# Patient Record
Sex: MALE | Race: BLACK OR AFRICAN AMERICAN | Employment: FULL TIME | URBAN - METROPOLITAN AREA
[De-identification: names, ages, dates, MRNs, and addresses within clinical notes are randomized per-mention and may not be internally consistent; named-entity substitution may affect disease eponyms.]

---

## 2020-04-14 ENCOUNTER — APPOINTMENT (OUTPATIENT)
Dept: GENERAL RADIOLOGY | Age: 65
End: 2020-04-14
Payer: COMMERCIAL

## 2020-04-14 ENCOUNTER — HOSPITAL ENCOUNTER (EMERGENCY)
Age: 65
Discharge: HOME | End: 2020-04-14
Attending: EMERGENCY MEDICINE
Payer: COMMERCIAL

## 2020-04-14 VITALS
SYSTOLIC BLOOD PRESSURE: 165 MMHG | RESPIRATION RATE: 18 BRPM | WEIGHT: 270 LBS | HEART RATE: 80 BPM | HEIGHT: 70 IN | DIASTOLIC BLOOD PRESSURE: 111 MMHG | BODY MASS INDEX: 38.65 KG/M2 | OXYGEN SATURATION: 95 % | TEMPERATURE: 98.3 F

## 2020-04-14 LAB
A/G RATIO: 1.2 (ref 1.1–2.2)
ALBUMIN SERPL-MCNC: 3.8 G/DL (ref 3.4–5)
ALP BLD-CCNC: 72 U/L (ref 40–129)
ALT SERPL-CCNC: 20 U/L (ref 10–40)
ANION GAP SERPL CALCULATED.3IONS-SCNC: 12 MMOL/L (ref 3–16)
AST SERPL-CCNC: 24 U/L (ref 15–37)
BASOPHILS ABSOLUTE: 0.1 K/UL (ref 0–0.2)
BASOPHILS RELATIVE PERCENT: 2 %
BILIRUB SERPL-MCNC: 0.3 MG/DL (ref 0–1)
BUN BLDV-MCNC: 16 MG/DL (ref 7–20)
CALCIUM SERPL-MCNC: 9.5 MG/DL (ref 8.3–10.6)
CHLORIDE BLD-SCNC: 102 MMOL/L (ref 99–110)
CO2: 25 MMOL/L (ref 21–32)
CREAT SERPL-MCNC: 1 MG/DL (ref 0.8–1.3)
EOSINOPHILS ABSOLUTE: 0.3 K/UL (ref 0–0.6)
EOSINOPHILS RELATIVE PERCENT: 4.7 %
GFR AFRICAN AMERICAN: >60
GFR NON-AFRICAN AMERICAN: >60
GLOBULIN: 3.2 G/DL
GLUCOSE BLD-MCNC: 117 MG/DL (ref 70–99)
HCT VFR BLD CALC: 45 % (ref 40.5–52.5)
HEMOGLOBIN: 15 G/DL (ref 13.5–17.5)
INR BLD: 1.04 (ref 0.86–1.14)
LYMPHOCYTES ABSOLUTE: 2.8 K/UL (ref 1–5.1)
LYMPHOCYTES RELATIVE PERCENT: 41.5 %
MCH RBC QN AUTO: 24.8 PG (ref 26–34)
MCHC RBC AUTO-ENTMCNC: 33.3 G/DL (ref 31–36)
MCV RBC AUTO: 74.6 FL (ref 80–100)
MONOCYTES ABSOLUTE: 0.9 K/UL (ref 0–1.3)
MONOCYTES RELATIVE PERCENT: 13 %
NEUTROPHILS ABSOLUTE: 2.6 K/UL (ref 1.7–7.7)
NEUTROPHILS RELATIVE PERCENT: 38.8 %
PDW BLD-RTO: 13.7 % (ref 12.4–15.4)
PLATELET # BLD: 191 K/UL (ref 135–450)
PMV BLD AUTO: 9 FL (ref 5–10.5)
POTASSIUM SERPL-SCNC: 3.9 MMOL/L (ref 3.5–5.1)
PROTHROMBIN TIME: 12.1 SEC (ref 10–13.2)
RBC # BLD: 6.04 M/UL (ref 4.2–5.9)
SODIUM BLD-SCNC: 139 MMOL/L (ref 136–145)
TOTAL PROTEIN: 7 G/DL (ref 6.4–8.2)
WBC # BLD: 6.7 K/UL (ref 4–11)

## 2020-04-14 PROCEDURE — 80053 COMPREHEN METABOLIC PANEL: CPT

## 2020-04-14 PROCEDURE — 6370000000 HC RX 637 (ALT 250 FOR IP): Performed by: EMERGENCY MEDICINE

## 2020-04-14 PROCEDURE — 99283 EMERGENCY DEPT VISIT LOW MDM: CPT

## 2020-04-14 PROCEDURE — 71045 X-RAY EXAM CHEST 1 VIEW: CPT

## 2020-04-14 PROCEDURE — 85610 PROTHROMBIN TIME: CPT

## 2020-04-14 PROCEDURE — 85025 COMPLETE CBC W/AUTO DIFF WBC: CPT

## 2020-04-14 RX ORDER — PREDNISONE 20 MG/1
40 TABLET ORAL ONCE
Status: COMPLETED | OUTPATIENT
Start: 2020-04-14 | End: 2020-04-14

## 2020-04-14 RX ORDER — ALBUTEROL SULFATE 90 UG/1
2 AEROSOL, METERED RESPIRATORY (INHALATION) EVERY 4 HOURS PRN
Qty: 1 INHALER | Refills: 0 | Status: ON HOLD | OUTPATIENT
Start: 2020-04-14 | End: 2022-01-11

## 2020-04-14 RX ORDER — PREDNISONE 10 MG/1
40 TABLET ORAL DAILY
Qty: 16 TABLET | Refills: 0 | Status: SHIPPED | OUTPATIENT
Start: 2020-04-14 | End: 2020-04-18

## 2020-04-14 RX ORDER — AZITHROMYCIN 250 MG/1
500 TABLET, FILM COATED ORAL ONCE
Status: COMPLETED | OUTPATIENT
Start: 2020-04-14 | End: 2020-04-14

## 2020-04-14 RX ORDER — AZITHROMYCIN 250 MG/1
250 TABLET, FILM COATED ORAL DAILY
Qty: 5 TABLET | Refills: 0 | Status: SHIPPED | OUTPATIENT
Start: 2020-04-14 | End: 2020-04-19

## 2020-04-14 RX ADMIN — AZITHROMYCIN MONOHYDRATE 500 MG: 250 TABLET ORAL at 02:02

## 2020-04-14 RX ADMIN — PREDNISONE 40 MG: 20 TABLET ORAL at 02:02

## 2020-04-14 NOTE — ED PROVIDER NOTES
Attends meetings of clubs or organizations: Not on file     Relationship status: Not on file    Intimate partner violence     Fear of current or ex partner: Not on file     Emotionally abused: Not on file     Physically abused: Not on file     Forced sexual activity: Not on file   Other Topics Concern    Not on file   Social History Narrative    Not on file     No current facility-administered medications for this encounter. Current Outpatient Medications   Medication Sig Dispense Refill    predniSONE (DELTASONE) 10 MG tablet Take 4 tablets by mouth daily for 4 days 16 tablet 0    albuterol sulfate HFA (PROVENTIL HFA) 108 (90 Base) MCG/ACT inhaler Inhale 2 puffs into the lungs every 4 hours as needed for Wheezing 1 Inhaler 0    azithromycin (ZITHROMAX) 250 MG tablet Take 1 tablet by mouth daily for 5 days 2 pills on day 1 and 1 pill on days 2-5 5 tablet 0    lisinopril (PRINIVIL;ZESTRIL) 10 MG tablet Take 10 mg by mouth daily      Multiple Vitamins-Minerals (THERAPEUTIC MULTIVITAMIN-MINERALS) tablet Take 1 tablet by mouth daily      Ascorbic Acid (VITAMIN C) 500 MG tablet Take 250 mg by mouth daily      Omega-3 Fatty Acids (FISH OIL) 1000 MG CAPS Take 1,000 mg by mouth daily      Ginkgo Biloba Extract (GNP GINGKO BILOBA EXTRACT) 60 MG CAPS Take by mouth daily      naproxen (NAPROSYN) 500 MG tablet Take 500 mg by mouth 2 times daily as needed for Pain       No Known Allergies    REVIEW OF SYSTEMS  10 systems reviewed, pertinent positives per HPI otherwise noted to be negative. PHYSICAL EXAM  BP (!) 165/111   Pulse 80   Temp 98.3 °F (36.8 °C) (Oral)   Resp 18   Ht 5' 10\" (1.778 m)   Wt 270 lb (122.5 kg)   SpO2 95%   BMI 38.74 kg/m²   GENERAL APPEARANCE: Awake and alert. Cooperative. No acute distress. HEAD: Normocephalic. Atraumatic. EYES: PERRL. EOM's grossly intact. ENT: Mucous membranes are moist.   NECK: Supple. HEART: RRR.    CHEST/LUNGS: Chest atraumatic, nontender, MILD WHEEZE

## 2020-04-15 ENCOUNTER — CARE COORDINATION (OUTPATIENT)
Dept: CARE COORDINATION | Age: 65
End: 2020-04-15

## 2020-04-15 NOTE — CARE COORDINATION
COVID-19 ED/Flu Clinic Initial Outreach Note    Patient contacted regarding recent visit for viral symptoms. This Anthony Turpin contacted the patient by telephone to perform post discharge call. Verified name and  with patient as identifiers. Provided introduction to self, and reason for call due to viral symptoms of infection and/or exposure to COVID-19. Patient presented to emergency department/flu clinic with complaints of viral symptoms/exposure to COVID. Patient reports symptoms are improving. Due to no new or worsening symptoms the RN CTN/ACADRIAN was not notified for escalation. Discussed exposure protocols and quarantine with CDC Guidelines What To Do If You Are Sick    Patient was given an opportunity for questions and concerns. Stay home except to get medical care    Separate yourself from other people and animals in your home    Call ahead before visiting your doctor    Wear a facemask    Cover your coughs and sneezes    Clean your hands often    Avoid sharing personal household items    Clean all high-touch surfaces everyday    Monitor your symptoms  Seek prompt medical attention if your illness is worsening (e.g., difficulty breathing). Before seeking care, call your healthcare provider and tell them that you have, or are being evaluated for, COVID-19. Put on a facemask before you enter the facility. These steps will help the healthcare provider's office to keep other people in the office or waiting room from getting infected or exposed. Ask your healthcare provider to call the local or Critical access hospital health department. Persons who are placed under If you have a medical emergency and need to call 911, notify the dispatch personnel that you have, or are being evaluated for COVID-19. If possible, put on a facemask before emergency medical services arrive.     The patient agrees to contact the [facility] exposure line [phone], local health department [place] [facility]: ([phone]) and PCP office

## 2020-04-17 ENCOUNTER — TELEPHONE (OUTPATIENT)
Dept: PULMONOLOGY | Age: 65
End: 2020-04-17

## 2020-04-17 NOTE — TELEPHONE ENCOUNTER
Within this Telehealth Consent, the terms you and yours refer to the person using the Telehealth Service (Service), or in the case of a use of the Service by or on behalf of a minor, you and yours refer to and include (i) the parent or legal guardian who provides consent to the use of the Service by such minor or uses the Service on behalf of such minor, and (ii) the minor for whom consent is being provided or on whose behalf the Service is being utilized. When using Service, you will be consulting with your health care providers via the use of Telehealth.   Telehealth involves the delivery of healthcare services using electronic communications, information technology or other means between a healthcare provider and a patient who are not in the same physical location. Telehealth may be used for diagnosis, treatment, follow-up and/or patient education, and may include, but is not limited to, one or more of the following:    Electronic transmission of medical records, photo images, personal health information or other data between a patient and a healthcare provider    Interactions between a patient and healthcare provider via audio, video and/or data communications    Use of output data from medical devices, sound and video files    Anticipated Benefits   The use of Telehealth by your Provider(s) through the Service may have the following possible benefits:    Making it easier and more efficient for you to access medical care and treatment for the conditions treated by such Provider(s) utilizing the Service    Allowing you to obtain medical care and treatment by Provider(s) at times that are convenient for you    Enabling you to interact with Provider(s) without the necessity of an in-office appointment     Possible Risks   While the use of Telehealth can provide potential benefits for you, there are also potential risks associated with the use of Telehealth.  These risks include, but may not be

## 2020-04-22 ENCOUNTER — VIRTUAL VISIT (OUTPATIENT)
Dept: PULMONOLOGY | Age: 65
End: 2020-04-22
Payer: COMMERCIAL

## 2020-04-22 ENCOUNTER — TELEPHONE (OUTPATIENT)
Dept: PULMONOLOGY | Age: 65
End: 2020-04-22

## 2020-04-22 PROCEDURE — 99204 OFFICE O/P NEW MOD 45 MIN: CPT | Performed by: INTERNAL MEDICINE

## 2020-04-22 ASSESSMENT — ENCOUNTER SYMPTOMS
STRIDOR: 0
CHOKING: 0
EYE PAIN: 0
VOICE CHANGE: 0
EYE DISCHARGE: 0
CHEST TIGHTNESS: 1
SORE THROAT: 0
EYE ITCHING: 0
CONSTIPATION: 0
DIARRHEA: 0
COUGH: 1
ABDOMINAL PAIN: 0

## 2020-04-22 NOTE — PROGRESS NOTES
Pulmonary Outpatient Note   Erinn Hinson MD       4/22/2020    Chief Complaint:  New Patient (Ref Dr Ramírez Martínez Frequent Bronchitis)     HPI:   76y.o. year old male here for further evaluation of hemoptysis. Has a remote history of sarcoidosis based on chest imaging and what sounds like a nerve biopsy due to sciatica pain he was experiencing over 20 years ago. He had not respiratory symptoms at that time and no further workup or treatment was pursued. A week ago he experienced three days of a daily cough productive of yellow sputum and streaks of blood, mild congestion, substernal chest tightness. No fevers, sick contacts, or shortness of breath. Was evaluated in the ED, had a CXR I Personally reviewed showing no acute process. Labs reviewed, no significant lymphopenia or eosinophilia. Discharged on azithromycin, prednisone, and albuterol MDI. He subsequently improved. Had one episode earlier this week of chest burning after he was done mowing the lawn. Denies any preceding allergic or asthma symptoms. Has not been using his albuterol MDI as he did not feel that he needed to.        Past Medical History:   Diagnosis Date    Hypertension     Kidney stone     Mitral valve prolapse 1980    resolved on own    Sarcoidosis     Spinal stenosis        Past Surgical History:   Procedure Laterality Date    BACK SURGERY  1992    inflamed nerve    COLONOSCOPY  5/5/2015       Social History     Tobacco Use    Smoking status: Never Smoker    Smokeless tobacco: Never Used   Substance Use Topics    Alcohol use: Yes     Comment: 2 x week          Family History   Problem Relation Age of Onset    High Blood Pressure Mother     Stroke Father          Current Outpatient Medications:     albuterol sulfate HFA (PROVENTIL HFA) 108 (90 Base) MCG/ACT inhaler, Inhale 2 puffs into the lungs every 4 hours as needed for Wheezing, Disp: 1 Inhaler, Rfl: 0    lisinopril (PRINIVIL;ZESTRIL) 10 MG tablet, Take 10 mg by again  -Return to clinic after CT chest for further workup    Orders Placed This Encounter   Procedures    CT Chest Martir Varma MD    Evon Bruner is a 76 y.o. male being evaluated by a Virtual Visit (video visit) encounter to address concerns as mentioned above. A caregiver was present when appropriate. Due to this being a TeleHealth encounter ([facility]), evaluation of the following organ systems was limited: Vitals/Constitutional/EENT/Resp/CV/GI//MS/Neuro/Skin/Heme-Lymph-Imm. Pursuant to the emergency declaration under the [address] [place] [address], [address] [place] [address] authority and the [employer] Act, this Virtual Visit was conducted with patient's (and/or legal guardian's) consent, to reduce the patient's risk of exposure to COVID-19 and provide necessary medical care. The patient (and/or legal guardian) has also been advised to contact this office for worsening conditions or problems, and seek emergency medical treatment and/or call 911 if deemed necessary. Services were provided through a video synchronous discussion virtually to substitute for in-person clinic visit. Patient and provider were located at their individual homes. --Emile Pham MD on 4/22/2020 at 12:31 PM    An electronic signature was used to authenticate this note.

## 2020-04-22 NOTE — TELEPHONE ENCOUNTER
----- Message from Derek Almaguer MD sent at 4/22/2020 12:32 PM EDT -----  CT chest in June and follow up visit after   LM on am

## 2020-06-22 ENCOUNTER — HOSPITAL ENCOUNTER (OUTPATIENT)
Dept: CT IMAGING | Age: 65
Discharge: HOME | End: 2020-06-22
Payer: COMMERCIAL

## 2020-06-22 PROCEDURE — 71250 CT THORAX DX C-: CPT

## 2020-06-29 ENCOUNTER — VIRTUAL VISIT (OUTPATIENT)
Dept: PULMONOLOGY | Age: 65
End: 2020-06-29
Payer: COMMERCIAL

## 2020-06-29 PROCEDURE — 99213 OFFICE O/P EST LOW 20 MIN: CPT | Performed by: INTERNAL MEDICINE

## 2020-06-29 ASSESSMENT — ENCOUNTER SYMPTOMS
EYE ITCHING: 0
SORE THROAT: 0
ABDOMINAL PAIN: 0
CONSTIPATION: 0
STRIDOR: 0
CHOKING: 0
EYE DISCHARGE: 0
DIARRHEA: 0
CHEST TIGHTNESS: 0
VOICE CHANGE: 0
EYE PAIN: 0

## 2020-06-29 NOTE — PROGRESS NOTES
MA Communication: The following orders are received by verbal communication from Dr. Marie Pierce. Orders include: Pt.  To FU PRN

## 2020-06-29 NOTE — PATIENT INSTRUCTIONS
Remember to bring all pulmonary medications to your next appointment with the office. Please keep all of your future appointments scheduled by [facility] Pul[facility] office. Out of respect for other patients and providers, you may be asked to reschedule your appointment if you arrive later than your scheduled appointment time. Appointments cancelled less than 24hrs in advance will be considered a no show. Patients with three missed appointments within 1 year or four missed appointments within 2 years can be dismissed from the practice. You may receive a survey regarding the care you received during your visit. Your input is valuable to us. We encourage you to complete and return your survey. We hope you will choose us in the future for your healthcare needs.

## 2020-06-29 NOTE — PROGRESS NOTES
Pulmonary Outpatient Note   Guy Gaxiola MD       6/29/2020    Chief Complaint:  Follow-up and Results (CT)     HPI:   76y.o. year old male here for further evaluation hemoptysis. Virtual visit through [facility]. me    CT chest personally reviewed, peribronchial thickening and subtle apical emphysema. He denies any significant symptoms at this time from a respiratory standpoint. States that he goes outside and engages in moderate exertion without difficulty.    No further hemoptysis  Was prescribed an inhaler last visit but has not required it     Past Medical History:   Diagnosis Date    Hypertension     Kidney stone     Mitral valve prolapse 1980    resolved on own    Sarcoidosis     Spinal stenosis        Past Surgical History:   Procedure Laterality Date    BACK SURGERY  1992    inflamed nerve    COLONOSCOPY  5/5/2015       Social History     Tobacco Use    Smoking status: Never Smoker    Smokeless tobacco: Never Used   Substance Use Topics    Alcohol use: Yes     Comment: 2 x week       Family History   Problem Relation Age of Onset    High Blood Pressure Mother     Stroke Father          Current Outpatient Medications:     albuterol sulfate HFA (PROVENTIL HFA) 108 (90 Base) MCG/ACT inhaler, Inhale 2 puffs into the lungs every 4 hours as needed for Wheezing, Disp: 1 Inhaler, Rfl: 0    lisinopril (PRINIVIL;ZESTRIL) 10 MG tablet, Take 10 mg by mouth daily, Disp: , Rfl:     Multiple Vitamins-Minerals (THERAPEUTIC MULTIVITAMIN-MINERALS) tablet, Take 1 tablet by mouth daily, Disp: , Rfl:     Ascorbic Acid (VITAMIN C) 500 MG tablet, Take 250 mg by mouth daily, Disp: , Rfl:     Omega-3 Fatty Acids (FISH OIL) 1000 MG CAPS, Take 1,000 mg by mouth daily, Disp: , Rfl:     Ginkgo Biloba Extract (GNP GINGKO BILOBA EXTRACT) 60 MG CAPS, Take by mouth daily, Disp: , Rfl:     naproxen (NAPROSYN) 500 MG tablet, Take 500 mg by mouth 2 times daily as needed for Pain, Disp: , Rfl:     Patient has no known

## 2022-01-04 ENCOUNTER — APPOINTMENT (OUTPATIENT)
Dept: GENERAL RADIOLOGY | Age: 67
DRG: 500 | End: 2022-01-04
Payer: COMMERCIAL

## 2022-01-04 ENCOUNTER — APPOINTMENT (OUTPATIENT)
Dept: CT IMAGING | Age: 67
DRG: 500 | End: 2022-01-04
Payer: COMMERCIAL

## 2022-01-04 ENCOUNTER — HOSPITAL ENCOUNTER (INPATIENT)
Age: 67
LOS: 7 days | Discharge: NURSING, REHAB OR LONG-TERM CARE | DRG: 500 | End: 2022-01-11
Attending: EMERGENCY MEDICINE | Admitting: INTERNAL MEDICINE
Payer: COMMERCIAL

## 2022-01-04 DIAGNOSIS — S76.111A: ICD-10-CM

## 2022-01-04 DIAGNOSIS — S76.112A TENDON RUPTURE, TRAUMATIC. QUADRICEPS, LEFT, INITIAL ENCOUNTER: Primary | ICD-10-CM

## 2022-01-04 PROBLEM — I10 HTN (HYPERTENSION): Status: ACTIVE | Noted: 2022-01-04

## 2022-01-04 PROBLEM — S76.119A: Status: ACTIVE | Noted: 2022-01-04

## 2022-01-04 PROBLEM — S46.212A BICEPS TENDON RUPTURE, LEFT, INITIAL ENCOUNTER: Status: ACTIVE | Noted: 2022-01-04

## 2022-01-04 LAB
A/G RATIO: 1.3 (ref 1.1–2.2)
ALBUMIN SERPL-MCNC: 3.8 G/DL (ref 3.4–5)
ALP BLD-CCNC: 68 U/L (ref 40–129)
ALT SERPL-CCNC: 16 U/L (ref 10–40)
ANION GAP SERPL CALCULATED.3IONS-SCNC: 10 MMOL/L (ref 3–16)
APTT: 22.5 SEC (ref 26.2–38.6)
AST SERPL-CCNC: 26 U/L (ref 15–37)
BASOPHILS ABSOLUTE: 0.1 K/UL (ref 0–0.2)
BASOPHILS RELATIVE PERCENT: 0.7 %
BILIRUB SERPL-MCNC: 0.7 MG/DL (ref 0–1)
BUN BLDV-MCNC: 11 MG/DL (ref 7–20)
CALCIUM SERPL-MCNC: 8.7 MG/DL (ref 8.3–10.6)
CHLORIDE BLD-SCNC: 102 MMOL/L (ref 99–110)
CO2: 25 MMOL/L (ref 21–32)
CREAT SERPL-MCNC: 0.9 MG/DL (ref 0.8–1.3)
EOSINOPHILS ABSOLUTE: 0 K/UL (ref 0–0.6)
EOSINOPHILS RELATIVE PERCENT: 0.6 %
GFR AFRICAN AMERICAN: >60
GFR NON-AFRICAN AMERICAN: >60
GLUCOSE BLD-MCNC: 103 MG/DL (ref 70–99)
HCT VFR BLD CALC: 42.7 % (ref 40.5–52.5)
HEMOGLOBIN: 13.4 G/DL (ref 13.5–17.5)
INR BLD: 1.08 (ref 0.88–1.12)
LYMPHOCYTES ABSOLUTE: 1.1 K/UL (ref 1–5.1)
LYMPHOCYTES RELATIVE PERCENT: 13.5 %
MCH RBC QN AUTO: 23.6 PG (ref 26–34)
MCHC RBC AUTO-ENTMCNC: 31.4 G/DL (ref 31–36)
MCV RBC AUTO: 75.2 FL (ref 80–100)
MONOCYTES ABSOLUTE: 0.7 K/UL (ref 0–1.3)
MONOCYTES RELATIVE PERCENT: 9 %
NEUTROPHILS ABSOLUTE: 6.3 K/UL (ref 1.7–7.7)
NEUTROPHILS RELATIVE PERCENT: 76.2 %
PDW BLD-RTO: 13.8 % (ref 12.4–15.4)
PLATELET # BLD: 176 K/UL (ref 135–450)
PMV BLD AUTO: 8.4 FL (ref 5–10.5)
POTASSIUM REFLEX MAGNESIUM: 3.6 MMOL/L (ref 3.5–5.1)
PROTHROMBIN TIME: 12.2 SEC (ref 9.9–12.7)
RBC # BLD: 5.67 M/UL (ref 4.2–5.9)
SARS-COV-2, NAAT: NOT DETECTED
SODIUM BLD-SCNC: 137 MMOL/L (ref 136–145)
TOTAL CK: 467 U/L (ref 39–308)
TOTAL PROTEIN: 6.7 G/DL (ref 6.4–8.2)
TROPONIN: <0.01 NG/ML
WBC # BLD: 8.3 K/UL (ref 4–11)

## 2022-01-04 PROCEDURE — 84484 ASSAY OF TROPONIN QUANT: CPT

## 2022-01-04 PROCEDURE — 73560 X-RAY EXAM OF KNEE 1 OR 2: CPT

## 2022-01-04 PROCEDURE — 87635 SARS-COV-2 COVID-19 AMP PRB: CPT

## 2022-01-04 PROCEDURE — 73562 X-RAY EXAM OF KNEE 3: CPT

## 2022-01-04 PROCEDURE — 85730 THROMBOPLASTIN TIME PARTIAL: CPT

## 2022-01-04 PROCEDURE — 82550 ASSAY OF CK (CPK): CPT

## 2022-01-04 PROCEDURE — 80053 COMPREHEN METABOLIC PANEL: CPT

## 2022-01-04 PROCEDURE — 85610 PROTHROMBIN TIME: CPT

## 2022-01-04 PROCEDURE — 1200000000 HC SEMI PRIVATE

## 2022-01-04 PROCEDURE — 2580000003 HC RX 258: Performed by: HOSPITALIST

## 2022-01-04 PROCEDURE — 73700 CT LOWER EXTREMITY W/O DYE: CPT

## 2022-01-04 PROCEDURE — 6370000000 HC RX 637 (ALT 250 FOR IP): Performed by: HOSPITALIST

## 2022-01-04 PROCEDURE — 99283 EMERGENCY DEPT VISIT LOW MDM: CPT

## 2022-01-04 PROCEDURE — 93005 ELECTROCARDIOGRAM TRACING: CPT | Performed by: HOSPITALIST

## 2022-01-04 PROCEDURE — 6360000002 HC RX W HCPCS: Performed by: HOSPITALIST

## 2022-01-04 PROCEDURE — 85025 COMPLETE CBC W/AUTO DIFF WBC: CPT

## 2022-01-04 RX ORDER — POTASSIUM CHLORIDE 7.45 MG/ML
10 INJECTION INTRAVENOUS PRN
Status: DISCONTINUED | OUTPATIENT
Start: 2022-01-04 | End: 2022-01-10

## 2022-01-04 RX ORDER — OXYCODONE HYDROCHLORIDE 5 MG/1
5 TABLET ORAL EVERY 6 HOURS PRN
Status: DISCONTINUED | OUTPATIENT
Start: 2022-01-04 | End: 2022-01-05

## 2022-01-04 RX ORDER — SODIUM CHLORIDE 0.9 % (FLUSH) 0.9 %
10 SYRINGE (ML) INJECTION PRN
Status: DISCONTINUED | OUTPATIENT
Start: 2022-01-04 | End: 2022-01-05

## 2022-01-04 RX ORDER — SODIUM CHLORIDE 0.9 % (FLUSH) 0.9 %
10 SYRINGE (ML) INJECTION EVERY 12 HOURS SCHEDULED
Status: DISCONTINUED | OUTPATIENT
Start: 2022-01-04 | End: 2022-01-05

## 2022-01-04 RX ORDER — GABAPENTIN 300 MG/1
300 CAPSULE ORAL 3 TIMES DAILY
Status: DISCONTINUED | OUTPATIENT
Start: 2022-01-04 | End: 2022-01-08

## 2022-01-04 RX ORDER — LISINOPRIL 10 MG/1
10 TABLET ORAL NIGHTLY
Status: DISCONTINUED | OUTPATIENT
Start: 2022-01-04 | End: 2022-01-06

## 2022-01-04 RX ORDER — ONDANSETRON 2 MG/ML
4 INJECTION INTRAMUSCULAR; INTRAVENOUS EVERY 6 HOURS PRN
Status: DISCONTINUED | OUTPATIENT
Start: 2022-01-04 | End: 2022-01-11 | Stop reason: HOSPADM

## 2022-01-04 RX ORDER — MAGNESIUM SULFATE IN WATER 40 MG/ML
2000 INJECTION, SOLUTION INTRAVENOUS PRN
Status: DISCONTINUED | OUTPATIENT
Start: 2022-01-04 | End: 2022-01-10

## 2022-01-04 RX ORDER — SODIUM CHLORIDE 9 MG/ML
INJECTION, SOLUTION INTRAVENOUS CONTINUOUS
Status: ACTIVE | OUTPATIENT
Start: 2022-01-04 | End: 2022-01-05

## 2022-01-04 RX ORDER — SODIUM CHLORIDE 9 MG/ML
25 INJECTION, SOLUTION INTRAVENOUS PRN
Status: DISCONTINUED | OUTPATIENT
Start: 2022-01-04 | End: 2022-01-11 | Stop reason: HOSPADM

## 2022-01-04 RX ORDER — CYCLOBENZAPRINE HCL 10 MG
5 TABLET ORAL 2 TIMES DAILY
Status: DISCONTINUED | OUTPATIENT
Start: 2022-01-04 | End: 2022-01-11 | Stop reason: HOSPADM

## 2022-01-04 RX ORDER — M-VIT,TX,IRON,MINS/CALC/FOLIC 27MG-0.4MG
1 TABLET ORAL DAILY
Status: DISCONTINUED | OUTPATIENT
Start: 2022-01-05 | End: 2022-01-11 | Stop reason: HOSPADM

## 2022-01-04 RX ORDER — ASCORBIC ACID 500 MG
250 TABLET ORAL DAILY
Status: DISCONTINUED | OUTPATIENT
Start: 2022-01-05 | End: 2022-01-11 | Stop reason: HOSPADM

## 2022-01-04 RX ORDER — SENNA PLUS 8.6 MG/1
1 TABLET ORAL DAILY PRN
Status: DISCONTINUED | OUTPATIENT
Start: 2022-01-04 | End: 2022-01-11 | Stop reason: HOSPADM

## 2022-01-04 RX ORDER — GABAPENTIN 300 MG/1
CAPSULE ORAL NIGHTLY
COMMUNITY
Start: 2021-12-13 | End: 2022-04-07

## 2022-01-04 RX ORDER — ACETAMINOPHEN 500 MG
1000 TABLET ORAL EVERY 6 HOURS PRN
Status: DISCONTINUED | OUTPATIENT
Start: 2022-01-04 | End: 2022-01-11 | Stop reason: HOSPADM

## 2022-01-04 RX ORDER — LIDOCAINE 4 G/G
1 PATCH TOPICAL DAILY
Status: DISCONTINUED | OUTPATIENT
Start: 2022-01-04 | End: 2022-01-05

## 2022-01-04 RX ORDER — ACETAMINOPHEN 325 MG/1
650 TABLET ORAL EVERY 6 HOURS PRN
Status: DISCONTINUED | OUTPATIENT
Start: 2022-01-04 | End: 2022-01-11 | Stop reason: HOSPADM

## 2022-01-04 RX ORDER — LISINOPRIL 10 MG/1
10 TABLET ORAL EVERY EVENING
Status: ON HOLD | COMMUNITY
End: 2022-04-09 | Stop reason: HOSPADM

## 2022-01-04 RX ORDER — PROMETHAZINE HYDROCHLORIDE 25 MG/1
12.5 TABLET ORAL EVERY 6 HOURS PRN
Status: DISCONTINUED | OUTPATIENT
Start: 2022-01-04 | End: 2022-01-11 | Stop reason: HOSPADM

## 2022-01-04 RX ORDER — ACETAMINOPHEN 650 MG/1
650 SUPPOSITORY RECTAL EVERY 6 HOURS PRN
Status: DISCONTINUED | OUTPATIENT
Start: 2022-01-04 | End: 2022-01-11 | Stop reason: HOSPADM

## 2022-01-04 RX ORDER — KETOROLAC TROMETHAMINE 30 MG/ML
15 INJECTION, SOLUTION INTRAMUSCULAR; INTRAVENOUS ONCE
Status: COMPLETED | OUTPATIENT
Start: 2022-01-04 | End: 2022-01-04

## 2022-01-04 RX ORDER — LISINOPRIL 10 MG/1
10 TABLET ORAL DAILY
Status: DISCONTINUED | OUTPATIENT
Start: 2022-01-05 | End: 2022-01-04

## 2022-01-04 RX ORDER — POTASSIUM CHLORIDE 20 MEQ/1
40 TABLET, EXTENDED RELEASE ORAL PRN
Status: DISCONTINUED | OUTPATIENT
Start: 2022-01-04 | End: 2022-01-10

## 2022-01-04 RX ADMIN — LISINOPRIL 10 MG: 10 TABLET ORAL at 23:43

## 2022-01-04 RX ADMIN — Medication 10 ML: at 23:44

## 2022-01-04 RX ADMIN — KETOROLAC TROMETHAMINE 15 MG: 30 INJECTION, SOLUTION INTRAMUSCULAR; INTRAVENOUS at 20:50

## 2022-01-04 RX ADMIN — SODIUM CHLORIDE: 9 INJECTION, SOLUTION INTRAVENOUS at 23:46

## 2022-01-04 RX ADMIN — CYCLOBENZAPRINE 5 MG: 10 TABLET, FILM COATED ORAL at 20:50

## 2022-01-04 RX ADMIN — GABAPENTIN 300 MG: 300 CAPSULE ORAL at 23:43

## 2022-01-04 RX ADMIN — OXYCODONE 5 MG: 5 TABLET ORAL at 20:50

## 2022-01-04 ASSESSMENT — ENCOUNTER SYMPTOMS
SHORTNESS OF BREATH: 0
VOMITING: 0
NAUSEA: 0

## 2022-01-04 ASSESSMENT — PAIN DESCRIPTION - LOCATION: LOCATION: KNEE

## 2022-01-04 ASSESSMENT — PAIN DESCRIPTION - ORIENTATION: ORIENTATION: RIGHT;LEFT

## 2022-01-04 ASSESSMENT — PAIN SCALES - GENERAL
PAINLEVEL_OUTOF10: 1
PAINLEVEL_OUTOF10: 0
PAINLEVEL_OUTOF10: 5

## 2022-01-04 ASSESSMENT — PAIN DESCRIPTION - PAIN TYPE: TYPE: ACUTE PAIN

## 2022-01-04 NOTE — ED PROVIDER NOTES
Emergency Department Provider Note  Location: [facility]  ED  1/4/2022     Patient Identification  Lin Xavier is a 78 y.o. male    Chief Complaint  Knee Pain (pulled down in mud while walking dog, hit right knee. unable to bear weight.)      Mode of Arrival  EMS    HPI  (History provided by patient)  This is a 78 y.o. male with a PMH significant for HTN, pre-diabetes presented today for right knee pain and swelling. Patient said he was walking his dog on a muddy slope. He slid and fell and has not been able to get up since. He reports significant pain in the right knee was swelling. He denies hip pain. He reports normal sensation and motor distally. Patient tried to get up and could not so he called 911. He rates his pain 1/10 in intensity when he does not move. When he attempts to bear weight, that's when pain is \"severe. \"  He denies hitting his head. No loss of consciousness. ROS  Review of Systems   Respiratory: Negative for shortness of breath. Cardiovascular: Positive for leg swelling (chronic lower leg edema). Negative for chest pain. Gastrointestinal: Negative for nausea and vomiting. Genitourinary: Negative for flank pain. Musculoskeletal: Positive for arthralgias (right knee pain) and joint swelling. Neurological: Negative for syncope and headaches. Psychiatric/Behavioral: Negative for confusion. I have reviewed the following nursing documentation:  Allergies: No Known Allergies    Past medical history:  has a past medical history of Hypertension, Kidney stone, Mitral valve prolapse (1980), Sarcoidosis, and Spinal stenosis. Past surgical history:  has a past surgical history that includes back surgery (1992) and Colonoscopy (5/5/2015). Home medications:   Prior to Admission medications    Medication Sig Start Date End Date Taking?  Authorizing Provider   albuterol sulfate HFA (PROVENTIL HFA) 108 (90 Base) MCG/ACT inhaler Inhale 2 puffs into the lungs every 4 hours as needed for Wheezing 4/14/20 4/14/21  Terrell MEDINA DO   lisinopril (PRINIVIL;ZESTRIL) 10 MG tablet Take 10 mg by mouth daily    Historical Provider, MD   Multiple Vitamins-Minerals (THERAPEUTIC MULTIVITAMIN-MINERALS) tablet Take 1 tablet by mouth daily    Historical Provider, MD   Ascorbic Acid (VITAMIN C) 500 MG tablet Take 250 mg by mouth daily    Historical Provider, MD   Omega-3 Fatty Acids (FISH OIL) 1000 MG CAPS Take 1,000 mg by mouth daily    Historical Provider, MD   Ginkgo Biloba Extract (GNP GINGKO BILOBA EXTRACT) 60 MG CAPS Take by mouth daily    Historical Provider, MD   naproxen (NAPROSYN) 500 MG tablet Take 500 mg by mouth 2 times daily as needed for Pain    Historical Provider, MD       Social history:  reports that he has never smoked. He has never used smokeless tobacco. He reports current alcohol use. He reports that he does not use drugs. Family history:    Family History   Problem Relation Age of Onset    High Blood Pressure Mother     Stroke Father        Exam  ED Triage Vitals   BP Temp Temp Source Pulse Resp SpO2 Height Weight   01/04/22 1410 01/04/22 1411 01/04/22 1411 01/04/22 1409 01/04/22 1409 01/04/22 1409 01/04/22 1409 01/04/22 1409   (!) 168/108 97.7 °F (36.5 °C) Axillary 83 15 96 % 5' 10\" (1.778 m) 265 lb (120.2 kg)   Physical Exam  Vitals and nursing note reviewed. Constitutional:       General: He is not in acute distress. Appearance: Normal appearance. He is well-developed. He is not diaphoretic. HENT:      Head: Normocephalic and atraumatic. Eyes:      General: No scleral icterus. Right eye: No discharge. Left eye: No discharge. Neck:      Trachea: No tracheal deviation. Cardiovascular:      Rate and Rhythm: Normal rate and regular rhythm. Pulses:           Dorsalis pedis pulses are detected w/ Doppler on the right side and detected w/ Doppler on the left side.         Posterior tibial pulses are detected w/ Doppler on the right side and detected w/ Doppler on the left side. Comments: Pedal pulses - good biphasic signal on dopperler  Pulmonary:      Effort: Pulmonary effort is normal. No respiratory distress. Breath sounds: No stridor. Musculoskeletal:      Cervical back: Neck supple. No tenderness. Right knee: Effusion present. No crepitus. Decreased range of motion (patient has about 10-15 degree active ROM). Normal pulse. Left knee: Deformity (obvious defect of the quadriceps tendon) present. No crepitus. Decreased range of motion (patient has no active flexion or extension of the left knee). Normal pulse. Right lower leg: Edema present. Left lower leg: Edema (edema of foot and ankle noted - chronic per patient) present. Comments: Hips and ankles nontender   Skin:     General: Skin is warm and dry. Capillary Refill: Capillary refill takes less than 2 seconds. Findings: No bruising or laceration. Neurological:      General: No focal deficit present. Mental Status: He is alert and oriented to person, place, and time. Cranial Nerves: No dysarthria or facial asymmetry. Sensory: No sensory deficit. Psychiatric:         Behavior: Behavior normal. Behavior is cooperative. MDM/ED Course  Radiology  XR KNEE LEFT (1-2 VIEWS)    Result Date: 1/4/2022  EXAM: XR Left Knee, 1 or 2 Views EXAM DATE/TIME: 1/4/2022 3:28 pm CLINICAL HISTORY: ORDERING SYSTEM PROVIDED  fall, palpable defect at the femoral tendon and patient cannot flex his knee  TECHNOLOGIST PROVIDED HISTORY:  Reason for exam:->fall, palpable defect at the femoral tendon and patient cannot flex his knee  Reason for Exam: fall, pt cannot flex knee TECHNIQUE: Frontal and/or lateral views of the left knee. COMPARISON: No relevant prior studies available. FINDINGS: Bones/joints:  No acute findings. No acute fracture. No dislocation.  Soft tissues:  Suprapatellar region is not well seen on the lateral view and therefore abnormalities in this area cannot be excluded. Also soft tissue injury or tendon tear cannot be excluded by this exam if suspected clinically. 1.  No evidence of fracture. 2.  Suprapatellar region is not well seen on the lateral view and therefore abnormalities in this area cannot be excluded. Also soft tissue injury or tendon tear cannot be excluded by this exam if suspected clinically. MRI examination would be recommended. XR KNEE RIGHT (1-2 VIEWS)    Result Date: 1/4/2022  EXAM: XR Right Knee, 1 or 2 Views EXAM DATE/TIME: 1/4/2022 2:25 pm CLINICAL HISTORY: ORDERING SYSTEM PROVIDED  right knee swelling and pain s/p fall; can't bear weight  TECHNOLOGIST PROVIDED HISTORY:  Reason for exam:->right knee swelling and pain s/p fall; can't bear weight  Reason for Exam: knee pain after walking dog, pt unable to bear weight TECHNIQUE: Frontal and/or lateral views of the right knee. COMPARISON: No relevant prior studies available. FINDINGS: Bones/joints:  No acute findings. No acute fracture. No dislocation. Soft tissues: There appears to be significant edema, fluid or hematoma in the suprapatellar region and obscuring tissue planes. The quadriceps tendon is not well visualized and therefore tear of the tendon cannot be excluded if suspected clinically. Alternatively this could represent edema or hematoma due to muscular injury in the distal anterior thigh or a large joint effusion though no significant joint effusion is suggested on the lateral view anterior to the knee joint. 1.  There appears to be significant edema, fluid or hematoma in the suprapatellar region and obscuring tissue planes. The quadriceps tendon is not well visualized and therefore tear of the tendon cannot be excluded if suspected clinically.   Alternatively this could represent edema or hematoma due to muscular injury in the distal anterior thigh or a large joint effusion though no significant joint effusion is suggested on the lateral view anterior to the knee joint. Correlate clinically. 2.  No evidence of fracture or joint malalignment. CT KNEE LEFT WO CONTRAST    Result Date: 1/4/2022  EXAMINATION: CT OF THE LEFT KNEE WITHOUT CONTRAST; CT OF THE RIGHT KNEE WITHOUT CONTRAST 1/4/2022 4:07 pm TECHNIQUE: CT of the left knee was performed without the administration of intravenous contrast.  Multiplanar reformatted images are provided for review. Dose modulation, iterative reconstruction, and/or weight based adjustment of the mA/kV was utilized to reduce the radiation dose to as low as reasonably achievable.; CT of the right knee was performed without the administration of intravenous contrast.  Multiplanar reformatted images are provided for review. Dose modulation, iterative reconstruction, and/or weight based adjustment of the mA/kV was utilized to reduce the radiation dose to as low as reasonably achievable. COMPARISON: Right and left knee radiographs January 4, 2022 HISTORY ORDERING SYSTEM PROVIDED HISTORY: cannot bear weight; concern for complete tendon rupture TECHNOLOGIST PROVIDED HISTORY: Reason for exam:->cannot bear weight; concern for complete tendon rupture Decision Support Exception - unselect if not a suspected or confirmed emergency medical condition->Emergency Medical Condition (MA) Reason for Exam: fell today-cannot bear weight-left knee pain; ORDERING SYSTEM PROVIDED HISTORY: unable to bear weight bilaterally; concern for bilateral tendon rupture TECHNOLOGIST PROVIDED HISTORY: Reason for exam:->unable to bear weight bilaterally; concern for bilateral tendon rupture Decision Support Exception - unselect if not a suspected or confirmed emergency medical condition->Emergency Medical Condition (MA) Reason for Exam: fell today-cannot bear weight -right knee pain FINDINGS: CT LEFT KNEE: Bones: No acute fracture of the left knee identified. No suspicious lytic or sclerotic osseous lesion.   No osteolysis or periosteal reaction. Soft Tissue: Complete rupture of the distal left quadriceps tendon with surrounding soft tissue edema. Mild retraction of the torn tendon fibers. Adjacent soft tissue and intramuscular edema. Joint: Anatomic alignment of the knee with no evidence for dislocation. Small knee effusion. Mild osteoarthritis. CT RIGHT KNEE: Bones: No acute fracture of the right knee identified. No suspicious lytic or sclerotic osseous lesion. Soft Tissue: Complete rupture of the right distal quadriceps tendon with associated soft tissue edema and blood products extending along the distal anterior compartment musculature and adjacent subcutaneous tissues consistent with associated hematoma. The hematoma measures approximately 3.0 x 5.1 x 8.8 cm along its imaged portion. No subcutaneous gas. Joint: Anatomic alignment of the knee with no evidence for dislocation. Mild tricompartmental osteoarthritis. Small knee effusion. 1. Complete rupture of the bilateral distal quadriceps tendons with associated muscular strains and surrounding soft tissue edema and blood products. Larger hematoma is present on the right at the level of the distal quadriceps rupture which measures approximately 3.0 x 5.1 x 8.8 cm. 2. No acute fracture identified. 3. 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 RECOMMENDATIONS: Unavailable     CT KNEE RIGHT WO CONTRAST    Result Date: 1/4/2022  EXAMINATION: CT OF THE LEFT KNEE WITHOUT CONTRAST; CT OF THE RIGHT KNEE WITHOUT CONTRAST 1/4/2022 4:07 pm TECHNIQUE: CT of the left knee was performed without the administration of intravenous contrast.  Multiplanar reformatted images are provided for review.  Dose modulation, iterative reconstruction, and/or weight based adjustment of the mA/kV was utilized to reduce the radiation dose to as low as reasonably achievable.; CT of the right knee was performed without the administration of intravenous contrast.  Multiplanar reformatted images are provided for review. Dose modulation, iterative reconstruction, and/or weight based adjustment of the mA/kV was utilized to reduce the radiation dose to as low as reasonably achievable. COMPARISON: Right and left knee radiographs January 4, 2022 HISTORY ORDERING SYSTEM PROVIDED HISTORY: cannot bear weight; concern for complete tendon rupture TECHNOLOGIST PROVIDED HISTORY: Reason for exam:->cannot bear weight; concern for complete tendon rupture Decision Support Exception - unselect if not a suspected or confirmed emergency medical condition->Emergency Medical Condition (MA) Reason for Exam: fell today-cannot bear weight-left knee pain; ORDERING SYSTEM PROVIDED HISTORY: unable to bear weight bilaterally; concern for bilateral tendon rupture TECHNOLOGIST PROVIDED HISTORY: Reason for exam:->unable to bear weight bilaterally; concern for bilateral tendon rupture Decision Support Exception - unselect if not a suspected or confirmed emergency medical condition->Emergency Medical Condition (MA) Reason for Exam: fell today-cannot bear weight -right knee pain FINDINGS: CT LEFT KNEE: Bones: No acute fracture of the left knee identified. No suspicious lytic or sclerotic osseous lesion. No osteolysis or periosteal reaction. Soft Tissue: Complete rupture of the distal left quadriceps tendon with surrounding soft tissue edema. Mild retraction of the torn tendon fibers. Adjacent soft tissue and intramuscular edema. Joint: Anatomic alignment of the knee with no evidence for dislocation. Small knee effusion. Mild osteoarthritis. CT RIGHT KNEE: Bones: No acute fracture of the right knee identified. No suspicious lytic or sclerotic osseous lesion. Soft Tissue: Complete rupture of the right distal quadriceps tendon with associated soft tissue edema and blood products extending along the distal anterior compartment musculature and adjacent subcutaneous tissues consistent with associated hematoma.   The hematoma measures approximately 3.0 x 5.1 x 8.8 cm along its imaged portion. No subcutaneous gas. Joint: Anatomic alignment of the knee with no evidence for dislocation. Mild tricompartmental osteoarthritis. Small knee effusion. 1. Complete rupture of the bilateral distal quadriceps tendons with associated muscular strains and surrounding soft tissue edema and blood products. Larger hematoma is present on the right at the level of the distal quadriceps rupture which measures approximately 3.0 x 5.1 x 8.8 cm. 2. No acute fracture identified. 3. 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 RECOMMENDATIONS: Unavailable         Labs  Results for orders placed or performed during the hospital encounter of 01/04/22   CBC Auto Differential   Result Value Ref Range    WBC 8.3 4.0 - 11.0 K/uL    RBC 5.67 4.20 - 5.90 M/uL    Hemoglobin 13.4 (L) 13.5 - 17.5 g/dL    Hematocrit 42.7 40.5 - 52.5 %    MCV 75.2 (L) 80.0 - 100.0 fL    MCH 23.6 (L) 26.0 - 34.0 pg    MCHC 31.4 31.0 - 36.0 g/dL    RDW 13.8 12.4 - 15.4 %    Platelets 854 321 - 199 K/uL    MPV 8.4 5.0 - 10.5 fL    Neutrophils % 76.2 %    Lymphocytes % 13.5 %    Monocytes % 9.0 %    Eosinophils % 0.6 %    Basophils % 0.7 %    Neutrophils Absolute 6.3 1.7 - 7.7 K/uL    Lymphocytes Absolute 1.1 1.0 - 5.1 K/uL    Monocytes Absolute 0.7 0.0 - 1.3 K/uL    Eosinophils Absolute 0.0 0.0 - 0.6 K/uL    Basophils Absolute 0.1 0.0 - 0.2 K/uL   Comprehensive Metabolic Panel w/ Reflex to MG   Result Value Ref Range    Sodium 137 136 - 145 mmol/L    Potassium reflex Magnesium 3.6 3.5 - 5.1 mmol/L    Chloride 102 99 - 110 mmol/L    CO2 25 21 - 32 mmol/L    Anion Gap 10 3 - 16    Glucose 103 (H) 70 - 99 mg/dL    BUN 11 7 - 20 mg/dL    CREATININE 0.9 0.8 - 1.3 mg/dL    GFR Non-African American >60 >60    GFR African American >60 >60    Calcium 8.7 8.3 - 10.6 mg/dL         - Patient seen and evaluated in room 21.  78 y.o. male presented for fall, right knee pain and swelling.   On exam, there was also a defect in the left suprapatellar region concerning for quadricep tendon rupture. Patient is neurovascularly intact distally. Compartments of the lower extremity soft. No concern for compartment syndrome. Patient was unable to move his leg so x-rays were done. There was concern for quadriceps tendon rupture. I could not obtained MRI emergently from the emergency department so I consulted Ortho and spoke with MILTON Woods. He recommended CT of the extremity instead. CT show complete rupture of bilateral quadricep tendons. Mr. Debi Jones evaluated the patient in the ED. Plan was to place the patient in bilateral knee immobilizers and give him a set of crutches. If he is able to bear weight and walk with crutches, he can go home. However, patient was not able to even stand without assistance. Patient also lives in a trilevel home which will make it difficult for him as he must go upstairs or downstairs immediately upon entering his house. Patient is at significant fall risk, unsafe to go home and therefore we agreed to admission. I spoke with Dr. Juanita Sheehan, hospitalist. We thoroughly discussed the history, physical exam, laboratory and imaging studies, as well as, emergency department course. Based upon that discussion, we've decided to admit Lazara Ann for further observation and evaluation of Jerrlyn Finger bilateral quadriceps tendon rupture. As I have deemed necessary from their history, physical and studies, I have considered and evaluated Lazara Ann for the following diagnoses:  ACUTE FRACTURE, DISLOCATION, COMPARTMENT SYNDROME, NEUROVASCULAR OR TENDON INJURIES, DVT. Clinical Impression:  1. Tendon rupture, traumatic. quadriceps, left, initial encounter    2. Traumatic rupture of quadriceps tendon, right, initial encounter          Disposition:  Admit to med/surg floor in stable condition.     Blood pressure (!) 149/98, pulse 77, temperature 97.7 °F (36.5 °C), temperature source Axillary, resp. rate 14, height 5' 10\" (1.778 m), weight 265 lb (120.2 kg), SpO2 95 %. This chart was generated in part by using Dragon Dictation system and may contain errors related to that system including errors in grammar, punctuation, and spelling, as well as words and phrases that may be inappropriate. If there are any questions or concerns please feel free to contact the dictating provider for clarification.      Gerald Lin MD  [address] [place] [address] Jace Connelly MD  01/04/22 [id number]

## 2022-01-05 ENCOUNTER — ANESTHESIA (OUTPATIENT)
Dept: OPERATING ROOM | Age: 67
DRG: 500 | End: 2022-01-05
Payer: COMMERCIAL

## 2022-01-05 ENCOUNTER — ANESTHESIA EVENT (OUTPATIENT)
Dept: OPERATING ROOM | Age: 67
DRG: 500 | End: 2022-01-05
Payer: COMMERCIAL

## 2022-01-05 VITALS
DIASTOLIC BLOOD PRESSURE: 80 MMHG | RESPIRATION RATE: 4 BRPM | OXYGEN SATURATION: 100 % | SYSTOLIC BLOOD PRESSURE: 141 MMHG | TEMPERATURE: 96.4 F

## 2022-01-05 PROBLEM — R73.03 PRE-DIABETES: Status: ACTIVE | Noted: 2022-01-05

## 2022-01-05 LAB
A/G RATIO: 1.3 (ref 1.1–2.2)
ALBUMIN SERPL-MCNC: 3.4 G/DL (ref 3.4–5)
ALP BLD-CCNC: 64 U/L (ref 40–129)
ALT SERPL-CCNC: 14 U/L (ref 10–40)
ANION GAP SERPL CALCULATED.3IONS-SCNC: 9 MMOL/L (ref 3–16)
AST SERPL-CCNC: 26 U/L (ref 15–37)
BASOPHILS ABSOLUTE: 0 K/UL (ref 0–0.2)
BASOPHILS RELATIVE PERCENT: 0.9 %
BILIRUB SERPL-MCNC: 1.1 MG/DL (ref 0–1)
BILIRUBIN URINE: NEGATIVE
BLOOD, URINE: NEGATIVE
BUN BLDV-MCNC: 12 MG/DL (ref 7–20)
CALCIUM SERPL-MCNC: 8.3 MG/DL (ref 8.3–10.6)
CHLORIDE BLD-SCNC: 105 MMOL/L (ref 99–110)
CLARITY: CLEAR
CO2: 23 MMOL/L (ref 21–32)
COLOR: YELLOW
CREAT SERPL-MCNC: 0.9 MG/DL (ref 0.8–1.3)
EKG ATRIAL RATE: 87 BPM
EKG DIAGNOSIS: NORMAL
EKG P AXIS: 62 DEGREES
EKG P-R INTERVAL: 168 MS
EKG Q-T INTERVAL: 376 MS
EKG QRS DURATION: 78 MS
EKG QTC CALCULATION (BAZETT): 452 MS
EKG R AXIS: 9 DEGREES
EKG T AXIS: 2 DEGREES
EKG VENTRICULAR RATE: 87 BPM
EOSINOPHILS ABSOLUTE: 0.1 K/UL (ref 0–0.6)
EOSINOPHILS RELATIVE PERCENT: 1 %
GFR AFRICAN AMERICAN: >60
GFR NON-AFRICAN AMERICAN: >60
GLUCOSE BLD-MCNC: 103 MG/DL (ref 70–99)
GLUCOSE BLD-MCNC: 112 MG/DL (ref 70–99)
GLUCOSE BLD-MCNC: 112 MG/DL (ref 70–99)
GLUCOSE BLD-MCNC: 119 MG/DL (ref 70–99)
GLUCOSE BLD-MCNC: 120 MG/DL (ref 70–99)
GLUCOSE URINE: NEGATIVE MG/DL
HCT VFR BLD CALC: 40.1 % (ref 40.5–52.5)
HEMOGLOBIN: 12.8 G/DL (ref 13.5–17.5)
KETONES, URINE: NEGATIVE MG/DL
LEUKOCYTE ESTERASE, URINE: NEGATIVE
LYMPHOCYTES ABSOLUTE: 1.6 K/UL (ref 1–5.1)
LYMPHOCYTES RELATIVE PERCENT: 28.8 %
MCH RBC QN AUTO: 23.8 PG (ref 26–34)
MCHC RBC AUTO-ENTMCNC: 31.9 G/DL (ref 31–36)
MCV RBC AUTO: 74.6 FL (ref 80–100)
MICROSCOPIC EXAMINATION: NORMAL
MONOCYTES ABSOLUTE: 0.7 K/UL (ref 0–1.3)
MONOCYTES RELATIVE PERCENT: 11.6 %
NEUTROPHILS ABSOLUTE: 3.3 K/UL (ref 1.7–7.7)
NEUTROPHILS RELATIVE PERCENT: 57.7 %
NITRITE, URINE: NEGATIVE
PDW BLD-RTO: 13.3 % (ref 12.4–15.4)
PERFORMED ON: ABNORMAL
PH UA: 7 (ref 5–8)
PLATELET # BLD: 157 K/UL (ref 135–450)
PMV BLD AUTO: 7.9 FL (ref 5–10.5)
POTASSIUM REFLEX MAGNESIUM: 3.7 MMOL/L (ref 3.5–5.1)
PROTEIN UA: NEGATIVE MG/DL
RBC # BLD: 5.37 M/UL (ref 4.2–5.9)
SODIUM BLD-SCNC: 137 MMOL/L (ref 136–145)
SPECIFIC GRAVITY UA: 1.01 (ref 1–1.03)
TOTAL CK: 608 U/L (ref 39–308)
TOTAL CK: 682 U/L (ref 39–308)
TOTAL CK: 768 U/L (ref 39–308)
TOTAL PROTEIN: 6.1 G/DL (ref 6.4–8.2)
URINE REFLEX TO CULTURE: NORMAL
URINE TYPE: NORMAL
UROBILINOGEN, URINE: 0.2 E.U./DL
WBC # BLD: 5.7 K/UL (ref 4–11)

## 2022-01-05 PROCEDURE — 2500000003 HC RX 250 WO HCPCS: Performed by: NURSE ANESTHETIST, CERTIFIED REGISTERED

## 2022-01-05 PROCEDURE — 3700000001 HC ADD 15 MINUTES (ANESTHESIA): Performed by: ORTHOPAEDIC SURGERY

## 2022-01-05 PROCEDURE — 2580000003 HC RX 258: Performed by: ORTHOPAEDIC SURGERY

## 2022-01-05 PROCEDURE — 80053 COMPREHEN METABOLIC PANEL: CPT

## 2022-01-05 PROCEDURE — 0LQM0ZZ REPAIR LEFT UPPER LEG TENDON, OPEN APPROACH: ICD-10-PCS | Performed by: ORTHOPAEDIC SURGERY

## 2022-01-05 PROCEDURE — 2500000003 HC RX 250 WO HCPCS: Performed by: ANESTHESIOLOGY

## 2022-01-05 PROCEDURE — 6360000002 HC RX W HCPCS: Performed by: NURSE ANESTHETIST, CERTIFIED REGISTERED

## 2022-01-05 PROCEDURE — 93005 ELECTROCARDIOGRAM TRACING: CPT | Performed by: NURSE PRACTITIONER

## 2022-01-05 PROCEDURE — 2500000003 HC RX 250 WO HCPCS: Performed by: NURSE PRACTITIONER

## 2022-01-05 PROCEDURE — 36415 COLL VENOUS BLD VENIPUNCTURE: CPT

## 2022-01-05 PROCEDURE — 85025 COMPLETE CBC W/AUTO DIFF WBC: CPT

## 2022-01-05 PROCEDURE — 2500000003 HC RX 250 WO HCPCS: Performed by: ORTHOPAEDIC SURGERY

## 2022-01-05 PROCEDURE — 81003 URINALYSIS AUTO W/O SCOPE: CPT

## 2022-01-05 PROCEDURE — 2720000010 HC SURG SUPPLY STERILE: Performed by: ORTHOPAEDIC SURGERY

## 2022-01-05 PROCEDURE — 94761 N-INVAS EAR/PLS OXIMETRY MLT: CPT

## 2022-01-05 PROCEDURE — 2700000000 HC OXYGEN THERAPY PER DAY

## 2022-01-05 PROCEDURE — 3700000000 HC ANESTHESIA ATTENDED CARE: Performed by: ORTHOPAEDIC SURGERY

## 2022-01-05 PROCEDURE — 27385 REPAIR OF THIGH MUSCLE: CPT | Performed by: ORTHOPAEDIC SURGERY

## 2022-01-05 PROCEDURE — 93010 ELECTROCARDIOGRAM REPORT: CPT | Performed by: INTERNAL MEDICINE

## 2022-01-05 PROCEDURE — 0LQL0ZZ REPAIR RIGHT UPPER LEG TENDON, OPEN APPROACH: ICD-10-PCS | Performed by: ORTHOPAEDIC SURGERY

## 2022-01-05 PROCEDURE — 6370000000 HC RX 637 (ALT 250 FOR IP): Performed by: NURSE PRACTITIONER

## 2022-01-05 PROCEDURE — 6370000000 HC RX 637 (ALT 250 FOR IP): Performed by: ORTHOPAEDIC SURGERY

## 2022-01-05 PROCEDURE — 2709999900 HC NON-CHARGEABLE SUPPLY: Performed by: ORTHOPAEDIC SURGERY

## 2022-01-05 PROCEDURE — 3600000013 HC SURGERY LEVEL 3 ADDTL 15MIN: Performed by: ORTHOPAEDIC SURGERY

## 2022-01-05 PROCEDURE — 82550 ASSAY OF CK (CPK): CPT

## 2022-01-05 PROCEDURE — 6360000002 HC RX W HCPCS: Performed by: ORTHOPAEDIC SURGERY

## 2022-01-05 PROCEDURE — 51798 US URINE CAPACITY MEASURE: CPT

## 2022-01-05 PROCEDURE — 7100000001 HC PACU RECOVERY - ADDTL 15 MIN: Performed by: ORTHOPAEDIC SURGERY

## 2022-01-05 PROCEDURE — 7100000000 HC PACU RECOVERY - FIRST 15 MIN: Performed by: ORTHOPAEDIC SURGERY

## 2022-01-05 PROCEDURE — 6360000002 HC RX W HCPCS: Performed by: ANESTHESIOLOGY

## 2022-01-05 PROCEDURE — 83036 HEMOGLOBIN GLYCOSYLATED A1C: CPT

## 2022-01-05 PROCEDURE — 2580000003 HC RX 258: Performed by: NURSE ANESTHETIST, CERTIFIED REGISTERED

## 2022-01-05 PROCEDURE — 3600000003 HC SURGERY LEVEL 3 BASE: Performed by: ORTHOPAEDIC SURGERY

## 2022-01-05 PROCEDURE — 1200000000 HC SEMI PRIVATE

## 2022-01-05 PROCEDURE — 99221 1ST HOSP IP/OBS SF/LOW 40: CPT | Performed by: ORTHOPAEDIC SURGERY

## 2022-01-05 RX ORDER — NICOTINE POLACRILEX 4 MG
15 LOZENGE BUCCAL PRN
Status: DISCONTINUED | OUTPATIENT
Start: 2022-01-05 | End: 2022-01-11 | Stop reason: HOSPADM

## 2022-01-05 RX ORDER — AMLODIPINE BESYLATE 5 MG/1
5 TABLET ORAL DAILY
Status: DISCONTINUED | OUTPATIENT
Start: 2022-01-05 | End: 2022-01-06

## 2022-01-05 RX ORDER — SODIUM CHLORIDE 9 MG/ML
25 INJECTION, SOLUTION INTRAVENOUS PRN
Status: DISCONTINUED | OUTPATIENT
Start: 2022-01-05 | End: 2022-01-11 | Stop reason: HOSPADM

## 2022-01-05 RX ORDER — SODIUM CHLORIDE, SODIUM LACTATE, POTASSIUM CHLORIDE, CALCIUM CHLORIDE 600; 310; 30; 20 MG/100ML; MG/100ML; MG/100ML; MG/100ML
INJECTION, SOLUTION INTRAVENOUS CONTINUOUS PRN
Status: DISCONTINUED | OUTPATIENT
Start: 2022-01-05 | End: 2022-01-05 | Stop reason: SDUPTHER

## 2022-01-05 RX ORDER — LABETALOL HYDROCHLORIDE 5 MG/ML
5 INJECTION, SOLUTION INTRAVENOUS
Status: DISCONTINUED | OUTPATIENT
Start: 2022-01-05 | End: 2022-01-05 | Stop reason: HOSPADM

## 2022-01-05 RX ORDER — HYDRALAZINE HYDROCHLORIDE 20 MG/ML
5 INJECTION INTRAMUSCULAR; INTRAVENOUS EVERY 30 MIN PRN
Status: DISCONTINUED | OUTPATIENT
Start: 2022-01-05 | End: 2022-01-05 | Stop reason: HOSPADM

## 2022-01-05 RX ORDER — MORPHINE SULFATE 2 MG/ML
2 INJECTION, SOLUTION INTRAMUSCULAR; INTRAVENOUS
Status: DISCONTINUED | OUTPATIENT
Start: 2022-01-05 | End: 2022-01-11 | Stop reason: HOSPADM

## 2022-01-05 RX ORDER — ONDANSETRON 2 MG/ML
INJECTION INTRAMUSCULAR; INTRAVENOUS PRN
Status: DISCONTINUED | OUTPATIENT
Start: 2022-01-05 | End: 2022-01-05 | Stop reason: SDUPTHER

## 2022-01-05 RX ORDER — HYDROMORPHONE HCL 110MG/55ML
PATIENT CONTROLLED ANALGESIA SYRINGE INTRAVENOUS PRN
Status: DISCONTINUED | OUTPATIENT
Start: 2022-01-05 | End: 2022-01-05 | Stop reason: SDUPTHER

## 2022-01-05 RX ORDER — OXYCODONE HYDROCHLORIDE AND ACETAMINOPHEN 5; 325 MG/1; MG/1
1 TABLET ORAL PRN
Status: DISCONTINUED | OUTPATIENT
Start: 2022-01-05 | End: 2022-01-05 | Stop reason: HOSPADM

## 2022-01-05 RX ORDER — DEXTROSE MONOHYDRATE 25 G/50ML
12.5 INJECTION, SOLUTION INTRAVENOUS PRN
Status: DISCONTINUED | OUTPATIENT
Start: 2022-01-05 | End: 2022-01-11 | Stop reason: HOSPADM

## 2022-01-05 RX ORDER — MEPERIDINE HYDROCHLORIDE 50 MG/ML
12.5 INJECTION INTRAMUSCULAR; INTRAVENOUS; SUBCUTANEOUS EVERY 5 MIN PRN
Status: DISCONTINUED | OUTPATIENT
Start: 2022-01-05 | End: 2022-01-05 | Stop reason: HOSPADM

## 2022-01-05 RX ORDER — OXYCODONE HYDROCHLORIDE 5 MG/1
5 TABLET ORAL EVERY 4 HOURS PRN
Status: DISCONTINUED | OUTPATIENT
Start: 2022-01-05 | End: 2022-01-11 | Stop reason: HOSPADM

## 2022-01-05 RX ORDER — BUPIVACAINE HYDROCHLORIDE 5 MG/ML
INJECTION, SOLUTION EPIDURAL; INTRACAUDAL PRN
Status: DISCONTINUED | OUTPATIENT
Start: 2022-01-05 | End: 2022-01-05 | Stop reason: ALTCHOICE

## 2022-01-05 RX ORDER — FENTANYL CITRATE 50 UG/ML
INJECTION, SOLUTION INTRAMUSCULAR; INTRAVENOUS PRN
Status: DISCONTINUED | OUTPATIENT
Start: 2022-01-05 | End: 2022-01-05 | Stop reason: SDUPTHER

## 2022-01-05 RX ORDER — SODIUM CHLORIDE 0.9 % (FLUSH) 0.9 %
5-40 SYRINGE (ML) INJECTION EVERY 12 HOURS SCHEDULED
Status: DISCONTINUED | OUTPATIENT
Start: 2022-01-05 | End: 2022-01-11 | Stop reason: HOSPADM

## 2022-01-05 RX ORDER — LIDOCAINE HYDROCHLORIDE 20 MG/ML
INJECTION, SOLUTION INFILTRATION; PERINEURAL PRN
Status: DISCONTINUED | OUTPATIENT
Start: 2022-01-05 | End: 2022-01-05 | Stop reason: SDUPTHER

## 2022-01-05 RX ORDER — SODIUM CHLORIDE 9 MG/ML
INJECTION, SOLUTION INTRAVENOUS CONTINUOUS
Status: DISCONTINUED | OUTPATIENT
Start: 2022-01-05 | End: 2022-01-05

## 2022-01-05 RX ORDER — ONDANSETRON 2 MG/ML
4 INJECTION INTRAMUSCULAR; INTRAVENOUS EVERY 30 MIN PRN
Status: DISCONTINUED | OUTPATIENT
Start: 2022-01-05 | End: 2022-01-05 | Stop reason: HOSPADM

## 2022-01-05 RX ORDER — ROCURONIUM BROMIDE 10 MG/ML
INJECTION, SOLUTION INTRAVENOUS PRN
Status: DISCONTINUED | OUTPATIENT
Start: 2022-01-05 | End: 2022-01-05 | Stop reason: SDUPTHER

## 2022-01-05 RX ORDER — ESMOLOL HYDROCHLORIDE 10 MG/ML
INJECTION INTRAVENOUS PRN
Status: DISCONTINUED | OUTPATIENT
Start: 2022-01-05 | End: 2022-01-05 | Stop reason: SDUPTHER

## 2022-01-05 RX ORDER — OXYCODONE HYDROCHLORIDE AND ACETAMINOPHEN 5; 325 MG/1; MG/1
2 TABLET ORAL PRN
Status: DISCONTINUED | OUTPATIENT
Start: 2022-01-05 | End: 2022-01-05 | Stop reason: HOSPADM

## 2022-01-05 RX ORDER — MAGNESIUM HYDROXIDE 1200 MG/15ML
LIQUID ORAL CONTINUOUS PRN
Status: COMPLETED | OUTPATIENT
Start: 2022-01-05 | End: 2022-01-05

## 2022-01-05 RX ORDER — DEXTROSE MONOHYDRATE 50 MG/ML
100 INJECTION, SOLUTION INTRAVENOUS PRN
Status: DISCONTINUED | OUTPATIENT
Start: 2022-01-05 | End: 2022-01-11 | Stop reason: HOSPADM

## 2022-01-05 RX ORDER — OXYCODONE HYDROCHLORIDE 5 MG/1
10 TABLET ORAL EVERY 4 HOURS PRN
Status: DISCONTINUED | OUTPATIENT
Start: 2022-01-05 | End: 2022-01-11 | Stop reason: HOSPADM

## 2022-01-05 RX ORDER — SODIUM CHLORIDE 0.9 % (FLUSH) 0.9 %
5-40 SYRINGE (ML) INJECTION PRN
Status: DISCONTINUED | OUTPATIENT
Start: 2022-01-05 | End: 2022-01-11 | Stop reason: HOSPADM

## 2022-01-05 RX ORDER — HYDRALAZINE HYDROCHLORIDE 20 MG/ML
10 INJECTION INTRAMUSCULAR; INTRAVENOUS EVERY 4 HOURS PRN
Status: DISCONTINUED | OUTPATIENT
Start: 2022-01-05 | End: 2022-01-05

## 2022-01-05 RX ORDER — PROPOFOL 10 MG/ML
INJECTION, EMULSION INTRAVENOUS PRN
Status: DISCONTINUED | OUTPATIENT
Start: 2022-01-05 | End: 2022-01-05 | Stop reason: SDUPTHER

## 2022-01-05 RX ORDER — PHENYLEPHRINE HCL IN 0.9% NACL 1 MG/10 ML
SYRINGE (ML) INTRAVENOUS PRN
Status: DISCONTINUED | OUTPATIENT
Start: 2022-01-05 | End: 2022-01-05 | Stop reason: SDUPTHER

## 2022-01-05 RX ORDER — MORPHINE SULFATE 4 MG/ML
4 INJECTION, SOLUTION INTRAMUSCULAR; INTRAVENOUS
Status: DISCONTINUED | OUTPATIENT
Start: 2022-01-05 | End: 2022-01-11 | Stop reason: HOSPADM

## 2022-01-05 RX ORDER — DIPHENHYDRAMINE HYDROCHLORIDE 50 MG/ML
6.25 INJECTION INTRAMUSCULAR; INTRAVENOUS
Status: DISCONTINUED | OUTPATIENT
Start: 2022-01-05 | End: 2022-01-05 | Stop reason: HOSPADM

## 2022-01-05 RX ORDER — LABETALOL HYDROCHLORIDE 5 MG/ML
10 INJECTION, SOLUTION INTRAVENOUS EVERY 4 HOURS PRN
Status: DISCONTINUED | OUTPATIENT
Start: 2022-01-05 | End: 2022-01-11 | Stop reason: HOSPADM

## 2022-01-05 RX ADMIN — HYDROMORPHONE HYDROCHLORIDE 1 MG: 2 INJECTION INTRAMUSCULAR; INTRAVENOUS; SUBCUTANEOUS at 11:44

## 2022-01-05 RX ADMIN — ROCURONIUM BROMIDE 10 MG: 10 SOLUTION INTRAVENOUS at 12:12

## 2022-01-05 RX ADMIN — Medication 100 MCG: at 10:57

## 2022-01-05 RX ADMIN — CYCLOBENZAPRINE 5 MG: 10 TABLET, FILM COATED ORAL at 18:52

## 2022-01-05 RX ADMIN — ROCURONIUM BROMIDE 50 MG: 10 SOLUTION INTRAVENOUS at 10:38

## 2022-01-05 RX ADMIN — ONDANSETRON 4 MG: 2 INJECTION INTRAMUSCULAR; INTRAVENOUS at 12:50

## 2022-01-05 RX ADMIN — ESMOLOL HYDROCHLORIDE 30 MG: 10 INJECTION, SOLUTION INTRAVENOUS at 11:31

## 2022-01-05 RX ADMIN — SODIUM CHLORIDE, SODIUM LACTATE, POTASSIUM CHLORIDE, AND CALCIUM CHLORIDE: .6; .31; .03; .02 INJECTION, SOLUTION INTRAVENOUS at 12:50

## 2022-01-05 RX ADMIN — SODIUM CHLORIDE: 9 INJECTION, SOLUTION INTRAVENOUS at 16:22

## 2022-01-05 RX ADMIN — ACETAMINOPHEN 650 MG: 325 TABLET ORAL at 18:04

## 2022-01-05 RX ADMIN — ROCURONIUM BROMIDE 20 MG: 10 SOLUTION INTRAVENOUS at 11:13

## 2022-01-05 RX ADMIN — Medication 3000 MG: at 10:37

## 2022-01-05 RX ADMIN — ROCURONIUM BROMIDE 20 MG: 10 SOLUTION INTRAVENOUS at 11:44

## 2022-01-05 RX ADMIN — HYDROMORPHONE HYDROCHLORIDE 1 MG: 2 INJECTION INTRAMUSCULAR; INTRAVENOUS; SUBCUTANEOUS at 11:13

## 2022-01-05 RX ADMIN — SODIUM CHLORIDE, SODIUM LACTATE, POTASSIUM CHLORIDE, AND CALCIUM CHLORIDE: .6; .31; .03; .02 INJECTION, SOLUTION INTRAVENOUS at 10:34

## 2022-01-05 RX ADMIN — LABETALOL HYDROCHLORIDE 5 MG: 5 INJECTION INTRAVENOUS at 14:05

## 2022-01-05 RX ADMIN — AMLODIPINE BESYLATE 5 MG: 5 TABLET ORAL at 21:05

## 2022-01-05 RX ADMIN — LIDOCAINE HYDROCHLORIDE 60 MG: 20 INJECTION, SOLUTION INFILTRATION; PERINEURAL at 10:38

## 2022-01-05 RX ADMIN — GABAPENTIN 300 MG: 300 CAPSULE ORAL at 18:52

## 2022-01-05 RX ADMIN — ESMOLOL HYDROCHLORIDE 20 MG: 10 INJECTION, SOLUTION INTRAVENOUS at 11:19

## 2022-01-05 RX ADMIN — PROPOFOL 200 MG: 10 INJECTION, EMULSION INTRAVENOUS at 10:38

## 2022-01-05 RX ADMIN — HYDROMORPHONE HYDROCHLORIDE 0.5 MG: 1 INJECTION, SOLUTION INTRAMUSCULAR; INTRAVENOUS; SUBCUTANEOUS at 14:08

## 2022-01-05 RX ADMIN — FENTANYL CITRATE 100 MCG: 50 INJECTION INTRAMUSCULAR; INTRAVENOUS at 10:38

## 2022-01-05 RX ADMIN — SUGAMMADEX 200 MG: 100 INJECTION, SOLUTION INTRAVENOUS at 12:55

## 2022-01-05 RX ADMIN — Medication 3000 MG: at 18:53

## 2022-01-05 RX ADMIN — LABETALOL HYDROCHLORIDE 10 MG: 5 INJECTION INTRAVENOUS at 21:50

## 2022-01-05 RX ADMIN — LISINOPRIL 10 MG: 10 TABLET ORAL at 18:52

## 2022-01-05 ASSESSMENT — PULMONARY FUNCTION TESTS
PIF_VALUE: 19
PIF_VALUE: 19
PIF_VALUE: 20
PIF_VALUE: 19
PIF_VALUE: 19
PIF_VALUE: 3
PIF_VALUE: 19
PIF_VALUE: 19
PIF_VALUE: 20
PIF_VALUE: 19
PIF_VALUE: 3
PIF_VALUE: 20
PIF_VALUE: 19
PIF_VALUE: 4
PIF_VALUE: 17
PIF_VALUE: 3
PIF_VALUE: 20
PIF_VALUE: 20
PIF_VALUE: 37
PIF_VALUE: 17
PIF_VALUE: 17
PIF_VALUE: 19
PIF_VALUE: 18
PIF_VALUE: 3
PIF_VALUE: 20
PIF_VALUE: 19
PIF_VALUE: 18
PIF_VALUE: 19
PIF_VALUE: 20
PIF_VALUE: 19
PIF_VALUE: 1
PIF_VALUE: 20
PIF_VALUE: 19
PIF_VALUE: 1
PIF_VALUE: 17
PIF_VALUE: 19
PIF_VALUE: 21
PIF_VALUE: 20
PIF_VALUE: 19
PIF_VALUE: 18
PIF_VALUE: 14
PIF_VALUE: 18
PIF_VALUE: 19
PIF_VALUE: 3
PIF_VALUE: 4
PIF_VALUE: 15
PIF_VALUE: 17
PIF_VALUE: 20
PIF_VALUE: 19
PIF_VALUE: 1
PIF_VALUE: 19
PIF_VALUE: 22
PIF_VALUE: 19
PIF_VALUE: 18
PIF_VALUE: 17
PIF_VALUE: 19
PIF_VALUE: 10
PIF_VALUE: 19
PIF_VALUE: 20
PIF_VALUE: 19
PIF_VALUE: 20
PIF_VALUE: 3
PIF_VALUE: 17
PIF_VALUE: 17
PIF_VALUE: 19
PIF_VALUE: 19
PIF_VALUE: 3
PIF_VALUE: 3
PIF_VALUE: 19
PIF_VALUE: 19
PIF_VALUE: 3
PIF_VALUE: 20
PIF_VALUE: 3
PIF_VALUE: 19
PIF_VALUE: 17
PIF_VALUE: 19
PIF_VALUE: 19
PIF_VALUE: 1
PIF_VALUE: 4
PIF_VALUE: 19
PIF_VALUE: 18
PIF_VALUE: 19
PIF_VALUE: 20
PIF_VALUE: 19
PIF_VALUE: 3
PIF_VALUE: 17
PIF_VALUE: 19
PIF_VALUE: 1
PIF_VALUE: 19
PIF_VALUE: 3
PIF_VALUE: 0
PIF_VALUE: 19
PIF_VALUE: 3
PIF_VALUE: 19
PIF_VALUE: 19
PIF_VALUE: 3
PIF_VALUE: 19
PIF_VALUE: 4
PIF_VALUE: 19
PIF_VALUE: 4
PIF_VALUE: 19
PIF_VALUE: 20
PIF_VALUE: 19
PIF_VALUE: 3
PIF_VALUE: 20
PIF_VALUE: 18
PIF_VALUE: 19
PIF_VALUE: 1
PIF_VALUE: 19
PIF_VALUE: 20
PIF_VALUE: 3
PIF_VALUE: 2
PIF_VALUE: 19
PIF_VALUE: 17
PIF_VALUE: 19
PIF_VALUE: 1
PIF_VALUE: 20
PIF_VALUE: 0
PIF_VALUE: 4
PIF_VALUE: 19
PIF_VALUE: 17
PIF_VALUE: 19

## 2022-01-05 ASSESSMENT — PAIN DESCRIPTION - LOCATION
LOCATION: LEG
LOCATION: OTHER (COMMENT)

## 2022-01-05 ASSESSMENT — PAIN SCALES - GENERAL
PAINLEVEL_OUTOF10: 3
PAINLEVEL_OUTOF10: 2
PAINLEVEL_OUTOF10: 0
PAINLEVEL_OUTOF10: 6
PAINLEVEL_OUTOF10: 2
PAINLEVEL_OUTOF10: 0
PAINLEVEL_OUTOF10: 2

## 2022-01-05 ASSESSMENT — PAIN DESCRIPTION - PROGRESSION: CLINICAL_PROGRESSION: OTHER (COMMENT)

## 2022-01-05 ASSESSMENT — PAIN - FUNCTIONAL ASSESSMENT: PAIN_FUNCTIONAL_ASSESSMENT: 0-10

## 2022-01-05 ASSESSMENT — PAIN DESCRIPTION - DESCRIPTORS
DESCRIPTORS: DULL;ACHING
DESCRIPTORS: OTHER (COMMENT)

## 2022-01-05 ASSESSMENT — PAIN DESCRIPTION - ORIENTATION
ORIENTATION: LEFT;RIGHT
ORIENTATION: OTHER (COMMENT)

## 2022-01-05 ASSESSMENT — PAIN DESCRIPTION - PAIN TYPE
TYPE: SURGICAL PAIN;ACUTE PAIN
TYPE: OTHER (COMMENT)

## 2022-01-05 ASSESSMENT — PAIN DESCRIPTION - FREQUENCY: FREQUENCY: OTHER (COMMENT)

## 2022-01-05 NOTE — PROGRESS NOTES
Ps Hospitalist  \"Pt adm w/ ruptured L Quad, on admission bp 171/94 and pt got his normal lisinopril dose. Bp was 166/109 just on assessment. would you like to order a bp med? \"    Response \" If asymptomatic just monitor for now\"    Will continue to monitor Pt.

## 2022-01-05 NOTE — PROGRESS NOTES
Hospitalist Progress Note      PCP: Roge Ospina MD    Date of Admission: 1/4/2022    Chief Complaint: Knee pain    Andrew Gibbs is a 78 y.o. male who presented to the ED via EMS to be evaluated for BLE pain and inability to ambulate s/p mechanical fall just prior to arrival.  Patient states that he was outside walking his dog on a muddy slope at which time the dog jolted forward, causing him to lurch forward and fall bended knee. He reports that he experienced excruciating R>L knee pain, after which she was unable to stand or bear weight. Patient denies LOC or other skeletal trauma. Chronic medical conditions include: Obesity, prediabetes, and HTN.    Upon arrival to the ED, EKG was obtained revealing NSR with remote inferior infarct. Plain films/CT scan revealed complete rupture of bilateral distal quadriceps tendon with associated muscle strain and surrounding soft tissue edema/blood product. Larger hematoma present on right side. Consultation was placed to orthopedics per ED attending, and patient was placed in bilateral immobilizer overnight.   Hospitalist team consulted to perform preoperative clearance and manage patient medically overnight, pending a.m. orthopedic intervention.        Subjective: NAD, discussed plan of care, OR today      Medications:  Reviewed    Infusion Medications    dextrose      sodium chloride       Scheduled Medications    insulin lispro  0-12 Units SubCUTAneous TID WC    insulin lispro  0-6 Units SubCUTAneous Nightly    vitamin C  250 mg Oral Daily    gabapentin  300 mg Oral TID    therapeutic multivitamin-minerals  1 tablet Oral Daily    lidocaine  1 patch TransDERmal Daily    cyclobenzaprine  5 mg Oral BID    sodium chloride flush  10 mL IntraVENous 2 times per day    enoxaparin  40 mg SubCUTAneous Daily    lisinopril  10 mg Oral Nightly     PRN Meds: glucose, dextrose, glucagon (rDNA), dextrose, meperidine, HYDROmorphone, HYDROmorphone, oxyCODONE-acetaminophen **OR** oxyCODONE-acetaminophen, ondansetron, diphenhydrAMINE, labetalol, hydrALAZINE, acetaminophen, oxyCODONE, HYDROmorphone, sodium chloride flush, sodium chloride, potassium chloride **OR** potassium alternative oral replacement **OR** potassium chloride, magnesium sulfate, promethazine **OR** ondansetron, senna, acetaminophen **OR** acetaminophen      Intake/Output Summary (Last 24 hours) at 1/5/2022 1147  Last data filed at 1/5/2022 0517  Gross per 24 hour   Intake --   Output 410 ml   Net -410 ml       Physical Exam Performed:    BP (!) 153/102   Pulse 91   Temp 98.8 °F (37.1 °C) (Temporal)   Resp 16   Ht 5' 10\" (1.778 m)   Wt 270 lb 4.8 oz (122.6 kg)   SpO2 97%   BMI 38.78 kg/m²     General appearance: No apparent distress, appears stated age and cooperative. HEENT: Pupils equal, round, and reactive to light. Conjunctivae/corneas clear. Neck: Supple, with full range of motion. No jugular venous distention. Trachea midline. Respiratory:  Normal respiratory effort. Clear to auscultation, bilaterally without Rales/Wheezes/Rhonchi. Cardiovascular: Regular rate and rhythm with normal S1/S2 without murmurs, rubs or gallops. Abdomen: Soft, non-tender, non-distended with normal bowel sounds. Musculoskeletal: BLE pain,tenderness, limited ROM  Skin: Skin color, texture, turgor normal.  No rashes or lesions. Neurologic:  Neurovascularly intact without any focal sensory/motor deficits.  Cranial nerves: II-XII intact, grossly non-focal.  Psychiatric: Alert and oriented, thought content appropriate, normal insight  Capillary Refill: Brisk,3 seconds, normal   Peripheral Pulses: +2 palpable, equal bilaterally       Labs:   Recent Labs     01/04/22  1755 01/05/22  0806   WBC 8.3 5.7   HGB 13.4* 12.8*   HCT 42.7 40.1*    157     Recent Labs     01/04/22  1755 01/05/22  0806    137   K 3.6 3.7    105   CO2 25 23   BUN 11 12   CREATININE 0.9 0.9   CALCIUM 8.7 8.3     Recent Labs     01/04/22  1755 01/05/22  0806   AST 26 26   ALT 16 14   BILITOT 0.7 1.1*   ALKPHOS 68 64     Recent Labs     01/04/22  1822   INR 1.08     Recent Labs     01/04/22  1755 01/05/22  0806   CKTOTAL 467* 608*   TROPONINI <0.01  --        Urinalysis:      Lab Results   Component Value Date    NITRU Negative 01/05/2022    WBCUA 3-5 06/28/2017    BACTERIA 1+ 06/28/2017    RBCUA  06/28/2017    BLOODU Negative 01/05/2022    SPECGRAV 1.015 01/05/2022    GLUCOSEU Negative 01/05/2022       Radiology:  CT KNEE LEFT WO CONTRAST   Final Result   1. Complete rupture of the bilateral distal quadriceps tendons with   associated muscular strains and surrounding soft tissue edema and blood   products. Larger hematoma is present on the right at the level of the distal   quadriceps rupture which measures approximately 3.0 x 5.1 x 8.8 cm.   2. No acute fracture identified. 3. 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 RECOMMENDATIONS:   Unavailable         CT KNEE RIGHT WO CONTRAST   Final Result   1. Complete rupture of the bilateral distal quadriceps tendons with   associated muscular strains and surrounding soft tissue edema and blood   products. Larger hematoma is present on the right at the level of the distal   quadriceps rupture which measures approximately 3.0 x 5.1 x 8.8 cm.   2. No acute fracture identified. 3. 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 RECOMMENDATIONS:   Unavailable         XR KNEE LEFT (1-2 VIEWS)   Final Result      1. No evidence of fracture. 2.  Suprapatellar region is not well seen on the lateral view and therefore   abnormalities in this area cannot be excluded. Also soft tissue injury or   tendon tear cannot be excluded by this exam if suspected clinically. MRI   examination would be recommended. XR KNEE RIGHT (1-2 VIEWS)   Final Result      1.   There appears to be significant edema, fluid or hematoma in the suprapatellar region and obscuring tissue planes. The quadriceps tendon is   not well visualized and therefore tear of the tendon cannot be excluded if   suspected clinically. Alternatively this could represent edema or hematoma   due to muscular injury in the distal anterior thigh or a large joint effusion   though no significant joint effusion is suggested on the lateral view   anterior to the knee joint. Correlate clinically. 2.  No evidence of fracture or joint malalignment.                  Assessment/Plan:    Active Hospital Problems    Diagnosis     Pre-diabetes [R73.03]     Quadriceps tendon rupture, left, initial encounter [S76.112A]     Quadriceps tendon rupture, right, initial encounter [S76.111A]     HTN (hypertension) [I10]     Rupture of distal quadriceps tendon [S76.119A]      BLE distal quadricep tendon rupture  -Tylenol, Flexeril, Neurontin and Lidoderm patch   -OxyIR/ IV Dilaudid scheduled sparingly to treat moderate/severe pain, respectively  -Preoperative EKG and laboratory studies ordered and reviewed; patient with NO absolute contraindication to proceed with recommended procedural intervention  -OR today  -Consultation placed to PT/OT for postoperative rehabilitation     HTN  -Patient admitted to telemetry floor for continuous monitoring during stay  -EKG obtained in ED reviewed personally and found to be without evidence of LAD or acute ischemia  -Continue home medication dosage of lisinopril with serial BP monitoring     Prediabetes with BMI 38.02  -A1c ordered with results pending at time of dictation  -PRN Humalog medium dose SSI scheduled before meals and at bedtime based on POC glucose  -Carbohydrate restriction placed on diet    DVT Prophylaxis: lovenox post op  Diet: Diet NPO  Code Status: Full Code    PT/OT Eval Status: Consulted    Dispo - pending course, dc arrangements    MARISOL Arreola - MILA

## 2022-01-05 NOTE — PROGRESS NOTES
4 Eyes Skin Assessment     The patient is being assess for   Post-Op Surgical    I agree that 2 RN's have performed a thorough Head to Toe Skin Assessment on the patient. ALL assessment sites listed below have been assessed. Areas assessed for pressure by both nurses:   [x]   Head, Face, and Ears   [x]   Shoulders, Back, and Chest, Abdomen  [x]   Arms, Elbows, and Hands   [x]   Coccyx, Sacrum, and Ischium  [x]   Legs, Feet, and Heels        Surgical dressings remain in place              **SHARE this note so that the co-signing nurse is able to place an eSignature**    Co-signer eSignature: {Esignature:[id number]}    Does the Patient have Skin Breakdown related to pressure?   No   Monico Prevention initiated:  NA   Wound Care Orders initiated:  NA      [facility] nurse consulted for Pressure Injury (Stage 3,4, Unstageable, DTI, NWPT, Complex wounds)and New or Established Ostomies:  NA      Primary Nurse eSignature: Electronically signed by Katrin Boyd RN on 1/5/22 at 3:44 PM EST

## 2022-01-05 NOTE — ANESTHESIA PRE PROCEDURE
Department of Anesthesiology  Preprocedure Note       Name:  Cora Jimenez   Age:  78 y.o.  :  1955                                          MRN:  [id number]         Date:  2022      Surgeon: Loy Mandel):  Donavan Mcgee MD    Procedure: Procedure(s):  BILATERAL QUADRICEPS  TENDON REPAIR    Medications prior to admission:   Prior to Admission medications    Medication Sig Start Date End Date Taking?  Authorizing Provider   gabapentin (NEURONTIN) 300 MG capsule TAKE ONE CAPSULE BY MOUTH THREE TIMES A DAY 21 Yes Historical Provider, MD   lisinopril (PRINIVIL;ZESTRIL) 10 MG tablet Take 1 tablet by mouth    Historical Provider, MD   albuterol sulfate HFA (PROVENTIL HFA) 108 (90 Base) MCG/ACT inhaler Inhale 2 puffs into the lungs every 4 hours as needed for Wheezing 20  Johnson July, DO   Multiple Vitamins-Minerals (THERAPEUTIC MULTIVITAMIN-MINERALS) tablet Take 1 tablet by mouth daily    Historical Provider, MD   Ascorbic Acid (VITAMIN C) 500 MG tablet Take 250 mg by mouth daily    Historical Provider, MD   Omega-3 Fatty Acids (FISH OIL) 1000 MG CAPS Take 1,000 mg by mouth daily    Historical Provider, MD   Ginkgo Biloba Extract (GNP GINGKO BILOBA EXTRACT) 60 MG CAPS Take by mouth daily    Historical Provider, MD   naproxen (NAPROSYN) 500 MG tablet Take 500 mg by mouth 2 times daily as needed for Pain    Historical Provider, MD       Current medications:    Current Facility-Administered Medications   Medication Dose Route Frequency Provider Last Rate Last Admin    insulin lispro (HUMALOG) injection vial 0-12 Units  0-12 Units SubCUTAneous TID  Nilesh Odell MD        insulin lispro (HUMALOG) injection vial 0-6 Units  0-6 Units SubCUTAneous Nightly Nilesh Odell MD        glucose (GLUTOSE) 40 % oral gel 15 g  15 g Oral PRN Nilesh Odell MD        dextrose 50 % IV solution  12.5 g IntraVENous PRN Nilesh Odell MD        glucagon (rDNA) injection 1 mg  1 mg Oral BID Rae Hyman MD   5 mg at 01/04/22 2050    sodium chloride flush 0.9 % injection 10 mL  10 mL IntraVENous 2 times per day Rae Hyman MD   10 mL at 01/04/22 2344    sodium chloride flush 0.9 % injection 10 mL  10 mL IntraVENous PRN Rae Hyman MD        0.9 % sodium chloride infusion  25 mL IntraVENous PRN Rae Hyman MD        potassium chloride (KLOR-CON M) extended release tablet 40 mEq  40 mEq Oral PRN Rae Hyman MD        Or    potassium bicarb-citric acid (EFFER-K) effervescent tablet 40 mEq  40 mEq Oral PRN Rae Hyman MD        Or   Valkaitlin Yan potassium chloride 10 mEq/100 mL IVPB (Peripheral Line)  10 mEq IntraVENous PRN Rae Hyman MD        magnesium sulfate 2000 mg in 50 mL IVPB premix  2,000 mg IntraVENous PRN Rae Hyman MD        enoxaparin (LOVENOX) injection 40 mg  40 mg SubCUTAneous Daily Rae Hyman MD        promethazine (PHENERGAN) tablet 12.5 mg  12.5 mg Oral Q6H PRN Rae Hyman MD        Or    ondansetron [facility]) injection 4 mg  4 mg IntraVENous Q6H PRN Rae Hyman MD        senna (SENOKOT) tablet 8.6 mg  1 tablet Oral Daily PRN Rae Hyman MD        acetaminophen (TYLENOL) tablet 650 mg  650 mg Oral Q6H PRN Rae Hyman MD        Or    acetaminophen (TYLENOL) suppository 650 mg  650 mg Rectal Q6H PRN Rae Hyman MD        lisinopril (PRINIVIL;ZESTRIL) tablet 10 mg  10 mg Oral Nightly Rae Hyman MD   10 mg at 01/04/22 2343       Allergies:     Allergies   Allergen Reactions    Bee Venom        Problem List:    Patient Active Problem List   Diagnosis Code    Quadriceps tendon rupture, left, initial encounter K38.962L    Quadriceps tendon rupture, right, initial encounter H76.430W    HTN (hypertension) I10    Rupture of distal quadriceps tendon S76.119A    Pre-diabetes R73.03       Past Medical History:        Diagnosis Date    Hypertension     Kidney stone     Mitral valve prolapse 1980    resolved on own    Sarcoidosis     Spinal stenosis        Past Surgical History:        Procedure Laterality Date    BACK SURGERY  1992    inflamed nerve    COLONOSCOPY  5/5/2015       Social History:    Social History     Tobacco Use    Smoking status: Never Smoker    Smokeless tobacco: Never Used   Substance Use Topics    Alcohol use: Yes     Comment: 2 x week                                Counseling given: Not Answered      Vital Signs (Current):   Vitals:    01/05/22 0610 01/05/22 0800 01/05/22 0900 01/05/22 1011   BP:  (!) 147/96  (!) 153/102   Pulse:  91 84 91   Resp:  16 16 16   Temp:  98 °F (36.7 °C) 98.2 °F (36.8 °C) 98.8 °F (37.1 °C)   TempSrc:  Oral Oral Temporal   SpO2:  92% 94% 97%   Weight: 270 lb 4.8 oz (122.6 kg)      Height:                                                  BP Readings from Last 3 Encounters:   01/05/22 (!) 153/102   04/14/20 (!) 165/111   06/28/17 (!) 144/110       NPO Status: Time of last liquid consumption: 1300                        Time of last solid consumption: 1300                        Date of last liquid consumption: 01/04/22                        Date of last solid food consumption: 01/04/22    BMI:   Wt Readings from Last 3 Encounters:   01/05/22 270 lb 4.8 oz (122.6 kg)   04/14/20 270 lb (122.5 kg)   06/28/17 260 lb (117.9 kg)     Body mass index is 38.78 kg/m².     CBC:   Lab Results   Component Value Date    WBC 5.7 01/05/2022    RBC 5.37 01/05/2022    HGB 12.8 01/05/2022    HCT 40.1 01/05/2022    MCV 74.6 01/05/2022    RDW 13.3 01/05/2022     01/05/2022       CMP:   Lab Results   Component Value Date     01/05/2022    K 3.7 01/05/2022     01/05/2022    CO2 23 01/05/2022    BUN 12 01/05/2022    CREATININE 0.9 01/05/2022    GFRAA >60 01/05/2022    AGRATIO 1.3 01/05/2022    LABGLOM >60 01/05/2022    GLUCOSE 112 01/05/2022    PROT 6.1 01/05/2022    CALCIUM 8.3 01/05/2022    BILITOT 1.1 01/05/2022    ALKPHOS 64 01/05/2022    AST 26 01/05/2022    ALT 14 01/05/2022       POC Tests:   Recent Labs     01/05/22  0740   POCGLU 103*       Coags:   Lab Results   Component Value Date    PROTIME 12.2 01/04/2022    INR 1.08 01/04/2022    APTT 22.5 01/04/2022       HCG (If Applicable): No results found for: PREGTESTUR, PREGSERUM, HCG, HCGQUANT     ABGs: No results found for: PHART, PO2ART, BNL7TMB, JUO7GPO, BEART, M7ZNTZMV     Type & Screen (If Applicable):  No results found for: LABABO, LABRH    Drug/Infectious Status (If Applicable):  No results found for: HIV, HEPCAB    COVID-19 Screening (If Applicable):   Lab Results   Component Value Date    COVID19 Not Detected 01/04/2022           Anesthesia Evaluation  Patient summary reviewed and Nursing notes reviewed no history of anesthetic complications:   Airway: Mallampati: III     Neck ROM: full   Dental:          Pulmonary:                              Cardiovascular:    (+) hypertension:,                   Neuro/Psych:               GI/Hepatic/Renal:            ROS comment: obesity. Endo/Other:                     Abdominal:             Vascular: Other Findings:             Anesthesia Plan      general     ASA 3     (Medications & allergies reviewed  All available lab & EKG data reviewed)  Induction: intravenous. Anesthetic plan and risks discussed with patient. Plan discussed with CRNA.                   Lacy Santacruz MD   1/5/2022

## 2022-01-05 NOTE — PROGRESS NOTES
Perfect Serve to Dr. Alfredo Stringer @ [phone]  Patient /114, HR 95, . He was given Labetolol in PACU for BP. He does not have a PRN for me to give him. Please advise. Thanks. Perfect Serve to Dr. Alfredo Stringer @ [phone] Hospital or Facility: [facility] From: Neyda Campbell RE: Marlys Davis RM: [id number] Patient /114, HR 95, . He was given Labetolol in PACU for BP. He does not have a PRN for me to give him. Please advise. Thanks. Need     Perfect Serve to Dr. Alfredo Stringer @ [phone]   Can I at least give him his night time lisinopril early. Perfect Serve to Camilla Swanson NP @ [phone]  I have sent two messages to cross cover re: bp 181/114. And, have not gotten messages. Can I give night time lisinopril, gabapentin and flexeril now? Can you order a PRN for BP? Thanks. Writer received phone call from Camilla Swanson NP to give night time medications now.

## 2022-01-05 NOTE — CONSULTS
Orthopaedic Surgery Consult Note      Reason for consult:  Bilateral quadriceps tendon ruptures    Requesting physician: Corine Webster MD    CHIEF COMPLAINT: bilateral knee pain    DATE OF INJURY: 1/4/22    HISTORY:  Mr. Delos Goldberg is a 78 y.o. male, who presented to [facility] ER after a fall with inability ambulate. He was walking his dog on a muddy slope when it jolted forward and yanked him, causing him to fall on flexed knees. He was unable to ambulate afterwards. He had significant swelling afterwards. Pain with weight bearing. He was found to have bilateral quad tendon ruptures in the ER. He was unable to ambulate in bilateral knee immobilizers, so was admitted.          Past Medical History:   Diagnosis Date    Hypertension     Kidney stone     Mitral valve prolapse 1980    resolved on own    Sarcoidosis     Spinal stenosis        Past Surgical History:   Procedure Laterality Date    BACK SURGERY  1992    inflamed nerve    COLONOSCOPY  5/5/2015       Current Facility-Administered Medications   Medication Dose Route Frequency Provider Last Rate Last Admin    insulin lispro (HUMALOG) injection vial 0-12 Units  0-12 Units SubCUTAneous TID  Lucie Jorge MD        insulin lispro (HUMALOG) injection vial 0-6 Units  0-6 Units SubCUTAneous Nightly Lucie Jorge MD        glucose (GLUTOSE) 40 % oral gel 15 g  15 g Oral PRN Lucie Jorge MD        dextrose 50 % IV solution  12.5 g IntraVENous PRN Lucie Jorge MD        glucagon (rDNA) injection 1 mg  1 mg IntraMUSCular PRN Lucie Jorge MD        dextrose 5 % solution  100 mL/hr IntraVENous PRN Lucie Jorge MD        ceFAZolin (ANCEF) 3000 mg in dextrose 5 % 100 mL IVPB  3,000 mg IntraVENous Once Lizbeth Amanda MD        ascorbic acid (VITAMIN C) tablet 250 mg  250 mg Oral Daily Lucie Jorge MD        gabapentin (NEURONTIN) capsule 300 mg  300 mg Oral TID Curly Howard MD   300 mg at 01/04/22 2343    therapeutic multivitamin-minerals 1 tablet  1 tablet Oral Daily Curly Howard MD        acetaminophen (TYLENOL) tablet 1,000 mg  1,000 mg Oral Q6H PRN Curly Howard MD        lidocaine 4 % external patch 1 patch  1 patch TransDERmal Daily Curly Howard MD   1 patch at 01/04/22 2050    oxyCODONE (ROXICODONE) immediate release tablet 5 mg  5 mg Oral Q6H PRN Curly Howard MD   5 mg at 01/04/22 2050    HYDROmorphone (DILAUDID) injection 0.5 mg  0.5 mg IntraVENous Q4H PRN Curly Howard MD        cyclobenzaprine (FLEXERIL) tablet 5 mg  5 mg Oral BID Curly Howard MD   5 mg at 01/04/22 2050    sodium chloride flush 0.9 % injection 10 mL  10 mL IntraVENous 2 times per day Curly Howard MD   10 mL at 01/04/22 2344    sodium chloride flush 0.9 % injection 10 mL  10 mL IntraVENous PRN Curly Howard MD        0.9 % sodium chloride infusion  25 mL IntraVENous PRN Curly Howard MD        potassium chloride (KLOR-CON M) extended release tablet 40 mEq  40 mEq Oral PRN Curly Howard MD        Or    potassium bicarb-citric acid (EFFER-K) effervescent tablet 40 mEq  40 mEq Oral PRN Curly Howard MD        Or   [facility] potassium chloride 10 mEq/100 mL IVPB (Peripheral Line)  10 mEq IntraVENous PRN Curly Howard MD        magnesium sulfate 2000 mg in 50 mL IVPB premix  2,000 mg IntraVENous PRN Curly Howard MD        enoxaparin (LOVENOX) injection 40 mg  40 mg SubCUTAneous Daily Curly Howard MD        promethazine (PHENERGAN) tablet 12.5 mg  12.5 mg Oral Q6H PRN Curly Howard MD        Or    ondansetron [facility]) injection 4 mg  4 mg IntraVENous Q6H PRN Curly Howard MD        senna (SENOKOT) tablet 8.6 mg  1 tablet Oral Daily PRN Curly Howard MD        acetaminophen (TYLENOL) tablet 650 mg  650 mg Oral Q6H PRN Curly Howard MD        Or    acetaminophen (TYLENOL) suppository 650 mg  650 mg Rectal Q6H PRN Genet SAUNDERS Karthik Rogers MD        lisinopril (PRINIVIL;ZESTRIL) tablet 10 mg  10 mg Oral Nightly Fredric Lombard, MD   10 mg at 01/04/22 8518       Allergies   Allergen Reactions    Bee Venom        Social History     Socioeconomic History    Marital status:      Spouse name: Not on file    Number of children: Not on file    Years of education: Not on file    Highest education level: Not on file   Occupational History    Not on file   Tobacco Use    Smoking status: Never Smoker    Smokeless tobacco: Never Used   Vaping Use    Vaping Use: Never used   Substance and Sexual Activity    Alcohol use: Yes     Comment: 2 x week    Drug use: Never    Sexual activity: Not on file   Other Topics Concern    Not on file   Social History Narrative    Not on file     Social Determinants of Health     Financial Resource Strain:     Difficulty of Paying Living Expenses: Not on file   Food Insecurity:     Worried About Running Out of Food in the Last Year: Not on file    Dwight of Food in the Last Year: Not on file   Transportation Needs:     Lack of Transportation (Medical): Not on file    Lack of Transportation (Non-Medical):  Not on file   Physical Activity:     Days of Exercise per Week: Not on file    Minutes of Exercise per Session: Not on file   Stress:     Feeling of Stress : Not on file   Social Connections:     Frequency of Communication with Friends and Family: Not on file    Frequency of Social Gatherings with Friends and Family: Not on file    Attends Latter day Services: Not on file    Active Member of Clubs or Organizations: Not on file    Attends Club or Organization Meetings: Not on file    Marital Status: Not on file   Intimate Partner Violence:     Fear of Current or Ex-Partner: Not on file    Emotionally Abused: Not on file    Physically Abused: Not on file    Sexually Abused: Not on file   Housing Stability:     Unable to Pay for Housing in the Last Year: Not on file    Number of Places Lived in the Last Year: Not on file    Unstable Housing in the Last Year: Not on file       Family History   Problem Relation Age of Onset    High Blood Pressure Mother     Stroke Father        Review of Systems:   General: negative  Psychological: negative  Ophthalmic: negative  ENT: negative  Hematological and Lymphatic: negative  Endocrine: negative  Respiratory: negative  Cardiovascular: negative  Gastrointestinal: negative  Genito-Urinary: negative  Musculoskeletal: negative. See HPI for pertinent positives. Neurological: negative  Dermatological: negative       PHYSICAL EXAM:  Mr. Tres More is a 78 y.o. male who presents today in no acute distress, awake, alert, and oriented. BP (!) 147/96   Pulse 84   Temp 98.2 °F (36.8 °C) (Oral)   Resp 16   Ht 5' 10\" (1.778 m)   Wt 270 lb 4.8 oz (122.6 kg)   SpO2 94%   BMI 38.78 kg/m²      On evaluation of his bilateral lower extremity, there is obvious deformity. There is palpable defects at superior pole of patella/quad tendon on left. Less palpable defect on right. He is unable to SLR bilaterally. There is swelling. He is not severely tender to palpation over the superior pole of patella, and otherwise nontender over the remainder of the extremity. Range of motion of knees not assessed. The skin overlying the bilateral knees is intact without evidence of lesion, laceration or abrasion. Distal pulses are 2+ and symmetric bilaterally. Sensation is grossly intact to light touch and symmetric bilaterally. Dorsiflexion / plantarflexion intact. Secondary skeletal survey negative. No tenderness to palpation in bilateral arms,  There is no pain with passive motion of neck, shoulders, elbows, wrists, ankles. Left kne Xrays: I independently reviewed the images, as well as the radiology report.   1.  No evidence of fracture.       2.  Suprapatellar region is not well seen on the lateral view and therefore   abnormalities in this area cannot be excluded.  Also soft tissue injury or   tendon tear cannot be excluded by this exam if suspected clinically.  MRI   examination would be recommended     Right knee xrays: I independently reviewed the images, as well as the radiology report. 1.  There appears to be significant edema, fluid or hematoma in the   suprapatellar region and obscuring tissue planes.  The quadriceps tendon is   not well visualized and therefore tear of the tendon cannot be excluded if   suspected clinically.  Alternatively this could represent edema or hematoma   due to muscular injury in the distal anterior thigh or a large joint effusion   though no significant joint effusion is suggested on the lateral view   anterior to the knee joint.  Correlate clinically.       2.  No evidence of fracture or joint malalignment. CT bilateral knees: I independently reviewed the images, as well as the radiology report. 1. Complete rupture of the bilateral distal quadriceps tendons with   associated muscular strains and surrounding soft tissue edema and blood   products.  Larger hematoma is present on the right at the level of the distal   quadriceps rupture which measures approximately 3.0 x 5.1 x 8.8 cm.   2. No acute fracture identified. 3. 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          CBC:   Lab Results   Component Value Date    WBC 5.7 01/05/2022    RBC 5.37 01/05/2022    HGB 12.8 01/05/2022    HCT 40.1 01/05/2022    MCV 74.6 01/05/2022    MCH 23.8 01/05/2022    MCHC 31.9 01/05/2022    RDW 13.3 01/05/2022     01/05/2022    MPV 7.9 01/05/2022       PT/INR:    Lab Results   Component Value Date    PROTIME 12.2 01/04/2022    INR 1.08 01/04/2022       Chem Basic:   Lab Results   Component Value Date     01/05/2022    K 3.7 01/05/2022     01/05/2022    CO2 23 01/05/2022    GLUCOSE 112 01/05/2022    BUN 12 01/05/2022    CREATININE 0.9 01/05/2022          IMPRESSION:    Bilateral quadriceps tendon ruptures  Class 2 obesity  Prediabetes   HTN      PLAN:  --I had an extensive discussion with Mr. Marissa Arana and/or family regarding the natural history, etiology, and long term consequences of his condition. I have presented reasonable alternatives to the patient's proposed care, treatment, and services. I have outlined a treatment plan with them and, in my opinion, surgical intervention is indicated at this time. Discussion I have had encompassed risks, benefits, and side effects related to the alternatives and the risks related to not receiving the proposed care, treatment, and services. I have discussed the potential complications (including, but not limited to injury to nerve or blood vessel, infection, bleeding, DVT or PE, stiffness, incomplete pain relief, need for further surgery, and anesthetic complications), limitations, expectations, alternatives, and risks of the surgical procedure. We also discussed the importance of postoperative rehabilitation. He has had full opportunity to ask his questions. I have answered them all to his satisfaction. I feel that Mr. Marissa Arana understands our discussion today and he will provide written informed consent for the procedure. --Plan for bilateral quadriceps tendon repairs  --Pain control  --NPO  --Patient has been medically cleared. --Social work / case management. I anticipate need for SNF/acute rehab given his inability to ambulate with knee immobilizers in ER yesterday. Ariana Fonseca. Annette Nguyen MD  Orthopaedic Surgery and Sports Medicine     Disclaimer: This note was generated with use of a verbal recognition program and an attempt was made to check for errors. It is possible that there are still dictated errors within this office note. If so, please bring any significant errors to my attention for an addendum. All efforts were made to ensure that this office note is accurate.

## 2022-01-05 NOTE — PROGRESS NOTES
Occupational Therapy/Physical Therapy  OT and PT orders received. Pt is scheduled for OR this morning. Please reorder OT post-op.   Vero Rodriguez OT  Marilin Coy, PT

## 2022-01-05 NOTE — PROGRESS NOTES
4 Eyes Skin Assessment     The patient is being assess for   Admission    I agree that 2 RN's have performed a thorough Head to Toe Skin Assessment on the patient. ALL assessment sites listed below have been assessed. Areas assessed for pressure by both nurses:   [x]   Head, Face, and Ears   [x]   Shoulders, Back, and Chest, Abdomen  [x]   Arms, Elbows, and Hands   [x]   Coccyx, Sacrum, and Ischium  [x]   Legs, Feet, and Heels        Skin Assessed Under all Medical Devices by both nurses:  N/A              All Mepilex Borders were peeled back and area peeked at by both nurses:  No: None  Please list where Mepilex Borders are located:  N/A             **SHARE this note so that the co-signing nurse is able to place an eSignature**    Co-signer eSignature: Electronically signed by Jayme Oh RN on 1/5/22 at 6:59 AM EST    Does the Patient have Skin Breakdown related to pressure?   No               Monico Prevention initiated:  No   Wound Care Orders initiated:  No      [facility] nurse consulted for Pressure Injury (Stage 3,4, Unstageable, DTI, NWPT, Complex wounds)and New or Established Ostomies:  No      Primary Nurse eSignature: Electronically signed by Dana Swift RN on 1/4/22 at 11:02 PM EST

## 2022-01-05 NOTE — PLAN OF CARE
Full consult note to follow    Xrays and CT  reviewed. Luis F Magallanes is a 78 y.o. male who presented to [facility] ER after a fall. Found to have bilateral quad tendon ruptures    Plan:  --Plan for OR this morning for bilateral quad tendon repairs  --NPO  --Pain control  --Patient has been medically cleared  --Will need case management/social work for likely placement postop since patients was unable to ambulate in knee immobilizers in ER.         Electronically signed by Jeanette Kwan MD on 1/5/2022 at 6:57 AM

## 2022-01-05 NOTE — PROGRESS NOTES
Pt put in lab orders as nurse collect, pt will be lab draw. Call placed to make aware, per lab can not see lab orders, stat lab orders placed. Will cont to monitor.

## 2022-01-05 NOTE — PROGRESS NOTES
AM assessment completed. See flow sheet. Pt A&O X 4. Resp E&E with no distress noted. Bilateral knee immobilizers remain in place. Pt.denies any needs at this time. Call light within reach. Will continue to monitor.

## 2022-01-05 NOTE — H&P
HOSPITALISTS HISTORY AND PHYSICAL    1/5/2022 5:33 AM    Patient Information:  Michael Dasilva is a 78 y.o. male [id number]  PCP:  Shanti Perry MD (Tel: [phone] )    Chief complaint:    Chief Complaint   Patient presents with    Knee Pain     pulled down in mud while walking dog, hit right knee. unable to bear weight. History of Present Illness:  Izabel Diop is a 78 y.o. male who presented to the ED via EMS to be evaluated for BLE pain and inability to ambulate s/p mechanical fall just prior to arrival.  Patient states that he was outside walking his dog on a muddy slope at which time the dog jolted forward, causing him to lurch forward and fall bended knee. He reports that he experienced excruciating R>L knee pain, after which she was unable to stand or bear weight. Patient denies LOC or other skeletal trauma. Chronic medical conditions include: Obesity, prediabetes, and HTN. Upon arrival to the ED, EKG was obtained revealing NSR with remote inferior infarct. Plain films/CT scan revealed complete rupture of bilateral distal quadriceps tendon with associated muscle strain and surrounding soft tissue edema/blood product. Larger hematoma present on right side. Consultation was placed to orthopedics per ED attending, and patient was placed in bilateral immobilizer overnight. Hospitalist team consulted to perform preoperative clearance and manage patient medically overnight, pending a.m. orthopedic intervention. History obtained from patient and review of Epic chart      REVIEW OF SYSTEMS:   Constitutional: Negative for fever,chills, and generalized weakness  ENT: Negative for headache, rhinorrhea, and sore throat.   Respiratory: Negative for shortness of breath, wheezing, and cough  Cardiovascular: Negative for chest pain, palpitations, peripheral edema, orthopnea or PND  Gastrointestinal: Negative for N/V/D and abdominal pain; no hematemesis, hematochezia, or melena; no anorexia  Genitourinary: Negative for dysuria, frequency, retention; no incontinence  Hematologic/Lymphatic: Negative for bleeding tendency/excessive bruising  Musculoskeletal: Positive BLE knee pain with inability to stand or ambulate s/p mechanical fall   neurologic: Negative for LOC, seizure activity, paresthesias, dysarthria, vertigo, and gait disturbance  Skin: Negative for itching,rash, decubitus  Psychiatric: Negative for depression,anxiety, and agitation; no hallucinations; denies SI/HI  Endocrine: Negative for polyuria/polydipsia/polyphagia; no heat/cold intolerance    Past Medical History:   has a past medical history of Hypertension, Kidney stone, Mitral valve prolapse, Sarcoidosis, and Spinal stenosis. Past Surgical History:   has a past surgical history that includes back surgery (1992) and Colonoscopy (5/5/2015). Medications:  No current facility-administered medications on file prior to encounter. Current Outpatient Medications on File Prior to Encounter   Medication Sig Dispense Refill    gabapentin (NEURONTIN) 300 MG capsule TAKE ONE CAPSULE BY MOUTH THREE TIMES A DAY      lisinopril (PRINIVIL;ZESTRIL) 10 MG tablet Take 1 tablet by mouth      albuterol sulfate HFA (PROVENTIL HFA) 108 (90 Base) MCG/ACT inhaler Inhale 2 puffs into the lungs every 4 hours as needed for Wheezing 1 Inhaler 0    Multiple Vitamins-Minerals (THERAPEUTIC MULTIVITAMIN-MINERALS) tablet Take 1 tablet by mouth daily      Ascorbic Acid (VITAMIN C) 500 MG tablet Take 250 mg by mouth daily      Omega-3 Fatty Acids (FISH OIL) 1000 MG CAPS Take 1,000 mg by mouth daily      Ginkgo Biloba Extract (GNP GINGKO BILOBA EXTRACT) 60 MG CAPS Take by mouth daily      naproxen (NAPROSYN) 500 MG tablet Take 500 mg by mouth 2 times daily as needed for Pain         Allergies:   Allergies   Allergen Reactions    Bee Venom Social History:   reports that he has never smoked. He has never used smokeless tobacco. He reports current alcohol use. He reports that he does not use drugs. Family History:  family history includes High Blood Pressure in his mother; Stroke in his father.      Physical Exam:  BP (!) 166/109   Pulse 96   Temp 98.2 °F (36.8 °C) (Oral)   Resp 16   Ht 5' 10\" (1.778 m)   Wt 265 lb (120.2 kg)   SpO2 91%   BMI 38.02 kg/m²     General appearance: Pleasant, overweight male resting in bed with BLE immobilizers in place  Eyes: Sclera clear without conjunctival injection; PERRLA; EOMI  ENT: Mucous membranes moist without thrush; normal dentition  Neck: Supple without meningismus; no goiter; no carotid bruit bilaterally  Cardiovascular: Regular rhythm without ectopy; normal S1-S2 with no murmurs; no peripheral edema; no JVD  Respiratory: No tachypnea; CTAB with adequate air exchange, no wheeze, rhonchi or rales; I:E intact  Gastrointestinal: Abdomen obese, non-tender, not distended; bowel sounds normal; no masses/organomegaly appreciated  Musculoskeletal: BLE immobilizers in place   neurology: A&O x3; cranial nerves 2-12 grossly intact; motor 5/5  BUE/BLE; no seizure activityPsychiatry: Well-groomed with good eye contact; appropriate affect; no visual/auditory hallucination  Skin: Warm, dry, normal turgor, no rash  PV: 2/4 radial and dorsalis pedis bilaterally; brisk capillary refill    Labs:  CBC:   Lab Results   Component Value Date    WBC 8.3 01/04/2022    RBC 5.67 01/04/2022    HGB 13.4 01/04/2022    HCT 42.7 01/04/2022    MCV 75.2 01/04/2022    MCH 23.6 01/04/2022    MCHC 31.4 01/04/2022    RDW 13.8 01/04/2022     01/04/2022    MPV 8.4 01/04/2022     BMP:    Lab Results   Component Value Date     01/04/2022    K 3.6 01/04/2022     01/04/2022    CO2 25 01/04/2022    BUN 11 01/04/2022    CREATININE 0.9 01/04/2022    CALCIUM 8.7 01/04/2022    GFRAA >60 01/04/2022    LABGLOM >60 01/04/2022 GLUCOSE 103 01/04/2022     CT KNEE LEFT WO CONTRAST   Final Result   1. Complete rupture of the bilateral distal quadriceps tendons with   associated muscular strains and surrounding soft tissue edema and blood   products. Larger hematoma is present on the right at the level of the distal   quadriceps rupture which measures approximately 3.0 x 5.1 x 8.8 cm.   2. No acute fracture identified. 3. 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 RECOMMENDATIONS:   Unavailable         CT KNEE RIGHT WO CONTRAST   Final Result   1. Complete rupture of the bilateral distal quadriceps tendons with   associated muscular strains and surrounding soft tissue edema and blood   products. Larger hematoma is present on the right at the level of the distal   quadriceps rupture which measures approximately 3.0 x 5.1 x 8.8 cm.   2. No acute fracture identified. 3. 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 RECOMMENDATIONS:   Unavailable         XR KNEE LEFT (1-2 VIEWS)   Final Result      1. No evidence of fracture. 2.  Suprapatellar region is not well seen on the lateral view and therefore   abnormalities in this area cannot be excluded. Also soft tissue injury or   tendon tear cannot be excluded by this exam if suspected clinically. MRI   examination would be recommended. XR KNEE RIGHT (1-2 VIEWS)   Final Result      1. There appears to be significant edema, fluid or hematoma in the   suprapatellar region and obscuring tissue planes. The quadriceps tendon is   not well visualized and therefore tear of the tendon cannot be excluded if   suspected clinically. Alternatively this could represent edema or hematoma   due to muscular injury in the distal anterior thigh or a large joint effusion   though no significant joint effusion is suggested on the lateral view   anterior to the knee joint. Correlate clinically. 2.  No evidence of fracture or joint malalignment. status-full code  Diet-cardiac DONNIE with carb restrictions now than n.p.o. after midnight  IV access-PIV established in ED    Admit as inpatient. I anticipate hospitalization spanning more than two midnights for investigation and treatment of the above medically necessary diagnoses. Please note that some part of this chart was generated using Dragon dictation software. Although every effort was made to ensure the accuracy of this automated transcription, some errors in transcription may have occurred inadvertently. If you may need any clarification, please do not hesitate to contact me through [facility].        Yakov Sewell MD    1/5/2022 5:33 AM

## 2022-01-06 LAB
ANION GAP SERPL CALCULATED.3IONS-SCNC: 10 MMOL/L (ref 3–16)
BASOPHILS ABSOLUTE: 0 K/UL (ref 0–0.2)
BASOPHILS RELATIVE PERCENT: 0.6 %
BUN BLDV-MCNC: 13 MG/DL (ref 7–20)
CALCIUM SERPL-MCNC: 8 MG/DL (ref 8.3–10.6)
CHLORIDE BLD-SCNC: 98 MMOL/L (ref 99–110)
CO2: 25 MMOL/L (ref 21–32)
CREAT SERPL-MCNC: 1 MG/DL (ref 0.8–1.3)
EKG ATRIAL RATE: 124 BPM
EKG DIAGNOSIS: NORMAL
EKG P AXIS: 46 DEGREES
EKG P-R INTERVAL: 174 MS
EKG Q-T INTERVAL: 288 MS
EKG QRS DURATION: 76 MS
EKG QTC CALCULATION (BAZETT): 413 MS
EKG R AXIS: 4 DEGREES
EKG T AXIS: -5 DEGREES
EKG VENTRICULAR RATE: 124 BPM
EOSINOPHILS ABSOLUTE: 0 K/UL (ref 0–0.6)
EOSINOPHILS RELATIVE PERCENT: 0.3 %
ESTIMATED AVERAGE GLUCOSE: 122.6 MG/DL
GFR AFRICAN AMERICAN: >60
GFR NON-AFRICAN AMERICAN: >60
GLUCOSE BLD-MCNC: 117 MG/DL (ref 70–99)
GLUCOSE BLD-MCNC: 126 MG/DL (ref 70–99)
GLUCOSE BLD-MCNC: 129 MG/DL (ref 70–99)
GLUCOSE BLD-MCNC: 134 MG/DL (ref 70–99)
GLUCOSE BLD-MCNC: 141 MG/DL (ref 70–99)
HBA1C MFR BLD: 5.9 %
HCT VFR BLD CALC: 37.3 % (ref 40.5–52.5)
HEMOGLOBIN: 11.8 G/DL (ref 13.5–17.5)
LYMPHOCYTES ABSOLUTE: 1.3 K/UL (ref 1–5.1)
LYMPHOCYTES RELATIVE PERCENT: 15 %
MCH RBC QN AUTO: 23.8 PG (ref 26–34)
MCHC RBC AUTO-ENTMCNC: 31.6 G/DL (ref 31–36)
MCV RBC AUTO: 75.2 FL (ref 80–100)
MONOCYTES ABSOLUTE: 1.2 K/UL (ref 0–1.3)
MONOCYTES RELATIVE PERCENT: 14.4 %
NEUTROPHILS ABSOLUTE: 5.8 K/UL (ref 1.7–7.7)
NEUTROPHILS RELATIVE PERCENT: 69.7 %
PDW BLD-RTO: 13.4 % (ref 12.4–15.4)
PERFORMED ON: ABNORMAL
PLATELET # BLD: 152 K/UL (ref 135–450)
PMV BLD AUTO: 8.4 FL (ref 5–10.5)
POTASSIUM REFLEX MAGNESIUM: 4 MMOL/L (ref 3.5–5.1)
RBC # BLD: 4.96 M/UL (ref 4.2–5.9)
SODIUM BLD-SCNC: 133 MMOL/L (ref 136–145)
TOTAL CK: 673 U/L (ref 39–308)
TOTAL CK: 698 U/L (ref 39–308)
TOTAL CK: 786 U/L (ref 39–308)
WBC # BLD: 8.4 K/UL (ref 4–11)

## 2022-01-06 PROCEDURE — 6370000000 HC RX 637 (ALT 250 FOR IP): Performed by: ORTHOPAEDIC SURGERY

## 2022-01-06 PROCEDURE — 97162 PT EVAL MOD COMPLEX 30 MIN: CPT

## 2022-01-06 PROCEDURE — 97116 GAIT TRAINING THERAPY: CPT

## 2022-01-06 PROCEDURE — 85025 COMPLETE CBC W/AUTO DIFF WBC: CPT

## 2022-01-06 PROCEDURE — 6360000002 HC RX W HCPCS: Performed by: ORTHOPAEDIC SURGERY

## 2022-01-06 PROCEDURE — 94761 N-INVAS EAR/PLS OXIMETRY MLT: CPT

## 2022-01-06 PROCEDURE — 82550 ASSAY OF CK (CPK): CPT

## 2022-01-06 PROCEDURE — 6370000000 HC RX 637 (ALT 250 FOR IP): Performed by: NURSE PRACTITIONER

## 2022-01-06 PROCEDURE — 97166 OT EVAL MOD COMPLEX 45 MIN: CPT

## 2022-01-06 PROCEDURE — 97530 THERAPEUTIC ACTIVITIES: CPT

## 2022-01-06 PROCEDURE — 80048 BASIC METABOLIC PNL TOTAL CA: CPT

## 2022-01-06 PROCEDURE — 2700000000 HC OXYGEN THERAPY PER DAY

## 2022-01-06 PROCEDURE — 1200000000 HC SEMI PRIVATE

## 2022-01-06 PROCEDURE — 36415 COLL VENOUS BLD VENIPUNCTURE: CPT

## 2022-01-06 PROCEDURE — 2580000003 HC RX 258: Performed by: ORTHOPAEDIC SURGERY

## 2022-01-06 PROCEDURE — 93010 ELECTROCARDIOGRAM REPORT: CPT | Performed by: INTERNAL MEDICINE

## 2022-01-06 PROCEDURE — 97535 SELF CARE MNGMENT TRAINING: CPT

## 2022-01-06 RX ORDER — LISINOPRIL 10 MG/1
10 TABLET ORAL NIGHTLY
Status: DISCONTINUED | OUTPATIENT
Start: 2022-01-07 | End: 2022-01-11 | Stop reason: HOSPADM

## 2022-01-06 RX ADMIN — SODIUM CHLORIDE, PRESERVATIVE FREE 10 ML: 5 INJECTION INTRAVENOUS at 20:58

## 2022-01-06 RX ADMIN — GABAPENTIN 300 MG: 300 CAPSULE ORAL at 10:02

## 2022-01-06 RX ADMIN — SODIUM CHLORIDE, PRESERVATIVE FREE 10 ML: 5 INJECTION INTRAVENOUS at 10:06

## 2022-01-06 RX ADMIN — AMLODIPINE BESYLATE 5 MG: 5 TABLET ORAL at 10:04

## 2022-01-06 RX ADMIN — ENOXAPARIN SODIUM 40 MG: 40 INJECTION SUBCUTANEOUS at 10:05

## 2022-01-06 RX ADMIN — OXYCODONE 5 MG: 5 TABLET ORAL at 07:35

## 2022-01-06 RX ADMIN — SODIUM CHLORIDE 25 ML: 9 INJECTION, SOLUTION INTRAVENOUS at 02:50

## 2022-01-06 RX ADMIN — GABAPENTIN 300 MG: 300 CAPSULE ORAL at 20:58

## 2022-01-06 RX ADMIN — CYCLOBENZAPRINE 5 MG: 10 TABLET, FILM COATED ORAL at 20:58

## 2022-01-06 RX ADMIN — MULTIPLE VITAMINS W/ MINERALS TAB 1 TABLET: TAB at 10:03

## 2022-01-06 RX ADMIN — INSULIN LISPRO 2 UNITS: 100 INJECTION, SOLUTION INTRAVENOUS; SUBCUTANEOUS at 13:36

## 2022-01-06 RX ADMIN — OXYCODONE HYDROCHLORIDE AND ACETAMINOPHEN 250 MG: 500 TABLET ORAL at 10:02

## 2022-01-06 RX ADMIN — METOPROLOL TARTRATE 25 MG: 25 TABLET, FILM COATED ORAL at 20:58

## 2022-01-06 RX ADMIN — GABAPENTIN 300 MG: 300 CAPSULE ORAL at 13:34

## 2022-01-06 RX ADMIN — CYCLOBENZAPRINE 5 MG: 10 TABLET, FILM COATED ORAL at 10:04

## 2022-01-06 RX ADMIN — ACETAMINOPHEN 1000 MG: 500 TABLET ORAL at 13:33

## 2022-01-06 RX ADMIN — Medication 3000 MG: at 02:51

## 2022-01-06 RX ADMIN — ACETAMINOPHEN 650 MG: 325 TABLET ORAL at 02:53

## 2022-01-06 ASSESSMENT — PAIN SCALES - GENERAL
PAINLEVEL_OUTOF10: 4
PAINLEVEL_OUTOF10: 2
PAINLEVEL_OUTOF10: 0
PAINLEVEL_OUTOF10: 4
PAINLEVEL_OUTOF10: 3
PAINLEVEL_OUTOF10: 4

## 2022-01-06 NOTE — PROGRESS NOTES
1/5/2022). Restrictions  Restrictions/Precautions  Restrictions/Precautions: Weight Bearing,Fall Risk  Required Braces or Orthoses?: Yes  Lower Extremity Weight Bearing Restrictions  Right Lower Extremity Weight Bearing: Weight Bearing As Tolerated  Left Lower Extremity Weight Bearing: Weight Bearing As Tolerated  Required Braces or Orthoses  Right Lower Extremity Brace: Knee Immobilizer  Left Lower Extremity Brace: Knee Immobilizer  Position Activity Restriction  Other position/activity restrictions: Bilateral legs while in knee immobilizers.  Ensure that immobilizers are appropriately positioned, holding knees in full extension  Vision/Hearing  Vision: Impaired  Vision Exceptions: Wears glasses for reading  Hearing: Within functional limits     Subjective  General  Chart Reviewed: Yes  Patient assessed for rehabilitation services?: Yes  Response To Previous Treatment: Not applicable  Referring Practitioner: Tennille Allred MD  Referral Date : 01/05/22  Follows Commands: Within Functional Limits  General Comment  Comments: cleared by nursing  Subjective  Subjective: pt resting in bed. 1/10 pain at rest  Pain Screening  Patient Currently in Pain: Yes          Orientation     Social/Functional History  Social/Functional History  Lives With: Spouse  Type of Home: House  Home Layout: Two level,Bed/Bath upstairs  Home Access:  (small threshold)  Bathroom Shower/Tub: Tub/Shower unit  Bathroom Equipment: Grab bars in [address]: Crutches,Standard walker  ADL Assistance: Independent  Homemaking Assistance: Independent  Homemaking Responsibilities: Yes  Ambulation Assistance: Independent (without AD)  Active : Yes  Occupation: Full time employment  Type of occupation: teacher at [employer]        Objective          PROM RLE (degrees)  RLE PROM: WFL  RLE General PROM: hip and ankle  PROM LLE (degrees)  LLE PROM: WFL  LLE General PROM: hip and ankle  Strength RLE  Comment: NT  Strength LLE  Comment: NT     Sensation  Overall Sensation Status: WNL  Bed mobility  Supine to Sit: Moderate assistance;2 Person assistance  Sit to Supine: Unable to assess  Transfers  Sit to Stand: 2 Person Assistance; Moderate Assistance (elevated bed height)  Stand to sit: Minimal Assistance;2 Person Assistance  Bed to Chair: Minimal assistance;2 Person Assistance  Ambulation  Ambulation?: Yes  WB Status: WBAT with B KI  More Ambulation?: No  Ambulation 1  Surface: level tile  Device: Standard Walker  Assistance: Minimal assistance  Quality of Gait: no knee flexion  Distance: 3'     Balance  Posture: Good  Sitting - Static: Good;-  Sitting - Dynamic: Good;-  Standing - Static: Good;-  Standing - Dynamic: Good;-        Plan   Plan  Times per week: 7x/week  Times per day: Daily  Current Treatment Recommendations: Strengthening,Balance Training,Endurance Training,Functional Mobility Training,Transfer Training,Gait Training,Positioning,Equipment Evaluation, Education, & procurement,Patient/Caregiver Education & Training,Safety Education & Training  Safety Devices  Type of devices:  All fall risk precautions in place,Call light within reach,Chair alarm in place,Gait belt,Patient at risk for falls,Nurse notified,Left in chair  Restraints  Initially in place: No      AM-PAC Score  AM-PAC Inpatient Mobility Raw Score : 11 (01/06/22 1241)  AM-PAC Inpatient T-Scale Score : 33.86 (01/06/22 1241)  Mobility Inpatient CMS 0-100% Score: 72.57 (01/06/22 1241)  Mobility Inpatient CMS G-Code Modifier : CL (01/06/22 1241)          Goals  Short term goals  Time Frame for Short term goals: 1/12  Short term goal 1: Pt will complete bed mobility wtih Min A  Short term goal 2: Pt will sit to stand with min A  Short term goal 3: Pt will ambulate x 13' with SW with CGA  Patient Goals   Patient goals : to learn how to move       Therapy Time   Individual Concurrent Group Co-treatment   Time In 0905         Time Out 0940         Minutes 35         Timed Code Treatment Minutes: [facility] WILLIAM Conte, PT

## 2022-01-06 NOTE — PROGRESS NOTES
Following message sent to Jose Alejandro NP: MEWS 4. BP elevated 167 /128, Norvasc given. -148s. Labetalol only parameters greater than 200 systolic. Hydralazine for greater than 17 or 95, but I believe it raises HR. EKG available please review. Waiting for response.

## 2022-01-06 NOTE — DISCHARGE INSTR - COC
SpO2 99%   BMI 39.37 kg/m²     Last documented pain score (0-10 scale): Pain Level: 4  Last Weight:   Wt Readings from Last 1 Encounters:   22 274 lb 6.4 oz (124.5 kg)     Mental Status:  {IP PT MENTAL STATUS:}    IV Access:  { RAVI IV ACCESS:828748496}    Nursing Mobility/ADLs:  Walking   {CHP DME PNGZ:489405262}  Transfer  {CHP DME OLPB:670363791}  Bathing  {CHP DME QTW}  Dressing  {CHP DME VYGA:018385948}  Toileting  {CHP DME SCYQ:965448573}  Feeding  {CHP DME HEHY:904628417}  Med Admin  {CHP DME YRJF:177546363}  Med Delivery   { RAVI MED Delivery:898570184}    Wound Care Documentation and Therapy:        Elimination:  Continence: Bowel: {YES / TY:31972}  Bladder: {YES / US:24627}  Urinary Catheter: {Urinary Catheter:094498220}   Colostomy/Ileostomy/Ileal Conduit: {YES / TZ:99462}       Date of Last BM: ***    Intake/Output Summary (Last 24 hours) at 2022 1301  Last data filed at 2022 0755  Gross per 24 hour   Intake 680 ml   Output 1275 ml   Net -595 ml     I/O last 3 completed shifts:   In: 46 [P.O.:480; I.V.:1200]  Out: 860 [Urine:810; Blood:50]    Safety Concerns:     [address] Safety Concerns:867174854}    Impairments/Disabilities:      [address] Impairments/Disabilities:676622772}    Nutrition Therapy:  Current Nutrition Therapy:   [address] Diet List:389862631}    Routes of Feeding: {P DME Other Feedings:236069666}  Liquids: {Slp liquid thickness:90133}  Daily Fluid Restriction: {CHP DME Yes amt example:365078396}  Last Modified Barium Swallow with Video (Video Swallowing Test): {Done Not Done BCTA:942190389}    Treatments at the Time of Hospital Discharge:   Respiratory Treatments: ***  Oxygen Therapy:  {Therapy; copd oxygen:54491}  Ventilator:    {MH CC Vent NYWE:549325738}    Rehab Therapies: {THERAPEUTIC INTERVENTION:2514939464}  Weight Bearing Status/Restrictions: {NAN MENCHACA Weight Bearin}  Other Medical Equipment (for information only, NOT a DME order): {EQUIPMENT:685086685}  Other Treatments: ***    Patient's personal belongings (please select all that are sent with patient):  {CHP DME Belongings:871835696}    RN SIGNATURE:  {Esignature:390510318}    CASE MANAGEMENT/SOCIAL WORK SECTION    Inpatient Status Date: 1/4/22    Readmission Risk Assessment Score:  Readmission Risk              Risk of Unplanned Readmission:  10           Discharging to [facility]/ [address]   [phone]     / signature: {Esignature:201709685}    PHYSICIAN SECTION    Prognosis: Good    Condition at Discharge: Stable    Rehab Potential (if transferring to Rehab): Good    Recommended Labs or Other Treatments After Discharge: None    Physician Certification: I certify the above information and transfer of Kem Jennings  is necessary for the continuing treatment of the diagnosis listed and that he requires Acute Rehab for less 30 days.      Update Admission H&P: No change in H&P    PHYSICIAN SIGNATURE:  Electronically signed by Christa Cervantes MD on 1/11/22 at 11:11 AM EST

## 2022-01-06 NOTE — PROGRESS NOTES
Hospitalist Progress Note      PCP: Shanti Perry MD    Date of Admission: 1/4/2022    Chief Complaint: Knee pain    Lynne Salvador is a 78 y.o. male who presented to the ED via EMS to be evaluated for BLE pain and inability to ambulate s/p mechanical fall just prior to arrival.  Patient states that he was outside walking his dog on a muddy slope at which time the dog jolted forward, causing him to lurch forward and fall bended knee. He reports that he experienced excruciating R>L knee pain, after which she was unable to stand or bear weight. Patient denies LOC or other skeletal trauma. Chronic medical conditions include: Obesity, prediabetes, and HTN.    Upon arrival to the ED, EKG was obtained revealing NSR with remote inferior infarct. Plain films/CT scan revealed complete rupture of bilateral distal quadriceps tendon with associated muscle strain and surrounding soft tissue edema/blood product. Larger hematoma present on right side. Consultation was placed to orthopedics per ED attending, and patient was placed in bilateral immobilizer overnight.   Hospitalist team consulted to perform preoperative clearance and manage patient medically overnight, pending a.m. orthopedic intervention.        Subjective: NAD, discussed plan of care, POD #1, Temp 100  's, Pain while sitting in chair, working with PT      Medications:  Reviewed    Infusion Medications    dextrose      sodium chloride 25 mL (01/06/22 0250)    sodium chloride       Scheduled Medications    insulin lispro  0-12 Units SubCUTAneous TID WC    insulin lispro  0-6 Units SubCUTAneous Nightly    sodium chloride flush  5-40 mL IntraVENous 2 times per day    enoxaparin  40 mg SubCUTAneous Daily    amLODIPine  5 mg Oral Daily    vitamin C  250 mg Oral Daily    gabapentin  300 mg Oral TID    therapeutic multivitamin-minerals  1 tablet Oral Daily    cyclobenzaprine  5 mg Oral BID    lisinopril  10 mg Oral 01/06/22  0145    137 133*   K 3.6 3.7 4.0    105 98*   CO2 25 23 25   BUN 11 12 13   CREATININE 0.9 0.9 1.0   CALCIUM 8.7 8.3 8.0*     Recent Labs     01/04/22 1755 01/05/22  0806   AST 26 26   ALT 16 14   BILITOT 0.7 1.1*   ALKPHOS 68 64     Recent Labs     01/04/22  1822   INR 1.08     Recent Labs     01/04/22  1755 01/05/22  0806 01/05/22 2011 01/06/22  0145 01/06/22  0752   CKTOTAL 467*   < > 768* 698* 673*   TROPONINI <0.01  --   --   --   --     < > = values in this interval not displayed. Urinalysis:      Lab Results   Component Value Date    NITRU Negative 01/05/2022    WBCUA 3-5 06/28/2017    BACTERIA 1+ 06/28/2017    RBCUA  06/28/2017    BLOODU Negative 01/05/2022    SPECGRAV 1.015 01/05/2022    GLUCOSEU Negative 01/05/2022       Radiology:  CT KNEE LEFT WO CONTRAST   Final Result   1. Complete rupture of the bilateral distal quadriceps tendons with   associated muscular strains and surrounding soft tissue edema and blood   products. Larger hematoma is present on the right at the level of the distal   quadriceps rupture which measures approximately 3.0 x 5.1 x 8.8 cm.   2. No acute fracture identified. 3. 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 RECOMMENDATIONS:   Unavailable         CT KNEE RIGHT WO CONTRAST   Final Result   1. Complete rupture of the bilateral distal quadriceps tendons with   associated muscular strains and surrounding soft tissue edema and blood   products. Larger hematoma is present on the right at the level of the distal   quadriceps rupture which measures approximately 3.0 x 5.1 x 8.8 cm.   2. No acute fracture identified. 3. 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 RECOMMENDATIONS:   Unavailable         XR KNEE LEFT (1-2 VIEWS)   Final Result      1. No evidence of fracture.       2.  Suprapatellar region is not well seen on the lateral view and therefore   abnormalities in this area cannot be excluded. Also soft tissue injury or   tendon tear cannot be excluded by this exam if suspected clinically. MRI   examination would be recommended. XR KNEE RIGHT (1-2 VIEWS)   Final Result      1. There appears to be significant edema, fluid or hematoma in the   suprapatellar region and obscuring tissue planes. The quadriceps tendon is   not well visualized and therefore tear of the tendon cannot be excluded if   suspected clinically. Alternatively this could represent edema or hematoma   due to muscular injury in the distal anterior thigh or a large joint effusion   though no significant joint effusion is suggested on the lateral view   anterior to the knee joint. Correlate clinically. 2.  No evidence of fracture or joint malalignment.                  Assessment/Plan:    Active Hospital Problems    Diagnosis     Pre-diabetes [R73.03]     Quadriceps tendon rupture, left, initial encounter [S76.112A]     Quadriceps tendon rupture, right, initial encounter [S76.111A]     HTN (hypertension) [I10]     Rupture of distal quadriceps tendon [S76.119A]      BLE distal quadricep tendon rupture  -Tylenol, Flexeril, Neurontin and Lidoderm patch   -OxyIR/ IV Dilaudid scheduled sparingly to treat moderate/severe pain, respectively  -Preoperative EKG and laboratory studies ordered and reviewed; patient with NO absolute contraindication to proceed with recommended procedural intervention  -POD #1, post op low grade temp, no leukocytosis, tylenol, monitor  -Consultation placed to PT/OT for postoperative rehabilitation     HTN  -Patient admitted to telemetry floor for continuous monitoring during stay  -EKG obtained in ED reviewed personally and found to be without evidence of LAD or acute ischemia  -Continue home medication dosage of lisinopril  -Norvasc added     Prediabetes with BMI 38.02  -A1c 5.9  -PRN Humalog medium dose SSI scheduled before meals and at bedtime based on POC glucose  -Carbohydrate restriction placed on diet    DVT Prophylaxis: lovenox post op  Diet: ADULT DIET;  Regular; 4 carb choices (60 gm/meal)  Code Status: Full Code    PT/OT Eval Status: Consulted    Dispo - pending course, dc arrangements    MARISOL Cali - CNP

## 2022-01-06 NOTE — PROGRESS NOTES
Message sent to [address] [place] [address] NP regarding elevated BP and /104 and 's to 120's. Waiting for response.

## 2022-01-06 NOTE — PROGRESS NOTES
Occupational Therapy   Occupational Therapy Initial Assessment and Treatment Note   Date: 2022   Patient Name: Darleen Cabot  MRN: [id number]     : 1955    Date of Service: 2022    Discharge Recommendations:  Subacute/Skilled Nursing Facility     Assessment   Performance deficits / Impairments: Decreased functional mobility ; Decreased ADL status  Assessment: OT eval completed. Pt admitted for B quad tendon rupture and is POD#1 B quad tendon repair. He is allowed WBAT on BLE and is to maintain knee extension at all times with B KI. Prior to onset, pt lived independently at home with spouse. Currently, he requires assist x2 for sit to stand and transfer to chair with SW. Extensive assist needed for LE ADLs. Recommend SNF for skilled OT to improve function for safe return home. cont OT in acute care. Prognosis: Good  Decision Making: Medium Complexity  OT Education: OT Role;Plan of Care;Transfer Training;Precautions; ADL Adaptive Strategies; Equipment  Disease Specific Education: Pt educated on weight bearing status, post-op precautions, appropriate DME, and safe mobility with AD. Pt verbalized understanding  REQUIRES OT FOLLOW UP: Yes  Activity Tolerance  Activity Tolerance: Patient Tolerated treatment well  Safety Devices  Safety Devices in place: Yes  Type of devices: Nurse notified;Gait belt;Call light within reach; Left in chair;Chair alarm in place       Patient Diagnosis(es): The primary encounter diagnosis was Tendon rupture, traumatic. quadriceps, left, initial encounter. A diagnosis of Traumatic rupture of quadriceps tendon, right, initial encounter was also pertinent to this visit. has a past medical history of Hypertension, Kidney stone, Mitral valve prolapse, Sarcoidosis, and Spinal stenosis. has a past surgical history that includes back surgery (); Colonoscopy (2015); and Leg Muscle Surgery (Bilateral, 2022). Restrictions  Restrictions/Precautions  Restrictions/Precautions: Weight Bearing,Fall Risk  Required Braces or Orthoses?: Yes  Lower Extremity Weight Bearing Restrictions  Right Lower Extremity Weight Bearing: Weight Bearing As Tolerated  Left Lower Extremity Weight Bearing: Weight Bearing As Tolerated  Required Braces or Orthoses  Right Lower Extremity Brace: Knee Immobilizer  Left Lower Extremity Brace: Knee Immobilizer  Position Activity Restriction  Other position/activity restrictions: Bilateral legs while in knee immobilizers. Ensure that immobilizers are appropriately positioned, holding knees in full extension    Subjective   General  Chart Reviewed: Analia Russell and Physical  Patient assessed for rehabilitation services?: Yes  Family / Caregiver Present: No  Referring Practitioner: Larissa Ferrara  Diagnosis: B quad tendon rupture s/p B quad tendon repair 1/5/22  Subjective  Subjective: Pt agreeable to OT  General Comment  Comments: RN approved therapy  Patient Currently in Pain: Yes  Pain Assessment  Pain Assessment: 0-10  Pain Level: 4  Non-Pharmaceutical Pain Intervention(s): Repositioned; Therapeutic presence  Response to Pain Intervention: Patient Satisfied  Vital Signs  BP: 130/77  MAP (mmHg): 128  Patient Currently in Pain: Yes  Social/Functional History  Social/Functional History  Lives With: Spouse  Type of Home: House  Home Layout: Two level,Bed/Bath upstairs  Home Access:  (small threshold)  Bathroom Shower/Tub: Tub/Shower unit  Bathroom Equipment: Grab bars in shower  Home Equipment: Crutches,Standard walker  ADL Assistance: Independent  Homemaking Assistance: Independent  Homemaking Responsibilities: Yes  Ambulation Assistance: Independent (without AD)  Active : Yes  Occupation: Full time employment  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 Once pt was upright on feet, he transferred to chair with min x2. Pt stood again from chair and took steps with min x2 and walker. ADL  Feeding: Independent  Grooming: Setup (performed while seated in chair)  LE Dressing: Dependent/Total  Toileting: Maximum assistance  Tone RUE  RUE Tone: Normotonic  Tone LUE  LUE Tone: Normotonic  Coordination  Movements Are Fluid And Coordinated: Yes     Bed mobility  Supine to Sit: Moderate assistance;2 Person assistance (HOB elevated)  Sit to Supine: Unable to assess  Transfers  Stand Pivot Transfers: Minimal assistance;2 Person assistance (SW)  Sit to stand: Maximum assistance;2 Person assistance (max x2 from bed, min x2 from chair)  Stand to sit: Moderate assistance;2 Person assistance     Cognition  Overall Cognitive Status: WFL        Sensation  Overall Sensation Status: WNL    LUE AROM (degrees)  LUE AROM : WFL  RUE AROM (degrees)  RUE AROM : WFL  LUE Strength  Gross LUE Strength: WFL  RUE Strength  Gross RUE Strength: WFL      Plan   Plan  Times per week: 4-6x  AM-PAC Score      AM-[facility] Inpatient Daily Activity Raw Score: 15 (01/06/22 1111)  AM-PAC Inpatient ADL T-Scale Score : 34.69 (01/06/22 1111)  ADL Inpatient CMS 0-100% Score: 56.46 (01/06/22 1111)  ADL Inpatient CMS G-Code Modifier : CK (01/06/22 1111)    Goals  Short term goals  Time Frame for Short term goals: 1 week (1/13) unless noted  Short term goal 1: Perform functional transfers with min x1 and AD  Short term goal 2: Perform LE ADLs (ie toileting, dressing) with mod A with adapt equip  Short term goal 3: Perform bathroom mobility with min x1 and AD by 1/11  Patient Goals   Patient goals :  \"Be able to walk\"     Therapy Time   Individual Concurrent Group Co-treatment   Time In 0907         Time Out 0947         Minutes 40         Timed Code Treatment Minutes: 37 Minutes (10 min eval)    If pt is discharged prior to next OT session, this note will serve as the discharge summary.   Sharmila Bradford OT

## 2022-01-06 NOTE — CARE COORDINATION
[facility]    Referral received from  to follow for home care services.    [facility] hollis to staff timely  Patient has no preference in [facility]    Referral sent to [address] [place] [address] home care accepte by Glenn Zheng RN, BSN CTN  [facility] [phone]

## 2022-01-06 NOTE — CARE COORDINATION
CASE MANAGEMENT INITIAL ASSESSMENT      Reviewed chart and completed assessment with patient: Pt  Explained Case Management role/services. Primary contact information: Wife LIMA [facility] Decision Maker :   Primary Decision Maker: Radene Seip - Spouse - [phone]          Can this person be reached and be able to respond quickly, such as within a few minutes or hours? Yes    Admit date/status: IP, 1/4/22  Diagnosis: Tendon rupture, traumatic. quadriceps, left, initial encounter, Traumatic rupture of quadriceps tendon, right, initial encounter, Rupture of distal quadriceps tendon and DX  Is this a Readmission?:  No      Insurance: BCBS Primary   Precert required for SNF: No Waived      3 night stay required: No    Living arrangements, Adls, care needs, prior to admission: Lives in a tri-level house with spouse, 0SE 8 steps to second floor. 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 Transportation: squad     [address] [place] [address] at home:  Walker_SW_Cane_X_RTS__ BSC__Shower Chair__  02__ HHN__ CPAP__  BiPap__  Hospital Bed__ W/C___ Other__________    Services in the home and/or outpatient, prior to admission: None      PT/OT recs: SNF per [address] Notification (HEN): needed for SNF    Barriers to discharge: None    Plan/comments: CM met with pt at bedside for initial assessment. Pt really wanting to dc back home with current support system in place [facility].. No agency preference. OK for referral to [address] [place] [address],[address], spoke to Eitan Umanzor. If pt dc homes he will need squad transport arranged and some DME. WC and shower chair, spoke to Candice.  CM following-Inez Shah RN      [facility] on chart for MD signature

## 2022-01-06 NOTE — OP NOTE
[facility]                 [facility]                                OPERATIVE REPORT    PATIENT NAME: Rajesh Duran                    :        1955  MED REC NO:   [id number]                          ROOM:       [id number]  ACCOUNT NO:   [de-identified]                           ADMIT DATE: 2022  PROVIDER:     Negra James MD    DATE OF PROCEDURE:  2022    PREOPERATIVE DIAGNOSIS:  Bilateral quadriceps tendon rupture. POSTOPERATIVE DIAGNOSIS:  Bilateral quadriceps tendon rupture. OPERATION PERFORMED:  Bilateral quadriceps tendon repair. SURGEON:  Negra James MD    ASSISTANT:  Marianela Cerrato.    ANESTHESIA:  General.    ESTIMATED BLOOD LOSS:  Minimal.    COMPLICATIONS:  None. DISPOSITION:  PACU in good condition. INDICATIONS FOR PROCEDURE:  The patient is a 57-year-old gentleman who  sustained a fall onto his bilateral knees after his dog ran from him  when he was on muddy incline and he fell down. He was unable to  ambulate afterwards and brought to [facility] ER where he was found  to have bilateral quadriceps tendon ruptures. He was unable to ambulate  and he thus was admitted to the hospital, Orthopedic consult was  requested and we recommended operative repair. We discussed the risks  benefits, complications, and alternatives to the procedure. We  subsequently provided written informed consent for the procedure. DESCRIPTION OF PROCEDURE:  The patient was seen in the preoperative  holding area. His bilateral legs were marked. He was seen by  Anesthesia Service. He was then brought to the operating room. He was  transferred onto the operating room table in the supine position. He  was then induced under general anesthesia. He received prophylactic  preoperative IV antibiotics. We then placed tourniquet on his bilateral  proximal thigh.   We then sterilely prepped and draped his bilateral legs  in a standard fashion. We began with his left leg, Esmarch tourniquet  was used to exsanguinate the left leg and tourniquet was inflated to 300  mmHg. Standard midline incision was made center of the patella with  majority of the incision proximal to the patella and just about  one-third inferior to the patella. Sharp dissection was carried down to  the skin and subcutaneous tissue, where we immediately encountered  hematoma in the completely ruptured quadriceps tendon on the left. We  then copiously irrigated the wound with normal saline solution. Hematoma was evacuated. We then placed 0 Vicryl sutures along on the  medial and lateral retinaculum. We then placed Arthrex suture tape in a  running Krackow fashion up the medial and lateral sides of the  quadriceps tendon. Two suture tapes were used and then we then prepared  the superior pole of the patella. Any overlying soft tissue and  reminiscence of tendon were rongeured off. We then abraded the patella  and used a rongeur to get a good bony bleeding heeling surface for a  tendon repair. Three parallel drill holes were then made with a Beath  pin and the Beath pin was then used to shuttle the sutures limbs through  the drill holes. The most medical suture was placed thorough the most  medial drill home. The lateral limb of the medial suture and the medial  limb of the lateral suture were placed with middle drill hole and most  lateral limb of suture was placed through the most lateral drill hole. Corresponding sutures were then pulled and this was approximated the  tendon back down to the patella while holding tension on one suture the  other one was then tied and similarly the other suture was then tied. These were then tied together for further reinforcement. We then also  reinforced over the periosteum with some 0 Vicryl suture through the  quad tendon. We then tied our retinacular suture.   We then irrigated  the wound with normal saline solution, we then closed subcutaneously  with 2-0 Vicryl suture in simple inverted interrupted fashion. Skin was  then closed with staples. Xeroform gauze was later applied at the end  of procedure as well as sterile Webril and then Ace wrap and knee  immobilizer at the end of the procedure. Prior to the doing that though  after the stapling this incision, we then began on the right knee. Tourniquet was deflated on the left knee at that time. Esmarch bandage  was then used to exsanguinate the right leg. Tourniquet was inflated on  the right leg to 300 mmHg, a standard midline incision similar to the  other side was also made. Once we were down to through the skin and  subcutaneous tissue, however we did not encounter immediate gap or  hematoma. I then dissected more proximally and we could feel a gap  there and we encountered hematoma. With further dissection, around the  superior pole of the patella, we then discovered that this was a partial  tear involving approximately 60 to 70% of the medial aspect of the  quadriceps tendon with about 30% of the quadriceps tendon still  completely intact on the lateral side with the lateral retinaculum  completely still intact. It was almost like an L-type tear. I first  elected to close the longitudinal aspect of the tear with margin  convergence using 0 Vicryl sutures and then we did place 0 Vicryl suture  along the medial retinaculum. I did prepare the superior pole of the  patella similar to the left knee with rongeur as well as rasp. I then  ran an Arthrex suture tape of the most lateral aspect of the tear and  then back down to the intact portion of the lateral aspect of the  quadriceps tendon, this was in a Krackow whipstitch type fashion. A  second suture tape was then started on the medical aspect of the tendon  going proximally and then coming down along the lateral aspect of the  tear of the medial aspect of the tendon.   We then drilled two drill  holes, one in the straight middle portion of the patella and one on the  medical portion of the patella. The Beath pin was then used to pass one  limb of suture from each of the suture tapes through the drill holes. Tension was then placed on all sutures, reapproximated the tendon back  down to the footprint, this was then tied in a similar fashion to the  left knee. I then also reinforced on to each other. We also reinforced  with further sutures of 0 Vicryl through the tendon and the patella and  periosteum. The retinaculum sutures was then tied, we then trialed the  knee through flexion and extension and could easily get up to 60 degree  with no tendon gapping, this was similar on the left knee too. We then  closed subcutaneously similarly with 2-0 Vicryl suture in a simple  interrupted. Skin was then closed with staple and similarly Xeroform  gauze and then 4 x 4 gauze, sterile Webril and Ace wrap were then  applied and then knee immobilizers on both knees. The patient was then  awoken from anesthesia and transferred onto his hospital cart and  transported to PACU for recovery. He tolerated the procedure well  without any complications. PLAN:  The patient will be recovered in the PACU, then be readmitted to  the floor. He will have physical therapy and occupational therapy  consult. He is to weight bear as tolerated and the knee immobilizers. He will have 24 hours of prophylactic IV antibiotics and DVT  prophylaxis.         Susi Williamson MD    D: 01/05/2022 12:52:48       T: 01/05/2022 16:07:22     [email or web address]  Job#: [id number]     Doc#: [id number]    CC:

## 2022-01-06 NOTE — ANESTHESIA POSTPROCEDURE EVALUATION
Department of Anesthesiology  Postprocedure Note    Patient: Marissa Arana  MRN: [id number]  YOB: 1955  Date of evaluation: 1/5/2022  Time:  8:31 PM     Procedure Summary     Date: 01/05/22 Room / Location: [address] [place] [address]  / [place]    Anesthesia Start: 1034 Anesthesia Stop: 9165    Procedure: BILATERAL QUADRICEPS  TENDON REPAIR (Bilateral Leg Upper) Diagnosis: (BILATERAL QUADICEPS TENDON REPAIR)    Surgeons: Estela Red MD Responsible Provider: Gui Weaver MD    Anesthesia Type: general ASA Status: 3          Anesthesia Type: general    Marcio Phase I: Marcio Score: 10    Marcio Phase II:      Last vitals: Reviewed and per EMR flowsheets.        Anesthesia Post Evaluation    Comments: Postoperative Anesthesia Note    Name:    Marissa Arana  MRN:      [id number]    Patient Vitals in the past 12 hrs:  01/05/22 1930, BP:(!) 169/104, Temp:98.9 °F (37.2 °C), Temp src:Oral, Pulse:114, Resp:20, SpO2:91 %  01/05/22 1757, BP:(!) 181/114, Temp:97.9 °F (36.6 °C), Temp src:Oral, Pulse:96, Resp:20, SpO2:90 %  01/05/22 1715, BP:111/72  01/05/22 1540, BP:(!) 141/99, Temp:97.7 °F (36.5 °C), Temp src:Oral, Pulse:80, Resp:24, SpO2:96 %  01/05/22 1425, BP:(!) 162/103, Pulse:85, Resp:17, SpO2:94 %  01/05/22 1420, Pulse:85, Resp:15, SpO2:95 %  01/05/22 1415, BP:(!) 158/110, Pulse:81, Resp:17, SpO2:95 %  01/05/22 1410, BP:(!) 164/108, Pulse:83, Resp:18, SpO2:94 %  01/05/22 1405, BP:(!) 165/110, Pulse:92, Resp:17, SpO2:100 %  01/05/22 1400, BP:(!) 163/105, Pulse:94, Resp:18, SpO2:98 %  01/05/22 1355, BP:(!) 170/96, Pulse:86, Resp:19, SpO2:99 %  01/05/22 1350, Pulse:86, Resp:17, SpO2:97 %  01/05/22 1345, BP:(!) 155/107, Pulse:86, Resp:9, SpO2:97 %  01/05/22 1340, BP:(!) 164/102, Pulse:96, Resp:19, SpO2:94 %  01/05/22 1335, BP:(!) 160/93, Pulse:90, Resp:20, SpO2:91 %  01/05/22 1330, BP:(!) 158/91, Pulse:99, Resp:15, SpO2:(!) 88 %  01/05/22 1325, BP:(!) 146/103, Pulse:108, Resp:19, SpO2:93 %  01/05/22 1324, BP:124/79, Temp:96.9 °F (36.1 °C), Temp src:Temporal, Pulse:96, Resp:22, SpO2:95 %  01/05/22 1011, BP:(!) 153/102, Temp:98.8 °F (37.1 °C), Temp src:Temporal, Pulse:91, Resp:16, SpO2:97 %  01/05/22 0900, Temp:98.2 °F (36.8 °C), Temp src:Oral, Pulse:84, Resp:16, SpO2:94 %     LABS:    CBC  Lab Results       Component                Value               Date/Time                  WBC                      5.7                 01/05/2022 08:06 AM        HGB                      12.8 (L)            01/05/2022 08:06 AM        HCT                      40.1 (L)            01/05/2022 08:06 AM        PLT                      157                 01/05/2022 08:06 AM   RENAL  Lab Results       Component                Value               Date/Time                  NA                       137                 01/05/2022 08:06 AM        K                        3.7                 01/05/2022 08:06 AM        CL                       105                 01/05/2022 08:06 AM        CO2                      23                  01/05/2022 08:06 AM        BUN                      12                  01/05/2022 08:06 AM        CREATININE               0.9                 01/05/2022 08:06 AM        GLUCOSE                  112 (H)             01/05/2022 08:06 AM   COAGS  Lab Results       Component                Value               Date/Time                  PROTIME                  12.2                01/04/2022 06:22 PM        INR                      1.08                01/04/2022 06:22 PM        APTT                     22.5 (L)            01/04/2022 06:22 PM     Intake & Output: In: 1200 (I.V.:1200)  Out: 860 (Urine:810)    Nausea & Vomiting:  No    Level of Consciousness:  Awake    Pain Assessment:  Adequate analgesia    Anesthesia Complications:  No apparent anesthetic complications    SUMMARY      Vital signs stable  OK to discharge from Stage I post anesthesia care.   Care transferred from Anesthesiology department on discharge from perioperative area

## 2022-01-06 NOTE — PROGRESS NOTES
Messaged attending physician regarding pt's elevated HR    \"Pt's HR is elevated at 125-135. BP is 130/77. He has PRN labetalol ordered for BP and HR; however, last night the labetalol dropped his BP from 182/113 to 95/68 within 30 minutes. Is there another option to better control pt's HR? Pt receiving 5mg amlodipine for BP now. Thank you.  \"    Steffen Cueva RN

## 2022-01-06 NOTE — PROGRESS NOTES
Department of Orthopedic Surgery  Physician Assistant   Progress Note    Subjective:       Systemic or Specific Complaints:Pain Control    Objective:     Patient Vitals for the past 24 hrs:   BP Temp Temp src Pulse Resp SpO2 Weight   01/06/22 0945 130/77 -- -- -- -- -- --   01/06/22 0730 (!) 162/108 99.2 °F (37.3 °C) Oral 115 16 99 % --   01/06/22 0349 -- 99.3 °F (37.4 °C) Oral -- -- -- --   01/06/22 0346 -- 99.8 °F (37.7 °C) Oral -- -- -- 274 lb 6.4 oz (124.5 kg)   01/06/22 0250 (!) 143/90 100 °F (37.8 °C) Oral 119 18 96 % --   01/05/22 2311 (!) 148/89 99.1 °F (37.3 °C) Oral 101 18 97 % --   01/05/22 2239 123/81 -- -- -- -- -- --   01/05/22 2234 129/82 -- -- -- -- -- --   01/05/22 2229 121/80 -- -- -- -- -- --   01/05/22 2224 108/69 -- -- -- -- -- --   01/05/22 2219 114/81 -- -- -- -- -- --   01/05/22 2212 95/68 -- -- 102 18 -- --   01/05/22 2206 121/86 -- -- 102 -- -- --   01/05/22 2202 (!) 139/93 -- -- 104 -- -- --   01/05/22 2156 (!) 144/95 -- -- 120 -- -- --   01/05/22 2153 (!) 182/113 -- -- -- -- -- --   01/05/22 2152 (!) 181/113 -- -- 133 -- -- --   01/05/22 2145 (!) 161/104 -- -- 133 -- -- --   01/05/22 2130 (!) 182/115 -- -- 130 -- -- --   01/05/22 2105 (!) 181/128 -- -- 142 -- -- --   01/05/22 2100 (!) 167/128 98.5 °F (36.9 °C) Oral 142 20 92 % --   01/05/22 1930 (!) 169/104 98.9 °F (37.2 °C) Oral 114 20 91 % --   01/05/22 1757 (!) 181/114 97.9 °F (36.6 °C) Oral 96 20 90 % --   01/05/22 1715 111/72 -- -- -- -- -- --   01/05/22 1540 (!) 141/99 97.7 °F (36.5 °C) Oral 80 24 96 % --       General: alert, appears stated age and cooperative   Wound: Wound clean and dry no evidence of infection. , Wound Intact and Positive for Edema   Motion: Painful range of Motion in affected extremity   DVT Exam: No evidence of DVT seen on physical exam.     Additional exam: NVI in the feet. Immobilizers intact.     Data Review  CBC:   Lab Results   Component Value Date    WBC 8.4 01/06/2022    RBC 4.96 01/06/2022    HGB 11.8 01/06/2022    HCT 37.3 01/06/2022     01/06/2022       Renal:   Lab Results   Component Value Date     01/06/2022    K 4.0 01/06/2022    CL 98 01/06/2022    CO2 25 01/06/2022    BUN 13 01/06/2022    CREATININE 1.0 01/06/2022    GLUCOSE 129 01/06/2022    CALCIUM 8.0 01/06/2022            Assessment:      bilateral quad tendon repair. Plan:      1:  PT/OT as able - NO ROM. WBAT. Wishes to go home if possible. Has wife there 24/7 and amenable to [facility]. Will see how he continues to do with PT to ensure he is safe to go home vs snf.   2:  Continue Deep venous thrombosis prophylaxis  3:  Continue Pain Control    MILTON Sinha

## 2022-01-07 ENCOUNTER — APPOINTMENT (OUTPATIENT)
Dept: GENERAL RADIOLOGY | Age: 67
DRG: 500 | End: 2022-01-07
Payer: COMMERCIAL

## 2022-01-07 LAB
ANION GAP SERPL CALCULATED.3IONS-SCNC: 9 MMOL/L (ref 3–16)
BUN BLDV-MCNC: 14 MG/DL (ref 7–20)
CALCIUM SERPL-MCNC: 8.2 MG/DL (ref 8.3–10.6)
CHLORIDE BLD-SCNC: 103 MMOL/L (ref 99–110)
CO2: 26 MMOL/L (ref 21–32)
CREAT SERPL-MCNC: 0.9 MG/DL (ref 0.8–1.3)
GFR AFRICAN AMERICAN: >60
GFR NON-AFRICAN AMERICAN: >60
GLUCOSE BLD-MCNC: 113 MG/DL (ref 70–99)
GLUCOSE BLD-MCNC: 114 MG/DL (ref 70–99)
HCT VFR BLD CALC: 33.7 % (ref 40.5–52.5)
HEMOGLOBIN: 10.8 G/DL (ref 13.5–17.5)
LACTIC ACID: 2.1 MMOL/L (ref 0.4–2)
MCH RBC QN AUTO: 24 PG (ref 26–34)
MCHC RBC AUTO-ENTMCNC: 31.9 G/DL (ref 31–36)
MCV RBC AUTO: 75.3 FL (ref 80–100)
PDW BLD-RTO: 13.6 % (ref 12.4–15.4)
PERFORMED ON: ABNORMAL
PLATELET # BLD: 136 K/UL (ref 135–450)
PMV BLD AUTO: 8.6 FL (ref 5–10.5)
POTASSIUM REFLEX MAGNESIUM: 3.8 MMOL/L (ref 3.5–5.1)
PROCALCITONIN: 0.45 NG/ML (ref 0–0.15)
RBC # BLD: 4.48 M/UL (ref 4.2–5.9)
SODIUM BLD-SCNC: 138 MMOL/L (ref 136–145)
TSH REFLEX: 0.83 UIU/ML (ref 0.27–4.2)
WBC # BLD: 9.5 K/UL (ref 4–11)

## 2022-01-07 PROCEDURE — 97530 THERAPEUTIC ACTIVITIES: CPT

## 2022-01-07 PROCEDURE — 2580000003 HC RX 258: Performed by: ORTHOPAEDIC SURGERY

## 2022-01-07 PROCEDURE — 97110 THERAPEUTIC EXERCISES: CPT

## 2022-01-07 PROCEDURE — 36415 COLL VENOUS BLD VENIPUNCTURE: CPT

## 2022-01-07 PROCEDURE — 83605 ASSAY OF LACTIC ACID: CPT

## 2022-01-07 PROCEDURE — 84443 ASSAY THYROID STIM HORMONE: CPT

## 2022-01-07 PROCEDURE — 84145 PROCALCITONIN (PCT): CPT

## 2022-01-07 PROCEDURE — 71045 X-RAY EXAM CHEST 1 VIEW: CPT

## 2022-01-07 PROCEDURE — 6360000002 HC RX W HCPCS: Performed by: NURSE PRACTITIONER

## 2022-01-07 PROCEDURE — 80048 BASIC METABOLIC PNL TOTAL CA: CPT

## 2022-01-07 PROCEDURE — 6370000000 HC RX 637 (ALT 250 FOR IP): Performed by: NURSE PRACTITIONER

## 2022-01-07 PROCEDURE — 97535 SELF CARE MNGMENT TRAINING: CPT

## 2022-01-07 PROCEDURE — 6370000000 HC RX 637 (ALT 250 FOR IP): Performed by: ORTHOPAEDIC SURGERY

## 2022-01-07 PROCEDURE — 2580000003 HC RX 258: Performed by: NURSE PRACTITIONER

## 2022-01-07 PROCEDURE — 85027 COMPLETE CBC AUTOMATED: CPT

## 2022-01-07 PROCEDURE — 6360000002 HC RX W HCPCS: Performed by: ORTHOPAEDIC SURGERY

## 2022-01-07 PROCEDURE — 1200000000 HC SEMI PRIVATE

## 2022-01-07 PROCEDURE — 97116 GAIT TRAINING THERAPY: CPT

## 2022-01-07 RX ORDER — AZITHROMYCIN 250 MG/1
500 TABLET, FILM COATED ORAL DAILY
Status: COMPLETED | OUTPATIENT
Start: 2022-01-07 | End: 2022-01-09

## 2022-01-07 RX ADMIN — GABAPENTIN 300 MG: 300 CAPSULE ORAL at 20:48

## 2022-01-07 RX ADMIN — GABAPENTIN 300 MG: 300 CAPSULE ORAL at 10:11

## 2022-01-07 RX ADMIN — SODIUM CHLORIDE, PRESERVATIVE FREE 10 ML: 5 INJECTION INTRAVENOUS at 10:15

## 2022-01-07 RX ADMIN — LISINOPRIL 10 MG: 10 TABLET ORAL at 20:48

## 2022-01-07 RX ADMIN — SODIUM CHLORIDE 25 ML: 9 INJECTION, SOLUTION INTRAVENOUS at 15:46

## 2022-01-07 RX ADMIN — OXYCODONE HYDROCHLORIDE AND ACETAMINOPHEN 250 MG: 500 TABLET ORAL at 10:11

## 2022-01-07 RX ADMIN — CEFTRIAXONE SODIUM 1000 MG: 1 INJECTION, POWDER, FOR SOLUTION INTRAMUSCULAR; INTRAVENOUS at 15:47

## 2022-01-07 RX ADMIN — METOPROLOL TARTRATE 25 MG: 25 TABLET, FILM COATED ORAL at 10:12

## 2022-01-07 RX ADMIN — CYCLOBENZAPRINE 5 MG: 10 TABLET, FILM COATED ORAL at 20:48

## 2022-01-07 RX ADMIN — CYCLOBENZAPRINE 5 MG: 10 TABLET, FILM COATED ORAL at 10:11

## 2022-01-07 RX ADMIN — ENOXAPARIN SODIUM 40 MG: 40 INJECTION SUBCUTANEOUS at 10:11

## 2022-01-07 RX ADMIN — SODIUM CHLORIDE, PRESERVATIVE FREE 10 ML: 5 INJECTION INTRAVENOUS at 20:50

## 2022-01-07 RX ADMIN — GABAPENTIN 300 MG: 300 CAPSULE ORAL at 14:50

## 2022-01-07 RX ADMIN — AZITHROMYCIN DIHYDRATE 500 MG: 250 TABLET, FILM COATED ORAL at 15:49

## 2022-01-07 RX ADMIN — MULTIPLE VITAMINS W/ MINERALS TAB 1 TABLET: TAB at 10:12

## 2022-01-07 RX ADMIN — METOPROLOL TARTRATE 25 MG: 25 TABLET, FILM COATED ORAL at 20:48

## 2022-01-07 ASSESSMENT — PAIN DESCRIPTION - LOCATION: LOCATION: HIP

## 2022-01-07 ASSESSMENT — PAIN DESCRIPTION - ORIENTATION: ORIENTATION: LEFT

## 2022-01-07 ASSESSMENT — PAIN SCALES - GENERAL
PAINLEVEL_OUTOF10: 0
PAINLEVEL_OUTOF10: 0

## 2022-01-07 ASSESSMENT — PAIN SCALES - WONG BAKER
WONGBAKER_NUMERICALRESPONSE: 2
WONGBAKER_NUMERICALRESPONSE: 2

## 2022-01-07 NOTE — CARE COORDINATION
[facility] - Acute Rehab Unit   After review, this patient is felt to be:       []  Appropriate for Acute Inpatient Rehab    [x]  Appropriate for Acute Inpatient Rehab Pending Insurance Authorization    []  Not appropriate for Acute Inpatient Rehab    []  Referral received and ARU reviewing patient; Evaluation ongoing. Marisa initiated 7/9/75 for ARU admission. Will notify DCP with further updates.  Thank you for the Giuliano Lopez RN

## 2022-01-07 NOTE — CARE COORDINATION
Addendum: [facility] can accept patient and will initiate insurance precertification. LG      Patient now agreeable to skilled nursing/rehab and/or inpatient rehab unit; states preference for acute level rehab. PT recs for IPU. MD order placed for IPR consult; referral to admissions to review; await response and decision to accept. Patient requires precert with commercial BCBS policy.       OLGA Espinosa [facility]  [id number]

## 2022-01-07 NOTE — PROGRESS NOTES
Physical Therapy  Facility/Department: [facility] C5 - MED SURG/ORTHO  Daily Treatment Note  NAME: La Irwin  : 1955  MRN: [id number]    Date of Service: 2022    Discharge Recommendations:  IP Rehab   PT Equipment Recommendations  Other: defer to facility. If pt does go home, pt will need hospital bed that elevates, SW, adjustable BSC  If pt is unable to be seen after this session, please let this note serve as discharge summary. Please see case management note for discharge disposition. Thank you. Assessment   Body structures, Functions, Activity limitations: Decreased functional mobility ; Decreased ROM; Decreased endurance;Decreased balance; Increased pain  Assessment: Pt progressing with decreased assist required. Tx focused on transfers from different heights to simulate pt's transfer needs at home. Pt states he has a low bed and standard toilet. Pt was able to stand from the bed to day. It was elevated but slightly less than yesterday. Pt required Mod A x 2 to stand from commode. Pt positioned in the chair at EOS. Pt seen as co-tx with OT to safely progress mobility. Additional time spend discussing with pt various DME needs if pt goes home and weighing pros/cons from rehab vs home. Given high level of indep prior to injury, pt may benefit from ARU stay to maximize mobility given current restrictions. Treatment Diagnosis: decreased mobility  Prognosis: Good  Decision Making: Medium Complexity  PT Education: Goals; General Safety; Disease Specific Education;PT Role;Plan of Care;Precautions;Transfer Training;Weight-bearing Education;Equipment  Patient Education: Pt expressed understanding on WBing precautions and OOB activity  Barriers to Learning: none  REQUIRES PT FOLLOW UP: Yes  Activity Tolerance  Activity Tolerance: Patient Tolerated treatment well  Activity Tolerance: 125/85, 94%, 103HR     Patient Diagnosis(es): The primary encounter diagnosis was Tendon rupture, traumatic. quadriceps, left, initial encounter. A diagnosis of Traumatic rupture of quadriceps tendon, right, initial encounter was also pertinent to this visit. has a past medical history of 01 minute Apgar score 0, Hypertension, Kidney stone, Mitral valve prolapse, Sarcoidosis, and Spinal stenosis. has a past surgical history that includes back surgery (); Colonoscopy (2015); and Leg Muscle Surgery (Bilateral, 2022). Restrictions  Restrictions/Precautions  Restrictions/Precautions: Weight Bearing,Fall Risk  Required Braces or Orthoses?: Yes  Lower Extremity Weight Bearing Restrictions  Right Lower Extremity Weight Bearing: Weight Bearing As Tolerated  Left Lower Extremity Weight Bearing: Weight Bearing As Tolerated  Required Braces or Orthoses  Right Lower Extremity Brace: Knee Immobilizer  Left Lower Extremity Brace: Knee Immobilizer  Position Activity Restriction  Other position/activity restrictions: No B knee ROM. Bilateral legs while in knee immobilizers. Ensure that immobilizers are appropriately positioned, holding knees in full extension  Subjective   General  Chart Reviewed: Yes  Response To Previous Treatment: Patient with no complaints from previous session. Family / Caregiver Present: No  Referring Practitioner: Donavan Mcgee MD  Subjective  Subjective: pt resting in bed. 1/10 pain at rest in L hip  General Comment  Comments: cleared by nursing          Orientation  Orientation  Overall Orientation Status: Within Normal Limits  Cognition      Objective   Bed mobility  Supine to Sit: Moderate assistance  Sit to Supine: Unable to assess  Transfers  Sit to Stand: 2 Person Assistance; Moderate Assistance  Stand to sit: Minimal Assistance;2 Person Assistance  Bed to Chair: Minimal assistance;2 Person Assistance  Ambulation  Ambulation?: Yes  WB Status: WBAT with B KI  More Ambulation?: No  Ambulation 1  Surface: level tile  Device: Standard Walker  Assistance: Minimal assistance  Quality of Gait: no knee flexion  Distance: 10' x 15'     Balance  Posture: Good  Sitting - Static: Good  Sitting - Dynamic: Good  Standing - Static: Good;-  Standing - Dynamic: Good;-       AM-PAC Score  AM-PAC Inpatient Mobility Raw Score : 11 (01/06/22 1241)  AM-PAC Inpatient T-Scale Score : 33.86 (01/06/22 1241)  Mobility Inpatient CMS 0-100% Score: 72.57 (01/06/22 1241)  Mobility Inpatient CMS G-Code Modifier : CL (01/06/22 1241)          Goals  Short term goals  Time Frame for Short term goals: 1/12  Short term goal 1: Pt will complete bed mobility wtih Min A. 1/7: Mod A  Short term goal 2: Pt will sit to stand with min A. 1/7: Mod x 2  Short term goal 3: Pt will ambulate x 13' with SW with CGA. 1/7: Min-CGA with SW  Patient Goals   Patient goals : to learn how to move    Plan    Plan  Times per week: 7x/week  Times per day: Daily  Current Treatment Recommendations: Strengthening,Balance Training,Endurance Training,Functional Mobility Training,Transfer Training,Gait Training,Positioning,Equipment Evaluation, Education, & procurement,Patient/Caregiver Education & Training,Safety Education & Training  Safety Devices  Type of devices:  All fall risk precautions in place,Call light within reach,Chair alarm in place,Gait belt,Patient at risk for falls,Nurse notified,Left in chair  Restraints  Initially in place: No     Therapy Time   Individual Concurrent Group Co-treatment   Time In 1038         Time Out 1120         Minutes [facility] [address], [address] [facility]

## 2022-01-07 NOTE — PROGRESS NOTES
Occupational Therapy  Facility/Department: [facility] C5 - MED SURG/ORTHO  Daily Treatment Note  NAME: Marlys Davis  : 1955  MRN: [id number]    Date of Service: 2022    Discharge Recommendations:  Subacute/Skilled Nursing Facility     Assessment   Performance deficits / Impairments: Decreased functional mobility ; Decreased ADL status  Assessment: Pt's ability to safely complete basic ADLs and functional mobility is significantly impacted by B quad tendon repair, requiring B knee extension at all times. He requires mod x2 for sit to  order to come to upright posture. Pt states that his wife cannot physically assist him with mobility. Extensive assist is needed for LE ADLs. Pt would highly benefit from IP rehab for skilled OT to address ADLs, mobility, home safety and DME. Cont OT in acute care. Prognosis: Good  OT Education: OT Role;Plan of Care;Transfer Training;Precautions; ADL Adaptive Strategies; Equipment  Disease Specific Education: Pt educated on importance of OOB mobility, prevention of complications of bedrest, and general safety during hospitalization. Pt verbalized understanding  REQUIRES OT FOLLOW UP: Yes  Activity Tolerance  Activity Tolerance: Patient Tolerated treatment well  Activity Tolerance: /85, HR 93, O2 sats 93% RA  Safety Devices  Safety Devices in place: Yes  Type of devices: Nurse notified;Gait belt;Call light within reach; Left in chair;Chair alarm in place       Patient Diagnosis(es): The primary encounter diagnosis was Tendon rupture, traumatic. quadriceps, left, initial encounter. A diagnosis of Traumatic rupture of quadriceps tendon, right, initial encounter was also pertinent to this visit. has a past medical history of 01 minute Apgar score 0, Hypertension, Kidney stone, Mitral valve prolapse, Sarcoidosis, and Spinal stenosis. has a past surgical history that includes back surgery ();  Colonoscopy (2015); and Leg Muscle Surgery (Bilateral, 1/5/2022). Restrictions  Restrictions/Precautions  Restrictions/Precautions: Weight Bearing,Fall Risk  Required Braces or Orthoses?: Yes  Lower Extremity Weight Bearing Restrictions  Right Lower Extremity Weight Bearing: Weight Bearing As Tolerated  Left Lower Extremity Weight Bearing: Weight Bearing As Tolerated  Required Braces or Orthoses  Right Lower Extremity Brace: Knee Immobilizer  Left Lower Extremity Brace: Knee Immobilizer  Position Activity Restriction  Other position/activity restrictions: No B knee ROM. Bilateral legs while in knee immobilizers. Ensure that immobilizers are appropriately positioned, holding knees in full extension  Subjective   General  Chart Reviewed: [facility] and Physical  Patient assessed for rehabilitation services?: Yes  Family / Caregiver Present: No  Referring Practitioner: Flavio Del Rio  Diagnosis: B quad tendon rupture s/p B quad tendon repair 1/5/22  Subjective  Subjective: Pt agreeable to OT  General Comment  Comments: RN approved therapy  Pain Assessment  Pain Assessment: Faces  Goldberg-Baker Pain Rating: Hurts a little bit  Pain Location: Hip  Pain Orientation: Left  Non-Pharmaceutical Pain Intervention(s): Repositioned; Therapeutic presence  Response to Pain Intervention: Patient Satisfied  Vital Signs  Pulse: (!) 14  Heart Rate Source: Monitor  BP: 125/85  BP Location: Left upper arm  Patient Position: Semi fowlers  Patient Currently in Pain: Yes  Oxygen Therapy  SpO2: 95 %  Pulse Oximeter Device Location: Finger;Right  O2 Device: None (Room air)   Orientation  Orientation  Overall Orientation Status: Within Functional Limits  Objective    ADL  Feeding: Independent  Grooming: Setup (seated)  UE Bathing: Setup  LE Bathing:  Moderate assistance (Used bathwipes)  UE Dressing: Stand by assistance (gown)  LE Dressing: Dependent/Total  Toileting: Maximum assistance     Balance  Sitting Balance: Stand by assistance  Standing Balance: Minimal assistance (static stand with min A and SW (requires mod x2 to stand))  Standing Balance  Activity: Sit to stand from EOB with mod x2 and SW from elevated bed height. Once pt was upright on feet, he can ambulate to BR and then to chair with min A. Per pt, his wife is unable to physically assist him with mobility at home. Functional Mobility  Functional - Mobility Device: Standard Walker  Activity: To/from bathroom  Assist Level: Minimal assistance  Toilet Transfers  Toilet - Technique: Ambulating  Equipment Used: Extra wide bedside commode (over toilet)  Toilet Transfer: Minimal assistance;2 Person assistance  Toilet Transfers Comments: vc's to advance feet forward as he began to sit down in order to maintain knee extension     Transfers  Stand Pivot Transfers: Minimal assistance (SW)  Sit to stand: Moderate assistance;2 Person assistance  Stand to sit: Moderate assistance;2 Person assistance      Cognition  Overall Cognitive Status: [facility]       Plan   Plan  Times per week: 4-6x    AM-PAC Score   AM-[facility] Inpatient Daily Activity Raw Score: 15 (01/07/22 1214)  AM-PAC Inpatient ADL T-Scale Score : 34.69 (01/07/22 1214)  ADL Inpatient CMS 0-100% Score: 56.46 (01/07/22 1214)  ADL Inpatient CMS G-Code Modifier : CK (01/07/22 1214)    Goals  Short term goals  Time Frame for Short term goals: 1 week (1/13) unless noted  Short term goal 1: Perform functional transfers with min x1 and AD-ongoing for toilet, min x2 1/7/22  Short term goal 2: Perform LE ADLs (ie toileting, dressing) with mod A with adapt equip--ongoing 1/7  Short term goal 3: Perform bathroom mobility with min x1 and AD by 1/11--goal met 1/07. New goal:  SBA for bathroom mobility with SW  Patient Goals   Patient goals :  \"Be able to walk\"     Therapy Time   Individual Concurrent Group Co-treatment   Time In 4190     3067   Time Out 1131     1120   Minutes 10     47   Timed Code Treatment Minutes: 62 Minutes    If pt is discharged prior to next OT session, this note will serve as the discharge summary.   Natacha Bergeron OT

## 2022-01-07 NOTE — PROGRESS NOTES
Pt requesting to only receive gabapentin at night. That is when he takes it normally. He states he believes it is the cause of his lethargy today. Messaged NP.      Tania Mcnulty RN

## 2022-01-07 NOTE — PROGRESS NOTES
Department of Orthopedic Surgery  Physician Assistant   Progress Note    Subjective:       Systemic or Specific Complaints:Pain Control improving struggled with PT some today. Low grade temps overnight. Objective:     Patient Vitals for the past 24 hrs:   BP Temp Temp src Pulse Resp SpO2   01/07/22 1042 125/85 -- -- (!) 14 -- 95 %   01/07/22 0340 102/70 99 °F (37.2 °C) Oral 94 16 94 %   01/06/22 2340 125/86 99.6 °F (37.6 °C) Oral 93 16 94 %   01/06/22 2045 125/81 98.6 °F (37 °C) Oral 109 15 93 %   01/06/22 1456 119/82 98.4 °F (36.9 °C) -- 114 -- 94 %       General: alert, appears stated age and cooperative   Wound: Wound clean and dry no evidence of infection. , Wound Intact and Positive for Edema   Motion: Painful range of Motion in affected extremity   DVT Exam: No evidence of DVT seen on physical exam.     Additional exam: NVI in the feet. Immobilizers intact. Data Review  CBC:   Lab Results   Component Value Date    WBC 9.5 01/07/2022    RBC 4.48 01/07/2022    HGB 10.8 01/07/2022    HCT 33.7 01/07/2022     01/07/2022       Renal:   Lab Results   Component Value Date     01/07/2022    K 3.8 01/07/2022     01/07/2022    CO2 26 01/07/2022    BUN 14 01/07/2022    CREATININE 0.9 01/07/2022    GLUCOSE 114 01/07/2022    CALCIUM 8.2 01/07/2022            Assessment:      bilateral quad tendon repair. Plan:      1:  PT/OT as able - NO ROM. WBAT. Pt has realized he is not doing great with PT and may be difficult to go home. He would like to pursue SNF at this time. Will ask SW to evaluate for placement. Can d/c when that is arranged. Will have him work on aggressive IS today for suspected atelectasis.   2:  Continue Deep venous thrombosis prophylaxis  3:  Continue Pain Control    MILTON Peace

## 2022-01-08 LAB
ANION GAP SERPL CALCULATED.3IONS-SCNC: 10 MMOL/L (ref 3–16)
BASOPHILS ABSOLUTE: 0.1 K/UL (ref 0–0.2)
BASOPHILS RELATIVE PERCENT: 0.8 %
BUN BLDV-MCNC: 22 MG/DL (ref 7–20)
CALCIUM SERPL-MCNC: 8.3 MG/DL (ref 8.3–10.6)
CHLORIDE BLD-SCNC: 106 MMOL/L (ref 99–110)
CO2: 24 MMOL/L (ref 21–32)
CREAT SERPL-MCNC: 1 MG/DL (ref 0.8–1.3)
EOSINOPHILS ABSOLUTE: 0.1 K/UL (ref 0–0.6)
EOSINOPHILS RELATIVE PERCENT: 1.1 %
GFR AFRICAN AMERICAN: >60
GFR NON-AFRICAN AMERICAN: >60
GLUCOSE BLD-MCNC: 109 MG/DL (ref 70–99)
GLUCOSE BLD-MCNC: 113 MG/DL (ref 70–99)
GLUCOSE BLD-MCNC: 117 MG/DL (ref 70–99)
GLUCOSE BLD-MCNC: 120 MG/DL (ref 70–99)
GLUCOSE BLD-MCNC: 95 MG/DL (ref 70–99)
HCT VFR BLD CALC: 30.7 % (ref 40.5–52.5)
HEMOGLOBIN: 9.7 G/DL (ref 13.5–17.5)
LACTIC ACID: 1 MMOL/L (ref 0.4–2)
LYMPHOCYTES ABSOLUTE: 1.7 K/UL (ref 1–5.1)
LYMPHOCYTES RELATIVE PERCENT: 18.8 %
MCH RBC QN AUTO: 23.8 PG (ref 26–34)
MCHC RBC AUTO-ENTMCNC: 31.5 G/DL (ref 31–36)
MCV RBC AUTO: 75.4 FL (ref 80–100)
MONOCYTES ABSOLUTE: 1.5 K/UL (ref 0–1.3)
MONOCYTES RELATIVE PERCENT: 16.2 %
NEUTROPHILS ABSOLUTE: 5.9 K/UL (ref 1.7–7.7)
NEUTROPHILS RELATIVE PERCENT: 63.1 %
PDW BLD-RTO: 13.3 % (ref 12.4–15.4)
PERFORMED ON: ABNORMAL
PERFORMED ON: NORMAL
PLATELET # BLD: 160 K/UL (ref 135–450)
PMV BLD AUTO: 8.5 FL (ref 5–10.5)
POTASSIUM REFLEX MAGNESIUM: 3.6 MMOL/L (ref 3.5–5.1)
PROCALCITONIN: 0.42 NG/ML (ref 0–0.15)
RBC # BLD: 4.07 M/UL (ref 4.2–5.9)
SODIUM BLD-SCNC: 140 MMOL/L (ref 136–145)
TOTAL CK: 1275 U/L (ref 39–308)
WBC # BLD: 9.3 K/UL (ref 4–11)

## 2022-01-08 PROCEDURE — 84145 PROCALCITONIN (PCT): CPT

## 2022-01-08 PROCEDURE — 97110 THERAPEUTIC EXERCISES: CPT

## 2022-01-08 PROCEDURE — 80048 BASIC METABOLIC PNL TOTAL CA: CPT

## 2022-01-08 PROCEDURE — 6370000000 HC RX 637 (ALT 250 FOR IP): Performed by: ORTHOPAEDIC SURGERY

## 2022-01-08 PROCEDURE — 2580000003 HC RX 258: Performed by: ORTHOPAEDIC SURGERY

## 2022-01-08 PROCEDURE — 1200000000 HC SEMI PRIVATE

## 2022-01-08 PROCEDURE — 85025 COMPLETE CBC W/AUTO DIFF WBC: CPT

## 2022-01-08 PROCEDURE — 82550 ASSAY OF CK (CPK): CPT

## 2022-01-08 PROCEDURE — 97116 GAIT TRAINING THERAPY: CPT

## 2022-01-08 PROCEDURE — 6360000002 HC RX W HCPCS: Performed by: NURSE PRACTITIONER

## 2022-01-08 PROCEDURE — 83605 ASSAY OF LACTIC ACID: CPT

## 2022-01-08 PROCEDURE — 97530 THERAPEUTIC ACTIVITIES: CPT

## 2022-01-08 PROCEDURE — 6360000002 HC RX W HCPCS: Performed by: ORTHOPAEDIC SURGERY

## 2022-01-08 PROCEDURE — 36415 COLL VENOUS BLD VENIPUNCTURE: CPT

## 2022-01-08 PROCEDURE — 2580000003 HC RX 258: Performed by: NURSE PRACTITIONER

## 2022-01-08 PROCEDURE — 6370000000 HC RX 637 (ALT 250 FOR IP): Performed by: NURSE PRACTITIONER

## 2022-01-08 RX ORDER — GABAPENTIN 300 MG/1
300 CAPSULE ORAL NIGHTLY
Status: DISCONTINUED | OUTPATIENT
Start: 2022-01-08 | End: 2022-01-11 | Stop reason: HOSPADM

## 2022-01-08 RX ADMIN — SODIUM CHLORIDE, PRESERVATIVE FREE 10 ML: 5 INJECTION INTRAVENOUS at 08:46

## 2022-01-08 RX ADMIN — GABAPENTIN 300 MG: 300 CAPSULE ORAL at 20:07

## 2022-01-08 RX ADMIN — METOPROLOL TARTRATE 25 MG: 25 TABLET, FILM COATED ORAL at 20:07

## 2022-01-08 RX ADMIN — GABAPENTIN 300 MG: 300 CAPSULE ORAL at 08:45

## 2022-01-08 RX ADMIN — LISINOPRIL 10 MG: 10 TABLET ORAL at 20:07

## 2022-01-08 RX ADMIN — MULTIPLE VITAMINS W/ MINERALS TAB 1 TABLET: TAB at 08:45

## 2022-01-08 RX ADMIN — SODIUM CHLORIDE, PRESERVATIVE FREE 10 ML: 5 INJECTION INTRAVENOUS at 20:09

## 2022-01-08 RX ADMIN — OXYCODONE HYDROCHLORIDE AND ACETAMINOPHEN 250 MG: 500 TABLET ORAL at 08:45

## 2022-01-08 RX ADMIN — CEFTRIAXONE SODIUM 1000 MG: 1 INJECTION, POWDER, FOR SOLUTION INTRAMUSCULAR; INTRAVENOUS at 14:52

## 2022-01-08 RX ADMIN — ENOXAPARIN SODIUM 40 MG: 40 INJECTION SUBCUTANEOUS at 08:46

## 2022-01-08 RX ADMIN — AZITHROMYCIN DIHYDRATE 500 MG: 250 TABLET, FILM COATED ORAL at 08:45

## 2022-01-08 RX ADMIN — CYCLOBENZAPRINE 5 MG: 10 TABLET, FILM COATED ORAL at 20:08

## 2022-01-08 RX ADMIN — CYCLOBENZAPRINE 5 MG: 10 TABLET, FILM COATED ORAL at 08:45

## 2022-01-08 RX ADMIN — METOPROLOL TARTRATE 25 MG: 25 TABLET, FILM COATED ORAL at 08:46

## 2022-01-08 RX ADMIN — SENNOSIDES 8.6 MG: 8.6 TABLET, COATED ORAL at 20:20

## 2022-01-08 ASSESSMENT — PAIN DESCRIPTION - LOCATION: LOCATION: HIP

## 2022-01-08 ASSESSMENT — PAIN DESCRIPTION - PAIN TYPE: TYPE: SURGICAL PAIN

## 2022-01-08 ASSESSMENT — PAIN DESCRIPTION - ORIENTATION: ORIENTATION: LEFT

## 2022-01-08 ASSESSMENT — PAIN DESCRIPTION - ONSET: ONSET: ON-GOING

## 2022-01-08 ASSESSMENT — PAIN DESCRIPTION - DESCRIPTORS: DESCRIPTORS: ACHING

## 2022-01-08 ASSESSMENT — PAIN SCALES - GENERAL
PAINLEVEL_OUTOF10: 0
PAINLEVEL_OUTOF10: 1

## 2022-01-08 ASSESSMENT — PAIN DESCRIPTION - FREQUENCY: FREQUENCY: CONTINUOUS

## 2022-01-08 NOTE — PROGRESS NOTES
Weight Bearing,Fall Risk  Required Braces or Orthoses?: Yes  Lower Extremity Weight Bearing Restrictions  Right Lower Extremity Weight Bearing: Weight Bearing As Tolerated  Left Lower Extremity Weight Bearing: Weight Bearing As Tolerated  Required Braces or Orthoses  Right Lower Extremity Brace: Knee Immobilizer  Left Lower Extremity Brace: Knee Immobilizer  Position Activity Restriction  Other position/activity restrictions: No B knee ROM. Bilateral legs while in knee immobilizers.  Ensure that immobilizers are appropriately positioned, holding knees in full extension  Subjective   General  Chart Reviewed: [facility]  Patient assessed for rehabilitation services?: Yes  Response to previous treatment: Patient with no complaints from previous session  Family / Caregiver Present: Yes (wife)  Referring Practitioner: Cinda Leslie  Diagnosis: B quad tendon rupture s/p B quad tendon repair 1/5/22  Subjective  Subjective: Pt agreeable to OT tx session  General Comment  Comments: RN cleared for therapy  Vital Signs  Patient Currently in Pain: Denies   Orientation  Orientation  Overall Orientation Status: Within Functional Limits  Objective             Balance  Sitting Balance: Stand by assistance  Standing Balance: Minimal assistance (Rhett x2 for static/dynamic stance with SW)  Standing Balance  Time: ~2-4 mins  Activity: sit to stand from EOB, fxl mobility in room  Comment: SW  Functional Mobility  Functional - Mobility Device: Standard Walker  Activity: Other (household distance in room)  Assist Level: Minimal assistance (Rhett x2)     Transfers  Sit to stand: 2 Person assistance;Maximum assistance (pt needing cues for hand placement, increased time and effort)  Stand to sit: Maximum assistance;2 Person assistance                       Cognition  Overall Cognitive Status: [id number]                    Exercises  Shoulder Extension: x10  Elbow Flexion: x20  Elbow Extension: x20                    Plan Plan  Times per week: 4-6x  Current Treatment Recommendations: Endurance Training,Balance Training,Functional Mobility Training,Safety Education & Training,Self-Care / ADL,Patient/Caregiver Education & Training    AM-PAC Score        AM-PAC Inpatient Daily Activity Raw Score: 15 (01/08/22 1650)  AM-PAC Inpatient ADL T-Scale Score : 34.69 (01/08/22 1650)  ADL Inpatient CMS 0-100% Score: 56.46 (01/08/22 1650)  ADL Inpatient CMS G-Code Modifier : CK (01/08/22 1650)    Goals  Short term goals  Time Frame for Short term goals: 1 week (1/13) unless noted  Short term goal 1: Perform functional transfers with min x1 and AD-ongoing for toilet, max x2 1/8/22  Short term goal 2: Perform LE ADLs (ie toileting, dressing) with mod A with adapt equip--ongoing 1/8  Short term goal 3: Perform bathroom mobility with min x1 and AD by 1/11--goal met 1/07. New goal:  SBA for bathroom mobility with SW - ongoing 1/8/22 - minAx2 with SW  Patient Goals   Patient goals : \"Be able to walk\"       Therapy Time   Individual Concurrent Group Co-treatment   Time In 1533         Time Out 1558         Minutes 25         Timed Code Treatment Minutes: 25 Minutes     If pt is unable to be seen after this session, please let this note serve as discharge summary. Please see case management note for discharge disposition. Thank you.     Stepan Carranza

## 2022-01-08 NOTE — PROGRESS NOTES
Hospitalist Progress Note      PCP: Ana Maria Nation MD    Date of Admission: 1/4/2022    Chief Complaint: Knee pain    Sarah King is a 78 y.o. male who presented to the ED via EMS to be evaluated for BLE pain and inability to ambulate s/p mechanical fall just prior to arrival.  Patient states that he was outside walking his dog on a muddy slope at which time the dog jolted forward, causing him to lurch forward and fall bended knee. He reports that he experienced excruciating R>L knee pain, after which she was unable to stand or bear weight. Patient denies LOC or other skeletal trauma. Chronic medical conditions include: Obesity, prediabetes, and HTN.    Upon arrival to the ED, EKG was obtained revealing NSR with remote inferior infarct. Plain films/CT scan revealed complete rupture of bilateral distal quadriceps tendon with associated muscle strain and surrounding soft tissue edema/blood product. Larger hematoma present on right side. Consultation was placed to orthopedics per ED attending, and patient was placed in bilateral immobilizer overnight.   Hospitalist team consulted to perform preoperative clearance and manage patient medically overnight, pending a.m. orthopedic intervention.        Subjective: NAD, discussed plan of care, POD #3, patient denies cp/sob, encouraged IS      Medications:  Reviewed    Infusion Medications    dextrose      sodium chloride 25 mL (01/07/22 4036)    sodium chloride       Scheduled Medications    gabapentin  300 mg Oral Nightly    cefTRIAXone (ROCEPHIN) IV  1,000 mg IntraVENous Q24H    azithromycin  500 mg Oral Daily    metoprolol tartrate  25 mg Oral BID    lisinopril  10 mg Oral Nightly    insulin lispro  0-12 Units SubCUTAneous TID WC    insulin lispro  0-6 Units SubCUTAneous Nightly    sodium chloride flush  5-40 mL IntraVENous 2 times per day    enoxaparin  40 mg SubCUTAneous Daily    vitamin C  250 mg Oral Daily    therapeutic multivitamin-minerals  1 tablet Oral Daily    cyclobenzaprine  5 mg Oral BID     PRN Meds: glucose, dextrose, glucagon (rDNA), dextrose, sodium chloride flush, sodium chloride, oxyCODONE **OR** oxyCODONE, morphine **OR** morphine, bisacodyl, magnesium hydroxide, labetalol, acetaminophen, sodium chloride, potassium chloride **OR** potassium alternative oral replacement **OR** potassium chloride, magnesium sulfate, promethazine **OR** ondansetron, senna, acetaminophen **OR** acetaminophen      Intake/Output Summary (Last 24 hours) at 1/8/2022 0957  Last data filed at 1/8/2022 0551  Gross per 24 hour   Intake --   Output 650 ml   Net -650 ml       Physical Exam Performed:    /75   Pulse 90   Temp 98.3 °F (36.8 °C) (Oral)   Resp 18   Ht 5' 10\" (1.778 m)   Wt 274 lb 6.4 oz (124.5 kg)   SpO2 95%   BMI 39.37 kg/m²     General appearance: No apparent distress, appears stated age and cooperative. HEENT: Pupils equal, round, and reactive to light. Conjunctivae/corneas clear. Neck: Supple, with full range of motion. No jugular venous distention. Trachea midline. Respiratory:  Normal respiratory effort. Decreased bases  Cardiovascular: Regular rate and rhythm with normal S1/S2 without murmurs, rubs or gallops. Abdomen: Soft, non-tender, non-distended with normal bowel sounds. Musculoskeletal: BLE pain, tenderness, limited ROM, bilat knee immobilizers  Skin: Skin color, texture, turgor normal.  No rashes or lesions. Neurologic:  Neurovascularly intact without any focal sensory/motor deficits.  Cranial nerves: II-XII intact, grossly non-focal.  Psychiatric: Alert and oriented, thought content appropriate, normal insight  Capillary Refill: Brisk,3 seconds, normal   Peripheral Pulses: +2 palpable, equal bilaterally       Labs:   Recent Labs     01/06/22  0145 01/07/22  0627 01/08/22  0652   WBC 8.4 9.5 9.3   HGB 11.8* 10.8* 9.7*   HCT 37.3* 33.7* 30.7*    136 160     Recent Labs 01/06/22  0145 01/07/22  0627 01/08/22  0652   * 138 140   K 4.0 3.8 3.6   CL 98* 103 106   CO2 25 26 24   BUN 13 14 22*   CREATININE 1.0 0.9 1.0   CALCIUM 8.0* 8.2* 8.3     No results for input(s): AST, ALT, BILIDIR, BILITOT, ALKPHOS in the last 72 hours. No results for input(s): INR in the last 72 hours. Recent Labs     01/06/22  0145 01/06/22  0752 01/06/22  1326   CKTOTAL 698* 673* 786*       Urinalysis:      Lab Results   Component Value Date    NITRU Negative 01/05/2022    WBCUA 3-5 06/28/2017    BACTERIA 1+ 06/28/2017    RBCUA  06/28/2017    BLOODU Negative 01/05/2022    SPECGRAV 1.015 01/05/2022    GLUCOSEU Negative 01/05/2022       Radiology:  XR CHEST PORTABLE   Final Result   Right lower lobe airspace disease, either atelectasis or pneumonia         CT KNEE LEFT WO CONTRAST   Final Result   1. Complete rupture of the bilateral distal quadriceps tendons with   associated muscular strains and surrounding soft tissue edema and blood   products. Larger hematoma is present on the right at the level of the distal   quadriceps rupture which measures approximately 3.0 x 5.1 x 8.8 cm.   2. No acute fracture identified. 3. 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 RECOMMENDATIONS:   Unavailable         CT KNEE RIGHT WO CONTRAST   Final Result   1. Complete rupture of the bilateral distal quadriceps tendons with   associated muscular strains and surrounding soft tissue edema and blood   products. Larger hematoma is present on the right at the level of the distal   quadriceps rupture which measures approximately 3.0 x 5.1 x 8.8 cm.   2. No acute fracture identified. 3. 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 RECOMMENDATIONS:   Unavailable         XR KNEE LEFT (1-2 VIEWS)   Final Result      1. No evidence of fracture. 2.  Suprapatellar region is not well seen on the lateral view and therefore   abnormalities in this area cannot be excluded. Also soft tissue injury or   tendon tear cannot be excluded by this exam if suspected clinically. MRI   examination would be recommended. XR KNEE RIGHT (1-2 VIEWS)   Final Result      1. There appears to be significant edema, fluid or hematoma in the   suprapatellar region and obscuring tissue planes. The quadriceps tendon is   not well visualized and therefore tear of the tendon cannot be excluded if   suspected clinically. Alternatively this could represent edema or hematoma   due to muscular injury in the distal anterior thigh or a large joint effusion   though no significant joint effusion is suggested on the lateral view   anterior to the knee joint. Correlate clinically. 2.  No evidence of fracture or joint malalignment. Assessment/Plan:    Active Hospital Problems    Diagnosis     Pre-diabetes [R73.03]     Quadriceps tendon rupture, left, initial encounter [S76.112A]     Quadriceps tendon rupture, right, initial encounter [S76.111A]     HTN (hypertension) [I10]     Rupture of distal quadriceps tendon [S76.119A]      BLE distal quadricep tendon rupture  -Tylenol, Flexeril, Neurontin and Lidoderm patch   -OxyIR/ IV Dilaudid scheduled sparingly to treat moderate/severe pain, respectively  -Preoperative EKG and laboratory studies ordered and reviewed; patient with NO absolute contraindication to proceed with recommended procedural intervention  -POD #3, post op low grade temp, no leukocytosis, tylenol, monitor  -Consultation placed to PT/OT for postoperative rehabilitation  -CXR Right lower lobe airspace disease, either atelectasis or pneumonia.  LA 2.1, PCT 0.45, likely atelectasis, however will start rocephin/azithro, encourage IS, mobility limited d/t injury     HTN  -Patient admitted to telemetry floor for continuous monitoring during stay  -EKG obtained in ED reviewed personally and found to be without evidence of LAD or acute ischemia  -Continue home medication dosage of lisinopril  -Metoprolol added     Prediabetes with BMI 38.02  -A1c 5.9  -PRN Humalog medium dose SSI scheduled before meals and at bedtime based on POC glucose  -Carbohydrate restriction placed on diet    DVT Prophylaxis: lovenox post op  Diet: ADULT DIET;  Regular; 4 carb choices (60 gm/meal)  Code Status: Full Code    PT/OT Eval Status: Consulted-prefer home, rehab consult    Dispo - pending course, dc arrangements    MARISOL Lowe - CNP

## 2022-01-08 NOTE — PROGRESS NOTES
Physical Therapy  Facility/Department: [facility] C5 - MED SURG/ORTHO  Daily Treatment Note  NAME: Lin Xavier  : 1955  MRN: [id number]    Date of Service: 2022    Discharge Recommendations:  IP Rehab   PT Equipment Recommendations  Equipment Needed: No  Other: defer to facility  If pt is unable to be seen after this session, please let this note serve as discharge summary. Please see case management note for discharge disposition. Thank you. Assessment   Body structures, Functions, Activity limitations: Decreased functional mobility ; Decreased ROM; Decreased endurance;Decreased balance; Increased pain  Assessment: Patient is progressing slowly with his therapy goals. Today he again required maximum assistance x 2 to stand and min assist x 2 to ambulate 10 feet x 2 with a standard walker. Patient continues to function below baseline and would benefit from skilled therapy to address current deficits mentioned above. PT again recommends that this patient receive skilled PT in the acute inpatient rehabilitation setting, when medically stable, in order to address his therapy deficits and to help him maximize his safety and independence with all functional mobility. PT to continue to follow. Treatment Diagnosis: Decreased independence with functional mobility  Specific instructions for Next Treatment: progress mobility as tolerated  Prognosis: Good  Decision Making: Medium Complexity  PT Education: Goals; General Safety; Disease Specific Education;PT Role;Plan of Care;Precautions;Transfer Training;Weight-bearing Education;Equipment;Gait Training;Functional Mobility Training; Injury Prevention;Pressure Relief;Home Exercise Program  Patient Education: Disease Specific Education: Patient educated on weight bearing status, post-op precautions, appropriate DME, and safe mobility with AD.  Patient verbalized understanding  Barriers to Learning: none  REQUIRES PT FOLLOW UP: Yes  Activity Tolerance  Activity Tolerance: Patient Tolerated treatment well  Activity Tolerance: Vitals: 113/83 92 BPM 94% on room air. seated 105/73 109 BPM     Patient Diagnosis(es): The primary encounter diagnosis was Tendon rupture, traumatic. quadriceps, left, initial encounter. A diagnosis of Traumatic rupture of quadriceps tendon, right, initial encounter was also pertinent to this visit. has a past medical history of 01 minute Apgar score 0, Hypertension, Kidney stone, Mitral valve prolapse, Sarcoidosis, and Spinal stenosis. has a past surgical history that includes back surgery (); Colonoscopy (2015); and Leg Muscle Surgery (Bilateral, 2022). Restrictions  Restrictions/Precautions  Restrictions/Precautions: Weight Bearing,Fall Risk  Required Braces or Orthoses?: Yes  Lower Extremity Weight Bearing Restrictions  Right Lower Extremity Weight Bearing: Weight Bearing As Tolerated  Left Lower Extremity Weight Bearing: Weight Bearing As Tolerated  Required Braces or Orthoses  Right Lower Extremity Brace: Knee Immobilizer  Left Lower Extremity Brace: Knee Immobilizer  Position Activity Restriction  Other position/activity restrictions: No B knee ROM. Bilateral legs while in knee immobilizers. Ensure that immobilizers are appropriately positioned, holding knees in full extension  Subjective   General  Chart Reviewed: Yes  Response To Previous Treatment: Patient with no complaints from previous session. Family / Caregiver Present: No  Referring Practitioner: Donavan Mcgee MD  Subjective  Subjective: Patient agreed to participate. General Comment  Comments: cleared by nursing. supine in bed upon entry of therapy staff.   Pain Screening  Patient Currently in Pain: Denies  Pain Assessment  Response to Pain Intervention: Patient Satisfied  Vital Signs  Patient Currently in Pain: Denies       Orientation  Orientation  Overall Orientation Status: Within Normal Limits  Cognition   Cognition  Overall Cognitive Status: WFL  Objective   Bed mobility  Supine to Sit: Moderate assistance  Sit to Supine: Moderate assistance  Scooting: Minimal assistance (to scoot to edge of bed)  Comment: head of bed elevated. Transfers  Sit to Stand: Maximum Assistance;2 Person Assistance  Stand to sit: Maximum Assistance;2 Person Assistance  Bed to Chair: Minimal assistance;2 Person Assistance  Comment: singificant hip flexion when standing in order to compensate for lack of knee flexion. cueing for safe hand placement. poor eccentric control of hip extensors. Ambulation  Ambulation?: Yes  WB Status: WBAT with B KI  More Ambulation?: No  Ambulation 1  Surface: level tile  Device: Standard Walker  Assistance: Minimal assistance;2 Person assistance  Quality of Gait: no knee flexion. min assist for SW placement. 1 loss of balance. min assist to correct. Gait Deviations: Slow Andria;Decreased step length;Decreased step height  Distance: 10 feet x 2. Comments: No complaints of shortness of breath, chest pain or dizziness. Balance  Posture: Good  Sitting - Static: Good  Sitting - Dynamic: Good  Standing - Static: Poor;+  Standing - Dynamic: Poor;+  Exercises  Straight Leg Raise: 1 x 10 bilateral AAROM  Hip Abduction: 1 x 10 bilateral AROM  Ankle Pumps: 1 x 10 bilateral  Comments: cueing for sequencing and technique. Other exercises  Other exercises?: No                        AM-PAC Score     AM-PAC Inpatient Mobility without Stair Climbing Raw Score : 11 (01/08/22 1651)  AM-PAC Inpatient without Stair Climbing T-Scale Score : 35.66 (01/08/22 1651)  Mobility Inpatient CMS 0-100% Score: 67.36 (01/08/22 1651)  Mobility Inpatient without Stair CMS G-Code Modifier : CL (01/08/22 1651)       Goals  Short term goals  Time Frame for Short term goals: 1/12  Short term goal 1: Pt will complete bed mobility wtih Min A. 1/8: Mod A  Short term goal 2: Pt will sit to stand with min A. 1/8:  Max x 2  Short term goal 3: Pt will ambulate x 15' with SW with CGA. 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 Plan    Plan  Times per week: 7x/week  Times per day: Daily  Plan weeks: 1 week 1/13/22  Specific instructions for Next Treatment: progress mobility as tolerated  Current Treatment Recommendations: Strengthening,Balance Training,Endurance Training,Functional Mobility Training,Transfer Training,Gait Training,Positioning,Equipment Evaluation, Education, & procurement,Patient/Caregiver Education & Training,Safety Education & Training  Safety Devices  Type of devices:  All fall risk precautions in place,Call light within reach,Gait belt,Patient at risk for falls,Nurse notified,Left in bed (patient's family at bedside)  Restraints  Initially in place: No     Therapy Time   Individual Concurrent Group Co-treatment   Time In 1533         Time Out 1558         Minutes 25         Timed Code Treatment Minutes: [address] Florence Daugherty, PT

## 2022-01-09 LAB
ANION GAP SERPL CALCULATED.3IONS-SCNC: 12 MMOL/L (ref 3–16)
BASOPHILS ABSOLUTE: 0.1 K/UL (ref 0–0.2)
BASOPHILS RELATIVE PERCENT: 0.9 %
BUN BLDV-MCNC: 19 MG/DL (ref 7–20)
CALCIUM SERPL-MCNC: 8.6 MG/DL (ref 8.3–10.6)
CHLORIDE BLD-SCNC: 102 MMOL/L (ref 99–110)
CO2: 25 MMOL/L (ref 21–32)
CREAT SERPL-MCNC: 0.8 MG/DL (ref 0.8–1.3)
EOSINOPHILS ABSOLUTE: 0.1 K/UL (ref 0–0.6)
EOSINOPHILS RELATIVE PERCENT: 1.6 %
GFR AFRICAN AMERICAN: >60
GFR NON-AFRICAN AMERICAN: >60
GLUCOSE BLD-MCNC: 108 MG/DL (ref 70–99)
GLUCOSE BLD-MCNC: 127 MG/DL (ref 70–99)
GLUCOSE BLD-MCNC: 131 MG/DL (ref 70–99)
GLUCOSE BLD-MCNC: 132 MG/DL (ref 70–99)
GLUCOSE BLD-MCNC: 145 MG/DL (ref 70–99)
GLUCOSE BLD-MCNC: 95 MG/DL (ref 70–99)
HCT VFR BLD CALC: 30.3 % (ref 40.5–52.5)
HEMOGLOBIN: 9.8 G/DL (ref 13.5–17.5)
LYMPHOCYTES ABSOLUTE: 1.4 K/UL (ref 1–5.1)
LYMPHOCYTES RELATIVE PERCENT: 16.6 %
MAGNESIUM: 2.1 MG/DL (ref 1.8–2.4)
MCH RBC QN AUTO: 23.9 PG (ref 26–34)
MCHC RBC AUTO-ENTMCNC: 32.3 G/DL (ref 31–36)
MCV RBC AUTO: 73.9 FL (ref 80–100)
MONOCYTES ABSOLUTE: 1.1 K/UL (ref 0–1.3)
MONOCYTES RELATIVE PERCENT: 13 %
NEUTROPHILS ABSOLUTE: 5.7 K/UL (ref 1.7–7.7)
NEUTROPHILS RELATIVE PERCENT: 67.9 %
PDW BLD-RTO: 13 % (ref 12.4–15.4)
PERFORMED ON: ABNORMAL
PERFORMED ON: NORMAL
PLATELET # BLD: 209 K/UL (ref 135–450)
PMV BLD AUTO: 8.7 FL (ref 5–10.5)
POTASSIUM REFLEX MAGNESIUM: 3.3 MMOL/L (ref 3.5–5.1)
RBC # BLD: 4.1 M/UL (ref 4.2–5.9)
SODIUM BLD-SCNC: 139 MMOL/L (ref 136–145)
WBC # BLD: 8.4 K/UL (ref 4–11)

## 2022-01-09 PROCEDURE — 97116 GAIT TRAINING THERAPY: CPT

## 2022-01-09 PROCEDURE — 2580000003 HC RX 258: Performed by: ORTHOPAEDIC SURGERY

## 2022-01-09 PROCEDURE — 94761 N-INVAS EAR/PLS OXIMETRY MLT: CPT

## 2022-01-09 PROCEDURE — 97530 THERAPEUTIC ACTIVITIES: CPT

## 2022-01-09 PROCEDURE — 97535 SELF CARE MNGMENT TRAINING: CPT

## 2022-01-09 PROCEDURE — 1200000000 HC SEMI PRIVATE

## 2022-01-09 PROCEDURE — 6370000000 HC RX 637 (ALT 250 FOR IP): Performed by: NURSE PRACTITIONER

## 2022-01-09 PROCEDURE — 85025 COMPLETE CBC W/AUTO DIFF WBC: CPT

## 2022-01-09 PROCEDURE — 2580000003 HC RX 258: Performed by: NURSE PRACTITIONER

## 2022-01-09 PROCEDURE — 6360000002 HC RX W HCPCS: Performed by: NURSE PRACTITIONER

## 2022-01-09 PROCEDURE — 80048 BASIC METABOLIC PNL TOTAL CA: CPT

## 2022-01-09 PROCEDURE — 83735 ASSAY OF MAGNESIUM: CPT

## 2022-01-09 PROCEDURE — 6370000000 HC RX 637 (ALT 250 FOR IP): Performed by: ORTHOPAEDIC SURGERY

## 2022-01-09 PROCEDURE — 2700000000 HC OXYGEN THERAPY PER DAY

## 2022-01-09 PROCEDURE — 36415 COLL VENOUS BLD VENIPUNCTURE: CPT

## 2022-01-09 PROCEDURE — 6360000002 HC RX W HCPCS: Performed by: ORTHOPAEDIC SURGERY

## 2022-01-09 RX ORDER — POLYETHYLENE GLYCOL 3350 17 G/17G
17 POWDER, FOR SOLUTION ORAL DAILY
Status: DISCONTINUED | OUTPATIENT
Start: 2022-01-10 | End: 2022-01-11 | Stop reason: HOSPADM

## 2022-01-09 RX ORDER — POTASSIUM CHLORIDE 20 MEQ/1
20 TABLET, EXTENDED RELEASE ORAL ONCE
Status: COMPLETED | OUTPATIENT
Start: 2022-01-09 | End: 2022-01-09

## 2022-01-09 RX ORDER — METOPROLOL SUCCINATE 25 MG/1
25 TABLET, EXTENDED RELEASE ORAL DAILY
Status: DISCONTINUED | OUTPATIENT
Start: 2022-01-10 | End: 2022-01-11 | Stop reason: HOSPADM

## 2022-01-09 RX ADMIN — CYCLOBENZAPRINE 5 MG: 10 TABLET, FILM COATED ORAL at 20:09

## 2022-01-09 RX ADMIN — ENOXAPARIN SODIUM 40 MG: 40 INJECTION SUBCUTANEOUS at 11:46

## 2022-01-09 RX ADMIN — LISINOPRIL 10 MG: 10 TABLET ORAL at 20:09

## 2022-01-09 RX ADMIN — OXYCODONE HYDROCHLORIDE AND ACETAMINOPHEN 250 MG: 500 TABLET ORAL at 11:44

## 2022-01-09 RX ADMIN — METOPROLOL TARTRATE 25 MG: 25 TABLET, FILM COATED ORAL at 11:45

## 2022-01-09 RX ADMIN — GABAPENTIN 300 MG: 300 CAPSULE ORAL at 20:09

## 2022-01-09 RX ADMIN — POTASSIUM CHLORIDE 20 MEQ: 1500 TABLET, EXTENDED RELEASE ORAL at 20:09

## 2022-01-09 RX ADMIN — METOPROLOL TARTRATE 25 MG: 25 TABLET, FILM COATED ORAL at 20:09

## 2022-01-09 RX ADMIN — CYCLOBENZAPRINE 5 MG: 10 TABLET, FILM COATED ORAL at 11:45

## 2022-01-09 RX ADMIN — SODIUM CHLORIDE, PRESERVATIVE FREE 10 ML: 5 INJECTION INTRAVENOUS at 20:10

## 2022-01-09 RX ADMIN — MULTIPLE VITAMINS W/ MINERALS TAB 1 TABLET: TAB at 11:44

## 2022-01-09 RX ADMIN — CEFTRIAXONE SODIUM 1000 MG: 1 INJECTION, POWDER, FOR SOLUTION INTRAMUSCULAR; INTRAVENOUS at 11:54

## 2022-01-09 RX ADMIN — AZITHROMYCIN DIHYDRATE 500 MG: 250 TABLET, FILM COATED ORAL at 11:46

## 2022-01-09 RX ADMIN — SODIUM CHLORIDE, PRESERVATIVE FREE 10 ML: 5 INJECTION INTRAVENOUS at 12:34

## 2022-01-09 ASSESSMENT — PAIN SCALES - GENERAL
PAINLEVEL_OUTOF10: 0
PAINLEVEL_OUTOF10: 1

## 2022-01-09 NOTE — PROGRESS NOTES
Physical Therapy  Facility/Department: [facility] C5 - MED SURG/ORTHO  Daily Treatment Note  NAME: La Irwin  : 1955  MRN: [id number]    Date of Service: 2022    Discharge Recommendations:  IP Rehab   PT Equipment Recommendations  Equipment Needed: No  Other: defer to next level of care    Assessment   Body structures, Functions, Activity limitations: Decreased functional mobility ; Decreased ROM; Decreased endurance;Decreased balance; Increased pain  Assessment: Pt seen with OT secondary to complexity of medical condition, decreased activity tolerance and URIEL with mobility with B KI. Pt is pleasant and agreeable, B KI adjusted at start of session to provide increased support and improve maintenance of no knee flexion px. Trialed slide board t/f to w/c this date d/t B KI however on first trial increased difficulty d/t increased posterior lean and slideboard moving with pt during t/f. Pt requires maxA x 2 to get hips situated safely in w/c. On second trial back to EOB pt is able to complete w/c push up with improved hip clearance for improved positioning of slideboard fully under hips/buttocks prior to t/f and pants donned, on second attempt improved performance with increased cues to facilitate anterior WS and increased use of UE. Pt is able to complete STS from elevated EOB to SW with modA x 2 and participate in gait training with CGA to Rhett. Pt is limited by decreased activity tolerance and B KI. Education provided on use of lift chair vs slideboard d/t requiring elevated EOB to stand d/t mechanics with KI. Pt will benefit from continued skilled PT in acute care setting to address above deficits, will continue to progres mobility as tolerated  Treatment Diagnosis: Decreased independence with functional mobility  Specific instructions for Next Treatment: progress mobility as tolerated  Prognosis: Good  Decision Making: Medium Complexity  PT Education: Goals; General Safety; Disease Specific Education;PT Role;Plan of Care;Precautions;Transfer Training;Weight-bearing Education;Equipment;Gait Training;Functional Mobility Training; Injury Prevention;Pressure Relief;Home Exercise Program  Patient Education: Reviewed WB px, use of KI to prevent knee flexion and progression of mobility with AD vs slideboard. Pt educated on safety with mobility. Pt verbalized understanding  Barriers to Learning: none  REQUIRES PT FOLLOW UP: Yes  Activity Tolerance  Activity Tolerance: Patient Tolerated treatment well;Patient limited by fatigue;Patient limited by endurance  Activity Tolerance: /79, HR 80, SpO2 92% on RA     Patient Diagnosis(es): The primary encounter diagnosis was Tendon rupture, traumatic. quadriceps, left, initial encounter. A diagnosis of Traumatic rupture of quadriceps tendon, right, initial encounter was also pertinent to this visit. has a past medical history of 01 minute Apgar score 0, Hypertension, Kidney stone, Mitral valve prolapse, Sarcoidosis, and Spinal stenosis. has a past surgical history that includes back surgery (); Colonoscopy (2015); and Leg Muscle Surgery (Bilateral, 2022). Restrictions  Restrictions/Precautions  Restrictions/Precautions: Weight Bearing,Fall Risk  Required Braces or Orthoses?: Yes  Lower Extremity Weight Bearing Restrictions  Right Lower Extremity Weight Bearing: Weight Bearing As Tolerated  Left Lower Extremity Weight Bearing: Weight Bearing As Tolerated  Required Braces or Orthoses  Right Lower Extremity Brace: Knee Immobilizer  Left Lower Extremity Brace: Knee Immobilizer  Position Activity Restriction  Other position/activity restrictions: No B knee ROM. Bilateral legs while in knee immobilizers. Ensure that immobilizers are appropriately positioned, holding knees in full extension  Subjective   General  Chart Reviewed: Yes  Response To Previous Treatment: Patient with no complaints from previous session.   Family / Caregiver Present: No  Referring Practitioner: Kumar Lopez MD  Subjective  Subjective: Pt supine in bed on approach, pleasant and agreeable to PT tx  General Comment  Comments: RN cleared pt for session  Pain Screening  Patient Currently in Pain: Denies  Vital Signs  Patient Currently in Pain: Denies       Orientation  Orientation  Overall Orientation Status: Within Normal Limits    Objective   Bed mobility  Supine to Sit: Moderate assistance (modA at trunk, HOB elevated, use of BR, increased time to complete)  Sit to Supine: Contact guard assistance     Transfers  Sit to Stand: Moderate Assistance;2 Person Assistance  Stand to sit: Moderate Assistance;2 Person Assistance  Bed to Chair: Moderate assistance;2 Person Assistance;Dependent/Total  Lateral Transfers: Dependent/Total  Comment: Pt completes slide board from EOB to w/c require maxA x 2 with increased difficulty completing despite cues for safety (slide board moving with pt despite pillowcase overboard as pt with difficulty with hip clearance on board, w/c also moving slightly despite brakes engaged), increased time to complete on first trial with difficulty getting safely into chair, on second trial pt with pants donned and increased cues provided to endsure anterior WS, pt aslo completing w/c push up to place slideboard further under hips and overall improved performance with modA/maxA x2. Pt also completes x 2 reps STS EOB to RW with EOB significantly elevated, modA x 2 with cues for positioning and safety. Ambulation  Ambulation?: Yes  WB Status: WBAT with B KI  Ambulation 1  Surface: level tile  Device: Standard Walker  Assistance: Contact guard assistance;Minimal assistance  Quality of Gait: Slow partial step through pattern, B decreased toe clearance with B circumduction d/t B KI, B decreased heel strike, forward flexed trunk, increased UE support. Pt mildly unsteady, no overt LOB, CGA to Rhett for balance.   Gait Deviations: Slow Andria;Decreased step length;Decreased step height  Distance: 25' x 2  Comments: Distances limited by fatigue  Stairs/Curb  Stairs?: No (deferred d/t safety and px)     Balance  Posture: Good  Sitting - Static: Good  Sitting - Dynamic: Good  Standing - Static: Fair  Standing - Dynamic: Fair  Comments: CGA to Rhett once in full upright positioning with SW            Comment: B KI adjusted at start of session to improve positioning and provide increased support and improve B knee extension/prevent flexion with mobility                AM-PAC Score  AM-PAC Inpatient Mobility Raw Score : 12 (01/09/22 1635)  AM-PAC Inpatient T-Scale Score : 35.33 (01/09/22 1635)  Mobility Inpatient CMS 0-100% Score: 68.66 (01/09/22 1635)  Mobility Inpatient CMS G-Code Modifier : CL (01/09/22 1635)          Goals  Short term goals  Time Frame for Short term goals: 1/12  Short term goal 1: Pt will complete bed mobility wtih Min A. 1/09: modA to SBA  Short term goal 2: Pt will sit to stand with min A.  -- 1/09: modA x 2 to RW with elevated EOB  Short term goal 3: Pt will ambulate x 15' with SW with CGA.  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 Plan    Plan  Times per week: 7x/week  Times per day: Daily  Plan weeks: 1 week 1/13/22  Specific instructions for Next Treatment: progress mobility as tolerated  Current Treatment Recommendations: Strengthening,Balance Training,Endurance Training,Functional Mobility Training,Transfer Training,Gait Training,Positioning,Equipment Evaluation, Education, & procurement,Patient/Caregiver Education & Training,Safety Education & Training,Home Exercise Program,Wheelchair Mobility Training  Safety Devices  Type of devices:  All fall risk precautions in place,Call light within reach,Gait belt,Patient at risk for falls,Nurse notified,Left in bed,Bed alarm in place  Restraints  Initially in place: No     Therapy Time   Individual Concurrent Group Co-treatment   Time In 0847         Time Out 0929         Minutes 42         Timed Code Treatment Minutes: 42 Minutes       If pt is unable to be seen after this session, please let this note serve as discharge summary. Please see case management note for discharge disposition. Thank you.     Eric Justice, PT, DPT

## 2022-01-09 NOTE — PROGRESS NOTES
Occupational Therapy  Facility/Department: [facility] C5 - MED SURG/ORTHO  Daily Treatment Note  NAME: Lazara Ann  : 1955  MRN: [id number]    Date of Service: 2022    Discharge Recommendations:  IP Rehab  OT Equipment Recommendations  Other: defer    Assessment   Performance deficits / Impairments: Decreased functional mobility ; Decreased ADL status  Assessment: Pt progressing towards OT goals but demos ongoing deficits in areas listed above. Pt attempted slide board transfer this date with heavy maxA x2 and increased difficulty going bed to w.c. Increased cuing given to patient for positioning on board/anterior lean and patient given pants for ease of sliding to go from w.c. to bed easier but still requiring maxA x2. Pt performs functional transfer from raised bed with modA x2 and completes household distance functional mobility with Rhett x2. Pt requires maxA for adjusting knee immobilizers at start of session. Pt would benefit from ongoing intensive IP OT to maximize independence with functional activity and problem solve mobility with no knee flexion. Will follow per POC while inpt. Prognosis: Good  OT Education: OT Role;Plan of Care;Transfer Training;Precautions; ADL Adaptive Strategies; Equipment  Patient Education: Disease specific: precautions, pain control, mobility/safety with transfers, position of knee immobilizers, slide board transfers, lift chair, IP rehab pt verbalized understanding  REQUIRES OT FOLLOW UP: Yes  Activity Tolerance  Activity Tolerance: Patient Tolerated treatment well  Activity Tolerance: , spO2 97% on RA  Safety Devices  Safety Devices in place: Yes  Type of devices: Nurse notified;Gait belt;Call light within reach; Left in bed;Bed alarm in place         Patient Diagnosis(es): The primary encounter diagnosis was Tendon rupture, traumatic. quadriceps, left, initial encounter.  A diagnosis of Traumatic rupture of quadriceps tendon, right, initial encounter was also pertinent to this visit. has a past medical history of 01 minute Apgar score 0, Hypertension, Kidney stone, Mitral valve prolapse, Sarcoidosis, and Spinal stenosis. has a past surgical history that includes back surgery (); Colonoscopy (2015); and Leg Muscle Surgery (Bilateral, 2022). Restrictions  Restrictions/Precautions  Restrictions/Precautions: Weight Bearing,Fall Risk  Required Braces or Orthoses?: Yes  Lower Extremity Weight Bearing Restrictions  Right Lower Extremity Weight Bearing: Weight Bearing As Tolerated  Left Lower Extremity Weight Bearing: Weight Bearing As Tolerated  Required Braces or Orthoses  Right Lower Extremity Brace: Knee Immobilizer  Left Lower Extremity Brace: Knee Immobilizer  Position Activity Restriction  Other position/activity restrictions: No B knee ROM. Bilateral legs while in knee immobilizers.  Ensure that immobilizers are appropriately positioned, holding knees in full extension  Subjective   General  Chart Reviewed: Pradip Holes and Physical  Patient assessed for rehabilitation services?: Yes  Response to previous treatment: Patient with no complaints from previous session  Family / Caregiver Present: No  Referring Practitioner: Joanne Bell  Diagnosis: B quad tendon rupture s/p B quad tendon repair 22  Subjective  Subjective: Pt agreeable toOT tx session  General Comment  Comments: RN cleared for therapy      Orientation  Orientation  Overall Orientation Status: Within Functional Limits  Objective    ADL  LE Dressing: Dependent/Total (don pants)        Balance  Sitting Balance: Stand by assistance  Standing Balance: Minimal assistance (Rhett x2)  Standing Balance  Time: ~2-4 mins  Activity: sit to stand from EOB, fxl mobility in room  Comment: SW  Functional Mobility  Functional - Mobility Device: Standard Walker  Activity: Other (in room)  Assist Level: Minimal assistance (Rhett x2)  Bed mobility  Supine to Sit: Moderate assistance  Sit to Supine: Stand by assistance  Comment: HOB elevated use of BR  Transfers  Slide Board: Maximum assistance;2 Person assistance (pt transfer bed to w.c with maxA x2 and increased difficulty 2/2 slide board moving and pt with no pants on, w.c locked and blocked by two therapists, w.c to bed with pants and improved ease still requiring maxA x2, max cues for forward lean)  Sit to stand: 2 Person assistance; Moderate assistance (bed elevated and B BR used, up to RW)  Stand to sit: 2 Person assistance; Moderate assistance (bed elevated, RW)                                                                 Plan   Plan  Times per week: 4-6x  Current Treatment Recommendations: Endurance Training,Balance Training,Functional Mobility Training,Safety Education & Training,Self-Care / ADL,Patient/Caregiver Education & Training    AM-PAC Score        AM-[facility] Inpatient Daily Activity Raw Score: 15 (01/09/22 1538)  AM-PAC Inpatient ADL T-Scale Score : 34.69 (01/09/22 1538)  ADL Inpatient CMS 0-100% Score: 56.46 (01/09/22 1538)  ADL Inpatient CMS G-Code Modifier : CK (01/09/22 1538)    Goals  Short term goals  Time Frame for Short term goals: 1 week (1/13) unless noted  Short term goal 1: Perform functional transfers with min x1 and AD-ongoing for toilet, mod x2 1/9/22  Short term goal 2: Perform LE ADLs (ie toileting, dressing) with mod A with adapt equip--ongoing 1/9 maxA  Short term goal 3: SBA for bathroom mobility with SW - ongoing 1/9/22 - minAx2 with SW  Patient Goals   Patient goals : \"Be able to walk\"       Therapy Time   Individual Concurrent Group Co-treatment   Time In 0848         Time Out 0928         Minutes 40         Timed Code Treatment Minutes: 40 Minutes    If pt is unable to be seen after this session, please let this note serve as discharge summary. Please see case management note for discharge disposition. Thank you.       Owen Wilburn

## 2022-01-09 NOTE — PROGRESS NOTES
Hospitalist Progress Note      PCP: Joshua Rivera MD    Date of Admission: 1/4/2022    Chief Complaint: Knee pain    John Lopez is a 78 y.o. male who presented to the ED via EMS to be evaluated for BLE pain and inability to ambulate s/p mechanical fall just prior to arrival.  Patient states that he was outside walking his dog on a muddy slope at which time the dog jolted forward, causing him to lurch forward and fall bended knee. He reports that he experienced excruciating R>L knee pain, after which she was unable to stand or bear weight. Patient denies LOC or other skeletal trauma. Chronic medical conditions include: Obesity, prediabetes, and HTN.    Upon arrival to the ED, EKG was obtained revealing NSR with remote inferior infarct. Plain films/CT scan revealed complete rupture of bilateral distal quadriceps tendon with associated muscle strain and surrounding soft tissue edema/blood product. Larger hematoma present on right side. Consultation was placed to orthopedics per ED attending, and patient was placed in bilateral immobilizer overnight.   Hospitalist team consulted to perform preoperative clearance and manage patient medically overnight, pending a.m. orthopedic intervention.        Subjective: NAD, discussed plan of care, working with PT, patient denies cp/sob, encouraged IS      Medications:  Reviewed    Infusion Medications    dextrose      sodium chloride 25 mL (01/07/22 1546)    sodium chloride       Scheduled Medications    [START ON 1/10/2022] metoprolol succinate  25 mg Oral Daily    gabapentin  300 mg Oral Nightly    cefTRIAXone (ROCEPHIN) IV  1,000 mg IntraVENous Q24H    azithromycin  500 mg Oral Daily    metoprolol tartrate  25 mg Oral BID    lisinopril  10 mg Oral Nightly    insulin lispro  0-12 Units SubCUTAneous TID WC    insulin lispro  0-6 Units SubCUTAneous Nightly    sodium chloride flush  5-40 mL IntraVENous 2 times per day    enoxaparin  40 mg SubCUTAneous Daily    vitamin C  250 mg Oral Daily    therapeutic multivitamin-minerals  1 tablet Oral Daily    cyclobenzaprine  5 mg Oral BID     PRN Meds: glucose, dextrose, glucagon (rDNA), dextrose, sodium chloride flush, sodium chloride, oxyCODONE **OR** oxyCODONE, morphine **OR** morphine, bisacodyl, magnesium hydroxide, labetalol, acetaminophen, sodium chloride, potassium chloride **OR** potassium alternative oral replacement **OR** potassium chloride, magnesium sulfate, promethazine **OR** ondansetron, senna, acetaminophen **OR** acetaminophen    No intake or output data in the 24 hours ending 01/09/22 1025    Physical Exam Performed:    /69   Pulse 90   Temp 98.9 °F (37.2 °C) (Oral)   Resp 16   Ht 5' 10\" (1.778 m)   Wt 274 lb 6.4 oz (124.5 kg)   SpO2 98%   BMI 39.37 kg/m²     General appearance: No apparent distress, appears stated age and cooperative. HEENT: Pupils equal, round, and reactive to light. Conjunctivae/corneas clear. Neck: Supple, with full range of motion. No jugular venous distention. Trachea midline. Respiratory:  Normal respiratory effort. Decreased bases  Cardiovascular: Regular rate and rhythm with normal S1/S2 without murmurs, rubs or gallops. Abdomen: Soft, non-tender, non-distended with normal bowel sounds. Musculoskeletal: BLE pain, tenderness, limited ROM, bilat knee immobilizers, denies paresthesia  Skin: Skin color, texture, turgor normal.  No rashes or lesions. Neurologic:  Neurovascularly intact without any focal sensory/motor deficits.  Cranial nerves: II-XII intact, grossly non-focal.  Psychiatric: Alert and oriented, thought content appropriate, normal insight  Capillary Refill: Brisk,3 seconds, normal   Peripheral Pulses: +2 palpable, equal bilaterally       Labs:   Recent Labs     01/07/22 0627 01/08/22 0652   WBC 9.5 9.3   HGB 10.8* 9.7*   HCT 33.7* 30.7*    160     Recent Labs     01/07/22 0627 01/08/22 0652    140 K 3.8 3.6    106   CO2 26 24   BUN 14 22*   CREATININE 0.9 1.0   CALCIUM 8.2* 8.3     No results for input(s): AST, ALT, BILIDIR, BILITOT, ALKPHOS in the last 72 hours. No results for input(s): INR in the last 72 hours. Recent Labs     01/06/22  1326 01/08/22  0652   CKTOTAL 786* 1,275*       Urinalysis:      Lab Results   Component Value Date    NITRU Negative 01/05/2022    WBCUA 3-5 06/28/2017    BACTERIA 1+ 06/28/2017    RBCUA  06/28/2017    BLOODU Negative 01/05/2022    SPECGRAV 1.015 01/05/2022    GLUCOSEU Negative 01/05/2022       Radiology:  XR CHEST PORTABLE   Final Result   Right lower lobe airspace disease, either atelectasis or pneumonia         CT KNEE LEFT WO CONTRAST   Final Result   1. Complete rupture of the bilateral distal quadriceps tendons with   associated muscular strains and surrounding soft tissue edema and blood   products. Larger hematoma is present on the right at the level of the distal   quadriceps rupture which measures approximately 3.0 x 5.1 x 8.8 cm.   2. No acute fracture identified. 3. 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 RECOMMENDATIONS:   Unavailable         CT KNEE RIGHT WO CONTRAST   Final Result   1. Complete rupture of the bilateral distal quadriceps tendons with   associated muscular strains and surrounding soft tissue edema and blood   products. Larger hematoma is present on the right at the level of the distal   quadriceps rupture which measures approximately 3.0 x 5.1 x 8.8 cm.   2. No acute fracture identified. 3. 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 RECOMMENDATIONS:   Unavailable         XR KNEE LEFT (1-2 VIEWS)   Final Result      1. No evidence of fracture. 2.  Suprapatellar region is not well seen on the lateral view and therefore   abnormalities in this area cannot be excluded. Also soft tissue injury or   tendon tear cannot be excluded by this exam if suspected clinically.   MRI examination would be recommended. XR KNEE RIGHT (1-2 VIEWS)   Final Result      1. There appears to be significant edema, fluid or hematoma in the   suprapatellar region and obscuring tissue planes. The quadriceps tendon is   not well visualized and therefore tear of the tendon cannot be excluded if   suspected clinically. Alternatively this could represent edema or hematoma   due to muscular injury in the distal anterior thigh or a large joint effusion   though no significant joint effusion is suggested on the lateral view   anterior to the knee joint. Correlate clinically. 2.  No evidence of fracture or joint malalignment. Assessment/Plan:    Active Hospital Problems    Diagnosis     Pre-diabetes [R73.03]     Quadriceps tendon rupture, left, initial encounter [S76.112A]     Quadriceps tendon rupture, right, initial encounter [S76.111A]     HTN (hypertension) [I10]     Rupture of distal quadriceps tendon [S76.119A]      BLE distal quadricep tendon rupture  -Tylenol, Flexeril  -OxyIR/ IV Dilaudid   -Preoperative EKG and laboratory studies ordered and reviewed; patient with NO absolute contraindication to proceed with recommended procedural intervention  -POD #4, post op low grade temp, no leukocytosis, tylenol, monitor  -Consultation placed to PT/OT for postoperative rehabilitation  -CXR Right lower lobe airspace disease, either atelectasis or pneumonia. LA 2.1->1.0, PCT 0.45->0.42, likely atelectasis, however will start rocephin/azithro, encourage IS, mobility limited d/t injury  CK elevated 2/2 trauma, renal function stable     HTN  -Tele  -EKG without evidence of LAD or acute ischemia  -Continue home medication dosage of lisinopril  -Toprol added for HTN/tachycardia     Prediabetes with BMI 39.37,, Obesity with Body mass index is 39.37 kg/m². Complicating assessment and treatment.  Placing patient at risk for multiple co-morbidities as well as early death and contributing to the patient's presentation. Counseled on weight loss  -A1c 5.9  -PRN Humalog medium dose SSI scheduled before meals and at bedtime based on POC glucose  -Carbohydrate restriction placed on diet    Post herpetic neuralgia  -gabapentin hs    DVT Prophylaxis: lovenox   Diet: ADULT DIET;  Regular; 4 carb choices (60 gm/meal)  Code Status: Full Code    PT/OT Eval Status: Consulted-IP rehab consult    Dispo - pending course, dc arrangements    MARISOL Walker - CNP

## 2022-01-10 LAB
ANION GAP SERPL CALCULATED.3IONS-SCNC: 6 MMOL/L (ref 3–16)
BUN BLDV-MCNC: 17 MG/DL (ref 7–20)
CALCIUM SERPL-MCNC: 8.3 MG/DL (ref 8.3–10.6)
CHLORIDE BLD-SCNC: 105 MMOL/L (ref 99–110)
CO2: 24 MMOL/L (ref 21–32)
CREAT SERPL-MCNC: 0.8 MG/DL (ref 0.8–1.3)
GFR AFRICAN AMERICAN: >60
GFR NON-AFRICAN AMERICAN: >60
GLUCOSE BLD-MCNC: 103 MG/DL (ref 70–99)
GLUCOSE BLD-MCNC: 105 MG/DL (ref 70–99)
GLUCOSE BLD-MCNC: 110 MG/DL (ref 70–99)
GLUCOSE BLD-MCNC: 80 MG/DL (ref 70–99)
GLUCOSE BLD-MCNC: 93 MG/DL (ref 70–99)
HCT VFR BLD CALC: 30.5 % (ref 40.5–52.5)
HEMOGLOBIN: 9.7 G/DL (ref 13.5–17.5)
MCH RBC QN AUTO: 23.6 PG (ref 26–34)
MCHC RBC AUTO-ENTMCNC: 31.8 G/DL (ref 31–36)
MCV RBC AUTO: 74.2 FL (ref 80–100)
PDW BLD-RTO: 12.7 % (ref 12.4–15.4)
PERFORMED ON: ABNORMAL
PERFORMED ON: ABNORMAL
PERFORMED ON: NORMAL
PERFORMED ON: NORMAL
PLATELET # BLD: 214 K/UL (ref 135–450)
PMV BLD AUTO: 8.6 FL (ref 5–10.5)
POTASSIUM REFLEX MAGNESIUM: 3.6 MMOL/L (ref 3.5–5.1)
RBC # BLD: 4.11 M/UL (ref 4.2–5.9)
SODIUM BLD-SCNC: 135 MMOL/L (ref 136–145)
TOTAL CK: 934 U/L (ref 39–308)
WBC # BLD: 7.2 K/UL (ref 4–11)

## 2022-01-10 PROCEDURE — 6370000000 HC RX 637 (ALT 250 FOR IP): Performed by: ORTHOPAEDIC SURGERY

## 2022-01-10 PROCEDURE — 36415 COLL VENOUS BLD VENIPUNCTURE: CPT

## 2022-01-10 PROCEDURE — 97530 THERAPEUTIC ACTIVITIES: CPT

## 2022-01-10 PROCEDURE — 1200000000 HC SEMI PRIVATE

## 2022-01-10 PROCEDURE — 6370000000 HC RX 637 (ALT 250 FOR IP): Performed by: NURSE PRACTITIONER

## 2022-01-10 PROCEDURE — 6360000002 HC RX W HCPCS: Performed by: ORTHOPAEDIC SURGERY

## 2022-01-10 PROCEDURE — 85027 COMPLETE CBC AUTOMATED: CPT

## 2022-01-10 PROCEDURE — 2700000000 HC OXYGEN THERAPY PER DAY

## 2022-01-10 PROCEDURE — 97535 SELF CARE MNGMENT TRAINING: CPT

## 2022-01-10 PROCEDURE — 2580000003 HC RX 258: Performed by: ORTHOPAEDIC SURGERY

## 2022-01-10 PROCEDURE — 2580000003 HC RX 258: Performed by: NURSE PRACTITIONER

## 2022-01-10 PROCEDURE — 80048 BASIC METABOLIC PNL TOTAL CA: CPT

## 2022-01-10 PROCEDURE — 82550 ASSAY OF CK (CPK): CPT

## 2022-01-10 PROCEDURE — 97110 THERAPEUTIC EXERCISES: CPT

## 2022-01-10 PROCEDURE — 97116 GAIT TRAINING THERAPY: CPT

## 2022-01-10 PROCEDURE — 94761 N-INVAS EAR/PLS OXIMETRY MLT: CPT

## 2022-01-10 PROCEDURE — 6360000002 HC RX W HCPCS: Performed by: NURSE PRACTITIONER

## 2022-01-10 RX ADMIN — POLYETHYLENE GLYCOL 3350 17 G: 17 POWDER, FOR SOLUTION ORAL at 09:44

## 2022-01-10 RX ADMIN — CYCLOBENZAPRINE 5 MG: 10 TABLET, FILM COATED ORAL at 09:43

## 2022-01-10 RX ADMIN — OXYCODONE HYDROCHLORIDE AND ACETAMINOPHEN 250 MG: 500 TABLET ORAL at 09:43

## 2022-01-10 RX ADMIN — ACETAMINOPHEN 1000 MG: 500 TABLET ORAL at 09:43

## 2022-01-10 RX ADMIN — ENOXAPARIN SODIUM 40 MG: 40 INJECTION SUBCUTANEOUS at 09:44

## 2022-01-10 RX ADMIN — CEFTRIAXONE SODIUM 1000 MG: 1 INJECTION, POWDER, FOR SOLUTION INTRAMUSCULAR; INTRAVENOUS at 11:50

## 2022-01-10 RX ADMIN — MULTIPLE VITAMINS W/ MINERALS TAB 1 TABLET: TAB at 09:43

## 2022-01-10 RX ADMIN — SODIUM CHLORIDE, PRESERVATIVE FREE 10 ML: 5 INJECTION INTRAVENOUS at 20:17

## 2022-01-10 RX ADMIN — GABAPENTIN 300 MG: 300 CAPSULE ORAL at 20:18

## 2022-01-10 RX ADMIN — LISINOPRIL 10 MG: 10 TABLET ORAL at 20:18

## 2022-01-10 RX ADMIN — SODIUM CHLORIDE, PRESERVATIVE FREE 10 ML: 5 INJECTION INTRAVENOUS at 09:44

## 2022-01-10 RX ADMIN — CYCLOBENZAPRINE 5 MG: 10 TABLET, FILM COATED ORAL at 20:18

## 2022-01-10 RX ADMIN — SODIUM CHLORIDE 25 ML: 9 INJECTION, SOLUTION INTRAVENOUS at 11:49

## 2022-01-10 RX ADMIN — METOPROLOL SUCCINATE 25 MG: 25 TABLET, EXTENDED RELEASE ORAL at 09:43

## 2022-01-10 ASSESSMENT — PAIN SCALES - GENERAL
PAINLEVEL_OUTOF10: 2
PAINLEVEL_OUTOF10: 0
PAINLEVEL_OUTOF10: 0

## 2022-01-10 NOTE — PLAN OF CARE
Pt educated on the need to turn every 2 hours to prevent any skin integrity breakdown. Pt verbalizes understanding. Pt declines assistance with repositions, turns self. See documentation.

## 2022-01-10 NOTE — PROGRESS NOTES
Department of Orthopedic Surgery  Physician Assistant   Progress Note    Subjective:       Systemic or Specific Complaints:Pain Control improving     Objective:     Patient Vitals for the past 24 hrs:   BP Temp Temp src Pulse Resp SpO2   01/10/22 1438 129/88 98 °F (36.7 °C) Oral 87 16 94 %   01/10/22 0832 (!) 144/73 98.6 °F (37 °C) Oral (!) 15 18 98 %   01/10/22 0342 126/79 98.2 °F (36.8 °C) Oral 85 16 94 %   01/10/22 0052 -- -- -- -- -- 93 %   01/09/22 2301 125/81 98.2 °F (36.8 °C) Oral 78 16 90 %   01/09/22 1943 (!) 146/77 98.9 °F (37.2 °C) Oral 97 16 97 %   01/09/22 1501 131/79 98.5 °F (36.9 °C) Oral 80 16 92 %       General: alert, appears stated age and cooperative   Wound: Wound clean and dry no evidence of infection. , Wound Intact and Positive for Edema   Motion: Painful range of Motion in affected extremity   DVT Exam: No evidence of DVT seen on physical exam.     Additional exam: NVI in the feet. Immobilizers intact. Data Review  CBC:   Lab Results   Component Value Date    WBC 7.2 01/10/2022    RBC 4.11 01/10/2022    HGB 9.7 01/10/2022    HCT 30.5 01/10/2022     01/10/2022       Renal:   Lab Results   Component Value Date     01/10/2022    K 3.6 01/10/2022     01/10/2022    CO2 24 01/10/2022    BUN 17 01/10/2022    CREATININE 0.8 01/10/2022    GLUCOSE 110 01/10/2022    CALCIUM 8.3 01/10/2022            Assessment:      bilateral quad tendon repair. Plan:      1:  PT/OT as able - NO ROM. WBAT. Can d/c when that is arranged as he plans for ARU. Will have him work on aggressive IS today.   2:  Continue Deep venous thrombosis prophylaxis  3:  Continue Pain Control    MILTON Leal

## 2022-01-10 NOTE — CARE COORDINATION
CM spoke to [employer] in admissions with [facility] and she is reaching out to payor now for updates on precert that was initiated on 1/7/22.  CM following-Inez Shah RN

## 2022-01-10 NOTE — PROGRESS NOTES
Occupational Therapy  Facility/Department: [facility] - MED SURG/ORTHO  Daily Treatment Note  NAME: Mohan Mayberry  : 1955  MRN: [id number]    Date of Service: 1/10/2022    Discharge Recommendations:  IP Rehab     Assessment   Performance deficits / Impairments: Decreased functional mobility ; Decreased ADL status; Decreased balance  Assessment: Addressed mobility and ADLs during OT session. Pt requires mod x2 for sit <>stand transition with SW and max/D LE ADLs. Pt would highly benefit from IP rehab to improve function for ADLs and mobility for safe return home. Cont OT. Co-tx with PT performed to safely and effectively address ADLs and mobility due to extent of pt's deficits. Prognosis: Good  OT Education: OT Role;Plan of Care;Transfer Training;Precautions; ADL Adaptive Strategies; Equipment  Disease Specific Education: Pt educated on importance of OOB mobility, prevention of complications of bedrest, and general safety during hospitalization. Pt verbalized understanding  REQUIRES OT FOLLOW UP: Yes  Activity Tolerance  Activity Tolerance: Patient Tolerated treatment well  Activity Tolerance: /79, HR 85, O2 sats 98% RA  Safety Devices  Safety Devices in place: Yes  Type of devices: Nurse notified;Gait belt;Call light within reach; Chair alarm in place; Left in chair       Patient Diagnosis(es): The primary encounter diagnosis was Tendon rupture, traumatic. quadriceps, left, initial encounter. A diagnosis of Traumatic rupture of quadriceps tendon, right, initial encounter was also pertinent to this visit. has a past medical history of 01 minute Apgar score 0, Hypertension, Kidney stone, Mitral valve prolapse, Sarcoidosis, and Spinal stenosis. has a past surgical history that includes back surgery (); Colonoscopy (2015); and Leg Muscle Surgery (Bilateral, 2022).     Restrictions  Restrictions/Precautions  Restrictions/Precautions: Weight Bearing,Fall Risk  Required Braces or Orthoses?: Yes  Lower Extremity Weight Bearing Restrictions  Right Lower Extremity Weight Bearing: Weight Bearing As Tolerated  Left Lower Extremity Weight Bearing: Weight Bearing As Tolerated  Required Braces or Orthoses  Right Lower Extremity Brace: Knee Immobilizer  Left Lower Extremity Brace: Knee Immobilizer  Position Activity Restriction  Other position/activity restrictions: No B knee ROM. Bilateral legs while in knee immobilizers. Ensure that immobilizers are appropriately positioned, holding knees in full extension  Subjective   General  Chart Reviewed: Britney Mcdaniel and Physical,Progress Notes  Patient assessed for rehabilitation services?: Yes  Response to previous treatment: Patient with no complaints from previous session  Family / Caregiver Present: No  Referring Practitioner: Gregorio Crow  Diagnosis: B quad tendon rupture s/p B quad tendon repair 1/5/22  Subjective  Subjective: Pt agreeable to OT tx. General Comment  Comments: RN approved therapy  Vital Signs  Patient Currently in Pain: Denies   Orientation  Orientation  Overall Orientation Status: Within Functional Limits  Objective    ADL  Feeding: Independent  Grooming: Setup (seated)  UE Dressing: Setup (adjust and tie gown while seated)  LE Dressing: Dependent/Total     Balance  Sitting Balance: Stand by assistance  Standing Balance: Minimal assistance (with SW)  Functional Mobility  Functional - Mobility Device: Standard Walker  Activity:  (in room)  Assist Level: Minimal assistance  Functional Mobility Comments: Education provided on safe technique. Unsteady with sit to stand transition. Transfers  Sit to stand:  Moderate assistance;2 Person assistance  Stand to sit: Moderate assistance;2 Person assistance  Transfer Comments: vc's to advance feet forward as he began to sit down in order to maintain knee extension         Cognition  Overall Cognitive Status: [facility]       Plan   Plan  Times per week: 4-6x  Current Treatment Recommendations: Endurance Training,Balance Training,Functional Mobility Training,Safety Education & Training,Self-Care / ADL,Patient/Caregiver Education & Training  AM-PAC Score   AM-PAC Inpatient Daily Activity Raw Score: 14 (01/10/22 1204)  AM-PAC Inpatient ADL T-Scale Score : 33.39 (01/10/22 1204)  ADL Inpatient CMS 0-100% Score: 59.67 (01/10/22 1204)  ADL Inpatient CMS G-Code Modifier : CK (01/10/22 1204)  Goals  Short term goals  Time Frame for Short term goals: 1 week (1/13) unless noted  Short term goal 1: Perform functional transfers with min x1 and AD-ongoing for toilet, mod x2 1/10  Short term goal 2: Perform LE ADLs (ie toileting, dressing) with mod A with adapt equip--ongoing 1/10  Short term goal 3: SBA for bathroom mobility with SW - ongoing 1/10  Patient Goals   Patient goals : \"Be able to walk\"     Therapy Time   Individual Concurrent Group Co-treatment   Time In       0843   Time Out       0921   Minutes       38   Timed Code Treatment Minutes: 45 Minutes    If pt is discharged prior to next OT session, this note will serve as the discharge summary.   Nadine Carney OT

## 2022-01-10 NOTE — PROGRESS NOTES
Hospitalist Progress Note      PCP: Heather De Los Santos MD    Date of Admission: 1/4/2022    Chief Complaint: Knee Pain    Subjective: no new c/o. Medications:  Reviewed    Infusion Medications    dextrose      sodium chloride 25 mL (01/07/22 1546)    sodium chloride       Scheduled Medications    metoprolol succinate  25 mg Oral Daily    polyethylene glycol  17 g Oral Daily    gabapentin  300 mg Oral Nightly    cefTRIAXone (ROCEPHIN) IV  1,000 mg IntraVENous Q24H    lisinopril  10 mg Oral Nightly    insulin lispro  0-12 Units SubCUTAneous TID WC    insulin lispro  0-6 Units SubCUTAneous Nightly    sodium chloride flush  5-40 mL IntraVENous 2 times per day    enoxaparin  40 mg SubCUTAneous Daily    vitamin C  250 mg Oral Daily    therapeutic multivitamin-minerals  1 tablet Oral Daily    cyclobenzaprine  5 mg Oral BID     PRN Meds: glucose, dextrose, glucagon (rDNA), dextrose, sodium chloride flush, sodium chloride, oxyCODONE **OR** oxyCODONE, morphine **OR** morphine, bisacodyl, magnesium hydroxide, labetalol, acetaminophen, sodium chloride, promethazine **OR** ondansetron, senna, acetaminophen **OR** acetaminophen      Intake/Output Summary (Last 24 hours) at 1/10/2022 0852  Last data filed at 1/9/2022 2027  Gross per 24 hour   Intake --   Output 1065 ml   Net -1065 ml       Physical Exam Performed:    BP (!) 144/73   Pulse (!) 15   Temp 98.6 °F (37 °C) (Oral)   Resp 18   Ht 5' 10\" (1.778 m)   Wt 274 lb 6.4 oz (124.5 kg)   SpO2 98%   BMI 39.37 kg/m²     General appearance: No apparent distress, appears stated age and cooperative. HEENT: Pupils equal, round, and reactive to light. Conjunctivae/corneas clear. Neck: Supple, with full range of motion. No jugular venous distention. Trachea midline. Respiratory:  Normal respiratory effort. Clear to auscultation, bilaterally without Rales/Wheezes/Rhonchi.   Cardiovascular: Regular rate and rhythm with normal S1/S2 without murmurs, rubs or gallops. Abdomen: Soft, non-tender, non-distended with normal bowel sounds. Musculoskeletal: No clubbing, cyanosis or edema bilaterally. Full range of motion without deformity. Skin: Skin color, texture, turgor normal.  No rashes or lesions. Neurologic:  Neurovascularly intact without any focal sensory/motor deficits. Cranial nerves: II-XII intact, grossly non-focal.  Psychiatric: Alert and oriented, thought content appropriate, normal insight  Capillary Refill: Brisk,< 3 seconds   Peripheral Pulses: +2 palpable, equal bilaterally       Labs:   Recent Labs     01/08/22  0652 01/09/22  1136 01/10/22  0641   WBC 9.3 8.4 7.2   HGB 9.7* 9.8* 9.7*   HCT 30.7* 30.3* 30.5*    209 214     Recent Labs     01/08/22  0652 01/09/22  1136 01/10/22  0641    139 135*   K 3.6 3.3* 3.6    102 105   CO2 24 25 24   BUN 22* 19 17   CREATININE 1.0 0.8 0.8   CALCIUM 8.3 8.6 8.3     No results for input(s): AST, ALT, BILIDIR, BILITOT, ALKPHOS in the last 72 hours. No results for input(s): INR in the last 72 hours.   Recent Labs     01/08/22  0652 01/10/22  0641   CKTOTAL 1,275* 934*       Urinalysis:      Lab Results   Component Value Date    NITRU Negative 01/05/2022    WBCUA 3-5 06/28/2017    BACTERIA 1+ 06/28/2017    RBCUA  06/28/2017    BLOODU Negative 01/05/2022    SPECGRAV 1.015 01/05/2022    GLUCOSEU Negative 01/05/2022       Consults:    IP CONSULT TO ORTHOPEDIC SURGERY  IP CONSULT TO HOSPITALIST  IP CONSULT TO ORTHOPEDIC SURGERY  IP CONSULT TO SOCIAL WORK  IP CONSULT TO PHYSICAL MEDICINE REHAB      Assessment/Plan:    Active Hospital Problems    Diagnosis     Pre-diabetes [R73.03]     Quadriceps tendon rupture, left, initial encounter [S76.112A]     Quadriceps tendon rupture, right, initial encounter [S76.111A]     HTN (hypertension) [I10]     Rupture of distal quadriceps tendon [S76.119A]          BLE distal quadricep tendon rupture - ortho consulted and appreciated s/p OR repair 5 Jan w/out complications. PNA - possible CAP w/ Strep Pneumonia. CXR w/ RLL ASD . Started on Rocephin/Azithro on or before 7 Jan w/ Azithro completed, now on Rocephin as a single agent.     HTN - w/out known CAD and no evidence of active signs/sxs of ischemia/failure. Currently controlled on home meds w/ vitals reviewed and documented as above.     Prediabetes with   -A1c 5.9  -PRN Humalog medium dose SSI scheduled before meals and at bedtime based on POC glucose     Post herpetic neuralgia  -gabapentin hs      Obesity -  With Body mass index is 39.37 kg/m². Complicating assessment and treatment. Placing patient at risk for multiple co-morbidities as well as early death and contributing to the patient's presentation. Counseled on weight loss. DVT Prophylaxis: LMWH     Recent Labs     01/08/22  0652 01/09/22  1136 01/10/22  0641    209 214     Diet: ADULT DIET; Regular  Code Status: Full Code      PT/OT Eval Status: seen w/ recs for inpatient rehab    Dispo - Patient is likely to remain in-house at least until Tues/Wed 11/12 Jan pending clinical course, subspecialty recs and placement decision.        Simeon Raphael MD

## 2022-01-10 NOTE — PROGRESS NOTES
Physical Therapy  Facility/Department: [facility] C5 - MED SURG/ORTHO  Daily Treatment Note  NAME: Izabel Diop  : 1955  MRN: [id number]    Date of Service: 1/10/2022    Discharge Recommendations:  IP Rehab   PT Equipment Recommendations  Equipment Needed: No  Other: defer to next level of care    Assessment   Body structures, Functions, Activity limitations: Decreased functional mobility ; Decreased ROM; Decreased endurance;Decreased balance; Increased pain  Assessment: Pt seen with OT secondary to complexity of medical condition, decreased activity tolerance and URIEL with mobility with B KI. Pt is able to complete STS from bed to SW with modA x 2 and participate in gait training with CGA. Pt is limited by decreased activity tolerance and B KI. Pt will benefit from continued skilled PT in acute care setting to address above deficits, will continue to progress mobility as tolerated  Treatment Diagnosis: Decreased independence with functional mobility  Prognosis: Good  Decision Making: Medium Complexity  PT Education: Goals; General Safety; Disease Specific Education;PT Role;Plan of Care;Precautions;Transfer Training;Weight-bearing Education;Equipment;Gait Training;Functional Mobility Training; Injury Prevention;Pressure Relief;Home Exercise Program  Patient Education: Pt educated on safety with mobility. Pt verbalized understanding  Barriers to Learning: none  REQUIRES PT FOLLOW UP: Yes  Activity Tolerance  Activity Tolerance: Patient Tolerated treatment well  Activity Tolerance: 144/73  HR 77  O2 97% RA     Patient Diagnosis(es): The primary encounter diagnosis was Tendon rupture, traumatic. quadriceps, left, initial encounter. A diagnosis of Traumatic rupture of quadriceps tendon, right, initial encounter was also pertinent to this visit. has a past medical history of 01 minute Apgar score 0, Hypertension, Kidney stone, Mitral valve prolapse, Sarcoidosis, and Spinal stenosis.    has a past surgical history that Balance  Posture: Good  Sitting - Static: Good  Sitting - Dynamic: Good  Standing - Static: Fair;+  Standing - Dynamic: Fair (sw)  Exercises  Straight Leg Raise: 1 x 10 bilateral AAROM  Quad Sets: 12 B  Gluteal Sets: 12  Hip Abduction: 1 x 10 bilateral AROM  Ankle Pumps: 1 x 15 bilateral  Comments: cueing for sequencing and technique. AM-PAC Score  AM-PAC Inpatient Mobility Raw Score : 14 (01/10/22 1001)  AM-PAC Inpatient T-Scale Score : 38.1 (01/10/22 1001)  Mobility Inpatient CMS 0-100% Score: 61.29 (01/10/22 1001)  Mobility Inpatient CMS G-Code Modifier : CL (01/10/22 1001)          Goals  Short term goals  Time Frame for Short term goals: 1/12  Short term goal 1: Pt will complete bed mobility wtih Min A. 1/09: modA to SBA   -1/10 min A  Short term goal 2: Pt will sit to stand with min A.  -- 1/10: modA x 2 to RW  Short term goal 3: Pt will ambulate x 15' with SW with CGA.  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  -1/10 on-going    Plan    Plan  Times per week: 7x/week  Times per day: Daily  Plan weeks: 1 week 1/13/22  Specific instructions for Next Treatment: progress mobility as tolerated  Current Treatment Recommendations: Strengthening,Balance Training,Endurance Training,Functional Mobility Training,Transfer Training,Gait Training,Positioning,Equipment Evaluation, Education, & procurement,Patient/Caregiver Education & Training,Safety Education & Training,Home Exercise Program,Wheelchair Mobility Training  Safety Devices  Type of devices:  All fall risk precautions in place,Call light within reach,Gait belt,Patient at risk for falls,Nurse notified,Left in chair,Chair alarm in place  Restraints  Initially in place: No     Therapy Time   Individual Concurrent Group Co-treatment   Time In 0843         Time Out 0921         Minutes 38         Timed Code Treatment Minutes: [id number]

## 2022-01-10 NOTE — CARE COORDINATION
Per Availity, precert is still pending with Samantha. If approved, will not have a bed until tomorrow. D/w Inez MCNEILL. Will update DCP when Samantha provides decision. Thank you.    Christen Bustamante M.A, [facility]-SLP/ CBIS   Clinical Liason

## 2022-01-11 ENCOUNTER — HOSPITAL ENCOUNTER (INPATIENT)
Age: 67
LOS: 9 days | Discharge: HOME HEALTH | DRG: 950 | End: 2022-01-20
Attending: PHYSICAL MEDICINE & REHABILITATION | Admitting: PHYSICAL MEDICINE & REHABILITATION
Payer: COMMERCIAL

## 2022-01-11 VITALS
TEMPERATURE: 98.5 F | HEART RATE: 107 BPM | SYSTOLIC BLOOD PRESSURE: 130 MMHG | BODY MASS INDEX: 39.28 KG/M2 | OXYGEN SATURATION: 98 % | WEIGHT: 274.4 LBS | RESPIRATION RATE: 16 BRPM | HEIGHT: 70 IN | DIASTOLIC BLOOD PRESSURE: 90 MMHG

## 2022-01-11 DIAGNOSIS — S76.112A TENDON RUPTURE, TRAUMATIC. QUADRICEPS, LEFT, INITIAL ENCOUNTER: ICD-10-CM

## 2022-01-11 LAB
ALBUMIN SERPL-MCNC: 3.1 G/DL (ref 3.4–5)
ANION GAP SERPL CALCULATED.3IONS-SCNC: 11 MMOL/L (ref 3–16)
BUN BLDV-MCNC: 18 MG/DL (ref 7–20)
CALCIUM SERPL-MCNC: 8.4 MG/DL (ref 8.3–10.6)
CHLORIDE BLD-SCNC: 105 MMOL/L (ref 99–110)
CO2: 24 MMOL/L (ref 21–32)
CREAT SERPL-MCNC: 0.8 MG/DL (ref 0.8–1.3)
GFR AFRICAN AMERICAN: >60
GFR NON-AFRICAN AMERICAN: >60
GLUCOSE BLD-MCNC: 108 MG/DL (ref 70–99)
GLUCOSE BLD-MCNC: 117 MG/DL (ref 70–99)
GLUCOSE BLD-MCNC: 131 MG/DL (ref 70–99)
GLUCOSE BLD-MCNC: 84 MG/DL (ref 70–99)
HCT VFR BLD CALC: 30.5 % (ref 40.5–52.5)
HEMOGLOBIN: 9.6 G/DL (ref 13.5–17.5)
MCH RBC QN AUTO: 23.4 PG (ref 26–34)
MCHC RBC AUTO-ENTMCNC: 31.5 G/DL (ref 31–36)
MCV RBC AUTO: 74.2 FL (ref 80–100)
PDW BLD-RTO: 13.2 % (ref 12.4–15.4)
PERFORMED ON: ABNORMAL
PERFORMED ON: ABNORMAL
PERFORMED ON: NORMAL
PHOSPHORUS: 2.9 MG/DL (ref 2.5–4.9)
PLATELET # BLD: 267 K/UL (ref 135–450)
PMV BLD AUTO: 8.4 FL (ref 5–10.5)
POTASSIUM SERPL-SCNC: 3.7 MMOL/L (ref 3.5–5.1)
RBC # BLD: 4.11 M/UL (ref 4.2–5.9)
SARS-COV-2, NAAT: NOT DETECTED
SODIUM BLD-SCNC: 140 MMOL/L (ref 136–145)
WBC # BLD: 8.2 K/UL (ref 4–11)

## 2022-01-11 PROCEDURE — 6360000002 HC RX W HCPCS: Performed by: PHYSICAL MEDICINE & REHABILITATION

## 2022-01-11 PROCEDURE — 6360000002 HC RX W HCPCS: Performed by: INTERNAL MEDICINE

## 2022-01-11 PROCEDURE — 85027 COMPLETE CBC AUTOMATED: CPT

## 2022-01-11 PROCEDURE — 36415 COLL VENOUS BLD VENIPUNCTURE: CPT

## 2022-01-11 PROCEDURE — 6360000002 HC RX W HCPCS: Performed by: ORTHOPAEDIC SURGERY

## 2022-01-11 PROCEDURE — 97110 THERAPEUTIC EXERCISES: CPT

## 2022-01-11 PROCEDURE — 6370000000 HC RX 637 (ALT 250 FOR IP): Performed by: NURSE PRACTITIONER

## 2022-01-11 PROCEDURE — 6370000000 HC RX 637 (ALT 250 FOR IP): Performed by: ORTHOPAEDIC SURGERY

## 2022-01-11 PROCEDURE — 97535 SELF CARE MNGMENT TRAINING: CPT

## 2022-01-11 PROCEDURE — 2580000003 HC RX 258: Performed by: INTERNAL MEDICINE

## 2022-01-11 PROCEDURE — 80069 RENAL FUNCTION PANEL: CPT

## 2022-01-11 PROCEDURE — 6370000000 HC RX 637 (ALT 250 FOR IP): Performed by: PHYSICAL MEDICINE & REHABILITATION

## 2022-01-11 PROCEDURE — 97530 THERAPEUTIC ACTIVITIES: CPT

## 2022-01-11 PROCEDURE — 1280000000 HC REHAB R&B

## 2022-01-11 PROCEDURE — 97116 GAIT TRAINING THERAPY: CPT

## 2022-01-11 PROCEDURE — 87635 SARS-COV-2 COVID-19 AMP PRB: CPT

## 2022-01-11 RX ORDER — OXYCODONE HYDROCHLORIDE 5 MG/1
5 TABLET ORAL EVERY 6 HOURS PRN
Qty: 28 TABLET | Refills: 0 | Status: ON HOLD | OUTPATIENT
Start: 2022-01-11 | End: 2022-01-11

## 2022-01-11 RX ORDER — OXYCODONE HYDROCHLORIDE 5 MG/1
5 TABLET ORAL EVERY 4 HOURS PRN
Status: DISCONTINUED | OUTPATIENT
Start: 2022-01-11 | End: 2022-01-20 | Stop reason: HOSPADM

## 2022-01-11 RX ORDER — SODIUM CHLORIDE 0.9 % (FLUSH) 0.9 %
5-40 SYRINGE (ML) INJECTION PRN
Status: CANCELLED | OUTPATIENT
Start: 2022-01-11

## 2022-01-11 RX ORDER — METOPROLOL SUCCINATE 25 MG/1
25 TABLET, EXTENDED RELEASE ORAL DAILY
Status: DISCONTINUED | OUTPATIENT
Start: 2022-01-12 | End: 2022-01-20 | Stop reason: HOSPADM

## 2022-01-11 RX ORDER — ACETAMINOPHEN 325 MG/1
650 TABLET ORAL EVERY 4 HOURS PRN
Status: DISCONTINUED | OUTPATIENT
Start: 2022-01-11 | End: 2022-01-11 | Stop reason: SDUPTHER

## 2022-01-11 RX ORDER — ACETAMINOPHEN 500 MG
1000 TABLET ORAL EVERY 6 HOURS PRN
Status: DISCONTINUED | OUTPATIENT
Start: 2022-01-11 | End: 2022-01-20 | Stop reason: HOSPADM

## 2022-01-11 RX ORDER — SODIUM CHLORIDE 0.9 % (FLUSH) 0.9 %
5-40 SYRINGE (ML) INJECTION EVERY 12 HOURS SCHEDULED
Status: DISCONTINUED | OUTPATIENT
Start: 2022-01-11 | End: 2022-01-15

## 2022-01-11 RX ORDER — POLYETHYLENE GLYCOL 3350 17 G/17G
17 POWDER, FOR SOLUTION ORAL DAILY PRN
Status: DISCONTINUED | OUTPATIENT
Start: 2022-01-11 | End: 2022-01-20 | Stop reason: HOSPADM

## 2022-01-11 RX ORDER — GABAPENTIN 300 MG/1
300 CAPSULE ORAL NIGHTLY
Status: CANCELLED | OUTPATIENT
Start: 2022-01-11

## 2022-01-11 RX ORDER — SODIUM CHLORIDE 9 MG/ML
25 INJECTION, SOLUTION INTRAVENOUS PRN
Status: CANCELLED | OUTPATIENT
Start: 2022-01-11

## 2022-01-11 RX ORDER — CYCLOBENZAPRINE HCL 10 MG
5 TABLET ORAL 2 TIMES DAILY
Status: DISCONTINUED | OUTPATIENT
Start: 2022-01-11 | End: 2022-01-20 | Stop reason: HOSPADM

## 2022-01-11 RX ORDER — DEXTROSE MONOHYDRATE 50 MG/ML
100 INJECTION, SOLUTION INTRAVENOUS PRN
Status: DISCONTINUED | OUTPATIENT
Start: 2022-01-11 | End: 2022-01-20 | Stop reason: HOSPADM

## 2022-01-11 RX ORDER — ONDANSETRON 2 MG/ML
4 INJECTION INTRAMUSCULAR; INTRAVENOUS EVERY 6 HOURS PRN
Status: CANCELLED | OUTPATIENT
Start: 2022-01-11

## 2022-01-11 RX ORDER — CYCLOBENZAPRINE HCL 5 MG
5 TABLET ORAL 2 TIMES DAILY
Qty: 14 TABLET | Refills: 0 | Status: ON HOLD | OUTPATIENT
Start: 2022-01-11 | End: 2022-01-11

## 2022-01-11 RX ORDER — DEXTROSE MONOHYDRATE 25 G/50ML
12.5 INJECTION, SOLUTION INTRAVENOUS PRN
Status: CANCELLED | OUTPATIENT
Start: 2022-01-11

## 2022-01-11 RX ORDER — NICOTINE POLACRILEX 4 MG
15 LOZENGE BUCCAL PRN
Status: CANCELLED | OUTPATIENT
Start: 2022-01-11

## 2022-01-11 RX ORDER — METOPROLOL SUCCINATE 25 MG/1
25 TABLET, EXTENDED RELEASE ORAL DAILY
Qty: 30 TABLET | Refills: 0 | Status: ON HOLD | OUTPATIENT
Start: 2022-01-12 | End: 2022-01-11

## 2022-01-11 RX ORDER — SENNA PLUS 8.6 MG/1
1 TABLET ORAL DAILY PRN
Status: DISCONTINUED | OUTPATIENT
Start: 2022-01-11 | End: 2022-01-20 | Stop reason: HOSPADM

## 2022-01-11 RX ORDER — PROMETHAZINE HYDROCHLORIDE 25 MG/1
12.5 TABLET ORAL EVERY 6 HOURS PRN
Status: DISCONTINUED | OUTPATIENT
Start: 2022-01-11 | End: 2022-01-20 | Stop reason: HOSPADM

## 2022-01-11 RX ORDER — ONDANSETRON 2 MG/ML
4 INJECTION INTRAMUSCULAR; INTRAVENOUS EVERY 6 HOURS PRN
Status: DISCONTINUED | OUTPATIENT
Start: 2022-01-11 | End: 2022-01-20 | Stop reason: HOSPADM

## 2022-01-11 RX ORDER — METOPROLOL SUCCINATE 25 MG/1
25 TABLET, EXTENDED RELEASE ORAL DAILY
Status: CANCELLED | OUTPATIENT
Start: 2022-01-11

## 2022-01-11 RX ORDER — DEXTROSE MONOHYDRATE 25 G/50ML
12.5 INJECTION, SOLUTION INTRAVENOUS PRN
Status: DISCONTINUED | OUTPATIENT
Start: 2022-01-11 | End: 2022-01-20 | Stop reason: HOSPADM

## 2022-01-11 RX ORDER — LISINOPRIL 10 MG/1
10 TABLET ORAL NIGHTLY
Status: CANCELLED | OUTPATIENT
Start: 2022-01-11

## 2022-01-11 RX ORDER — CYCLOBENZAPRINE HCL 10 MG
5 TABLET ORAL 2 TIMES DAILY
Status: CANCELLED | OUTPATIENT
Start: 2022-01-11

## 2022-01-11 RX ORDER — DEXTROSE MONOHYDRATE 50 MG/ML
100 INJECTION, SOLUTION INTRAVENOUS PRN
Status: CANCELLED | OUTPATIENT
Start: 2022-01-11

## 2022-01-11 RX ORDER — OXYCODONE HYDROCHLORIDE 5 MG/1
10 TABLET ORAL EVERY 4 HOURS PRN
Status: DISCONTINUED | OUTPATIENT
Start: 2022-01-11 | End: 2022-01-20 | Stop reason: HOSPADM

## 2022-01-11 RX ORDER — POLYETHYLENE GLYCOL 3350 17 G/17G
17 POWDER, FOR SOLUTION ORAL DAILY
Status: CANCELLED | OUTPATIENT
Start: 2022-01-11

## 2022-01-11 RX ORDER — OXYCODONE HYDROCHLORIDE 5 MG/1
5 TABLET ORAL EVERY 4 HOURS PRN
Status: CANCELLED | OUTPATIENT
Start: 2022-01-11

## 2022-01-11 RX ORDER — ONDANSETRON 4 MG/1
8 TABLET, ORALLY DISINTEGRATING ORAL EVERY 8 HOURS PRN
Status: DISCONTINUED | OUTPATIENT
Start: 2022-01-11 | End: 2022-01-20 | Stop reason: HOSPADM

## 2022-01-11 RX ORDER — TRAZODONE HYDROCHLORIDE 50 MG/1
50 TABLET ORAL NIGHTLY PRN
Status: DISCONTINUED | OUTPATIENT
Start: 2022-01-11 | End: 2022-01-20 | Stop reason: HOSPADM

## 2022-01-11 RX ORDER — HYDRALAZINE HYDROCHLORIDE 25 MG/1
50 TABLET, FILM COATED ORAL EVERY 6 HOURS PRN
Status: DISCONTINUED | OUTPATIENT
Start: 2022-01-11 | End: 2022-01-20 | Stop reason: HOSPADM

## 2022-01-11 RX ORDER — NICOTINE POLACRILEX 4 MG
15 LOZENGE BUCCAL PRN
Status: DISCONTINUED | OUTPATIENT
Start: 2022-01-11 | End: 2022-01-20 | Stop reason: HOSPADM

## 2022-01-11 RX ORDER — ASCORBIC ACID 500 MG
250 TABLET ORAL DAILY
Status: CANCELLED | OUTPATIENT
Start: 2022-01-11

## 2022-01-11 RX ORDER — ONDANSETRON 4 MG/1
8 TABLET, ORALLY DISINTEGRATING ORAL EVERY 8 HOURS PRN
Status: CANCELLED | OUTPATIENT
Start: 2022-01-11

## 2022-01-11 RX ORDER — OXYCODONE HYDROCHLORIDE 5 MG/1
10 TABLET ORAL EVERY 4 HOURS PRN
Status: CANCELLED | OUTPATIENT
Start: 2022-01-11

## 2022-01-11 RX ORDER — LISINOPRIL 10 MG/1
10 TABLET ORAL NIGHTLY
Status: DISCONTINUED | OUTPATIENT
Start: 2022-01-11 | End: 2022-01-20 | Stop reason: HOSPADM

## 2022-01-11 RX ORDER — TRAZODONE HYDROCHLORIDE 50 MG/1
50 TABLET ORAL NIGHTLY PRN
Status: CANCELLED | OUTPATIENT
Start: 2022-01-11

## 2022-01-11 RX ORDER — ACETAMINOPHEN 500 MG
1000 TABLET ORAL EVERY 6 HOURS PRN
Status: CANCELLED | OUTPATIENT
Start: 2022-01-11

## 2022-01-11 RX ORDER — PROMETHAZINE HYDROCHLORIDE 25 MG/1
12.5 TABLET ORAL EVERY 6 HOURS PRN
Status: CANCELLED | OUTPATIENT
Start: 2022-01-11

## 2022-01-11 RX ORDER — POLYETHYLENE GLYCOL 3350 17 G/17G
17 POWDER, FOR SOLUTION ORAL DAILY
Status: DISCONTINUED | OUTPATIENT
Start: 2022-01-12 | End: 2022-01-20 | Stop reason: HOSPADM

## 2022-01-11 RX ORDER — ASCORBIC ACID 500 MG
250 TABLET ORAL DAILY
Status: DISCONTINUED | OUTPATIENT
Start: 2022-01-11 | End: 2022-01-20 | Stop reason: HOSPADM

## 2022-01-11 RX ORDER — M-VIT,TX,IRON,MINS/CALC/FOLIC 27MG-0.4MG
1 TABLET ORAL DAILY
Status: CANCELLED | OUTPATIENT
Start: 2022-01-11

## 2022-01-11 RX ORDER — SODIUM CHLORIDE 0.9 % (FLUSH) 0.9 %
5-40 SYRINGE (ML) INJECTION EVERY 12 HOURS SCHEDULED
Status: CANCELLED | OUTPATIENT
Start: 2022-01-11

## 2022-01-11 RX ORDER — POLYETHYLENE GLYCOL 3350 17 G/17G
17 POWDER, FOR SOLUTION ORAL DAILY PRN
Status: CANCELLED | OUTPATIENT
Start: 2022-01-11

## 2022-01-11 RX ORDER — HYDRALAZINE HYDROCHLORIDE 50 MG/1
50 TABLET, FILM COATED ORAL EVERY 6 HOURS PRN
Status: CANCELLED | OUTPATIENT
Start: 2022-01-11

## 2022-01-11 RX ORDER — GABAPENTIN 300 MG/1
300 CAPSULE ORAL NIGHTLY
Status: DISCONTINUED | OUTPATIENT
Start: 2022-01-11 | End: 2022-01-20 | Stop reason: HOSPADM

## 2022-01-11 RX ORDER — ACETAMINOPHEN 325 MG/1
650 TABLET ORAL EVERY 4 HOURS PRN
Status: CANCELLED | OUTPATIENT
Start: 2022-01-11

## 2022-01-11 RX ORDER — SODIUM CHLORIDE 9 MG/ML
25 INJECTION, SOLUTION INTRAVENOUS PRN
Status: DISCONTINUED | OUTPATIENT
Start: 2022-01-11 | End: 2022-01-20 | Stop reason: HOSPADM

## 2022-01-11 RX ORDER — SODIUM CHLORIDE 0.9 % (FLUSH) 0.9 %
5-40 SYRINGE (ML) INJECTION PRN
Status: DISCONTINUED | OUTPATIENT
Start: 2022-01-11 | End: 2022-01-20 | Stop reason: HOSPADM

## 2022-01-11 RX ORDER — SENNA PLUS 8.6 MG/1
1 TABLET ORAL DAILY PRN
Status: CANCELLED | OUTPATIENT
Start: 2022-01-11

## 2022-01-11 RX ORDER — M-VIT,TX,IRON,MINS/CALC/FOLIC 27MG-0.4MG
1 TABLET ORAL DAILY
Status: DISCONTINUED | OUTPATIENT
Start: 2022-01-12 | End: 2022-01-20 | Stop reason: HOSPADM

## 2022-01-11 RX ADMIN — GABAPENTIN 300 MG: 300 CAPSULE ORAL at 20:37

## 2022-01-11 RX ADMIN — LISINOPRIL 10 MG: 10 TABLET ORAL at 20:37

## 2022-01-11 RX ADMIN — CEFTRIAXONE SODIUM 1000 MG: 1 INJECTION, POWDER, FOR SOLUTION INTRAMUSCULAR; INTRAVENOUS at 11:46

## 2022-01-11 RX ADMIN — MULTIPLE VITAMINS W/ MINERALS TAB 1 TABLET: TAB at 07:58

## 2022-01-11 RX ADMIN — ENOXAPARIN SODIUM 30 MG: 100 INJECTION SUBCUTANEOUS at 20:37

## 2022-01-11 RX ADMIN — POLYETHYLENE GLYCOL 3350 17 G: 17 POWDER, FOR SOLUTION ORAL at 07:58

## 2022-01-11 RX ADMIN — OXYCODONE HYDROCHLORIDE AND ACETAMINOPHEN 250 MG: 500 TABLET ORAL at 07:58

## 2022-01-11 RX ADMIN — METOPROLOL SUCCINATE 25 MG: 25 TABLET, EXTENDED RELEASE ORAL at 07:58

## 2022-01-11 RX ADMIN — OXYCODONE HYDROCHLORIDE AND ACETAMINOPHEN 250 MG: 500 TABLET ORAL at 20:37

## 2022-01-11 RX ADMIN — CYCLOBENZAPRINE 5 MG: 10 TABLET, FILM COATED ORAL at 20:37

## 2022-01-11 RX ADMIN — CYCLOBENZAPRINE 5 MG: 10 TABLET, FILM COATED ORAL at 07:58

## 2022-01-11 RX ADMIN — ENOXAPARIN SODIUM 40 MG: 40 INJECTION SUBCUTANEOUS at 07:58

## 2022-01-11 ASSESSMENT — PAIN SCALES - GENERAL
PAINLEVEL_OUTOF10: 0

## 2022-01-11 NOTE — CARE COORDINATION
Patient can admit to ARU between 2-3pm today. Notified Inez MCNEILL via VM. Will need a rapid test prior to admit. Pt has online classed until 3:45. Will admit at 6pm today instead. D/w Inez    Thank you.    Kallie Lunsford M.A, [facility]-SLP/ CBIS   Clinical Liason

## 2022-01-11 NOTE — PROGRESS NOTES
Enoxaparin Guidelines    Recent Labs     01/10/22  0641 01/11/22  0606   CREATININE 0.8 0.8     Recent Labs     01/11/22  0606   HGB 9.6*   HCT 30.5*        Estimated Creatinine Clearance: 119 mL/min (based on SCr of 0.8 mg/dL). TABLE 1. ENOXAPARIN ROUTINE PROPHYLAXIS DOSING (Medically ill, routine surgery)   Patient Weight (kg)     50.9 and below 51 - 100.9 101 - 150.9 151 - 174.9 175 or greater         Estimated CrCl  (ml/min) 30 or greater   30 mg SUBQ daily   40 mg SUBQ daily 30 mg SUBQ BID  40 mg SUBQ BID 60mg SUBQ BID      15-29 UFH 5000 units SUBQ BID   30 mg SUBQ daily 30 mg SUBQ daily 40 mg SUBQ daily   60 mg SUBQ daily      Less than 15 or Dialysis UFH 5000 units SUBQ BID   UFH 5000 units SUBQ TID UFH 7500 units SUBQ TID     Dose increased from 40 mg daily to 30 mg twice daily based on wt = 124.5 kg and crcl >30 ml/min.     Karin Harrell [facility] 1/11/2022 6:53 PM

## 2022-01-11 NOTE — CARE COORDINATION
CASE MANAGEMENT DISCHARGE SUMMARY      Discharge to: [facility] at 1800 today. Precertification completed: [facility] Exemption Notification (HENS) completed: N/A    IMM given: 1/10/22     New Durable Medical Equipment ordered/agency: None      Confirmed discharge plan with: Met with pt at bedside and he will update spouse     Patient: yes     Facility/Agency, name:  [facility] is aware. RN, name: Alvin Shell    Note: Discharging nurse to complete RAVI, reconcile AVS, and place final copy with patient's discharge packet. RN to ensure that written prescriptions for  Level II medications are sent with patient to the facility as per protocol.

## 2022-01-11 NOTE — PROGRESS NOTES
Occupational Therapy  Facility/Department: [facility] C5 - MED SURG/ORTHO  Daily Treatment Note  NAME: Eliazar Cardenas  : 1955  MRN: [id number]    Date of Service: 2022    Discharge Recommendations:  IP Rehab     Assessment   Performance deficits / Impairments: Decreased functional mobility ; Decreased ADL status; Decreased balance  Assessment: Pt demos good tolerance of OT treatment, pleasant and cooperative. Pt requires CGA of 1-2 for transfer training from EOB with use of bedrail. Primary focus of treatment transfer training. Pt max A to doff pants that were placed over KI, and to adjust KI to proper prosition. Co-tx collaboration this date with PT staff to safely progress pt toward goals. Pt will have better occupational performance outcomes within a co-treatment than 1:1 session. Pt functionoing below his baseline and would benefit from continued skilled OT in IPR setting at d/c. Prognosis: Good  OT Education: OT Role;Plan of Care;Transfer Training;Precautions; ADL Adaptive Strategies; Equipment  Disease Specific Education: Pt educated on weight bearing status, post-op precautions, appropriate DME, and safe mobility with AD. Pt verbalized understanding. Barriers to Learning: none perceived  REQUIRES OT FOLLOW UP: Yes  Activity Tolerance  Activity Tolerance: Patient Tolerated treatment well  Activity Tolerance: Vitals: BP= 130/90, HR= 107, SPO2= 98%  Safety Devices  Safety Devices in place: Yes  Type of devices: Left in chair;Chair alarm in place;Call light within reach;Nurse notified;Gait belt         Patient Diagnosis(es): The primary encounter diagnosis was Tendon rupture, traumatic. quadriceps, left, initial encounter. A diagnosis of Traumatic rupture of quadriceps tendon, right, initial encounter was also pertinent to this visit. has a past medical history of 01 minute Apgar score 0, Hypertension, Kidney stone, Mitral valve prolapse, Sarcoidosis, and Spinal stenosis.    has a past surgical history that includes back surgery (1992); Colonoscopy (5/5/2015); and Leg Muscle Surgery (Bilateral, 1/5/2022). Restrictions  Restrictions/Precautions  Restrictions/Precautions: Weight Bearing,Fall Risk  Required Braces or Orthoses?: Yes  Lower Extremity Weight Bearing Restrictions  Right Lower Extremity Weight Bearing: Weight Bearing As Tolerated  Left Lower Extremity Weight Bearing: Weight Bearing As Tolerated  Required Braces or Orthoses  Right Lower Extremity Brace: Knee Immobilizer  Left Lower Extremity Brace: Knee Immobilizer  Position Activity Restriction  Other position/activity restrictions: No B knee ROM. Bilateral legs while in knee immobilizers. Ensure that immobilizers are appropriately positioned, holding knees in full extension     Subjective   General  Chart Reviewed: Maribel Mis and Physical,Progress Notes  Patient assessed for rehabilitation services?: Yes  Response to previous treatment: Patient with no complaints from previous session  Family / Caregiver Present: No  Referring Practitioner: Kyle Leon  Diagnosis: B quad tendon rupture s/p B quad tendon repair 1/5/22    Subjective  Subjective: Pt resting in bed at approach, agreeable to OT treatment.     Vital Signs  Patient Currently in Pain: Denies     Orientation  Orientation  Overall Orientation Status: Within Functional Limits     Objective    ADL  LE Dressing: Dependent/Total (devyn KI, max A pants)     Balance  Sitting Balance: Supervision  Standing Balance: Contact guard assistance (SW)  Standing Balance  Activity: functional mobility, transfer training    Functional Mobility  Functional - Mobility Device: Standard Walker  Activity: Other  Assist Level: Contact guard assistance    Bed mobility  Supine to Sit: Stand by assistance (to L with HOB elevated and bedrails removed, increased time)     Transfers  Sit to stand: Contact guard assistance  Stand to sit: Contact guard assistance  Transfer Comments: VCs for technique and positioning with use of bedrail for transfer training     Cognition  Overall Cognitive Status: WNL     Plan   Plan  Times per week: 4-6x  Current Treatment Recommendations: Endurance Training,Balance Training,Functional Mobility Training,Safety Education & Training,Self-Care / ADL,Patient/Caregiver Education & Training    AM-PAC Score  AM-PAC Inpatient Daily Activity Raw Score: 14 (01/11/22 1054)  AM-PAC Inpatient ADL T-Scale Score : 33.39 (01/11/22 1054)  ADL Inpatient CMS 0-100% Score: 59.67 (01/11/22 1054)  ADL Inpatient CMS G-Code Modifier : CK (01/11/22 1054)    Goals  Short term goals  Time Frame for Short term goals: 1 week (1/13) unless noted  Short term goal 1: Perform functional transfers with min x1 and AD-- ongoing, met with use of bedrail 1/11/22  Short term goal 2: Perform LE ADLs (ie toileting, dressing) with mod A with adapt equip--ongoing 1/11/22  Short term goal 3: SBA for bathroom mobility with SW --ongoing 1/11/22  Patient Goals   Patient goals :  \"Be able to walk\"       Therapy Time   Individual Concurrent Group Co-treatment   Time In 1000         Time Out 1039         Minutes [address] MorganA/L

## 2022-01-11 NOTE — PROGRESS NOTES
Physical Therapy  Facility/Department: [facility] C5 - MED SURG/ORTHO  Daily Treatment Note  NAME: Rocio Byers  : 1955  MRN: [id number]    Date of Service: 2022    Discharge Recommendations:  IP Rehab   PT Equipment Recommendations  Equipment Needed: No  Other: defer to next level of care    Assessment   Body structures, Functions, Activity limitations: Decreased functional mobility ; Decreased ROM; Decreased endurance;Decreased balance; Increased pain  Assessment: Pt seen with OT secondary to complexity of medical condition, decreased activity tolerance and URIEL with mobility with B KI. Pt is able to complete STS from bed to SW with CGA  and participate in gait training with CGA. Pt is limited by decreased activity tolerance and B KI. Pt has stairs for home entrance and to bed and bathroom. Pt will benefit from continued skilled PT in acute care setting to address above deficits, will continue to progress mobility as tolerated  Treatment Diagnosis: Decreased independence with functional mobility  Prognosis: Good  Decision Making: Medium Complexity  PT Education: Goals; General Safety; Disease Specific Education;PT Role;Plan of Care;Precautions;Transfer Training;Weight-bearing Education;Equipment;Gait Training;Functional Mobility Training; Injury Prevention;Pressure Relief;Home Exercise Program  Patient Education: Pt educated on safety with mobility. Pt verbalized understanding  Barriers to Learning: none  REQUIRES PT FOLLOW UP: Yes  Activity Tolerance  Activity Tolerance: Patient Tolerated treatment well  Activity Tolerance: BP= 130/90, HR= 107, SPO2= 98%     Patient Diagnosis(es): The primary encounter diagnosis was Tendon rupture, traumatic. quadriceps, left, initial encounter. A diagnosis of Traumatic rupture of quadriceps tendon, right, initial encounter was also pertinent to this visit.      has a past medical history of 01 minute Apgar score 0, Hypertension, Kidney stone, Mitral valve prolapse, Sarcoidosis, and Spinal stenosis. has a past surgical history that includes back surgery (1992); Colonoscopy (5/5/2015); and Leg Muscle Surgery (Bilateral, 1/5/2022). Restrictions  Restrictions/Precautions  Restrictions/Precautions: Weight Bearing,Fall Risk  Required Braces or Orthoses?: Yes  Lower Extremity Weight Bearing Restrictions  Right Lower Extremity Weight Bearing: Weight Bearing As Tolerated  Left Lower Extremity Weight Bearing: Weight Bearing As Tolerated  Required Braces or Orthoses  Right Lower Extremity Brace: Knee Immobilizer  Left Lower Extremity Brace: Knee Immobilizer  Position Activity Restriction  Other position/activity restrictions: No B knee ROM. Bilateral legs while in knee immobilizers. Ensure that immobilizers are appropriately positioned, holding knees in full extension  Subjective   General  Chart Reviewed: Yes  Response To Previous Treatment: Patient with no complaints from previous session.   Family / Caregiver Present: No  Referring Practitioner: Estela Red MD  Subjective  Subjective: Pt supine in bed on approach, pleasant and agreeable to PT tx  General Comment  Comments: RN cleared pt for session  Pain Screening  Patient Currently in Pain: Denies  Vital Signs  Patient Currently in Pain: Denies       Orientation  Orientation  Overall Orientation Status: Within Normal Limits  Cognition      Objective   Bed mobility  Supine to Sit: Stand by assistance (to L with HOB elevated and bedrails removed, increased time)  Transfers  Sit to Stand: Contact guard assistance  Stand to sit: Contact guard assistance  Comment: VCs for technique and positioning with use of bedrail for transfer training  Ambulation  Ambulation?: Yes  WB Status: WBAT with B KI  Ambulation 1  Surface: level tile  Device: Standard Walker  Assistance: Contact guard assistance  Quality of Gait: Slow partial step through pattern, B decreased toe clearance with B circumduction d/t B KI, B decreased heel strike, forward flexed trunk, increased UE support. Pt steady, no overt LOB  Gait Deviations: Slow Andria;Decreased step length;Decreased step height  Distance: 50'  Comments: Distances limited by fatigue     Balance  Comments: Standing Balance: Contact guard assistance (SW)  Standing Balance  Exercises  Quad Sets: 12 B  Gluteal Sets: 12  Ankle Pumps: 1 x 15 bilateral         Comment: focus functional mobility, transfer training. BKI don/doff instructions         AM-PAC Score  AM-PAC Inpatient Mobility Raw Score : 16 (01/11/22 1136)  AM-PAC Inpatient T-Scale Score : 40.78 (01/11/22 1136)  Mobility Inpatient CMS 0-100% Score: 54.16 (01/11/22 1136)  Mobility Inpatient CMS G-Code Modifier : CK (01/11/22 1136)          Goals  Short term goals  Time Frame for Short term goals: 1/12  Short term goal 1: Pt will complete bed mobility wtih Min A. 1/09: modA to SBA   -1/10 min A -1/11 SBA  Short term goal 2: Pt will sit to stand with min A.  -- 1/10: modA x 2 to RW  -1/11 cga  Short term goal 3: Pt will ambulate x 15' with SW with CGA.  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  -1/10 on-going    Plan    Plan  Times per week: 7x/week  Times per day: Daily  Plan weeks: 1 week 1/13/22  Specific instructions for Next Treatment: progress mobility as tolerated  Current Treatment Recommendations: Strengthening,Balance Training,Endurance Training,Functional Mobility Training,Transfer Training,Gait Training,Positioning,Equipment Evaluation, Education, & procurement,Patient/Caregiver Education & Training,Safety Education & Training,Home Exercise Program,Wheelchair Mobility Training  Safety Devices  Type of devices:  All fall risk precautions in place,Call light within reach,Gait belt,Patient at risk for falls,Nurse notified,Left in chair,Chair alarm in place  Restraints  Initially in place: No     Therapy Time   Individual Concurrent Group Co-treatment   Time In 1000         Time Out 1039         Minutes 39         Timed Code Treatment Minutes: [address] LOPEZ

## 2022-01-11 NOTE — CONSULTS
Patient: Mati Tellez  [id number]  Date: 2022      Chief Complaint: fall    History of Present Illness/Hospital Course:  Mr. Alba Rangel is a 78year old male admitted 2022 after a mechanical fall with subsequent inability to ambulate. Evaluation revealed bilateral quadriceps tendon ruptures. On  he underwent operative repair by Dr. Aury Bingham. No ROM, but able to WBAT. Patient agreeable to ARU. has a past medical history of 01 minute Apgar score 0, Hypertension, Kidney stone, Mitral valve prolapse, Sarcoidosis, and Spinal stenosis. has a past surgical history that includes back surgery (); Colonoscopy (2015); and Leg Muscle Surgery (Bilateral, 2022). reports that he has never smoked. He has never used smokeless tobacco. He reports current alcohol use. He reports that he does not use drugs. family history includes High Blood Pressure in his mother; Stroke in his father. REVIEW OF SYSTEMS:   CONSTITUTIONAL: negative for fevers, chills, diaphoresis, activity change, appetite change, fatigue, night sweats and unexpected weight change.    EYES: negative for blurred vision, eye discharge, visual disturbance and icterus  HEENT: negative for hearing loss, tinnitus, ear drainage, sinus pressure, nasal congestion, epistaxis and snoring  RESPIRATORY: Negative for hemoptysis, cough, sputum production  CARDIOVASCULAR: negative for chest pain, palpitations, exertional chest pressure/discomfort, edema, syncope  GASTROINTESTINAL: negative for nausea, vomiting, diarrhea, constipation, blood in stool and abdominal pain  GENITOURINARY: negative for frequency, dysuria, urinary incontinence, decreased urine volume, and hematuria  HEMATOLOGIC/LYMPHATIC: negative for easy bruising, bleeding and lymphadenopathy  ALLERGIC/IMMUNOLOGIC: negative for recurrent infections, angioedema, anaphylaxis and drug reactions  ENDOCRINE: negative for weight changes and diabetic symptoms including polyuria, polydipsia and polyphagia  MUSCULOSKELETAL: negative for pain, joint swelling, decreased range of motion and muscle weakness  NEUROLOGICAL: negative for headaches, slurred speech, unilateral weakness  PSYCHIATRIC/BEHAVIORAL: negative for hallucinations, behavioral problems, confusion and agitation. Physical Examination:  Vitals: Patient Vitals for the past 24 hrs:   BP Temp Temp src Pulse Resp SpO2   01/11/22 0756 (!) 130/90 -- -- 107 -- 98 %   01/10/22 2345 (!) 150/87 98.5 °F (36.9 °C) Oral 99 16 95 %   01/10/22 2011 (!) 173/100 98.5 °F (36.9 °C) Oral 94 16 94 %   01/10/22 1438 129/88 98 °F (36.7 °C) Oral 87 16 94 %   01/10/22 0832 (!) 144/73 98.6 °F (37 °C) Oral (!) 15 18 98 %     Mood: Stable  Const: No distress  ENT: Oral mucosa moist  Eyes: No discharge or injection  CV: Regular rate and rhythm, no murmur rub or gallop noted  Resp: Lungs clear to auscultation bilaterally, no rales wheezes or ronchi  GI: Soft, nontender, nondistended. Normal bowel sounds. Neuro: Alert, oriented, appropriate. No cranial nerve deficits appreciated. Sensation intact to light touch. Motor examination reveals normal strength in all four limbs diffusely  Skin: No lesions or rashes noted. MSK: No joint abnormalities noted. Lab Results   Component Value Date    WBC 8.2 01/11/2022    HGB 9.6 (L) 01/11/2022    HCT 30.5 (L) 01/11/2022    MCV 74.2 (L) 01/11/2022     01/11/2022     Lab Results   Component Value Date    INR 1.08 01/04/2022    INR 1.04 04/14/2020    PROTIME 12.2 01/04/2022    PROTIME 12.1 04/14/2020     Lab Results   Component Value Date    CREATININE 0.8 01/11/2022    BUN 18 01/11/2022     01/11/2022    K 3.7 01/11/2022     01/11/2022    CO2 24 01/11/2022     Lab Results   Component Value Date    ALT 14 01/05/2022    AST 26 01/05/2022    ALKPHOS 64 01/05/2022    BILITOT 1.1 (H) 01/05/2022     Impression   1.  Complete rupture of the bilateral distal quadriceps tendons with   associated muscular strains and surrounding soft tissue edema and blood   products.  Larger hematoma is present on the right at the level of the distal   quadriceps rupture which measures approximately 3.0 x 5.1 x 8.8 cm.   2. No acute fracture identified. 3. 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 Assessment:  1. Bilat quadriceps tendon rupture s/p repair   2. HTN   3. Spinal stenosis      Recommendations:    Patient with new functional deficits and ongoing medical complexity. Demonstrates ability to tolerate 3 hours therapy/day. He is a good candidate for acute inpatient rehab when medically appropriate. Thank you for this consult. Please contact me with any questions or concerns. Latrell Penaloza MD 1/11/2022, 8:12 AM

## 2022-01-11 NOTE — PROGRESS NOTES
Hospitalist Progress Note      PCP: Cozette Cogan, MD    Date of Admission: 1/4/2022    Chief Complaint: Knee Pain    Subjective: no new c/o. Medications:  Reviewed    Infusion Medications    dextrose      sodium chloride 25 mL (01/10/22 1149)    sodium chloride       Scheduled Medications    cefTRIAXone (ROCEPHIN) IV  1,000 mg IntraVENous Q24H    metoprolol succinate  25 mg Oral Daily    polyethylene glycol  17 g Oral Daily    gabapentin  300 mg Oral Nightly    lisinopril  10 mg Oral Nightly    insulin lispro  0-12 Units SubCUTAneous TID WC    insulin lispro  0-6 Units SubCUTAneous Nightly    sodium chloride flush  5-40 mL IntraVENous 2 times per day    enoxaparin  40 mg SubCUTAneous Daily    vitamin C  250 mg Oral Daily    therapeutic multivitamin-minerals  1 tablet Oral Daily    cyclobenzaprine  5 mg Oral BID     PRN Meds: glucose, dextrose, glucagon (rDNA), dextrose, sodium chloride flush, sodium chloride, oxyCODONE **OR** oxyCODONE, morphine **OR** morphine, bisacodyl, magnesium hydroxide, labetalol, acetaminophen, sodium chloride, promethazine **OR** ondansetron, senna, acetaminophen **OR** acetaminophen      Intake/Output Summary (Last 24 hours) at 1/11/2022 0814  Last data filed at 1/10/2022 2019  Gross per 24 hour   Intake --   Output 400 ml   Net -400 ml       Physical Exam Performed:    BP (!) 130/90   Pulse 107   Temp 98.5 °F (36.9 °C) (Oral)   Resp 16   Ht 5' 10\" (1.778 m)   Wt 274 lb 6.4 oz (124.5 kg)   SpO2 98%   BMI 39.37 kg/m²     General appearance: No apparent distress, appears stated age and cooperative. HEENT: Pupils equal, round, and reactive to light. Conjunctivae/corneas clear. Neck: Supple, with full range of motion. No jugular venous distention. Trachea midline. Respiratory:  Normal respiratory effort. Clear to auscultation, bilaterally without Rales/Wheezes/Rhonchi.   Cardiovascular: Regular rate and rhythm with normal S1/S2 without murmurs, rubs or gallops. Abdomen: Soft, non-tender, non-distended with normal bowel sounds. Musculoskeletal: No clubbing, cyanosis or edema bilaterally. Full range of motion without deformity. Skin: Skin color, texture, turgor normal.  No rashes or lesions. Neurologic:  Neurovascularly intact without any focal sensory/motor deficits. Cranial nerves: II-XII intact, grossly non-focal.  Psychiatric: Alert and oriented, thought content appropriate, normal insight  Capillary Refill: Brisk,< 3 seconds   Peripheral Pulses: +2 palpable, equal bilaterally       Labs:   Recent Labs     01/09/22  1136 01/10/22  0641 01/11/22  0606   WBC 8.4 7.2 8.2   HGB 9.8* 9.7* 9.6*   HCT 30.3* 30.5* 30.5*    214 267     Recent Labs     01/09/22  1136 01/10/22  0641 01/11/22  0606    135* 140   K 3.3* 3.6 3.7    105 105   CO2 25 24 24   BUN 19 17 18   CREATININE 0.8 0.8 0.8   CALCIUM 8.6 8.3 8.4   PHOS  --   --  2.9     No results for input(s): AST, ALT, BILIDIR, BILITOT, ALKPHOS in the last 72 hours. No results for input(s): INR in the last 72 hours.   Recent Labs     01/10/22  0641   CKTOTAL 934*       Urinalysis:      Lab Results   Component Value Date    NITRU Negative 01/05/2022    WBCUA 3-5 06/28/2017    BACTERIA 1+ 06/28/2017    RBCUA  06/28/2017    BLOODU Negative 01/05/2022    SPECGRAV 1.015 01/05/2022    GLUCOSEU Negative 01/05/2022       Consults:    IP CONSULT TO ORTHOPEDIC SURGERY  IP CONSULT TO HOSPITALIST  IP CONSULT TO ORTHOPEDIC SURGERY  IP CONSULT TO SOCIAL WORK  IP CONSULT TO PHYSICAL MEDICINE REHAB      Assessment/Plan:    Active Hospital Problems    Diagnosis     Pre-diabetes [R73.03]     Quadriceps tendon rupture, left, initial encounter [S76.112A]     Quadriceps tendon rupture, right, initial encounter [S76.111A]     HTN (hypertension) [I10]     Rupture of distal quadriceps tendon [S76.119A]          BLE distal quadricep tendon rupture - ortho consulted and appreciated s/p OR repair 5 Jan w/out complications. Continue post-op mgt/rehab plan per Ortho. PO/IV Narcotic analgesia PRN as ordered. PNA - possible CAP w/ Strep Pneumonia. CXR w/ RLL ASD . Started on Rocephin/Azithro on or before 7 Jan w/ Azithro completed, now on Rocephin as a single agent - changed to DAILY dosing. No further ABX at discharge     HTN - w/out known CAD and no evidence of active signs/sxs of ischemia/failure. Currently controlled on home meds w/ vitals reviewed and documented as above.     Prediabetes - w/ HbA1c 5.9% this admission. Follow FSBS/SSI medium regimen. Obesity -  With Body mass index is 39.37 kg/m². Complicating assessment and treatment. Placing patient at risk for multiple co-morbidities as well as early death and contributing to the patient's presentation. Counseled on weight loss. DVT Prophylaxis: LMWH     Recent Labs     01/09/22  1136 01/10/22  0641 01/11/22  0606    214 267     Diet: ADULT DIET; Regular  Code Status: Full Code      PT/OT Eval Status: seen w/ recs for inpatient rehab    Dispo - Patient is likely to remain in-house at least until Tues/Wed 11/12 Jan pending clinical course, subspecialty recs and placement decision.        Judith Joy MD

## 2022-01-12 LAB
A/G RATIO: 0.9 (ref 1.1–2.2)
ALBUMIN SERPL-MCNC: 3.1 G/DL (ref 3.4–5)
ALP BLD-CCNC: 69 U/L (ref 40–129)
ALT SERPL-CCNC: 61 U/L (ref 10–40)
ANION GAP SERPL CALCULATED.3IONS-SCNC: 11 MMOL/L (ref 3–16)
AST SERPL-CCNC: 61 U/L (ref 15–37)
BILIRUB SERPL-MCNC: 1.2 MG/DL (ref 0–1)
BUN BLDV-MCNC: 17 MG/DL (ref 7–20)
CALCIUM SERPL-MCNC: 8.4 MG/DL (ref 8.3–10.6)
CHLORIDE BLD-SCNC: 102 MMOL/L (ref 99–110)
CO2: 24 MMOL/L (ref 21–32)
CREAT SERPL-MCNC: 0.8 MG/DL (ref 0.8–1.3)
GFR AFRICAN AMERICAN: >60
GFR NON-AFRICAN AMERICAN: >60
GLUCOSE BLD-MCNC: 103 MG/DL (ref 70–99)
GLUCOSE BLD-MCNC: 106 MG/DL (ref 70–99)
GLUCOSE BLD-MCNC: 111 MG/DL (ref 70–99)
GLUCOSE BLD-MCNC: 115 MG/DL (ref 70–99)
GLUCOSE BLD-MCNC: 99 MG/DL (ref 70–99)
HCT VFR BLD CALC: 31.3 % (ref 40.5–52.5)
HEMOGLOBIN: 9.9 G/DL (ref 13.5–17.5)
MCH RBC QN AUTO: 23.6 PG (ref 26–34)
MCHC RBC AUTO-ENTMCNC: 31.6 G/DL (ref 31–36)
MCV RBC AUTO: 74.8 FL (ref 80–100)
PDW BLD-RTO: 13.2 % (ref 12.4–15.4)
PERFORMED ON: ABNORMAL
PERFORMED ON: NORMAL
PLATELET # BLD: 310 K/UL (ref 135–450)
PMV BLD AUTO: 7.6 FL (ref 5–10.5)
POTASSIUM REFLEX MAGNESIUM: 3.7 MMOL/L (ref 3.5–5.1)
RBC # BLD: 4.19 M/UL (ref 4.2–5.9)
SODIUM BLD-SCNC: 137 MMOL/L (ref 136–145)
TOTAL PROTEIN: 6.5 G/DL (ref 6.4–8.2)
WBC # BLD: 7.3 K/UL (ref 4–11)

## 2022-01-12 PROCEDURE — 97116 GAIT TRAINING THERAPY: CPT

## 2022-01-12 PROCEDURE — 80053 COMPREHEN METABOLIC PANEL: CPT

## 2022-01-12 PROCEDURE — 6370000000 HC RX 637 (ALT 250 FOR IP): Performed by: PHYSICAL MEDICINE & REHABILITATION

## 2022-01-12 PROCEDURE — 85027 COMPLETE CBC AUTOMATED: CPT

## 2022-01-12 PROCEDURE — 36415 COLL VENOUS BLD VENIPUNCTURE: CPT

## 2022-01-12 PROCEDURE — 97162 PT EVAL MOD COMPLEX 30 MIN: CPT

## 2022-01-12 PROCEDURE — 6360000002 HC RX W HCPCS: Performed by: PHYSICAL MEDICINE & REHABILITATION

## 2022-01-12 PROCEDURE — 97530 THERAPEUTIC ACTIVITIES: CPT

## 2022-01-12 PROCEDURE — 97535 SELF CARE MNGMENT TRAINING: CPT

## 2022-01-12 PROCEDURE — 97166 OT EVAL MOD COMPLEX 45 MIN: CPT

## 2022-01-12 PROCEDURE — 1280000000 HC REHAB R&B

## 2022-01-12 PROCEDURE — 97110 THERAPEUTIC EXERCISES: CPT

## 2022-01-12 RX ADMIN — LISINOPRIL 10 MG: 10 TABLET ORAL at 22:37

## 2022-01-12 RX ADMIN — METOPROLOL SUCCINATE 25 MG: 25 TABLET, FILM COATED, EXTENDED RELEASE ORAL at 07:31

## 2022-01-12 RX ADMIN — POLYETHYLENE GLYCOL 3350 17 G: 17 POWDER, FOR SOLUTION ORAL at 07:30

## 2022-01-12 RX ADMIN — OXYCODONE HYDROCHLORIDE AND ACETAMINOPHEN 250 MG: 500 TABLET ORAL at 07:31

## 2022-01-12 RX ADMIN — CYCLOBENZAPRINE 5 MG: 10 TABLET, FILM COATED ORAL at 07:31

## 2022-01-12 RX ADMIN — ENOXAPARIN SODIUM 30 MG: 100 INJECTION SUBCUTANEOUS at 22:38

## 2022-01-12 RX ADMIN — ENOXAPARIN SODIUM 30 MG: 100 INJECTION SUBCUTANEOUS at 07:32

## 2022-01-12 RX ADMIN — GABAPENTIN 300 MG: 300 CAPSULE ORAL at 22:38

## 2022-01-12 RX ADMIN — CYCLOBENZAPRINE 5 MG: 10 TABLET, FILM COATED ORAL at 22:38

## 2022-01-12 RX ADMIN — MULTIPLE VITAMINS W/ MINERALS TAB 1 TABLET: TAB at 07:32

## 2022-01-12 ASSESSMENT — PAIN SCALES - GENERAL
PAINLEVEL_OUTOF10: 0

## 2022-01-12 NOTE — CONSULTS
Comprehensive Nutrition Assessment    Type and Reason for Visit:  Initial,Consult    Nutrition Recommendations/Plan:   1. Continue general diet   2. Encourage PO intakes   3. Monitor nutrition adequacy, pertinent labs, bowel habits, wt changes, and clinical progress    Nutrition Assessment:  77 yo male admitted to the ARU s/p mechanical fall with rupture of distal quadriceps tendon s/p repair. Consulted for oral nutrition supplements. Ordered general diet. Pt reports appetite decreased since admission to hospital. Reports decreased PO intakes. Encouraged PO to promote healing s/p surgery. Pt declined ONS. Denied any diet questions at this time. Will monitor. Malnutrition Assessment:  Malnutrition Status: At risk for malnutrition (Comment)      Estimated Daily Nutrient Needs:  Energy (kcal):  8329-9513 kcal; Weight Used for Energy Requirements:  Ideal (75 kg)     Protein (g):  75-90 g; Weight Used for Protein Requirements:  Ideal (1.0-1.2 g/kg)        Fluid (ml/day):   ; Method Used for Fluid Requirements:  1 ml/kcal      Nutrition Related Findings:  Labs reviewed. Trace BLE edema. Bowel regimen ordered. Wounds:  Surgical Incision       Current Nutrition Therapies:    ADULT DIET;  Regular    Anthropometric Measures:  · Height: 5' 10\" (177.8 cm)  · Current Body Weight: 273 lb (123.8 kg)   · Usual Body Weight: 274 lb (124.3 kg) (bed scale on 1/4/21)     · Ideal Body Weight: 166 lbs; % Ideal Body Weight 164.5 %   · BMI: 39.2  · BMI Categories: Obese Class 2 (BMI 35.0 -39.9)       Nutrition Diagnosis:   · Inadequate oral intake related to inadequate protein-energy intake as evidenced by poor intake prior to admission      Nutrition Interventions:   Food and/or Nutrient Delivery:  Continue Current Diet  Nutrition Education/Counseling:  Education declined   Coordination of Nutrition Care:  Continue to monitor while inpatient    Goals:  Pt will consume greater than 50% of meals this ARU stay       Nutrition Monitoring and Evaluation:   Behavioral-Environmental Outcomes:  None Identified   Food/Nutrient Intake Outcomes:  Food and Nutrient Intake  Physical Signs/Symptoms Outcomes:  Nutrition Focused Physical Findings,Weight     Discharge Planning:    Continue current diet     Electronically signed by Abeba Tesfaye MS, RD, LD on 1/12/22 at 12:18 PM EST    Contact: [phone]

## 2022-01-12 NOTE — PROGRESS NOTES
Occupational Therapy   Occupational Therapy Initial Assessment/Treatment  Date: 2022   Patient Name: Dallas Renae  MRN: [id number]     : 1955    Date of Service: 2022    Discharge Recommendations:  Home with assist PRN,Home with Home health OT,S Level 1  OT Equipment Recommendations  Equipment Needed: Yes  Mobility Devices: ADL Assistive Devices  ADL Assistive Devices: Transfer Tub Bench;Grab Bars - tub;Grab Bars - toilet; Toileting - Raised Toilet Seat with arms    Assessment   Performance deficits / Impairments: Decreased functional mobility ; Decreased ADL status; Decreased balance;Decreased endurance;Decreased strength;Decreased high-level IADLs;Decreased posture  Assessment: Pt presents with the above deficits requiring skilled OT services to return to [facility] s/p admission on 2022 after a mechanical fall with subsequent inability to ambulate. Evaluation revealed bilateral quadriceps tendon ruptures. On  he underwent operative repair by Dr. Xenia Burton. No ROM, but able to WBAT. Pt required mod/max A to stand from ADL surfaces and mod/max A to ambulate to bathroom and around bathroom with no AD. Pt completed UB ADLs with supervision and LB ADLs with max A due to inability to reach LEs with KIs. Pt demonstrated good static standing balance to stand at sink for grooming with I. Pt completed BUE ther ex with good strength for 5# weight. Prognosis: Good  Decision Making: Medium Complexity  OT Education: OT Role;Plan of Care;Transfer Training;Precautions; ADL Adaptive Strategies; Equipment  Patient Education: Disease specific: precautions, mobility/safety with transfers, position of knee immobilizers, safety with ADLs, ther ex  Barriers to Learning: Pt verbalized understanding.   REQUIRES OT FOLLOW UP: Yes  Activity Tolerance  Activity Tolerance: Patient Tolerated treatment well;Patient limited by fatigue  Activity Tolerance: Vitals: BP= 138/82, HR= 92, SPO2= 93%  Safety Devices  Safety Devices in place: Yes  Type of devices: Call light within reach;Nurse notified;Gait belt;Left in bed           Patient Diagnosis(es): There were no encounter diagnoses. has a past medical history of 01 minute Apgar score 0, Hypertension, Kidney stone, Mitral valve prolapse, Sarcoidosis, and Spinal stenosis. has a past surgical history that includes back surgery (); Colonoscopy (2015); and Leg Muscle Surgery (Bilateral, 2022). Restrictions  Restrictions/Precautions  Restrictions/Precautions: Weight Bearing,Fall Risk,General Precautions,ROM Restrictions  Required Braces or Orthoses?: Yes  Lower Extremity Weight Bearing Restrictions  Right Lower Extremity Weight Bearing: Weight Bearing As Tolerated  Left Lower Extremity Weight Bearing: Weight Bearing As Tolerated  Required Braces or Orthoses  Right Lower Extremity Brace: Knee Immobilizer  Left Lower Extremity Brace: Knee Immobilizer  Position Activity Restriction  Other position/activity restrictions: No B knee ROM. Bilateral legs while in knee immobilizers. Ensure that immobilizers are appropriately positioned, holding knees in full extension    Subjective   General  Chart Reviewed: Roselind Jonas and Physical,Progress Notes,Imaging,Labs  Patient assessed for rehabilitation services?: Yes  Additional Pertinent Hx: HTN, PNA  Family / Caregiver Present: No  Referring Practitioner: Rosmery Morales MD  Diagnosis: B quad tendon rupture s/p B quad tendon repair 22  Subjective  Subjective: Pt in bed, pleasant, agreeable to OT evaluation.   Patient Currently in Pain: Denies  Vital Signs  Patient Currently in Pain: Denies  Height and Weight  Height: 5' 10\" (177.8 cm)  Social/Functional History  Social/Functional History  Lives With: Spouse  Type of Home: House  Home Layout: Multi-level,Bed/Bath upstairs  Home Access:  (small threshold to enter house)  Bathroom Shower/Tub: Tub/Shower unit  Bathroom Toilet: Standard  Bathroom Equipment: Grab bars in shower  Home Equipment: Cane,Standard walker,Crutches  ADL Assistance: [address]: Independent  Homemaking Responsibilities: Yes  Meal Prep Responsibility: Secondary  Laundry Responsibility: Secondary  Cleaning Responsibility: Secondary  Bill Paying/Finance Responsibility: Primary  Shopping Responsibility: Secondary  Ambulation Assistance: Independent  Transfer Assistance: Independent  Active : Yes  Occupation: Full time employment  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 Pt reports no falls in the last year.        Objective   Vision: Impaired  Vision Exceptions: Wears glasses for reading  Hearing: Within functional limits    Orientation  Overall Orientation Status: Within Functional Limits  Observation/Palpation  Posture: Fair  Observation: forward flexed at hips  Balance  Sitting Balance: Supervision  Standing Balance: Maximum assistance (with no AD for mobility, min A with RW, I for static standing at sink)  Standing Balance  Time: 30 seconds x3, 2 minutes  Activity: transfer to/from bathroom, LB bathing, LB dressing, grooming at sink  Comment: no AD into bathroom and around bathroom, RW out of bathroom to w/c  Functional Mobility  Functional - Mobility Device: No device  Activity: To/from bathroom  Assist Level: Maximum assistance  Functional Mobility Comments: improved to min A with RW  ADL  Feeding: Independent  Grooming: Independent (standing at sink to brush teeth)  UE Bathing: Independent  LE Bathing: Maximum assistance (assist to stand to bathe buttocks, assist to bag BLEs)  UE Dressing: Setup (to don shirt)  LE Dressing: Maximum assistance (to thread BLEs into underwear/pants, and to don socks, able to pull over hips in stance with min A for balance)  Toileting: Maximum assistance (for assistance with sit<>stands to complete clothing management and hygiene)  Tone RUE  RUE Tone: Normotonic  Tone LUE  LUE Tone: Normotonic  Coordination  Movements Are Fluid And Coordinated: Yes     Bed mobility  Supine to Sit: Stand by assistance  Transfers  Stand Step Transfers: Maximum assistance  Sit to stand: Maximum assistance  Stand to sit: Maximum assistance  Vision - Basic Assessment  Prior Vision: No visual deficits  Visual History: No significant visual history  Patient Visual Report: No visual complaint reported.   Cognition  Overall Cognitive Status: WFL  Perception  Overall Perceptual Status: WFL     Sensation  Overall Sensation Status: WFL  Type of ROM/Therapeutic Exercise  Type of ROM/Therapeutic Exercise: Free weights  Comment: 5#  Exercises  Shoulder Flexion: x25  Shoulder Extension: x25  Horizontal ABduction: x25  Horizontal ADduction: x25  Elbow Flexion: x25  Elbow Extension: x25  Supination: x25  Pronation: x25  Wrist Flexion: x25  Wrist Extension: x25  Other: chest press x25     LUE AROM (degrees)  LUE AROM : WFL  Left Hand AROM (degrees)  Left Hand AROM: WFL  RUE AROM (degrees)  RUE AROM : WFL  Right Hand AROM (degrees)  Right Hand AROM: WFL  LUE Strength  Gross LUE Strength: Exceptions to [facility]  L Shoulder Flex: 4-/5  L Elbow Flex: 4+/5  L Elbow Ext: 4+/5  L Wrist Flex: 4/5  L Wrist Ext: 4/5  L Hand General: 4+/5  RUE Strength  Gross RUE Strength: Exceptions to [facility]  R Shoulder Flex: 4-/5  R Elbow Flex: 4+/5  R Elbow Ext: 4+/5  R Wrist Flex: 4/5  R Wrist Ext: 4/5  R Hand General: 4+/5     Hand Dominance  Hand Dominance: Right             Plan   Plan  Times per week: 5/7 days/week  Plan weeks: 10 days  Current Treatment Recommendations: Endurance Training,Balance Training,Functional Mobility Training,Safety Education & Training,Self-Care / ADL,Patient/Caregiver Education & Training,Strengthening,ROM,Equipment Evaluation, Education, & procurement,Home Management Training    Goals  Short term goals  Time Frame for Short term goals: 6 days- 1/17/22  Short term goal 1: Pt will complete functional transfers with min A. Short term goal 2: Pt will perform toileting with min A. Short term goal 3: Pt will complete LB dressing with mod A. Long term goals  Time Frame for Long term goals : 10 days 1/21/22  Long term goal 1: Pt will perform functional transfers with SBA. Long term goal 2: Pt will complete toileting with supervision. Long term goal 3: Pt will perform LB bathing with supervision. Long term goal 4: Pt will complete LB dressing with min A. Long term goal 5: Pt will complete simple IADL task with supervision.   Patient Goals   Patient goals : \"to get to the point where I can bend my knees, to get stronger, and to get out\"       Therapy Time   Individual Concurrent Group Co-treatment   Time In 0730         Time Out 0900         Minutes 90         Timed Code Treatment Minutes: 75 Minutes (15 minute evaluation)       Hellen Torres OT

## 2022-01-12 NOTE — CARE COORDINATION
Clinicals faxed to [facility]#[phone] and notified them patient was admitted 1/11/22 as an exemption for the COVID waiver  Kelly Bazzi RN

## 2022-01-12 NOTE — PROGRESS NOTES
Physical Therapy    Facility/Department: [facility] ARU  Initial Assessment    NAME: Aileen Nathan  : 1955  MRN: [id number]    Date of Service: 2022    Discharge Recommendations:  Home with assist PRN,Home with Home health PT   PT Equipment Recommendations  Other: TBD    Assessment    Body structures, Functions, Activity limitations: Decreased functional mobility ; Decreased ROM; Decreased endurance;Decreased balance; Increased pain;Decreased strength  Assessment: Patient seen for PT evaluation, bed mobility, transfers, and gait training. Patient cleared by RN for therapy participation this date. Patient agreeable to therapy. Patient admitted s/p fall with resulting devyn quad tendon ruptures and is now s/p repair . Pt is WBAT BLE with no ROM permitted with BLE KI. Pt previously independent. Patient completed supine<>sit with SBA, transfers with min A, and ambulates up to 20 ft without AD with mod A. Pt unable to complete car transfer d/t unable to bring BLE into car with KI, ROM restrictions, and limited space in car. Pt's main barrier is managing stairs to get up to bedroom/bathroom. Pt states small threshold into home into main floor where there is a living room and kitchen. Pt will benefit from skilled PT in ARU to progress independence with mobility. Treatment Diagnosis: Decreased independence with functional mobility  Specific instructions for Next Treatment: progress mobility as tolerated  Prognosis: Good  Decision Making: Medium Complexity  PT Education: Goals; General Safety; Disease Specific Education;PT Role;Plan of Care;Precautions;Transfer Training;Weight-bearing Education;Equipment;Gait Training;Functional Mobility Training; Injury Prevention;Pressure Relief;Home Exercise Program  Patient Education: pt educated on ROM restrictions and importance of KI, compensatory strategies with mobility, role of PT - demos understanding  Barriers to Learning: none  REQUIRES PT FOLLOW UP: Yes  Activity Tolerance  Activity Tolerance: Patient Tolerated treatment well  Activity Tolerance: 138/82, 92 bpm, 94%       Patient Diagnosis(es): There were no encounter diagnoses. has a past medical history of 01 minute Apgar score 0, Hypertension, Kidney stone, Mitral valve prolapse, Sarcoidosis, and Spinal stenosis. has a past surgical history that includes back surgery (); Colonoscopy (2015); and Leg Muscle Surgery (Bilateral, 2022). Restrictions  Restrictions/Precautions  Restrictions/Precautions: Weight Bearing,Fall Risk,General Precautions,ROM Restrictions  Required Braces or Orthoses?: Yes  Lower Extremity Weight Bearing Restrictions  Right Lower Extremity Weight Bearing: Weight Bearing As Tolerated  Left Lower Extremity Weight Bearing: Weight Bearing As Tolerated  Required Braces or Orthoses  Right Lower Extremity Brace: Knee Immobilizer  Left Lower Extremity Brace: Knee Immobilizer  Position Activity Restriction  Other position/activity restrictions: No B knee ROM. Bilateral legs while in knee immobilizers.  Ensure that immobilizers are appropriately positioned, holding knees in full extension  Vision/Hearing  Vision: Impaired  Vision Exceptions: Wears glasses for reading  Hearing: Within functional limits     Subjective  General  Chart Reviewed: Yes  Patient assessed for rehabilitation services?: Yes  Response To Previous Treatment: Not applicable  Family / Caregiver Present: No  Referring Practitioner: Rebecca Varghese  Referral Date : 22  Diagnosis: s/p fall, devyn quad tendon ruptures, s/p repair   Follows Commands: Within Functional Limits  Subjective  Subjective: pt in bed, agreeable to therapy  Pain Screening  Patient Currently in Pain: Denies  Vital Signs  Patient Currently in Pain: Denies       Orientation  Orientation  Overall Orientation Status: Within Normal Limits  Social/Functional History  Social/Functional History  Lives With: Spouse  Type of Home: House  Home Layout: Multi-level,Bed/Bath upstairs  Home Access:  (small threshold to enter house)  Bathroom Shower/Tub: Tub/Shower unit  Bathroom Toilet: Standard  Bathroom Equipment: Grab bars in shower  Home Equipment: Cane,Standard walker,Crutches  ADL Assistance: [address]: Independent  Homemaking Responsibilities: Yes  Meal Prep Responsibility: Secondary  Laundry Responsibility: Secondary  Cleaning Responsibility: Secondary  Bill Paying/Finance Responsibility: Primary  Shopping Responsibility: Secondary  Ambulation Assistance: Independent  Transfer Assistance: Independent  Active : Yes  Occupation: Full time employment  Type of occupation: teacher at [employer]  Additional Comments: Pt states that spouse could provide 24HR supervision but likely not physical assistance. Pt reports no falls in the last year.   Objective     Observation/Palpation  Posture: Fair    AROM RLE (degrees)  RLE AROM: Exceptions  RLE General AROM: did not assess at knee d/t KI  AROM LLE (degrees)  LLE AROM : Exceptions  LLE General AROM: did not assess at knee d/t KI  Strength RLE  Strength RLE: Exception  Comment: did not formally assess d/t recent surgery and ROM restrictions  Strength LLE  Strength LLE: Exception  Comment: did not formally assess d/t recent surgery and ROM restrictions     Sensation  Overall Sensation Status: WFL  Bed mobility  Rolling to Left: Stand by assistance  Rolling to Right: Stand by assistance  Supine to Sit: Stand by assistance  Sit to Supine: Stand by assistance  Comment: HOB flat without use of bedrails  Transfers  Sit to Stand: Minimal Assistance  Stand to sit: Minimal Assistance  Bed to Chair: Minimal assistance  Car Transfer: Unable to assess (completed stand pivot w/c<>car with min A, unable to bring BLE into car d/t ROM restrictions and limited space in car, even with seated leaned and scooted back)  Ambulation  Ambulation?: Yes  WB Status: WBAT with BLE KI  Ambulation 1  Surface: level tile;uneven  Device: No Device  Other Apparatus: Knee Immobilizer; Wheelchair follow;Right;Left (devyn KI)  Assistance: Moderate assistance;Dependent/Total (dependent d/t w/c follow)  Gait Deviations: Slow Gavin;Decreased step length;Decreased step height; Increased AMNA  Distance: 20 ft over level tile, 12 ft over turf rug        Other exercises  Other exercises?: Yes  Other exercises 1: 2 reps 4\" step up/downs at // bars with min A     Plan   Plan  Times per week: 5-7 days/week  Times per day: Daily  Specific instructions for Next Treatment: progress mobility as tolerated  Current Treatment Recommendations: Strengthening,Balance Training,Endurance Training,Functional Mobility Training,Transfer Training,Gait Training,Positioning,Equipment Evaluation, Education, & procurement,Patient/Caregiver Education & Training,Safety Education & Training,Home Exercise Program,Wheelchair Mobility Training,Stair training  Safety Devices  Type of devices: All fall risk precautions in place,Call light within reach,Gait belt,Patient at risk for falls,Nurse notified,Left in bed  Restraints  Initially in place: No     Addendum Second Session  Assessment: Pt seen for gait and transfers. Pt reporting fatigue. Pt agreeable to sit up in recliner at end of session. Pt with SW at home, so trialed SW this session. Discussed potential benefits of RW and obtaining wheel attachments for SW. Pt verbalized understanding. Bed mobility: supine>sit with SBA    Transfers: Pt completes STS with min A and SW    Gait: Pt ambulates 150 ft with SW and CGA with slow gavin, short step length, and decreased clearance BLE    Goals  Short term goals  Time Frame for Short term goals: 1/17/22  Short term goal 1: Pt will complete bed mobility independently. Short term goal 2: Pt will complete transfers with SBA and LRAD. Short term goal 3: Pt will ambulate 100 ft with SBA and LRAD.   Short term goal 4: Pt will tolerate 3 stairs with 1 handrail and CGA. Long term goals  Time Frame for Long term goals : 10 days (7/21/22)  Long term goal 1: Pt will complete transfers mod I with LRAD. Long term goal 2: Pt will ambulate 150 ft mod I with LRAD. Long term goal 3: Pt will negotiate 10 stairs with 1 handrail and supervision. Long term goal 4: Pt will complete car transfer with LRAD mod I.   Patient Goals   Patient goals : \"Bend my knees, get stronger, and get back home\"       Therapy Time   Individual Concurrent Group Co-treatment   Time In 1000         Time Out 1100         Minutes 60         Timed Code Treatment Minutes: 60 Minutes     Second Session Therapy Time:   Individual Concurrent Group Co-treatment   Time In 1400         Time Out 1430         Minutes 30           Timed Code Treatment Minutes:  90 mins    Jacques Campos, PT

## 2022-01-12 NOTE — DISCHARGE SUMMARY
Hospital Medicine Discharge Summary    Patient ID: Falguni Gould      Patient's PCP: Cozette Cogan, MD    Admit Date: 1/4/2022     Discharge Date: 1/11/2022      Admitting Physician: Leeann Vidal MD     Discharge Physician: Martha Kay MD     Discharge Diagnoses: Active Hospital Problems    Diagnosis     Pre-diabetes [R73.03]     Quadriceps tendon rupture, left, initial encounter [S76.112A]     Quadriceps tendon rupture, right, initial encounter [S76.111A]     HTN (hypertension) [I10]     Rupture of distal quadriceps tendon [S76.119A]        The patient was seen and examined on day of discharge and this discharge summary is in conjunction with any daily progress note from day of discharge. Hospital Course:        BLE distal quadricep tendon rupture - ortho consulted and appreciated s/p OR repair 5 Jan w/out complications. Continue post-op mgt/rehab plan per Ortho.       PNA - possible CAP w/ Strep Pneumonia. CXR w/ RLL ASD . Started on Rocephin/Azithro on or before 7 Jan w/ Azithro completed, now on Rocephin as a single agent - changed to DAILY dosing. No further ABX at discharge     HTN - w/out known CAD and no evidence of active signs/sxs of ischemia/failure. Currently controlled on home meds      Prediabetes - w/ HbA1c 5.9% this admission. Followed FSBS/SSI medium regimen.      Obesity -  With Body mass index is 39.37 kg/m². Complicating assessment and treatment. Placing patient at risk for multiple co-morbidities as well as early death and contributing to the patient's presentation. Counseled on weight loss.        Labs:  For convenience and continuity at follow-up the following most recent labs are provided:      CBC:    Lab Results   Component Value Date    WBC 7.3 01/12/2022    HGB 9.9 01/12/2022    HCT 31.3 01/12/2022     01/12/2022       Renal:    Lab Results   Component Value Date     01/12/2022    K 3.7 01/12/2022     01/12/2022    CO2 24 01/12/2022    BUN 17 01/12/2022    CREATININE 0.8 01/12/2022    CALCIUM 8.4 01/12/2022    PHOS 2.9 01/11/2022         Significant Diagnostic Studies    Radiology:   XR CHEST PORTABLE   Final Result   Right lower lobe airspace disease, either atelectasis or pneumonia         CT KNEE LEFT WO CONTRAST   Final Result   1. Complete rupture of the bilateral distal quadriceps tendons with   associated muscular strains and surrounding soft tissue edema and blood   products. Larger hematoma is present on the right at the level of the distal   quadriceps rupture which measures approximately 3.0 x 5.1 x 8.8 cm.   2. No acute fracture identified. 3. 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 RECOMMENDATIONS:   Unavailable         CT KNEE RIGHT WO CONTRAST   Final Result   1. Complete rupture of the bilateral distal quadriceps tendons with   associated muscular strains and surrounding soft tissue edema and blood   products. Larger hematoma is present on the right at the level of the distal   quadriceps rupture which measures approximately 3.0 x 5.1 x 8.8 cm.   2. No acute fracture identified. 3. 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 RECOMMENDATIONS:   Unavailable         XR KNEE LEFT (1-2 VIEWS)   Final Result      1. No evidence of fracture. 2.  Suprapatellar region is not well seen on the lateral view and therefore   abnormalities in this area cannot be excluded. Also soft tissue injury or   tendon tear cannot be excluded by this exam if suspected clinically. MRI   examination would be recommended. XR KNEE RIGHT (1-2 VIEWS)   Final Result      1. There appears to be significant edema, fluid or hematoma in the   suprapatellar region and obscuring tissue planes. The quadriceps tendon is   not well visualized and therefore tear of the tendon cannot be excluded if   suspected clinically.   Alternatively this could represent edema or hematoma   due to minutes in the examination, evaluation, counseling and review of medications and discharge plan. Signed:    Craig Fothergill, MD   1/12/2022      Thank you Heather De Los Santos MD for the opportunity to be involved in this patient's care. If you have any questions or concerns please feel free to contact me at [phone].

## 2022-01-12 NOTE — PLAN OF CARE
ARU PATIENT TREATMENT PLAN  [address] [place] [address]   [phone]    Lujean Duty    : 1955  Acct #: [de-identified]  MRN: [id number]   PHYSICIAN:  Lois Acosta MD  Primary Problem    Patient Active Problem List   Diagnosis    Quadriceps tendon rupture, left, initial encounter    Quadriceps tendon rupture, right, initial encounter    HTN (hypertension)    Rupture of distal quadriceps tendon    Pre-diabetes       Rehabilitation Diagnosis:     Rupture of distal quadriceps tendon [V27.729C]       ADMIT DATE:2022    Patient Goals: \"to get to the point where I can bend my knees, to get stronger, and to get out\"    Admitting Impairments: Pt. Admitted s/p bilateral quadriceps tendon ruptures resulting in Decreased functional mobility ; Decreased ADL status; Decreased balance;Decreased endurance;Decreased strength;Decreased high-level IADLs;Decreased posture  Barriers: weakness, balance, endurance  Participation: Good     CARE PLAN     NURSING:  Lujean Duty while on this unit will:     [] Be continent of bowel and bladder     [x] Have an adequate number of bowel movements  [] Urinate with no urinary retention >300ml in bladder  [] Complete bladder protocol with mims removal  [] Maintain O2 SATs at ___%  [x] Have pain managed while on ARU       [] Be pain free by discharge   [x] Have no skin breakdown while on ARU  [] Have improved skin integrity via wound measurements  [x] Have no signs/symptoms of infection at the wound site  [x] Be free from injury during hospitalization   [] Complete education with patient/family with understanding demonstrated for:  [x] Adjustment   [] Other:   Nursing interventions may include bowel/bladder training, education for medical assistive devices, medication education, O2 saturation management, energy conservation, stress management techniques, fall prevention, alarms protocol, seating and positioning, skin/wound care, pressure relief instruction,dressing changes,  infection protection, DVT prophylaxis, and/or assistance with in room safety with transfers to bed, toilet, wheelchair, shower as well as bathroom activities and hygiene. Patient/caregiver education for:   [x] Disease/sustained injury/management      [x] Medication Use   [] Surgical intervention   [x] Safety   [x] Body mechanics and or joint protection   [x] Health maintenance         PHYSICAL THERAPY:  Goals:                  Short term goals  Time Frame for Short term goals: 1/17/22  Short term goal 1: Pt will complete bed mobility independently. Short term goal 2: Pt will complete transfers with SBA and LRAD. Short term goal 3: Pt will ambulate 100 ft with SBA and LRAD. Short term goal 4: Pt will tolerate 3 stairs with 1 handrail and CGA. Long term goals  Time Frame for Long term goals : 10 days (7/21/22)  Long term goal 1: Pt will complete transfers mod I with LRAD. Long term goal 2: Pt will ambulate 150 ft mod I with LRAD. Long term goal 3: Pt will negotiate 10 stairs with 1 handrail and supervision. Long term goal 4: Pt will complete car transfer with LRAD mod I. These goals were reviewed with this patient at the time of assessment and Kem Jennings is in agreement. Plan of Care: Pt to be seen 5 out of 7 days per week, 90   mins (exact) per day for 10 days (exact)                   Current Treatment Recommendations: Strengthening,Balance Training,Endurance Training,Functional Mobility Training,Transfer Training,Gait Training,Positioning,Equipment Evaluation, Education, & procurement,Patient/Caregiver Education & Training,Safety Education & [employer] Exercise Program,Wheelchair Mobility Training,Stair training      OCCUPATIONAL THERAPY:  Goals:             Short term goals  Time Frame for Short term goals: 6 days- 1/17/22  Short term goal 1: Pt will complete functional transfers with min A.   Short term goal 2: Pt will perform toileting with min A. Short term goal 3: Pt will complete LB dressing with mod A. :  Long term goals  Time Frame for Long term goals : 10 days 1/21/22  Long term goal 1: Pt will perform functional transfers with SBA. Long term goal 2: Pt will complete toileting with supervision. Long term goal 3: Pt will perform LB bathing with supervision. Long term goal 4: Pt will complete LB dressing with min A. Long term goal 5: Pt will complete simple IADL task with supervision. :    These goals were reviewed with this patient at the time of assessment and Luis F Magallanes is in agreement    Plan of Care:  Pt to be seen 5 out of 7 days per week, 90   mins (exact) per day for 10 days (exact)  Current Treatment Recommendations: Endurance Training,Balance Training,Functional Mobility Training,Safety Education & Training,Self-Care / ADL,Patient/Caregiver Education & Training,Strengthening,ROM,Equipment Evaluation, Education, & procurement,Home Management Training      SPEECH THERAPY: Goals will be left blank if speech is not following this patient. Goals:           CASE MANAGEMENT:  Goals:   Assist patient/family with discharge planning, patient/family counseling,   and coordination with insurance during ARU stay.     QIM / IRF CAMILO SCORES:  ITEM CURRENT SCORE GOAL   Eating CARE Score: 6 Discharge Goal: Independent   Oral Hygiene CARE Score: 6 Discharge Goal: Independent   Toileting Hygiene CARE Score: 2 Discharge Goal: Supervision or touching assistance   Shower/Bathe Self CARE Score: 2 Discharge Goal: Supervision or touching assistance   Upper Body Dressing CARE Score: 5 Discharge Goal: Independent   Lower Body Dressing CARE Score: 2 Discharge Goal: Partial/moderate assistance   On/Off Footwear CARE Score: 1 Discharge Goal: Partial/moderate assistance   Roll Left & Right CARE Score: 4 Discharge Goal: Independent   Sit to Lying  CARE Score: 4 Discharge Goal: Independent   Lying to Sitting EOB CARE Score: 4 Discharge Goal: Independent   Sit to Stand CARE Score: 3 Discharge Goal: Independent   Chair/Bed to Chair Transfer CARE Score: 3 Discharge Goal: Independent   Toilet Transfer CARE Score: 2 Discharge Goal: Supervision or touching assistance   Car Transfer CARE Score: 88 Discharge Goal: Independent   Walk 10 Feet CARE Score: 1 Discharge Goal: Independent   Walk 50 Feet, 2 Turns CARE Score: 88 Discharge Goal: Independent   Walk 150 Feet CARE Score: 88 Discharge Goal: Independent   Walk 10 Feet, Uneven Surface CARE Score: 1 Discharge Goal: Independent   1 Step (Curb) CARE Score: 88 Discharge Goal: Supervision or touching assistance   4 Steps CARE Score: 88 Discharge Goal: Supervision or touching assistance   12 Steps CARE Score: 88 Discharge Goal: Supervision or touching assistance    Object CARE Score: 88 Discharge Goal: Independent   Wheel 50 feet, 2 turns CARE Score: 9 Discharge Goal: Not Applicable   Wheel 512 Feet CARE Score: 9 Discharge Goal: Not Applicable          Marlys Davis will be seen a minimum of 3 hours of therapy per day, a minimum of 5 out of 7 days per week. [] In this rare instance due to the nature of this patient's medical involvement, this patient will be seen 15 hours per week (900 minutes within a 7 day period). Treatments may include therapeutic exercises, gait training, neuromuscular re-ed, transfer training, community reintegration, bed mobility, w/c mobility and training, self care, home mgmt, cognitive training, energy conservation,dysphagia tx, speech/language/communication therapy, group therapy, and patient/family education. In addition, dietician/nutritionist may monitor calorie count as well as intake and collaboratively work with SLP on dietary upgrades. Neuropsychology/Psychology may evaluate and provide necessary support.     Medical issues being managed closely and that require 24 hour availability of a physician:   [] Swallowing Precautions  [x] Bowel/Bladder Fx  [] Weight bearing precautions   [x] Wound Care    [x] Pain Mgmt   [x] Infection Protection   [x] DVT Prophylaxis   [x] Fall Precautions  [x] Fluid/Electrolyte/Nutrition Balance   [] Voice Protection   [x] Respiratory  [] Other:    Medical Prognosis: [x] Good  [] Fair    [] Guarded   Total expected IRF days 10  Anticipated discharge destination:    [] Home Independently   [x] Home Modified Independent  [] Home with supervision    []SNF     [] Other                                           Physician anticipated functional outcomes:  Home w/ assist as needed and home health services. IPOC brief synthesis: Mr. Soo Lunsford is a 78year old male initially presented 1/4/2022 after a mechanical fall with subsequent inability to ambulate. Evaluation revealed bilateral quadriceps tendon ruptures. On 1/5 he underwent operative repair by Dr. Tobin Lunsford. No ROM, but able to WBAT. Post-op course complicated by pneumonia. Now presents to ARU with impaired mobility and self-care below his baseline. He requires comprehensive inpatient rehab program in order to return to community setting. This plan has been reviewed with Debo Cameron on 1/12 in a language the patient understands. Debo Cameron has had the opportunity to include input with the therapy team.      I have reviewed this initial plan of care and agree with its contents:    Title   Name    Date    Time    Physician: Mine Augustine.  Ezequiel Soliz MD 1/13/2022, 9:49 AM    Case Mgmt:Geetha Sampson RN    OT: Alex Bae OTR/L    PT: Salomon Burton PT    RN: Irena Neff RN    ST:    :  Maryanne Fields OTR/L    Other:

## 2022-01-12 NOTE — H&P
Patient: Christopher Duty  [id number]  Date: 2022      Chief Complaint: Fall    History of Present Illness/Hospital Course:    Mr. Chen Castelan is a 78year old male admitted 2022 after a mechanical fall with subsequent inability to ambulate. Evaluation revealed bilateral quadriceps tendon ruptures. On  he underwent operative repair by Dr. Lawanda Roth. No ROM, but able to WBAT. This morning he conveys that pain control is adequate and his bowels are not moving. Prior Level of Function:  Independent    Current Level of Function:  Jed     has a past medical history of 01 minute Apgar score 0, Hypertension, Kidney stone, Mitral valve prolapse, Sarcoidosis, and Spinal stenosis. has a past surgical history that includes back surgery (); Colonoscopy (2015); and Leg Muscle Surgery (Bilateral, 2022). reports that he has never smoked. He has never used smokeless tobacco. He reports current alcohol use. He reports that he does not use drugs. family history includes High Blood Pressure in his mother; Stroke in his father.     Current Facility-Administered Medications   Medication Dose Route Frequency Provider Last Rate Last Admin    bisacodyl (DULCOLAX) EC tablet 5 mg  5 mg Oral Daily PRN Lois Acosta MD        magnesium hydroxide (MILK OF MAGNESIA) 400 MG/5ML suspension 30 mL  30 mL Oral Daily PRN Lois Acosta MD        oxyCODONE (ROXICODONE) immediate release tablet 5 mg  5 mg Oral Q4H PRN MD Gordy Jeffrey oxyCODONE (ROXICODONE) immediate release tablet 10 mg  10 mg Oral Q4H PRN Lois Acosta MD        sodium chloride flush 0.9 % injection 5-40 mL  5-40 mL IntraVENous 2 times per day Lois Acosta MD        sodium chloride flush 0.9 % injection 5-40 mL  5-40 mL IntraVENous PRN Lois Acosta MD        0.9 % sodium chloride infusion  25 mL IntraVENous PRN Lois Acosta MD        promethazine (PHENERGAN) tablet 12.5 mg  12.5 mg Oral Q6H PRN Syed Lynch Jaelyn Perez MD        Or    [facility]) injection 4 mg  4 mg IntraVENous Q6H PRN Randall Fuentes MD        [facility]) tablet 8.6 mg  1 tablet Oral Daily PRN Randall Fuentes MD        acetaminophen (TYLENOL) tablet 1,000 mg  1,000 mg Oral Q6H PRN Randall Fuentes MD        dextrose 5 % solution  100 mL/hr IntraVENous PRN Randall Fuentes MD        dextrose 50 % IV solution  12.5 g IntraVENous PRN Randall Fuentes MD        glucagon (rDNA) injection 1 mg  1 mg IntraMUSCular PRN Randall Fuentes MD        glucose (GLUTOSE) 40 % oral gel 15 g  15 g Oral PRN Randall Fuentes MD        insulin lispro (HUMALOG) injection vial 0-12 Units  0-12 Units SubCUTAneous TID WC Randall Fuentes MD        insulin lispro (HUMALOG) injection vial 0-6 Units  0-6 Units SubCUTAneous Nightly Randall Fuentes MD        enoxaparin (LOVENOX) injection 30 mg  30 mg SubCUTAneous BID Randall Fuentes MD   30 mg at 01/12/22 0732    polyethylene glycol (GLYCOLAX) packet 17 g  17 g Oral Daily PRN Randall Fuentes MD        ascorbic acid (VITAMIN C) tablet 250 mg  250 mg Oral Daily Randall Fuentes MD   250 mg at 01/12/22 0731    cefTRIAXone (ROCEPHIN) 1000 mg IVPB in 50 mL D5W minibag  1,000 mg IntraVENous Daily Randall Fuentes MD        cyclobenzaprine (FLEXERIL) tablet 5 mg  5 mg Oral BID Randall Fuentes MD   5 mg at 01/12/22 0731    gabapentin (NEURONTIN) capsule 300 mg  300 mg Oral Nightly Randall Fuentes MD   300 mg at 01/11/22 2037    hydrALAZINE (APRESOLINE) tablet 50 mg  50 mg Oral Q6H PRN Randall Fuentes MD        lisinopril (PRINIVIL;ZESTRIL) tablet 10 mg  10 mg Oral Nightly Randall Fuentes MD   10 mg at 01/11/22 2037    metoprolol succinate (TOPROL XL) extended release tablet 25 mg  25 mg Oral Daily Randall Fuentes MD   25 mg at 01/12/22 0731    ondansetron (ZOFRAN-ODT) disintegrating tablet 8 mg  8 mg Oral Q8H PRN Randall Fuentes MD        polyethylene glycol [facility]) packet 17 g  17 g Oral Daily Jeanie Castaneda MD   17 g at 01/12/22 0730    therapeutic multivitamin-minerals 1 tablet  1 tablet Oral Daily Jeanie Castaneda MD   1 tablet at 01/12/22 0732    traZODone (DESYREL) tablet 50 mg  50 mg Oral Nightly PRN Jeanie Castaneda MD           Allergies   Allergen Reactions    Bee Venom        Current Facility-Administered Medications   Medication Dose Route Frequency Provider Last Rate Last Admin    bisacodyl (DULCOLAX) EC tablet 5 mg  5 mg Oral Daily PRN Jeanie Castaneda MD        magnesium hydroxide (MILK OF MAGNESIA) 400 MG/5ML suspension 30 mL  30 mL Oral Daily PRN Jeanie Castaneda MD        oxyCODONE (ROXICODONE) immediate release tablet 5 mg  5 mg Oral Q4H PRN Jeanie Castaneda MD        Or   Reese Diggs oxyCODONE (ROXICODONE) immediate release tablet 10 mg  10 mg Oral Q4H PRN Jeanie Castaneda MD        sodium chloride flush 0.9 % injection 5-40 mL  5-40 mL IntraVENous 2 times per day Jeanie Castaneda MD        sodium chloride flush 0.9 % injection 5-40 mL  5-40 mL IntraVENous PRN Jeanie Castaneda MD        0.9 % sodium chloride infusion  25 mL IntraVENous PRN Jaenie Castaneda MD        promethazine (PHENERGAN) tablet 12.5 mg  12.5 mg Oral Q6H PRN Jeanie Castaneda MD        Or    [facility]ron [facility]) injection 4 mg  4 mg IntraVENous Q6H PRN Jeanie Castaneda MD        [facility]) tablet 8.6 mg  1 tablet Oral Daily PRN Jeanie Castaneda MD        acetaminophen (TYLENOL) tablet 1,000 mg  1,000 mg Oral Q6H PRN Jeanie Castaneda MD        dextrose 5 % solution  100 mL/hr IntraVENous PRN Jeanie Castaneda MD        dextrose 50 % IV solution  12.5 g IntraVENous PRN Jeanie Castaneda MD        glucagon (rDNA) injection 1 mg  1 mg IntraMUSCular PRN Jeanie Castaneda MD        glucose (GLUTOSE) 40 % oral gel 15 g  15 g Oral PRN Jeanie Castaneda MD        insulin lispro (HUMALOG) injection vial 0-12 Units  0-12 Units SubCUTAneous TID WC Jeanie Castaneda MD        insulin lispro (HUMALOG) injection vial 0-6 Units  0-6 Units SubCUTAneous Nightly Lois Acosta MD        enoxaparin (LOVENOX) injection 30 mg  30 mg SubCUTAneous BID Lois Acosta MD   30 mg at 01/12/22 0732    polyethylene glycol (GLYCOLAX) packet 17 g  17 g Oral Daily PRN Lois Acosta MD        ascorbic acid (VITAMIN C) tablet 250 mg  250 mg Oral Daily Lois Acosta MD   250 mg at 01/12/22 0731    cefTRIAXone (ROCEPHIN) 1000 mg IVPB in 50 mL D5W minibag  1,000 mg IntraVENous Daily Lois Acosta MD        cyclobenzaprine (FLEXERIL) tablet 5 mg  5 mg Oral BID Lois Acosta MD   5 mg at 01/12/22 0731    gabapentin (NEURONTIN) capsule 300 mg  300 mg Oral Nightly Lois Acosta MD   300 mg at 01/11/22 2037    hydrALAZINE (APRESOLINE) tablet 50 mg  50 mg Oral Q6H PRN Lois Acosta MD        lisinopril (PRINIVIL;ZESTRIL) tablet 10 mg  10 mg Oral Nightly Lois Acosta MD   10 mg at 01/11/22 2037    metoprolol succinate (TOPROL XL) extended release tablet 25 mg  25 mg Oral Daily Lois Acosta MD   25 mg at 01/12/22 0731    ondansetron (ZOFRAN-ODT) disintegrating tablet 8 mg  8 mg Oral Q8H PRN Lois Acosta MD        polyethylene glycol [facility]) packet 17 g  17 g Oral Daily Lois Acosta MD   17 g at 01/12/22 0730    therapeutic multivitamin-minerals 1 tablet  1 tablet Oral Daily Lois Acosta MD   1 tablet at 01/12/22 0732    traZODone (DESYREL) tablet 50 mg  50 mg Oral Nightly PRN Lois Acosta MD             REVIEW OF SYSTEMS:   CONSTITUTIONAL: negative for fevers, chills, diaphoresis, activity change, appetite change, fatigue, night sweats and unexpected weight change. EYES: negative for blurred vision, eye discharge, visual disturbance and icterus. HEENT: negative for hearing loss, tinnitus, ear drainage, sinus pressure, nasal congestion, epistaxis and snoring. RESPIRATORY: Negative for hemoptysis, cough, sputum production.    CARDIOVASCULAR: negative for chest pain, palpitations, exertional chest pressure/discomfort, edema, syncope. GASTROINTESTINAL: negative for nausea, vomiting, diarrhea, constipation, blood in stool and abdominal pain. GENITOURINARY: negative for frequency, dysuria, urinary incontinence, decreased urine volume, and hematuria. HEMATOLOGIC/LYMPHATIC: negative for easy bruising, bleeding and lymphadenopathy. ALLERGIC/IMMUNOLOGIC: negative for recurrent infections, angioedema, anaphylaxis and drug reactions. ENDOCRINE: negative for weight changes and diabetic symptoms including polyuria, polydipsia and polyphagia. MUSCULOSKELETAL: negative for pain, joint swelling, decreased range of motion and muscle weakness. NEUROLOGICAL: negative for headaches, slurred speech, unilateral weakness. PSYCHIATRIC/BEHAVIORAL: negative for hallucinations, behavioral problems, confusion and agitation. All pertinent positives are noted in the HPI. Physical Examination:  Vitals:   Patient Vitals for the past 24 hrs:   BP Temp Temp src Pulse Resp SpO2 Height Weight   01/12/22 0720 138/82 98 °F (36.7 °C) Oral 92 20 94 % -- --   01/12/22 0422 -- -- -- -- -- -- 5' 10\" (1.778 m) 273 lb (123.8 kg)   01/11/22 1845 134/80 98 °F (36.7 °C) Oral 80 20 96 % -- --     Psych: Stable mood, normal judgement, normal affect   Const: No distress  Eyes: Conjunctiva noninjected, no icterus noted; pupils equal, round, and reactive to light. HENT: Atraumatic, normocephalic; Oral mucosa moist  Neck: Trachea midline, neck supple. No thyromegaly noted. CV: Regular rate and rhythm, no murmur rub or gallop noted  Resp: Lungs clear to auscultation bilaterally, no rales wheezes or ronchi, no retractions. Respirations unlabored. GI: Soft, nontender, nondistended. Normal bowel sounds. No palpable masses. Neuro: Alert, oriented, appropriate. No cranial nerve deficits appreciated. Sensation intact to light touch.  Motor examination reveals normal strength in all four limbs diffusely. Skin: Normal temperature and turgor  MSK: Bilat knee immobilizers in place. Ext: No significant edema appreciated. No varicosities. Lab Results   Component Value Date    WBC 7.3 01/12/2022    HGB 9.9 (L) 01/12/2022    HCT 31.3 (L) 01/12/2022    MCV 74.8 (L) 01/12/2022     01/12/2022     Lab Results   Component Value Date    INR 1.08 01/04/2022    INR 1.04 04/14/2020    PROTIME 12.2 01/04/2022    PROTIME 12.1 04/14/2020     Lab Results   Component Value Date    CREATININE 0.8 01/12/2022    BUN 17 01/12/2022     01/12/2022    K 3.7 01/12/2022     01/12/2022    CO2 24 01/12/2022     Lab Results   Component Value Date    ALT 61 (H) 01/12/2022    AST 61 (H) 01/12/2022    ALKPHOS 69 01/12/2022    BILITOT 1.2 (H) 01/12/2022     Impression   1. Complete rupture of the bilateral distal quadriceps tendons with   associated muscular strains and surrounding soft tissue edema and blood   products.  Larger hematoma is present on the right at the level of the distal   quadriceps rupture which measures approximately 3.0 x 5.1 x 8.8 cm.   2. No acute fracture identified. 3. 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 The above laboratory data have been reviewed. The above imaging data have been reviewed. The above medical testing have been reviewed. Body mass index is 39.17 kg/m². Barriers to Discharge: weakness, balance, endurance    POST ADMISSION PHYSICIAN EVALUATION  The patient has agreed to being admitted to our comprehensive inpatient  rehabilitation facility consisting of at least 180 minutes of therapy a day,  5 out of 7 days a week. The patient/family has a good understanding of our discharge process.  The  patient has potential to make improvement and is in need of at least two of  the following multidisciplinary therapies including but not limited to  physical, occupational, respiratory, and speech, nutritional services, wound care, and prosthetics and orthotics. Given the patients complex condition  and risk of further medical complications, rehabilitation services cannot be  safely provided at a lower level of care such as a skilled nursing facility. I have compared the patients medical and functional status at the time of the  preadmission screening and the same on this date, and there are no significant changes. By signing this document, I acknowledge that I have personally performed a  full physical examination on this patient within 24 hours of admission to  this inpatient rehabilitation facility and have determined the patient to be  able to tolerate the above course of treatment at an intensive level for a  reasonable period of time. I will be completing a detailed individualized  Plan of Care for this patient by day four of the patients stay based upon the  Preadmission Screen, this Post-Admission Evaluation, and the therapy  evaluations. Rehabilitation Diagnosis:  Orthopedic, 8.9, Other Orthopedic    Assessment and Plan:  BLE distal quadriceps tendon rupture s/p repair  - restrictions per o/s  - pain control    Pneumonia  - completed abx    HTN  - follow bp on home regimen      Obesity  - dietary consulted    Bowels: Schedule stool softener. Follow bowel movements. Enema or suppository if needed. Bladder: Check PVR x 3. [address] if PVR > 200ml or if any volume is > 500 ml. Sleep: Trazodone provided prn. Follow up appointments: orthopedics, pcp    Latrell Penaloza MD 1/12/2022, 9:40 AM

## 2022-01-12 NOTE — PATIENT CARE CONFERENCE
[facility]  Inpatient Rehabilitation  Weekly Team Conference Note    Date: 2022  Patient Name: Darleen Cabot        MRN: [id number]    : 1955  (77 y.o.)  Gender: male   Referring Practitioner: Jaelyn Perez  Diagnosis: s/p fall, devyn quad tendon ruptures, s/p repair 1/5      Interventions to be utilized toward barriers to discharge, per discipline:  NURSING  Nursing observed barriers to dc: Pain and Lower extremity weakness  Nursing interventions:Assist with ADL's, toileting and medication management  Family Education: no  Fall Risk:  Yes      Physical therapy observed barriers to dc: Evaluation in progress. Goals and POC to be established this date. Occupational therapy observed barriers to dc: Evaluation in progress. Goals and POC to be established this date. Current Treatment Recommendations: Endurance Training,Balance Training,Functional Mobility Training,Safety Education & Training,Self-Care / ADL,Patient/Caregiver Education & Training,Strengthening,ROM,Equipment Evaluation, Education, & procurement,Home Management Training     Speech therapy observed barriers to dc: No speech therapy ordered for this patient at time of admission. NUTRITION  Weight: 273 lb (123.8 kg) / Body mass index is 39.17 kg/m². Diet Order: ADULT DIET; Regular  Education: Pending assessment       CASE MANAGEMENT  Assessment: Lives in a tri-level house with spouse, 0SE 8 steps to second floor. 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 Patient has cane at home. Referral to [facility] by prior CM made and verified. CM will continue to support for discharge needs. Interdisciplinary Goals:   1.) Pt isa remain free from falls. 2.) Therapy evaluations in progress. Goals and POC to be established this date.        Discharge Plan   Estimated discharge date: 2022  Destination: Home health   Pass:No  Services at Discharge: Home health physical therapy. Equipment at Discharge: Tub transfer bench, possible raised toilet seat, standard walker (pt owns). Team Members Present at Conference:  : Corby Black    Occupational Therapist: Angela Benitez OT  Physical Therapist: Karlos Shaffer, PT  Speech Therapist: N/A  Nurse:Dougie Moreno RN  Dietician: Sheron Bridges RDN, LD  : Tiffanie Villarreal, OTR/L  Psychiatry: N/A    Family members present at conference: No      I led this team conference and I approve the established interdisciplinary plan of care as documented within the medical record of [place] Renae. MD: Nettie Daly.  Maddi Seymour MD 1/12/2022, 11:19 AM

## 2022-01-13 ENCOUNTER — TELEPHONE (OUTPATIENT)
Dept: ORTHOPEDIC SURGERY | Age: 67
End: 2022-01-13

## 2022-01-13 LAB
ANION GAP SERPL CALCULATED.3IONS-SCNC: 10 MMOL/L (ref 3–16)
BASOPHILS ABSOLUTE: 0.1 K/UL (ref 0–0.2)
BASOPHILS RELATIVE PERCENT: 0.7 %
BUN BLDV-MCNC: 17 MG/DL (ref 7–20)
CALCIUM SERPL-MCNC: 8.3 MG/DL (ref 8.3–10.6)
CHLORIDE BLD-SCNC: 103 MMOL/L (ref 99–110)
CO2: 23 MMOL/L (ref 21–32)
CREAT SERPL-MCNC: 0.9 MG/DL (ref 0.8–1.3)
EOSINOPHILS ABSOLUTE: 0.4 K/UL (ref 0–0.6)
EOSINOPHILS RELATIVE PERCENT: 5.1 %
GFR AFRICAN AMERICAN: >60
GFR NON-AFRICAN AMERICAN: >60
GLUCOSE BLD-MCNC: 105 MG/DL (ref 70–99)
GLUCOSE BLD-MCNC: 110 MG/DL (ref 70–99)
GLUCOSE BLD-MCNC: 116 MG/DL (ref 70–99)
GLUCOSE BLD-MCNC: 135 MG/DL (ref 70–99)
GLUCOSE BLD-MCNC: 98 MG/DL (ref 70–99)
HCT VFR BLD CALC: 30.3 % (ref 40.5–52.5)
HEMOGLOBIN: 9.6 G/DL (ref 13.5–17.5)
LYMPHOCYTES ABSOLUTE: 1.2 K/UL (ref 1–5.1)
LYMPHOCYTES RELATIVE PERCENT: 14.1 %
MCH RBC QN AUTO: 23.6 PG (ref 26–34)
MCHC RBC AUTO-ENTMCNC: 31.7 G/DL (ref 31–36)
MCV RBC AUTO: 74.5 FL (ref 80–100)
MONOCYTES ABSOLUTE: 1.3 K/UL (ref 0–1.3)
MONOCYTES RELATIVE PERCENT: 16 %
NEUTROPHILS ABSOLUTE: 5.3 K/UL (ref 1.7–7.7)
NEUTROPHILS RELATIVE PERCENT: 64.1 %
PDW BLD-RTO: 12.9 % (ref 12.4–15.4)
PERFORMED ON: ABNORMAL
PERFORMED ON: NORMAL
PLATELET # BLD: 351 K/UL (ref 135–450)
PMV BLD AUTO: 7.3 FL (ref 5–10.5)
POTASSIUM REFLEX MAGNESIUM: 3.8 MMOL/L (ref 3.5–5.1)
RBC # BLD: 4.06 M/UL (ref 4.2–5.9)
SODIUM BLD-SCNC: 136 MMOL/L (ref 136–145)
WBC # BLD: 8.3 K/UL (ref 4–11)

## 2022-01-13 PROCEDURE — 97116 GAIT TRAINING THERAPY: CPT

## 2022-01-13 PROCEDURE — 80048 BASIC METABOLIC PNL TOTAL CA: CPT

## 2022-01-13 PROCEDURE — 6360000002 HC RX W HCPCS: Performed by: PHYSICAL MEDICINE & REHABILITATION

## 2022-01-13 PROCEDURE — 97535 SELF CARE MNGMENT TRAINING: CPT

## 2022-01-13 PROCEDURE — 97530 THERAPEUTIC ACTIVITIES: CPT

## 2022-01-13 PROCEDURE — 85025 COMPLETE CBC W/AUTO DIFF WBC: CPT

## 2022-01-13 PROCEDURE — 36415 COLL VENOUS BLD VENIPUNCTURE: CPT

## 2022-01-13 PROCEDURE — 1280000000 HC REHAB R&B

## 2022-01-13 PROCEDURE — 6370000000 HC RX 637 (ALT 250 FOR IP): Performed by: PHYSICAL MEDICINE & REHABILITATION

## 2022-01-13 PROCEDURE — 97110 THERAPEUTIC EXERCISES: CPT

## 2022-01-13 RX ADMIN — ENOXAPARIN SODIUM 30 MG: 100 INJECTION SUBCUTANEOUS at 10:14

## 2022-01-13 RX ADMIN — POLYETHYLENE GLYCOL 3350 17 G: 17 POWDER, FOR SOLUTION ORAL at 10:13

## 2022-01-13 RX ADMIN — ENOXAPARIN SODIUM 30 MG: 100 INJECTION SUBCUTANEOUS at 20:19

## 2022-01-13 RX ADMIN — GABAPENTIN 300 MG: 300 CAPSULE ORAL at 20:19

## 2022-01-13 RX ADMIN — OXYCODONE HYDROCHLORIDE AND ACETAMINOPHEN 250 MG: 500 TABLET ORAL at 10:14

## 2022-01-13 RX ADMIN — METOPROLOL SUCCINATE 25 MG: 25 TABLET, FILM COATED, EXTENDED RELEASE ORAL at 10:14

## 2022-01-13 RX ADMIN — CYCLOBENZAPRINE 5 MG: 10 TABLET, FILM COATED ORAL at 20:19

## 2022-01-13 RX ADMIN — CYCLOBENZAPRINE 5 MG: 10 TABLET, FILM COATED ORAL at 10:13

## 2022-01-13 RX ADMIN — LISINOPRIL 10 MG: 10 TABLET ORAL at 20:19

## 2022-01-13 RX ADMIN — MULTIPLE VITAMINS W/ MINERALS TAB 1 TABLET: TAB at 10:14

## 2022-01-13 ASSESSMENT — PAIN DESCRIPTION - ORIENTATION
ORIENTATION: LEFT
ORIENTATION: LEFT

## 2022-01-13 ASSESSMENT — PAIN DESCRIPTION - LOCATION
LOCATION: LEG;KNEE
LOCATION: LEG

## 2022-01-13 ASSESSMENT — PAIN DESCRIPTION - PAIN TYPE: TYPE: SURGICAL PAIN

## 2022-01-13 ASSESSMENT — PAIN DESCRIPTION - DESCRIPTORS: DESCRIPTORS: ACHING

## 2022-01-13 NOTE — PROGRESS NOTES
Physical Therapy  Facility/Department: [facility] ARU  Daily Treatment Note  NAME: Omar James  : 1955  MRN: [id number]    Date of Service: 2022    Discharge Recommendations:  Home with assist PRN,Home with Home health PT   PT Equipment Recommendations  Equipment Needed: Yes  Mobility Devices: Taffy Maid: Standard    Assessment   Body structures, Functions, Activity limitations: Decreased functional mobility ; Decreased ROM; Decreased endurance;Decreased balance; Increased pain;Decreased strength  Assessment: pt found in gym, session significantly limited by increased fatigue/ decreased activity tolerance. requires mulitple legnthy seated rest breaks between activities. HR increases to 125-130 with activity but returns to low 100s within 2-3 min of seated rest. grossly CGA for mobility with use of SW, pt declining use of RW per pt comfort. initiated stair training, tolerated 1+3 steps prior to fatigue. rec home with initial 24/7 and HHPT vs OPPT pending progress. DME: SW  Treatment Diagnosis: Decreased independence with functional mobility  Specific instructions for Next Treatment: progress mobility as tolerated  Prognosis: Good  Decision Making: Medium Complexity  PT Education: Goals; General Safety; Disease Specific Education;PT Role;Plan of Care;Precautions;Transfer Training;Weight-bearing Education;Equipment;Gait Training;Functional Mobility Training; Injury Prevention;Pressure Relief;Home Exercise Program  Patient Education: pt educated on ROM restrictions and importance of KI, compensatory strategies with mobility, role of PT - demos understanding  Barriers to Learning: none  REQUIRES PT FOLLOW UP: Yes  Activity Tolerance  Activity Tolerance: Patient limited by fatigue;Patient limited by endurance  Activity Tolerance: 124/78, 100% on room air, 120 bpm     Patient Diagnosis(es): There were no encounter diagnoses.      has a past medical history of 01 minute Apgar score 0, Hypertension, Kidney stone, Mitral valve prolapse, Sarcoidosis, and Spinal stenosis. has a past surgical history that includes back surgery (1992); Colonoscopy (5/5/2015); and Leg Muscle Surgery (Bilateral, 1/5/2022). Restrictions  Restrictions/Precautions  Restrictions/Precautions: Weight Bearing,Fall Risk,General Precautions,ROM Restrictions  Required Braces or Orthoses?: Yes  Lower Extremity Weight Bearing Restrictions  Right Lower Extremity Weight Bearing: Weight Bearing As Tolerated  Left Lower Extremity Weight Bearing: Weight Bearing As Tolerated  Required Braces or Orthoses  Right Lower Extremity Brace: Knee Immobilizer  Left Lower Extremity Brace: Knee Immobilizer  Position Activity Restriction  Other position/activity restrictions: No B knee ROM. Bilateral legs while in knee immobilizers. Ensure that immobilizers are appropriately positioned, holding knees in full extension  Subjective   General  Chart Reviewed: Yes  Response To Previous Treatment: Patient with no complaints from previous session. Referring Practitioner: Laine Schilling  Subjective  Subjective: found in gym, reproting min-moderate pain in BLEs  General Comment  Comments: limited by increased fatigue/ decreased activity tolerance. Pain Screening  Patient Currently in Pain: Yes  Pain Assessment  Pain Assessment: 0-10  Patient's Stated Pain Goal: 4  Pain Type: Surgical pain  Pain Location: Leg  Pain Orientation: Left  Pain Descriptors: Aching  Non-Pharmaceutical Pain Intervention(s): Ambulation/Increased Activity; Rest  Response to Pain Intervention: Patient Satisfied  Vital Signs  Patient Currently in Pain: Yes       Orientation  Orientation  Overall Orientation Status: Within Normal Limits  Cognition      Objective      Transfers  Sit to Stand: Contact guard assistance  Stand to sit: Contact guard assistance  Comment: sit to stand from chair to RW, recliner to Rw and low mat to Rw with CGA x multiple reps.   Ambulation  Ambulation?: Yes  WB Status: WBAT with BLE KI  More Ambulation?: Yes  Ambulation 1  Surface: level tile;uneven  Device: Standard Walker  Other Apparatus: Knee Immobilizer; Wheelchair follow;Right;Left  Assistance: Contact guard assistance  Quality of Gait: Slow partial step through pattern, B decreased toe clearance with B circumduction d/t B KI, B decreased heel strike, forward flexed trunk, increased UE support. Pt steady, no overt LOB  Distance: 170 ft, 90 ft  Comments: extended seated rest between bouts of gait due to increased fatigue. increased time 2* decreased velocity. Ambulation 2  Surface - 2: level tile  Device 2: Rolling Walker  Assistance 2: Contact guard assistance  Quality of Gait 2: partial step through, lacks B swing phase 2* B KI donned, compensates with hip circumduction to progress BLE, decreased velocity  Distance: 105 ft  Stairs/Curb  Stairs?: Yes  Stairs  # Steps : 4 (1+3)  Stairs Height: 6\"  Rails: Bilateral  Assistance: Minimal assistance;Contact guard assistance  Comment: pt ascended / descended 1 6\" step + 3 6 steps with lengthy seated rest between bouts due to fatigue. pt limited by B KI , utilizes increased weight shifting and hip circumduction to progress LEs. Exercises  Hip Flexion: x15 BLE  Hip Abduction: x15 BLE  Comments: completed standing with BUE support on SW       Second Session:  Pt found in recliner, agreeable to session. Reports minimal pain in LLE but does not limit activity. Activity tolerance limited by increased fatigue. Sit to stand recliner to RW with CGA  Pt completed dyn stand activity x 3 min 30 sec:  cone from table on R with RUE-> place to LUE in midline and place to various targets out of AMNA 2x6 cones total with intermittent BUE support on RW. Gait x 95 ft x 2 reps with SW, above stated gait deficits and SBA-supv. Pt in recliner with alarm donned and call light/needs within reach at end of session.    Goals  Short term goals  Time Frame for Short term goals: 1/17/22  Short term goal 1: Pt will complete bed mobility independently. Short term goal 2: Pt will complete transfers with SBA and LRAD. Short term goal 3: Pt will ambulate 100 ft with SBA and LRAD. Short term goal 4: Pt will tolerate 3 stairs with 1 handrail and CGA. Long term goals  Time Frame for Long term goals : 10 days (7/21/22)  Long term goal 1: Pt will complete transfers mod I with LRAD. Long term goal 2: Pt will ambulate 150 ft mod I with LRAD. Long term goal 3: Pt will negotiate 10 stairs with 1 handrail and supervision. Long term goal 4: Pt will complete car transfer with LRAD mod I. Patient Goals   Patient goals : \"Bend my knees, get stronger, and get back home\"    Plan    Plan  Times per week: 5-7 days/week  Times per day: Daily  Plan weeks: 1 week 1/13/22  Specific instructions for Next Treatment: progress mobility as tolerated  Current Treatment Recommendations: Strengthening,Balance Training,Endurance Training,Functional Mobility Training,Transfer Training,Gait Training,Positioning,Equipment Evaluation, Education, & procurement,Patient/Caregiver Education & Training,Safety Education & Training,Home Exercise Program,Wheelchair Mobility Training,Stair training  Safety Devices  Type of devices:  All fall risk precautions in place,Call light within reach,Gait belt,Patient at risk for falls,Nurse notified,Left in bed  Restraints  Initially in place: No     Therapy Time   Individual Concurrent Group Co-treatment   Time In 1030         Time Out 1130         Minutes 60         Timed Code Treatment Minutes: Cruz 61 Time:   Individual Concurrent Group Co-treatment   Time In 1230         Time Out 1300         Minutes 30           Timed Code Treatment Minutes:  30    Total Treatment Minutes:  90    Tamia Cabrera PT

## 2022-01-13 NOTE — CARE COORDINATION
Case Management ARU Admission Assessment     Objective:  will complete initial assessment and review the role of Case Management while on the ARU. Patient will be educated on team conferences as well as discuss family training and how it is encouraged on the unit. Order for \"discharge planning\" has been addressed. Family Present: No  Primary :Jessica Acosta     Admit date: 01/11/22          Insurance: Primary Coverage: MEDICARE/MEDICARE PART A and Secondary: BCBS/BCBS - [place] PPO    Admitting diagnosis: Rupture of distal quadriceps tendon    Current home situation: IPTA with cane, Lives with wife in a tri-level home with a no step entry into home and a 8 step entry onto the second level where bathroom is located. Patient currently works. Durable Medical Equipment at home:  Walker__Cane_x_RTS__ BSC__Shower Chair__  02__ HHN__ CPAP__  BiPap__  Hospital Bed__ W/C___ Other__________    Meds to Beds program: Yes    Services prior to admission: None    Preference for [facility] or Outpatient therapy: TBD    Patient's goal(s):Return home to prior independance    Working or Volunteering prior to admission:  _x_Yes __No                        Accessibility to community resources/transportation: Private/Wife       Has patient experienced a recent loss or significant life event that would impact their care or ability to participate? _x_No  __Yes, please explain:    Has patient ever been treated for emotional disorder(s)? _x_No  __Yes, how does this affect current situation? Is patient and family coping appropriately with stressful events and this hospitalization?   _x_Yes  __No, please explain:    Support system at home and in the community:Family    Who will be the one to contact for family training:Jessica Acosta     24 hour care on discharge: TBD    PCP: Katina Brown MD    Pharmacy: Meds to bed    Discharge plan/Summary:   spoke with patient at bedside to complete initial assessment and review the role of Case Management while on the ARU. Patient educated on team conferences as well as discuss family training and how it is encouraged on the unit. IPTA with cane, Lives with wife in a tri-level home with a no step entry into home and a 8 step entry onto the second level where bathroom is located. Patient currently works. Referral sent to [facility]>accepted while on Acute floor.  Case Management (CM) will continue to follow for discharge planning recommendations from the treatment team.

## 2022-01-13 NOTE — PROGRESS NOTES
Lazara Ann  1/13/2022  [id number]    Chief Complaint: Rupture of distal quadriceps tendon    Subjective:   Patient seen working with PT this am. Denies current complaint. He states his pain is well controlled. No acute events overnight. Labs reviewed    ROS: no n/v cp, sob, f/c    Objective:  Patient Vitals for the past 24 hrs:   BP Temp Temp src Pulse Resp SpO2 Height   01/13/22 0830 112/65 98.5 °F (36.9 °C) Axillary 117 20 100 % --   01/13/22 0741 110/69 -- -- 93 -- 98 % --   01/12/22 2215 128/86 99 °F (37.2 °C) Oral 110 20 96 % --   01/12/22 1101 -- -- -- -- -- -- 5' 10\" (1.778 m)     Gen: No distress, pleasant. HEENT: Normocephalic, atraumatic. CV: Regular rate and rhythm. Resp: No respiratory distress. Abd: Soft, nontender   Ext: No edema. Knee immobilizers in place bilaterally. Neuro: Alert, oriented, appropriately interactive. Wt Readings from Last 3 Encounters:   01/12/22 273 lb (123.8 kg)   01/06/22 274 lb 6.4 oz (124.5 kg)   04/14/20 270 lb (122.5 kg)       Laboratory data:   Lab Results   Component Value Date    WBC 8.3 01/13/2022    HGB 9.6 (L) 01/13/2022    HCT 30.3 (L) 01/13/2022    MCV 74.5 (L) 01/13/2022     01/13/2022       Lab Results   Component Value Date     01/13/2022    K 3.8 01/13/2022     01/13/2022    CO2 23 01/13/2022    BUN 17 01/13/2022    CREATININE 0.9 01/13/2022    GLUCOSE 110 01/13/2022    CALCIUM 8.3 01/13/2022        Therapy progress:  PT  Required Braces or Orthoses  Right Lower Extremity Brace: Knee Immobilizer  Left Lower Extremity Brace: Knee Immobilizer  Position Activity Restriction  Other position/activity restrictions: No B knee ROM. Bilateral legs while in knee immobilizers.  Ensure that immobilizers are appropriately positioned, holding knees in full extension  Objective     Sit to Stand: Minimal Assistance  Stand to sit: Minimal Assistance  Bed to Chair: Minimal assistance  Device: No Device  Other Apparatus: Knee

## 2022-01-13 NOTE — DISCHARGE INSTR - COC
Continuity of Care Form    Patient Name: Kem Jennings   :  1955  MRN:  [id number]    Admit date:  2022  Discharge date:  21    Code Status Order: Full Code   Advance Directives:      Admitting Physician:  Benedicto Pinon MD  PCP: Cyndi Robledo MD    Discharging Nurse: [facility] Unit/Room#: 8335/8315-96  Discharging Unit Phone Number: [phone]    Emergency Contact:   Extended Emergency Contact Information  Primary Emergency Contact: [facility]  Address: [place] [address]  Home Phone: [phone]  Work Phone: [phone]  Relation: Spouse    Past Surgical History:  Past Surgical History:   Procedure Laterality Date    BACK SURGERY  1992    inflamed nerve    COLONOSCOPY  2015    LEG MUSCLE SURGERY Bilateral 2022    BILATERAL QUADRICEPS  TENDON REPAIR performed by Tiara Chi MD at [place] [address]       Immunization History:   Immunization History   Administered Date(s) Administered    COVID-19, Pfizer, PF, 30mcg/0.3mL 10/22/2021       Active Problems:  Patient Active Problem List   Diagnosis Code    Quadriceps tendon rupture, left, initial encounter F33.546F    Quadriceps tendon rupture, right, initial encounter S76.111A    HTN (hypertension) I10    Rupture of distal quadriceps tendon S76.119A    Pre-diabetes R73.03       Isolation/Infection:   Isolation            No Isolation          Patient Infection Status       None to display            Nurse Assessment:  Last Vital Signs: /65   Pulse 117   Temp 98.5 °F (36.9 °C) (Axillary)   Resp 20   Ht 5' 10\" (1.778 m)   Wt 273 lb (123.8 kg)   SpO2 100%   BMI 39.17 kg/m²     Last documented pain score (0-10 scale): Pain Level: 0  Last Weight:   Wt Readings from Last 1 Encounters:   22 273 lb (123.8 kg)     Mental Status:  oriented    IV Access:  - None    Nursing Mobility/ADLs:  Walking   Assisted  Transfer  Assisted  Bathing Assisted  Dressing  Assisted  Toileting  Assisted  Feeding  Assisted  Med Admin  Assisted  Med Delivery   whole    Wound Care Documentation and Therapy:        Elimination:  Continence: Bowel: No  Bladder: No  Urinary Catheter: None   Colostomy/Ileostomy/Ileal Conduit: No       Date of Last BM: per pt report    Intake/Output Summary (Last 24 hours) at 1/13/2022 0943  Last data filed at 1/13/2022 9614  Gross per 24 hour   Intake 360 ml   Output 1300 ml   Net -940 ml     I/O last 3 completed shifts: In: 5 [P.O.:720]  Out: 1450 [Urine:1450]    Safety Concerns: At Risk for Falls    Impairments/Disabilities:      None    Nutrition Therapy:  Current Nutrition Therapy:   - Oral Diet:  General    Routes of Feeding: Oral  Liquids: Thin Liquids  Daily Fluid Restriction: no  Last Modified Barium Swallow with Video (Video Swallowing Test): not done    Treatments at the Time of Hospital Discharge:   Respiratory Treatments: none  Oxygen Therapy:  is not on home oxygen therapy.   Ventilator:    - No ventilator support    Rehab Therapies: Physical Therapy  Weight Bearing Status/Restrictions: No weight bearing restirctions  Other Medical Equipment (for information only, NOT a DME order):  wheelchair  Other Treatments: bilateral dressings on quadriceps, staples in place    Patient's personal belongings (please select all that are sent with patient):  Glasses    RN SIGNATURE:  Electronically signed by Yasir Thurman RN on 1/20/22 at 3:51 PM EST    CASE MANAGEMENT/SOCIAL WORK SECTION    Inpatient Status Date: 01/11/2022    Readmission Risk Assessment Score:  Readmission Risk              Risk of Unplanned Readmission:  12           Discharging to Facility/ [address]   [phone]    / signature: Electronically signed by Arabella Alejandro RN on 1/20/22 at 9:53 AM EST    PHYSICIAN SECTION    Prognosis: Good    Condition at Discharge: Stable    Rehab Potential (if transferring to Rehab): Good    Recommended Labs or Other Treatments After Discharge: home health as ordered; follow up with PCP, orthopedics    Physician Certification: I certify the above information and transfer of Falguni Gould  is necessary for the continuing treatment of the diagnosis listed and that he requires [address] for less 30 days.      Update Admission H&P: No change in H&P    PHYSICIAN SIGNATURE:  Electronically signed by Jeanie Castaneda MD on 1/19/22 at 11:25 AM EST

## 2022-01-13 NOTE — CARE COORDINATION
ARU Team Conference       Planned Discharge Date: 01/21/22  Durable medical equipment needed:W/C and shower chair    Discharge Plan: CM spoke with patient with updated DCP. Home with assist and home care PT/OT. Patient agreeable with DCP. CM will continue to support for discharge needs. Tracey De La O RN    [id number] spoke with Tj Mata from [facility] ([phone]) to confirm the referral for PT/OT>Following at this time.  Tracey De La O RN

## 2022-01-13 NOTE — PROGRESS NOTES
Occupational Therapy  Facility/Department: [facility] ARU  Daily Treatment Note  NAME: Lin Xavier  : 1955  MRN: [id number]    Date of Service: 2022    Discharge Recommendations:  Home with assist PRN,Home with Home health OT,S Level 1  OT Equipment Recommendations  Equipment Needed: Yes  Mobility Devices: ADL Assistive Devices  ADL Assistive Devices: Transfer Tub Bench;Grab Bars - tub;Grab Bars - toilet; Toileting - Raised Toilet Seat with arms    Assessment   Performance deficits / Impairments: Decreased functional mobility ; Decreased ADL status; Decreased balance;Decreased endurance;Decreased strength;Decreased high-level IADLs;Decreased posture  Assessment: First Session: Pt agreeable to OT session. Pt performed sit<>stands from EOB and recliner with CGA and completed sit<>stands x5 from recliner. Pt required max A to stand from bed on first attempt but able to improve to CGA with repetition. Pt completed grooming at sink with mod I and demonstrated SPV for mobility with SW. Pt completed core exercises with good strength for 6# medicine ball. Second Session: Pt completed mobility to gym with mod I and use of SW. Pt completed BUE ther ex with good strength and fair balance in stance without UE support, requiring min A to correct posterior lean but able to continue standing with SBA for remainder of 1 minute and 3 minutes of standing. Continue POC. Prognosis: Good  OT Education: OT Role;Plan of Care;Transfer Training;Precautions; ADL Adaptive Strategies; Equipment;Home Exercise Program  Patient Education: Disease specific: precautions, mobility/safety with transfers, position of knee immobilizers, safety with ADLs, ther ex  Barriers to Learning: Pt verbalized understanding.   REQUIRES OT FOLLOW UP: Yes  Activity Tolerance  Activity Tolerance: Patient Tolerated treatment well;Patient limited by fatigue  Safety Devices  Safety Devices in place: Yes  Type of devices: Call light within reach;Nurse notified;Gait belt;Left in chair         Patient Diagnosis(es): There were no encounter diagnoses. has a past medical history of 01 minute Apgar score 0, Hypertension, Kidney stone, Mitral valve prolapse, Sarcoidosis, and Spinal stenosis. has a past surgical history that includes back surgery (); Colonoscopy (2015); and Leg Muscle Surgery (Bilateral, 2022). Restrictions  Restrictions/Precautions  Restrictions/Precautions: Weight Bearing,Fall Risk,General Precautions,ROM Restrictions  Required Braces or Orthoses?: Yes  Lower Extremity Weight Bearing Restrictions  Right Lower Extremity Weight Bearing: Weight Bearing As Tolerated  Left Lower Extremity Weight Bearing: Weight Bearing As Tolerated  Required Braces or Orthoses  Right Lower Extremity Brace: Knee Immobilizer  Left Lower Extremity Brace: Knee Immobilizer  Position Activity Restriction  Other position/activity restrictions: No B knee ROM. Bilateral legs while in knee immobilizers. Ensure that immobilizers are appropriately positioned, holding knees in full extension  Subjective   General  Chart Reviewed: Arvilla Darlyn and Physical,Progress Notes,Imaging,Labs  Patient assessed for rehabilitation services?: Yes  Additional Pertinent Hx: HTN, PNA  Family / Caregiver Present: No  Referring Practitioner: Nathan Zambrano MD  Diagnosis: B quad tendon rupture s/p B quad tendon repair 22  Subjective  Subjective: Pt in bed, resting, easily arousable, agreeable to OT session.   Vital Signs  Pulse: 93  Heart Rate Source: Monitor  BP: 110/69  BP Location: Right upper arm  Patient Position: Semi fowlers  Patient Currently in Pain: Denies  Oxygen Therapy  SpO2: 98 %  Pulse Oximeter Device Mode: Intermittent  Pulse Oximeter Device Location: Right;Finger  O2 Device: None (Room air)   Orientation  Orientation  Overall Orientation Status: Within Functional Limits  Objective    ADL  Grooming: Independent (standing at sink for oral hygiene and washing face)  LE Dressing: Minimal assistance (to fully pull up socks, good use of reacher and sock aid to doff/don socks, difficulty due to ACE wrap on feet)        Balance  Sitting Balance: Independent  Standing Balance: Maximum assistance (initially when attempting to stand from bed, mod I with SW for majority of session)  Standing Balance  Time: 3:45, 5 minutes x2  Activity: transfer to/from bathroom, grooming at sink, functional mobility to/from gym  Comment: with SW  Functional Mobility  Functional - Mobility Device: No device  Activity: To/from bathroom; To/From therapy gym  Assist Level: Supervision  Functional Mobility Comments: SPV to mod I  Bed mobility  Supine to Sit: Supervision  Transfers  Stand Step Transfers: Contact guard assistance  Sit to stand: Maximum assistance  Stand to sit: Contact guard assistance  Transfer Comments: max A to stand on first attempt from bed, CGA to stand from all other surfaces for remainder of session        Coordination  Gross Motor: good coordination for ADLs and ther ex              Cognition  Overall Cognitive Status: WFL                    Type of ROM/Therapeutic Exercise  Type of ROM/Therapeutic Exercise: Free weights  Comment: 5#  Exercises  Shoulder Flexion: x25  Shoulder Extension: x25  Horizontal ABduction: x25  Horizontal ADduction: x25  Elbow Flexion: x25  Elbow Extension: x25  Supination: x25  Pronation: x25  Wrist Flexion: x25  Wrist Extension: x25  Other: chest press x25, core rotation, obliques, and a-p leaning 2x25 with 6# medicine ball; forward/backward rows x25 with 5# dowel afshin                    Plan   Plan  Times per week: 5/7 days/week  Plan weeks: 10 days  Current Treatment Recommendations: Endurance Training,Balance Training,Functional Mobility Training,Safety Education & Training,Self-Care / ADL,Patient/Caregiver Education & Training,Strengthening,ROM,Equipment Evaluation, Education, & procurement,Home Management Training    Goals  Short term goals  Time Frame for Short term goals: 6 days- 1/17/22  Short term goal 1: Pt will complete functional transfers with min A. Short term goal 2: Pt will perform toileting with min A. Short term goal 3: Pt will complete LB dressing with mod A. Long term goals  Time Frame for Long term goals : 10 days 1/21/22  Long term goal 1: Pt will perform functional transfers with SBA. Long term goal 2: Pt will complete toileting with supervision. Long term goal 3: Pt will perform LB bathing with supervision. Long term goal 4: Pt will complete LB dressing with min A. Long term goal 5: Pt will complete simple IADL task with supervision.   Patient Goals   Patient goals : \"to get to the point where I can bend my knees, to get stronger, and to get out\"       Therapy Time   Individual Concurrent Group Co-treatment   Time In 0730         Time Out 0830         Minutes 60         Timed Code Treatment Minutes: 60 Minutes    Second Session Therapy Time:   Individual Concurrent Group Co-treatment   Time In 1000         Time Out 1030         Minutes 30           Timed Code Treatment Minutes:  30 Minutes    Total Treatment Minutes:  90 minutes      Eber Schilder, OT

## 2022-01-14 LAB
GLUCOSE BLD-MCNC: 108 MG/DL (ref 70–99)
GLUCOSE BLD-MCNC: 110 MG/DL (ref 70–99)
GLUCOSE BLD-MCNC: 121 MG/DL (ref 70–99)
GLUCOSE BLD-MCNC: 95 MG/DL (ref 70–99)
PERFORMED ON: ABNORMAL
PERFORMED ON: NORMAL

## 2022-01-14 PROCEDURE — 97530 THERAPEUTIC ACTIVITIES: CPT

## 2022-01-14 PROCEDURE — 97110 THERAPEUTIC EXERCISES: CPT

## 2022-01-14 PROCEDURE — 97116 GAIT TRAINING THERAPY: CPT

## 2022-01-14 PROCEDURE — 6370000000 HC RX 637 (ALT 250 FOR IP): Performed by: NURSE PRACTITIONER

## 2022-01-14 PROCEDURE — 1280000000 HC REHAB R&B

## 2022-01-14 PROCEDURE — 6360000002 HC RX W HCPCS: Performed by: PHYSICAL MEDICINE & REHABILITATION

## 2022-01-14 PROCEDURE — 6370000000 HC RX 637 (ALT 250 FOR IP): Performed by: PHYSICAL MEDICINE & REHABILITATION

## 2022-01-14 PROCEDURE — 97535 SELF CARE MNGMENT TRAINING: CPT

## 2022-01-14 RX ORDER — SENNA AND DOCUSATE SODIUM 50; 8.6 MG/1; MG/1
1 TABLET, FILM COATED ORAL 2 TIMES DAILY
Status: DISCONTINUED | OUTPATIENT
Start: 2022-01-14 | End: 2022-01-20 | Stop reason: HOSPADM

## 2022-01-14 RX ORDER — BISACODYL 10 MG
10 SUPPOSITORY, RECTAL RECTAL DAILY PRN
Status: DISCONTINUED | OUTPATIENT
Start: 2022-01-14 | End: 2022-01-20 | Stop reason: HOSPADM

## 2022-01-14 RX ADMIN — ENOXAPARIN SODIUM 30 MG: 100 INJECTION SUBCUTANEOUS at 22:09

## 2022-01-14 RX ADMIN — SENNOSIDES AND DOCUSATE SODIUM 1 TABLET: 50; 8.6 TABLET ORAL at 12:01

## 2022-01-14 RX ADMIN — METOPROLOL SUCCINATE 25 MG: 25 TABLET, FILM COATED, EXTENDED RELEASE ORAL at 08:12

## 2022-01-14 RX ADMIN — LISINOPRIL 10 MG: 10 TABLET ORAL at 22:09

## 2022-01-14 RX ADMIN — CYCLOBENZAPRINE 5 MG: 10 TABLET, FILM COATED ORAL at 22:09

## 2022-01-14 RX ADMIN — SENNOSIDES AND DOCUSATE SODIUM 1 TABLET: 50; 8.6 TABLET ORAL at 22:11

## 2022-01-14 RX ADMIN — MULTIPLE VITAMINS W/ MINERALS TAB 1 TABLET: TAB at 08:12

## 2022-01-14 RX ADMIN — CYCLOBENZAPRINE 5 MG: 10 TABLET, FILM COATED ORAL at 08:12

## 2022-01-14 RX ADMIN — OXYCODONE HYDROCHLORIDE AND ACETAMINOPHEN 250 MG: 500 TABLET ORAL at 08:12

## 2022-01-14 RX ADMIN — ENOXAPARIN SODIUM 30 MG: 100 INJECTION SUBCUTANEOUS at 08:11

## 2022-01-14 RX ADMIN — GABAPENTIN 300 MG: 300 CAPSULE ORAL at 22:09

## 2022-01-14 RX ADMIN — POLYETHYLENE GLYCOL 3350 17 G: 17 POWDER, FOR SOLUTION ORAL at 08:12

## 2022-01-14 ASSESSMENT — PAIN DESCRIPTION - DESCRIPTORS: DESCRIPTORS: ACHING

## 2022-01-14 ASSESSMENT — PAIN SCALES - GENERAL
PAINLEVEL_OUTOF10: 0
PAINLEVEL_OUTOF10: 0
PAINLEVEL_OUTOF10: 2

## 2022-01-14 ASSESSMENT — PAIN DESCRIPTION - LOCATION: LOCATION: LEG

## 2022-01-14 ASSESSMENT — PAIN DESCRIPTION - ORIENTATION: ORIENTATION: LEFT

## 2022-01-14 ASSESSMENT — PAIN DESCRIPTION - PAIN TYPE: TYPE: SURGICAL PAIN

## 2022-01-14 NOTE — PROGRESS NOTES
1/5/2022). Restrictions  Restrictions/Precautions  Restrictions/Precautions: Weight Bearing,Fall Risk,General Precautions,ROM Restrictions  Required Braces or Orthoses?: Yes  Lower Extremity Weight Bearing Restrictions  Right Lower Extremity Weight Bearing: Weight Bearing As Tolerated  Left Lower Extremity Weight Bearing: Weight Bearing As Tolerated  Required Braces or Orthoses  Right Lower Extremity Brace: Knee Immobilizer  Left Lower Extremity Brace: Knee Immobilizer  Position Activity Restriction  Other position/activity restrictions: No B knee ROM. Bilateral legs while in knee immobilizers. Ensure that immobilizers are appropriately positioned, holding knees in full extension. PA cleared pt to remove KI while bathing with knees kept extended throughout bathing. Subjective   General  Chart Reviewed: Yes  Response To Previous Treatment: Patient with no complaints from previous session. Family / Caregiver Present: No  Referring Practitioner: Jasmin  Subjective  Subjective: reports 2/10 pain LLE  General Comment  Comments: found in recliner  Pain Screening  Patient Currently in Pain: Yes  Pain Assessment  Pain Level: 2  Pain Type: Surgical pain  Pain Location: Leg  Pain Orientation: Left  Pain Descriptors: Aching  Non-Pharmaceutical Pain Intervention(s): Ambulation/Increased Activity;Repositioned  Response to Pain Intervention: Patient Satisfied  Vital Signs  Patient Currently in Pain: Yes       Orientation  Orientation  Overall Orientation Status: Within Normal Limits  Cognition      Objective      Transfers  Sit to Stand: Contact guard assistance;Stand by assistance  Stand to sit: Contact guard assistance;Stand by assistance  Comment: sit to stand from recliner to SW with CGA-SBA. sit to stand w/c to SW x 2-3 reps with CGA-SBA.  increased time due to  BKIs  Ambulation  Ambulation?: Yes  WB Status: WBAT with BLE KI  Ambulation 1  Surface: level tile  Device: Standard Walker  Assistance: Stand by assistance  Quality of Gait: Slow partial step through pattern, B decreased toe clearance with B circumduction d/t B KI, B decreased heel strike, forward flexed trunk, increased UE support. Pt steady, no overt LOB  Distance: 115 ft x 2 reps  Stairs/Curb  Stairs?: Yes  Stairs  # Steps : 4  Stairs Height: 6\"  Rails: Bilateral  Assistance: Contact guard assistance;Stand by assistance  Comment: pt ascended/descended 4 6\" steps with BHR, step to pattern and CGA-SBA, B KIs donned      Second Session:  Found in recliner, continues to report minimal pain. Pleasant and agreeable. Sit to stand recliner to Sw with sba  Gait x 180 ft with SW, increased time with above stated gait deficits (10 ft turf)    Pt ascended/descedned 8 6\" steps with BHR, step to pattern and SBA with increased time due to B KIs    Pt tolerated 12 min of static/dyn standing activity with shoulder width AMNA on ground while completing puzzle with supv-mod ind. Pt completed 2x15 PNF D1 flexion pattern with BUE each with 4# medicine ball    Pt completed 2x15 seated crunches with 4# medicine ball    Pt completed 3 min of chetan afshin (2.5#) taps seated with forward /R/L direction changes for trunk strengthening with supv. Gait x 105 ft to room with SW, supv and decreased velocity. Pt in recliner with alarm donned and call light/needs within reach at end of session  Goals  Short term goals  Time Frame for Short term goals: 1/17/22  Short term goal 1: Pt will complete bed mobility independently. Short term goal 2: Pt will complete transfers with SBA and LRAD. Short term goal 3: Pt will ambulate 100 ft with SBA and LRAD. Short term goal 4: Pt will tolerate 3 stairs with 1 handrail and CGA. Long term goals  Time Frame for Long term goals : 10 days (7/21/22)  Long term goal 1: Pt will complete transfers mod I with LRAD. Long term goal 2: Pt will ambulate 150 ft mod I with LRAD.   Long term goal 3: Pt will negotiate 10 stairs with 1 handrail and supervision. Long term goal 4: Pt will complete car transfer with LRAD mod I. Patient Goals   Patient goals : \"Bend my knees, get stronger, and get back home\"    Plan    Plan  Times per week: 5-7 days/week  Times per day: Daily  Plan weeks: 1 week 1/13/22  Specific instructions for Next Treatment: progress mobility as tolerated  Current Treatment Recommendations: Strengthening,Balance Training,Endurance Training,Functional Mobility Training,Transfer Training,Gait Training,Positioning,Equipment Evaluation, Education, & procurement,Patient/Caregiver Education & Training,Safety Education & Training,Home Exercise Program,Wheelchair Mobility Training,Stair training  Safety Devices  Type of devices:  All fall risk precautions in place,Call light within reach,Gait belt,Patient at risk for falls,Nurse notified,Left in bed  Restraints  Initially in place: No     Therapy Time   Individual Concurrent Group Co-treatment   Time In 1230         Time Out 1300         Minutes 30         Timed Code Treatment Minutes: 30 Minutes     Second Session Therapy Time:   Individual Concurrent Group Co-treatment   Time In 1330         Time Out 1430         Minutes 60           Timed Code Treatment Minutes:  60    Total Treatment Minutes:  90    Flor Blanco, PT

## 2022-01-14 NOTE — PROGRESS NOTES
Fabiano Grijalva  1/14/2022  [id number]    Chief Complaint: Rupture of distal quadriceps tendon    Subjective:   Patient seen resting in bed this am. He denies current complaint, states his pain is minimal. He states that he has not had a BM since admission. Denies nausea, but admits to little appetite. Denies passing flatus. Bowel sounds active. Will add secondary stool softener. Labs reviewed    ROS: no n/v cp, sob, f/c    Objective:  Patient Vitals for the past 24 hrs:   BP Temp Temp src Pulse Resp SpO2   01/14/22 0839 105/73 -- -- 97 -- 98 %   01/14/22 0800 119/86 98.4 °F (36.9 °C) Oral 93 16 96 %   01/13/22 2013 131/80 97.9 °F (36.6 °C) Oral 101 18 98 %     Gen: No distress, pleasant. HEENT: Normocephalic, atraumatic. CV: Regular rate and rhythm. Resp: No respiratory distress. Abd: Soft, nontender, active bowel sounds present  Ext: No edema. Knee immobilizers in place bilaterally. Neuro: Alert, oriented, appropriately interactive. Wt Readings from Last 3 Encounters:   01/12/22 273 lb (123.8 kg)   01/06/22 274 lb 6.4 oz (124.5 kg)   04/14/20 270 lb (122.5 kg)       Laboratory data:   Lab Results   Component Value Date    WBC 8.3 01/13/2022    HGB 9.6 (L) 01/13/2022    HCT 30.3 (L) 01/13/2022    MCV 74.5 (L) 01/13/2022     01/13/2022       Lab Results   Component Value Date     01/13/2022    K 3.8 01/13/2022     01/13/2022    CO2 23 01/13/2022    BUN 17 01/13/2022    CREATININE 0.9 01/13/2022    GLUCOSE 110 01/13/2022    CALCIUM 8.3 01/13/2022        Therapy progress:  PT  Required Braces or Orthoses  Right Lower Extremity Brace: Knee Immobilizer  Left Lower Extremity Brace: Knee Immobilizer  Position Activity Restriction  Other position/activity restrictions: No B knee ROM. Bilateral legs while in knee immobilizers.  Ensure that immobilizers are appropriately positioned, holding knees in full extension  Objective     Sit to Stand: Contact guard assistance  Stand to sit: Contact guard assistance  Bed to Chair: Minimal assistance  Device: Standard Walker  Other Apparatus: Knee Immobilizer,Wheelchair follow,Right,Left  Assistance: Contact guard assistance  Distance: 170 ft, 90 ft  OT  PT Equipment Recommendations  Equipment Needed: Yes  Mobility Devices: Kiana Josie: Standard  Other: TBD     Assessment        SLP                Body mass index is 39.17 kg/m². Rehabilitation Diagnosis:  Orthopedic, 8.9, Other Orthopedic     Assessment and Plan:  BLE distal quadriceps tendon rupture s/p repair  - restrictions per o/s  - pain control     Pneumonia  - completed abx     HTN  - follow bp on home regimen       Obesity  - dietary consulted     Bowels: Schedule stool softener. Follow bowel movements. Enema or suppository if needed.      Bladder: Check PVR x 3. [address] if PVR > 200ml or if any volume is > 500 ml.      Sleep: Trazodone provided prn.      Follow up appointments: orthopedics, pcp    Electronically signed by MARISOL Wolf CNP on 1/14/2022 at 10:51 AM     The patient was seen in conjunction with the nurse practitioner with independent history, evaluation and examination. I agree with the note which has been adjusted to reflect my findings. I have had face to face contact with the patient and performed a substantive portion of the E/M visit. In summary, patient feeling well and making good progress with therapies. Bowel regimen increased for constipation. [facility].  Irma Gee MD 1/14/2022, 4:59 PM

## 2022-01-14 NOTE — PROGRESS NOTES
Occupational Therapy  Facility/Department: [facility]  Daily Treatment Note  NAME: Leo Wiseman  : 1955  MRN: [id number]    Date of Service: 2022    Discharge Recommendations:  Home with assist PRN,Home with Home health OT,S Level 1  OT Equipment Recommendations  Equipment Needed: Yes  Mobility Devices: ADL Assistive Devices  ADL Assistive Devices: Transfer Tub Bench;Grab Bars - tub;Grab Bars - toilet; Toileting - Raised Toilet Seat with arms    Assessment   Performance deficits / Impairments: Decreased functional mobility ; Decreased ADL status; Decreased balance;Decreased endurance;Decreased strength;Decreased high-level IADLs;Decreased posture  Assessment: First Session: Pt agreeable to OT session. Pt completed standing at sink for grooming for 12 minutes with mod I. Pt completed BUE ther ex with good strength while seated in recliner. Second Session: Pt completed mobility to/from bathroom with SW with SPV/SBA. Pt completed bathing with supervision while seated but min VCs for safety with keeping knees extended and not standing up without KIs. Pt completed UB dressing with independence and able to gather clothing and min A for LB dressing. Pt required assist to adjust KI properly but able to complete donning underwear/pants with reacher and min VCs and socks with sock aid and setup. Continue POC. Prognosis: Good  OT Education: OT Role;Plan of Care;Transfer Training;Precautions; ADL Adaptive Strategies; Equipment;Home Exercise Program  Patient Education: Disease specific: precautions, mobility/safety with transfers, position of knee immobilizers, safety with ADLs, ther ex, use of a/e, safety in shower without KI  Barriers to Learning: Pt verbalized understanding but will benefit from reinforcement with bathing without KIs.   REQUIRES OT FOLLOW UP: Yes  Activity Tolerance  Activity Tolerance: Patient Tolerated treatment well;Patient limited by fatigue  Safety Devices  Safety Devices in place: Yes  Type of devices: Call light within reach;Nurse notified;Gait belt;Left in chair         Patient Diagnosis(es): There were no encounter diagnoses. has a past medical history of 01 minute Apgar score 0, Hypertension, Kidney stone, Mitral valve prolapse, Sarcoidosis, and Spinal stenosis. has a past surgical history that includes back surgery (); Colonoscopy (2015); and Leg Muscle Surgery (Bilateral, 2022). Restrictions  Restrictions/Precautions  Restrictions/Precautions: Weight Bearing,Fall Risk,General Precautions,ROM Restrictions  Required Braces or Orthoses?: Yes  Lower Extremity Weight Bearing Restrictions  Right Lower Extremity Weight Bearing: Weight Bearing As Tolerated  Left Lower Extremity Weight Bearing: Weight Bearing As Tolerated  Required Braces or Orthoses  Right Lower Extremity Brace: Knee Immobilizer  Left Lower Extremity Brace: Knee Immobilizer  Position Activity Restriction  Other position/activity restrictions: No B knee ROM. Bilateral legs while in knee immobilizers. Ensure that immobilizers are appropriately positioned, holding knees in full extension. PA cleared pt to remove KI while bathing with knees kept extended throughout bathing. Subjective   General  Chart Reviewed: Loulou Gauthier and Physical,Progress Notes,Imaging,Labs  Patient assessed for rehabilitation services?: Yes  Additional Pertinent Hx: HTN, PNA  Family / Caregiver Present: No  Referring Practitioner: Leonarda Quintanilla MD  Diagnosis: B quad tendon rupture s/p B quad tendon repair 22  Subjective  Subjective: Pt in bed, pleasant, agreeable to OT session.   Vital Signs  Pulse: 97  Heart Rate Source: Monitor  BP: 105/73  BP Location: Right upper arm  Patient Position: Semi fowlers  Patient Currently in Pain: Denies  Oxygen Therapy  SpO2: 98 %  Pulse Oximeter Device Mode: Intermittent  Pulse Oximeter Device Location: Left;Finger  O2 Device: None (Room air)   Orientation  Orientation  Overall Orientation Status: Within Functional Limits  Objective    ADL  Grooming: Independent (standing at sink for oral hygiene and shaving)  UE Bathing: Independent  LE Bathing: Supervision (mod VCs for keeping knees straight without KIs)  UE Dressing: Independent  LE Dressing: Minimal assistance (to adjust KI only, good use of reacher and sock aid to don socks, underwear, and pants, min VCs for use of reacher with underwear)        Balance  Sitting Balance: Independent  Standing Balance: Supervision (for mobility, mod I for static standing)  Standing Balance  Time: 12 minutes  Activity: transfer to/from bathroom, grooming at sink  Comment: with   Functional Mobility  Functional - Mobility Device: Standard Walker  Activity: To/from bathroom  Assist Level: Supervision  Shower Transfers  Shower - Transfer From: [facility] - Transfer Type: To and From  Shower - Transfer To: Transfer tub bench  Shower - Technique: Ambulating  Shower Transfers: Supervision  Bed mobility  Supine to Sit: Supervision  Transfers  Stand Step Transfers: Stand by assistance  Sit to stand: Stand by assistance  Stand to sit: Supervision        Coordination  Gross Motor: good coordination for ADLs and ther ex              Cognition  Overall Cognitive Status: WFL  Cognition Comment: min VCs for keeping knees straight while bathing with KI removed. Pt attempted to stand once without KI even after being told to not stand without KI.                     Type of ROM/Therapeutic Exercise  Type of ROM/Therapeutic Exercise: Free weights  Comment: 5#  Exercises  Shoulder Flexion: x25  Shoulder Extension: x25  Horizontal ABduction: x25  Horizontal ADduction: x25  Elbow Flexion: x25  Elbow Extension: x25  Supination: x25  Pronation: x25  Wrist Flexion: x25  Wrist Extension: x25  Other: chest press x25; core rotation, obliques, and a-p leaning x25 with 6# medicine ball seated, core rotation, obliques, and forward/backward rows x25 each while standing with 6# medicine ball                    Plan   Plan  Times per week: 5/7 days/week  Plan weeks: 10 days  Current Treatment Recommendations: Endurance Training,Balance Training,Functional Mobility Training,Safety Education & Training,Self-Care / ADL,Patient/Caregiver Education & Training,Strengthening,ROM,Equipment Evaluation, Education, & procurement,Home Management Training    Goals  Short term goals  Time Frame for Short term goals: 6 days- 1/17/22  Short term goal 1: Pt will complete functional transfers with min A. GOAL MET 1/14/22 Pt completed functional transfers with SBA. Short term goal 2: Pt will perform toileting with min A. Short term goal 3: Pt will complete LB dressing with mod A. GOAL MET 1/14/22 Pt completed LB dressing with min A. Long term goals  Time Frame for Long term goals : 10 days 1/21/22  Long term goal 1: Pt will perform functional transfers with SBA. Long term goal 2: Pt will complete toileting with supervision. Long term goal 3: Pt will perform LB bathing with supervision. Long term goal 4: Pt will complete LB dressing with min A. Long term goal 5: Pt will complete simple IADL task with supervision.   Patient Goals   Patient goals : \"to get to the point where I can bend my knees, to get stronger, and to get out\"       Therapy Time   Individual Concurrent Group Co-treatment   Time In 0830         Time Out 0900         Minutes 30         Timed Code Treatment Minutes: 30 Minutes    Second Session Therapy Time:   Individual Concurrent Group Co-treatment   Time In 1030         Time Out 1130         Minutes 60           Timed Code Treatment Minutes:  60 Minutes    Total Treatment Minutes:  90 minutes      Justin Camara OT

## 2022-01-15 LAB
GLUCOSE BLD-MCNC: 111 MG/DL (ref 70–99)
GLUCOSE BLD-MCNC: 119 MG/DL (ref 70–99)
GLUCOSE BLD-MCNC: 98 MG/DL (ref 70–99)
GLUCOSE BLD-MCNC: 99 MG/DL (ref 70–99)
PERFORMED ON: ABNORMAL
PERFORMED ON: ABNORMAL
PERFORMED ON: NORMAL
PERFORMED ON: NORMAL

## 2022-01-15 PROCEDURE — 6360000002 HC RX W HCPCS: Performed by: PHYSICAL MEDICINE & REHABILITATION

## 2022-01-15 PROCEDURE — 97116 GAIT TRAINING THERAPY: CPT

## 2022-01-15 PROCEDURE — 6370000000 HC RX 637 (ALT 250 FOR IP): Performed by: NURSE PRACTITIONER

## 2022-01-15 PROCEDURE — 6370000000 HC RX 637 (ALT 250 FOR IP): Performed by: PHYSICAL MEDICINE & REHABILITATION

## 2022-01-15 PROCEDURE — 1280000000 HC REHAB R&B

## 2022-01-15 PROCEDURE — 97530 THERAPEUTIC ACTIVITIES: CPT

## 2022-01-15 PROCEDURE — 97110 THERAPEUTIC EXERCISES: CPT

## 2022-01-15 RX ADMIN — CYCLOBENZAPRINE 5 MG: 10 TABLET, FILM COATED ORAL at 21:34

## 2022-01-15 RX ADMIN — ENOXAPARIN SODIUM 30 MG: 100 INJECTION SUBCUTANEOUS at 21:38

## 2022-01-15 RX ADMIN — GABAPENTIN 300 MG: 300 CAPSULE ORAL at 21:34

## 2022-01-15 RX ADMIN — ENOXAPARIN SODIUM 30 MG: 100 INJECTION SUBCUTANEOUS at 10:04

## 2022-01-15 RX ADMIN — OXYCODONE HYDROCHLORIDE AND ACETAMINOPHEN 250 MG: 500 TABLET ORAL at 10:04

## 2022-01-15 RX ADMIN — SENNOSIDES AND DOCUSATE SODIUM 1 TABLET: 50; 8.6 TABLET ORAL at 21:34

## 2022-01-15 RX ADMIN — CYCLOBENZAPRINE 5 MG: 10 TABLET, FILM COATED ORAL at 10:04

## 2022-01-15 RX ADMIN — MULTIPLE VITAMINS W/ MINERALS TAB 1 TABLET: TAB at 10:03

## 2022-01-15 RX ADMIN — LISINOPRIL 10 MG: 10 TABLET ORAL at 21:42

## 2022-01-15 RX ADMIN — POLYETHYLENE GLYCOL 3350 17 G: 17 POWDER, FOR SOLUTION ORAL at 10:04

## 2022-01-15 RX ADMIN — SENNOSIDES AND DOCUSATE SODIUM 1 TABLET: 50; 8.6 TABLET ORAL at 10:04

## 2022-01-15 RX ADMIN — METOPROLOL SUCCINATE 25 MG: 25 TABLET, FILM COATED, EXTENDED RELEASE ORAL at 10:03

## 2022-01-15 ASSESSMENT — PAIN SCALES - GENERAL
PAINLEVEL_OUTOF10: 0
PAINLEVEL_OUTOF10: 2
PAINLEVEL_OUTOF10: 0

## 2022-01-15 ASSESSMENT — PAIN DESCRIPTION - LOCATION: LOCATION: FOOT

## 2022-01-15 ASSESSMENT — PAIN DESCRIPTION - DESCRIPTORS: DESCRIPTORS: ACHING

## 2022-01-15 ASSESSMENT — PAIN DESCRIPTION - ORIENTATION: ORIENTATION: LEFT

## 2022-01-15 NOTE — PROGRESS NOTES
Physical Therapy  Facility/Department: [facility]  Daily Treatment Note  NAME: Laina Newberry  : 1955  MRN: [id number]    Date of Service: 1/15/2022    Discharge Recommendations:  Home with assist PRN,Home with Home health PT   PT Equipment Recommendations  Equipment Needed: Yes  Mobility Devices: Justine Mcdowell: Standard  Other: TBD    Assessment   Body structures, Functions, Activity limitations: Decreased functional mobility ; Decreased ROM; Decreased endurance;Decreased balance; Increased pain;Decreased strength  Assessment: Pt presents w/ similar level of function compared to previous sessions. Focus of session was on amb and stair navigation. Pt tolerated amb very well but req a 5 min rest break after each time (2x total). Pt navigates stairs well and presents w/ extreme circumduction and lateral lean d/t limitations of BKI. Pt reports he feels fatigued after stairs, needed 5 min seated rest. Pt will cont to benefit from skilled PT services in [place] to improve endurance and activity tolerance for safe transition home. Pt will benefit from home w/ PRN assist and HHPT upon d/c. Treatment Diagnosis: Decreased independence with functional mobility  Specific instructions for Next Treatment: progress mobility as tolerated  Prognosis: Good  Decision Making: Medium Complexity  PT Education: Goals; General Safety; Disease Specific Education;PT Role;Plan of Care;Precautions;Transfer Training;Weight-bearing Education;Equipment;Gait Training;Functional Mobility Training; Injury Prevention;Pressure Relief;Home Exercise Program  Patient Education: Pt educated on gait mechanics and importance of equal step length for BLE. Pt demos understanding  Barriers to Learning: none  REQUIRES PT FOLLOW UP: Yes  Activity Tolerance  Activity Tolerance: Patient limited by fatigue;Patient limited by endurance     Patient Diagnosis(es): There were no encounter diagnoses.      has a past medical history of 01 minute Apgar score 0, Hypertension, Kidney stone, Mitral valve prolapse, Sarcoidosis, and Spinal stenosis. has a past surgical history that includes back surgery (1992); Colonoscopy (5/5/2015); and Leg Muscle Surgery (Bilateral, 1/5/2022). Restrictions  Restrictions/Precautions  Restrictions/Precautions: Weight Bearing,Fall Risk,General Precautions,ROM Restrictions  Required Braces or Orthoses?: Yes  Lower Extremity Weight Bearing Restrictions  Right Lower Extremity Weight Bearing: Weight Bearing As Tolerated  Left Lower Extremity Weight Bearing: Weight Bearing As Tolerated  Required Braces or Orthoses  Right Lower Extremity Brace: Knee Immobilizer  Left Lower Extremity Brace: Knee Immobilizer  Position Activity Restriction  Other position/activity restrictions: No B knee ROM. Bilateral legs while in knee immobilizers. Ensure that immobilizers are appropriately positioned, holding knees in full extension. PA cleared pt to remove KI while bathing with knees kept extended throughout bathing. Subjective   General  Chart Reviewed: Yes  Response To Previous Treatment: Patient with no complaints from previous session. Family / Caregiver Present: No  Referring Practitioner: Jasmin  Subjective  Subjective: Pt agreeable to PT  General Comment  Comments: found in recliner  Pain Screening  Patient Currently in Pain: Denies  Vital Signs  Pulse: 95  BP: 125/80  BP Location: Right upper arm  Patient Currently in Pain: Denies  Oxygen Therapy  SpO2: 97 %  Pulse Oximeter Device Mode: Intermittent  O2 Device: None (Room air)       Orientation  Orientation  Overall Orientation Status: Within Normal Limits  Cognition   Cognition  Overall Cognitive Status: WFL  Objective   Bed mobility  Comment: [place], pt up in chair at start and end of session  Transfers  Sit to Stand: Contact guard assistance;Stand by assistance  Stand to sit: Contact guard assistance;Stand by assistance  Comment: Pt requires increased time for t/f d/t bilat KIs.  Pt has to perform sit to stand w/ excessive forward trunk lean for leverage over BKIs and momentum to help propel him forward. SBA-CGA for safety but mobility and tolerance of t/f is very good. Ambulation  Ambulation?: Yes  WB Status: WBAT with BLE KI  More Ambulation?: Yes  Ambulation 1  Surface: level tile  Device: Standard Walker  Other Apparatus: Knee Immobilizer;Right;Left  Assistance: Stand by assistance  Quality of Gait: Slow partial step through pattern, B decreased toe clearance with B circumduction d/t B KI, B decreased heel strike, forward flexed trunk, increased UE support. Pt steady, no overt LOB. Favors RLE over LLE in advancement, per pt LLE is significantly weaker and he has experienced \"buckling\" before. Gait Deviations: Slow Andria;Decreased step length;Decreased step height; Increased AMNA  Distance: 115 ft x 2 reps  Comments: Pt req cues for improved ratio of step length between L and R. Pt req 2 seated rest breaks after amb for 5 mins each  Stairs/Curb  Stairs?: Yes  Stairs  # Steps : 4  Stairs Height: 6\"  Rails: Bilateral  Device: No Device  Assistance: Contact guard assistance;Stand by assistance  Comment: pt ascended/descended 4 6\" steps with BHR, step to pattern and CGA-SBA, B KIs donned     Balance  Posture: Good  Sitting - Static: Good  Sitting - Dynamic: Good  Standing - Static: Fair;+  Standing - Dynamic: Fair  Comments: Standing Balance: Contact guard assistance (SW)  Standing Balance         Strength RLE  Strength RLE: Exception  Comment: did not formally assess d/t recent surgery and ROM restrictions  Strength LLE  Strength LLE: Exception  Comment: did not formally assess d/t recent surgery and ROM restrictions    Goals  Short term goals  Time Frame for Short term goals: 1/17/22  Short term goal 1: Pt will complete bed mobility independently. Short term goal 2: Pt will complete transfers with SBA and LRAD. Short term goal 3: Pt will ambulate 100 ft with SBA and LRAD.  GOAL MET 1/15: 115 ft x2 w/ SW and SBA  Short term goal 4: Pt will tolerate 3 stairs with 1 handrail and CGA. Long term goals  Time Frame for Long term goals : 10 days (7/21/22)  Long term goal 1: Pt will complete transfers mod I with LRAD. Long term goal 2: Pt will ambulate 150 ft mod I with LRAD. Long term goal 3: Pt will negotiate 10 stairs with 1 handrail and supervision. Long term goal 4: Pt will complete car transfer with LRAD mod I. Patient Goals   Patient goals : \"Bend my knees, get stronger, and get back home\"    Plan    Plan  Times per week: 5-7 days/week  Times per day: Daily  Plan weeks: 1 week 1/13/22  Specific instructions for Next Treatment: progress mobility as tolerated  Current Treatment Recommendations: Strengthening,Balance Training,Endurance Training,Functional Mobility Training,Transfer Training,Gait Training,Positioning,Equipment Evaluation, Education, & procurement,Patient/Caregiver Education & Training,Safety Education & Training,Home Exercise Program,Wheelchair Mobility Training,Stair training  Safety Devices  Type of devices: All fall risk precautions in place,Call light within reach,Gait belt,Patient at risk for falls,Nurse notified,Left in chair,Chair alarm in place  Restraints  Initially in place: No     Therapy Time   Individual Concurrent Group Co-treatment   Time In 1100         Time Out 1130         Minutes 30         Timed Code Treatment Minutes: 69 Makayla Vasquez PT    If pt is unable to be seen after this session, please let this note serve as discharge summary. Please see case management note for discharge disposition. Thank you.

## 2022-01-15 NOTE — PROGRESS NOTES
Physical Therapy  Facility/Department: [facility] AR[facility]  Daily Treatment Note  NAME: Falguni Gould  : 1955  MRN: [id number]    Date of Service: 1/15/2022    Discharge Recommendations:  Home with assist PRN,Home with Home health PT   PT Equipment Recommendations  Equipment Needed: Yes  Mobility Devices: Monica Dragon: Standard    Assessment   Body structures, Functions, Activity limitations: Decreased functional mobility ; Decreased ROM; Decreased endurance;Decreased balance; Increased pain;Decreased strength  Assessment: Pt seen in am for first PT session, pt pleasant and agreeable and denies c/o pain. B KI adjusted at start of session but slid down within <50' of gait. Re-adjusted and ace wrap donned to BLE in figure 8 for edema control and to improve/maintain KI positioning. Pt able to progress through remainder of session with B KI remaining in place. Pt demonstrates t/f with grossly CGA/SBA but does require Rhett from EOB d/t lower surface and no HR to push up from (despite multiple attempts at CGA/SBA), ambulation up to 200' with SW and SBA and 4 steps with BHR with CGA. Pt is limtied by decreased activity tolerance, B KI and knee ROM px. Pt will benefit from continued skilled PT in ARU to address above deficits, will continue to progress mobility as tolerated. Treatment Diagnosis: Decreased independence with functional mobility  Specific instructions for Next Treatment: progress mobility as tolerated  Prognosis: Good  Decision Making: Medium Complexity  PT Education: Goals; General Safety; Disease Specific Education;PT Role;Plan of Care;Precautions;Transfer Training;Weight-bearing Education;Equipment;Gait Training;Functional Mobility Training; Injury Prevention;Pressure Relief;Home Exercise Program  Patient Education: Educated on safety with mobility, use of AD and importance of KI positioning and maintaining full knee extension with mobility. Educated no car t/f sitting laterally across back seat.  Pt verbalized understanding  Barriers to Learning: none  REQUIRES PT FOLLOW UP: Yes  Activity Tolerance  Activity Tolerance: Patient limited by fatigue;Patient limited by endurance     Patient Diagnosis(es): There were no encounter diagnoses. has a past medical history of 01 minute Apgar score 0, Hypertension, Kidney stone, Mitral valve prolapse, Sarcoidosis, and Spinal stenosis. has a past surgical history that includes back surgery (); Colonoscopy (2015); and Leg Muscle Surgery (Bilateral, 2022). Restrictions  Restrictions/Precautions  Restrictions/Precautions: Weight Bearing,Fall Risk,General Precautions,ROM Restrictions  Required Braces or Orthoses?: Yes  Lower Extremity Weight Bearing Restrictions  Right Lower Extremity Weight Bearing: Weight Bearing As Tolerated  Left Lower Extremity Weight Bearing: Weight Bearing As Tolerated  Required Braces or Orthoses  Right Lower Extremity Brace: Knee Immobilizer  Left Lower Extremity Brace: Knee Immobilizer  Position Activity Restriction  Other position/activity restrictions: No B knee ROM. Bilateral legs while in knee immobilizers. Ensure that immobilizers are appropriately positioned, holding knees in full extension. PA cleared pt to remove KI while bathing with knees kept extended throughout bathing. Subjective   General  Chart Reviewed: Yes  Response To Previous Treatment: Patient with no complaints from previous session.   Family / Caregiver Present: No  Referring Practitioner: Jasmin  Subjective  Subjective: Pt supine in bed on approach, pleasant and agreeable to PT tx  Pain Screening  Patient Currently in Pain: Denies  Vital Signs  Patient Currently in Pain: Denies       Orientation  Orientation  Overall Orientation Status: Within Normal Limits    Objective   Bed mobility  Supine to Sit: Supervision (HOB elevated, use of BR)  Sit to Supine: Unable to assess (Pt seated in recliner at end of session)     Transfers  Sit to Stand: Contact guard assistance;Stand by assistance;Minimal Assistance  Stand to sit: Contact guard assistance;Stand by assistance;Minimal Assistance  Bed to Chair: Stand by assistance;Contact guard assistance;Minimal assistance  Car Transfer: Contact guard assistance;Minimal Assistance (With SW, car elevated, pt scooting posteriorly to sit laterally across seats d/t B KI limitations, discussed seated in back seat across seats to allow for B KI, pt requires CGA to Rhett to support BLE while scooting backwards but otherwise completes CGA)  Comment: Pt completes multiples t/f throughout session, first t/f from bed to SW requiring Rhett d/t low surface, B KI and no HR to push up from, otherwise pt completes all other t/f with SW and grossly CGA to SBA     Ambulation  Ambulation?: Yes  WB Status: WBAT with BLE KI  Ambulation 1  Surface: level tile  Device: Standard Walker  Other Apparatus: Knee Immobilizer;Right;Left  Assistance: Stand by assistance  Quality of Gait: Slow partial step through pattern, B decreased toe clearance with B circumduction d/t B KI, B decreased heel strike, forward flexed trunk, increased UE support. Pt steady, no overt LOB. Favors RLE over LLE in advancement, per pt LLE is significantly weaker and he has experienced \"buckling\" before. Gait Deviations: Slow Andria;Decreased step length;Decreased step height; Increased AMNA  Distance: 120' + 48' + 200' + 120'  Comments: Distances limited by fatigue with intermittent rest breaks  Stairs/Curb  Stairs?: Yes  Stairs  # Steps : 4  Stairs Height: 6\"  Rails: Bilateral  Device: No Device  Assistance: Contact guard assistance;Stand by assistance  Comment: pt ascended/descended 4 6\" steps with BHR, step to pattern and CGA-SBA, B KIs donned with increase circumduction and lateral lean for limb advancement        Balance  Posture: Good  Sitting - Static: Good  Sitting - Dynamic: Good  Standing - Static: Fair;+  Standing - Dynamic: Fair  Comments: SBA standing with RW            Comment: B KI in place but slid down to ankles on approach, re-adjusted at start of session to provide increased support however within <50' of gait both KI slide back down. Pt seated in chair and B KI adjusted and ace wrap donned to BLE in figure 8 pattern to improve edema as well placed over inferior aspect of KI to hold them in better positioning. Much improved positioning and stability provided following donning of ace wrap and pt able to progres through remainder of session without KI's sliding back down. Goals  Short term goals  Time Frame for Short term goals: 1/17/22  Short term goal 1: Pt will complete bed mobility independently. Short term goal 2: Pt will complete transfers with SBA and LRAD. Short term goal 3: Pt will ambulate 100 ft with SBA and LRAD. GOAL MET 1/15: 115 ft x2 w/ SW and SBA  Short term goal 4: Pt will tolerate 3 stairs with 1 handrail and CGA. Long term goals  Time Frame for Long term goals : 10 days (7/21/22)  Long term goal 1: Pt will complete transfers mod I with LRAD. Long term goal 2: Pt will ambulate 150 ft mod I with LRAD. Long term goal 3: Pt will negotiate 10 stairs with 1 handrail and supervision. Long term goal 4: Pt will complete car transfer with LRAD mod I. Patient Goals   Patient goals : \"Bend my knees, get stronger, and get back home\"    Plan    Plan  Times per week: 5-7 days/week  Times per day: Daily  Plan weeks: 1 week 1/13/22  Specific instructions for Next Treatment: progress mobility as tolerated  Current Treatment Recommendations: Strengthening,Balance Training,Endurance Training,Functional Mobility Training,Transfer Training,Gait Training,Positioning,Equipment Evaluation, Education, & procurement,Patient/Caregiver Education & Training,Safety Education & Training,Home Exercise Program,Wheelchair Mobility Training,Stair training  Safety Devices  Type of devices:  All fall risk precautions in place,Call light within reach,Gait belt,Patient at risk for falls,Nurse notified,Left in chair,Chair alarm in place  Restraints  Initially in place: No     Therapy Time   Individual Concurrent Group Co-treatment   Time In 0830         Time Out 0930         Minutes 60         Timed Code Treatment Minutes: [address] [facility] [employer] [facility] [employer], PT, DPT

## 2022-01-15 NOTE — PROGRESS NOTES
Occupational Therapy  Facility/Department: [facility] ARU  Daily Treatment Note  NAME: Omar James  : 1955  MRN: [id number]    Date of Service: 1/15/2022    Discharge Recommendations:  Home with assist PRN,Home with Home health OT,S Level 1       Assessment   Performance deficits / Impairments: Decreased functional mobility ; Decreased ADL status; Decreased balance;Decreased endurance;Decreased strength;Decreased high-level IADLs;Decreased posture    Assessment: First Session: Pt agreeable to OT session. Pt performing functional mobility today with primarily SBA-supervision with SW but having 1 LOB while taking steps backwards requiring CGA to correct balance. Pt performing functional t/fs with SBA. Pt performing cone scavenger hunt with assistance with memory for sequencing of cones to collect but good coordination and judgement for use of reacher to retrieve items. Pt required min vc's for safety with SW in kitchen and needing rest breaks after mobility this date. Pt progressing towards goals. Cont POC. Pt sitting in recliner upon arrival. Pt able to stand with SBA to SW. Pt able to manage ambulation to bathroom with SW with SBA. Pt able to manage toilet transfer, however, toilet appears to be very low for pt. BSC placed over top of toilet in order to in order increase height for increased safety and independence during transfers. Pt able to manage distance from room to therapy gym, ambulation with SW with SBA. Pt completed  BUE strengthening exercises with 5# weight, cues provided throughout. Pt complete tub transfer with use of TTB. Pt completed corn hole activity, weight shifting while tossing bean bag. Pt able to bear \"more\" weight through LLE, which pt said has been difficult. Cont OT POC. Prognosis: Good  OT Education: OT Role;Plan of Care;Transfer Training;Precautions; ADL Adaptive Strategies; Equipment;Home Exercise Program  Patient Education: Disease specific: precautions, safe mobiltiy/t/fs, ther ex and ther ex handout, use of a/e. Pt verbalized understanding. REQUIRES OT FOLLOW UP: Yes  Activity Tolerance  Activity Tolerance: Patient Tolerated treatment well;Patient limited by fatigue  Activity Tolerance: /82, HR 94, O2 96%. Vitals WFL throughout session. Safety Devices  Safety Devices in place: Yes  Type of devices: Call light within reach;Nurse notified;Gait belt;Left in chair         Patient Diagnosis(es): There were no encounter diagnoses. has a past medical history of 01 minute Apgar score 0, Hypertension, Kidney stone, Mitral valve prolapse, Sarcoidosis, and Spinal stenosis. has a past surgical history that includes back surgery (); Colonoscopy (2015); and Leg Muscle Surgery (Bilateral, 2022). Restrictions  Restrictions/Precautions  Restrictions/Precautions: Weight Bearing,Fall Risk,General Precautions,ROM Restrictions  Required Braces or Orthoses?: Yes  Lower Extremity Weight Bearing Restrictions  Right Lower Extremity Weight Bearing: Weight Bearing As Tolerated  Left Lower Extremity Weight Bearing: Weight Bearing As Tolerated  Required Braces or Orthoses  Right Lower Extremity Brace: Knee Immobilizer  Left Lower Extremity Brace: Knee Immobilizer  Position Activity Restriction  Other position/activity restrictions: No B knee ROM. Bilateral legs while in knee immobilizers. Ensure that immobilizers are appropriately positioned, holding knees in full extension. PA cleared pt to remove KI while bathing with knees kept extended throughout bathing.   Subjective   General  Chart Reviewed: Rodger Lapping and Physical,Progress Notes,Labs  Patient assessed for rehabilitation services?: Yes  Additional Pertinent Hx: HTN, PNA  Response to previous treatment: Patient with no complaints from previous session  Family / Caregiver Present: No  Referring Practitioner: Palmira Callaway MD  Diagnosis: B quad tendon rupture s/p B quad tendon repair 1/5/22  Subjective  Subjective: Pt in chair, pleasant, agreeable to OT session. General Comment  Comments: RN approved therapy      Orientation     Objective          Cognition  Overall Cognitive Status: [facility]                                         Plan   Plan  Times per week: 5/7 days/week  Plan weeks: 10 days  Current Treatment Recommendations: Endurance Training,Balance Training,Functional Mobility Training,Safety Education & Training,Self-Care / ADL,Patient/Caregiver Education & Training,Strengthening,ROM,Equipment Evaluation, Education, & procurement,Home Management Training    Goals  Short term goals  Time Frame for Short term goals: 6 days- 1/17/22  Short term goal 1: Pt will complete functional transfers with min A. GOAL MET 1/14/22 Pt completed functional transfers with SBA. Short term goal 2: Pt will perform toileting with min A. Short term goal 3: Pt will complete LB dressing with mod A. GOAL MET 1/14/22 Pt completed LB dressing with min A. Long term goals  Time Frame for Long term goals : 10 days 1/21/22  Long term goal 1: Pt will perform functional transfers with SBA. Long term goal 2: Pt will complete toileting with supervision. Long term goal 3: Pt will perform LB bathing with supervision. Long term goal 4: Pt will complete LB dressing with min A. Long term goal 5: Pt will complete simple IADL task with supervision.   Patient Goals   Patient goals : \"to get to the point where I can bend my knees, to get stronger, and to get out\"       Therapy Time   Individual Concurrent Group Co-treatment   Time In 1000         Time Out 1030         Minutes 30         Timed Code Treatment Minutes: 30 Minutes      Second Session Therapy Time:   Individual Concurrent Group Co-treatment   Time In 1230         Time Out 1330         Minutes 60           Timed Code Treatment Minutes:  60 Minutes    Total Treatment Minutes: 90 minutes        Marie Story OTR/L

## 2022-01-16 LAB
GLUCOSE BLD-MCNC: 113 MG/DL (ref 70–99)
GLUCOSE BLD-MCNC: 115 MG/DL (ref 70–99)
GLUCOSE BLD-MCNC: 118 MG/DL (ref 70–99)
GLUCOSE BLD-MCNC: 130 MG/DL (ref 70–99)
PERFORMED ON: ABNORMAL

## 2022-01-16 PROCEDURE — 6370000000 HC RX 637 (ALT 250 FOR IP): Performed by: PHYSICAL MEDICINE & REHABILITATION

## 2022-01-16 PROCEDURE — 6360000002 HC RX W HCPCS: Performed by: PHYSICAL MEDICINE & REHABILITATION

## 2022-01-16 PROCEDURE — 6370000000 HC RX 637 (ALT 250 FOR IP): Performed by: NURSE PRACTITIONER

## 2022-01-16 PROCEDURE — 1280000000 HC REHAB R&B

## 2022-01-16 RX ADMIN — GABAPENTIN 300 MG: 300 CAPSULE ORAL at 21:16

## 2022-01-16 RX ADMIN — CYCLOBENZAPRINE 5 MG: 10 TABLET, FILM COATED ORAL at 21:17

## 2022-01-16 RX ADMIN — ENOXAPARIN SODIUM 30 MG: 100 INJECTION SUBCUTANEOUS at 21:18

## 2022-01-16 RX ADMIN — ENOXAPARIN SODIUM 30 MG: 100 INJECTION SUBCUTANEOUS at 09:57

## 2022-01-16 RX ADMIN — SENNOSIDES AND DOCUSATE SODIUM 1 TABLET: 50; 8.6 TABLET ORAL at 09:57

## 2022-01-16 RX ADMIN — MULTIPLE VITAMINS W/ MINERALS TAB 1 TABLET: TAB at 09:58

## 2022-01-16 RX ADMIN — OXYCODONE HYDROCHLORIDE AND ACETAMINOPHEN 250 MG: 500 TABLET ORAL at 09:57

## 2022-01-16 RX ADMIN — LISINOPRIL 10 MG: 10 TABLET ORAL at 21:17

## 2022-01-16 RX ADMIN — CYCLOBENZAPRINE 5 MG: 10 TABLET, FILM COATED ORAL at 09:57

## 2022-01-16 RX ADMIN — POLYETHYLENE GLYCOL 3350 17 G: 17 POWDER, FOR SOLUTION ORAL at 09:57

## 2022-01-16 RX ADMIN — METOPROLOL SUCCINATE 25 MG: 25 TABLET, FILM COATED, EXTENDED RELEASE ORAL at 09:57

## 2022-01-16 RX ADMIN — SENNOSIDES AND DOCUSATE SODIUM 1 TABLET: 50; 8.6 TABLET ORAL at 21:17

## 2022-01-16 ASSESSMENT — PAIN SCALES - GENERAL
PAINLEVEL_OUTOF10: 0

## 2022-01-17 LAB
ANION GAP SERPL CALCULATED.3IONS-SCNC: 10 MMOL/L (ref 3–16)
BASOPHILS ABSOLUTE: 0.1 K/UL (ref 0–0.2)
BASOPHILS RELATIVE PERCENT: 1.2 %
BUN BLDV-MCNC: 15 MG/DL (ref 7–20)
CALCIUM SERPL-MCNC: 8.5 MG/DL (ref 8.3–10.6)
CHLORIDE BLD-SCNC: 102 MMOL/L (ref 99–110)
CO2: 25 MMOL/L (ref 21–32)
CREAT SERPL-MCNC: 0.8 MG/DL (ref 0.8–1.3)
EOSINOPHILS ABSOLUTE: 0.5 K/UL (ref 0–0.6)
EOSINOPHILS RELATIVE PERCENT: 5.1 %
GFR AFRICAN AMERICAN: >60
GFR NON-AFRICAN AMERICAN: >60
GLUCOSE BLD-MCNC: 108 MG/DL (ref 70–99)
GLUCOSE BLD-MCNC: 110 MG/DL (ref 70–99)
GLUCOSE BLD-MCNC: 129 MG/DL (ref 70–99)
GLUCOSE BLD-MCNC: 72 MG/DL (ref 70–99)
GLUCOSE BLD-MCNC: 80 MG/DL (ref 70–99)
GLUCOSE BLD-MCNC: 82 MG/DL (ref 70–99)
HCT VFR BLD CALC: 32 % (ref 40.5–52.5)
HEMOGLOBIN: 10.2 G/DL (ref 13.5–17.5)
LYMPHOCYTES ABSOLUTE: 1.9 K/UL (ref 1–5.1)
LYMPHOCYTES RELATIVE PERCENT: 20.5 %
MCH RBC QN AUTO: 23.6 PG (ref 26–34)
MCHC RBC AUTO-ENTMCNC: 31.8 G/DL (ref 31–36)
MCV RBC AUTO: 74.2 FL (ref 80–100)
MONOCYTES ABSOLUTE: 1.4 K/UL (ref 0–1.3)
MONOCYTES RELATIVE PERCENT: 14.7 %
NEUTROPHILS ABSOLUTE: 5.4 K/UL (ref 1.7–7.7)
NEUTROPHILS RELATIVE PERCENT: 58.5 %
PDW BLD-RTO: 13.4 % (ref 12.4–15.4)
PERFORMED ON: ABNORMAL
PERFORMED ON: ABNORMAL
PERFORMED ON: NORMAL
PLATELET # BLD: 474 K/UL (ref 135–450)
PMV BLD AUTO: 7.6 FL (ref 5–10.5)
POTASSIUM REFLEX MAGNESIUM: 3.8 MMOL/L (ref 3.5–5.1)
RBC # BLD: 4.31 M/UL (ref 4.2–5.9)
SODIUM BLD-SCNC: 137 MMOL/L (ref 136–145)
WBC # BLD: 9.2 K/UL (ref 4–11)

## 2022-01-17 PROCEDURE — 97116 GAIT TRAINING THERAPY: CPT

## 2022-01-17 PROCEDURE — 85025 COMPLETE CBC W/AUTO DIFF WBC: CPT

## 2022-01-17 PROCEDURE — 97535 SELF CARE MNGMENT TRAINING: CPT

## 2022-01-17 PROCEDURE — 6360000002 HC RX W HCPCS: Performed by: PHYSICAL MEDICINE & REHABILITATION

## 2022-01-17 PROCEDURE — 1280000000 HC REHAB R&B

## 2022-01-17 PROCEDURE — 97110 THERAPEUTIC EXERCISES: CPT

## 2022-01-17 PROCEDURE — 6370000000 HC RX 637 (ALT 250 FOR IP): Performed by: NURSE PRACTITIONER

## 2022-01-17 PROCEDURE — 97530 THERAPEUTIC ACTIVITIES: CPT

## 2022-01-17 PROCEDURE — 80048 BASIC METABOLIC PNL TOTAL CA: CPT

## 2022-01-17 PROCEDURE — 36415 COLL VENOUS BLD VENIPUNCTURE: CPT

## 2022-01-17 PROCEDURE — 6370000000 HC RX 637 (ALT 250 FOR IP): Performed by: PHYSICAL MEDICINE & REHABILITATION

## 2022-01-17 RX ADMIN — CYCLOBENZAPRINE 5 MG: 10 TABLET, FILM COATED ORAL at 07:55

## 2022-01-17 RX ADMIN — METOPROLOL SUCCINATE 25 MG: 25 TABLET, FILM COATED, EXTENDED RELEASE ORAL at 07:55

## 2022-01-17 RX ADMIN — POLYETHYLENE GLYCOL 3350 17 G: 17 POWDER, FOR SOLUTION ORAL at 07:56

## 2022-01-17 RX ADMIN — SENNOSIDES AND DOCUSATE SODIUM 1 TABLET: 50; 8.6 TABLET ORAL at 07:56

## 2022-01-17 RX ADMIN — GABAPENTIN 300 MG: 300 CAPSULE ORAL at 21:51

## 2022-01-17 RX ADMIN — CYCLOBENZAPRINE 5 MG: 10 TABLET, FILM COATED ORAL at 21:52

## 2022-01-17 RX ADMIN — LISINOPRIL 10 MG: 10 TABLET ORAL at 21:51

## 2022-01-17 RX ADMIN — OXYCODONE HYDROCHLORIDE AND ACETAMINOPHEN 250 MG: 500 TABLET ORAL at 07:55

## 2022-01-17 RX ADMIN — ENOXAPARIN SODIUM 30 MG: 100 INJECTION SUBCUTANEOUS at 07:56

## 2022-01-17 RX ADMIN — ENOXAPARIN SODIUM 30 MG: 100 INJECTION SUBCUTANEOUS at 21:53

## 2022-01-17 RX ADMIN — SENNOSIDES AND DOCUSATE SODIUM 1 TABLET: 50; 8.6 TABLET ORAL at 21:51

## 2022-01-17 RX ADMIN — MULTIPLE VITAMINS W/ MINERALS TAB 1 TABLET: TAB at 07:54

## 2022-01-17 ASSESSMENT — PAIN SCALES - GENERAL: PAINLEVEL_OUTOF10: 0

## 2022-01-17 NOTE — PROGRESS NOTES
Mohan Mayberry  1/17/2022  [id number]    Chief Complaint: Rupture of distal quadriceps tendon    Subjective:   Patient seen resting in bed this am. Pain well controlled. Bowels now moving. ROS: no n/v cp, sob, f/c    Objective:  Patient Vitals for the past 24 hrs:   BP Temp Temp src Pulse Resp SpO2   01/17/22 0752 127/71 96.8 °F (36 °C) Oral 96 16 93 %   01/16/22 2117 128/87 98.4 °F (36.9 °C) Oral 101 16 94 %     Gen: No distress, pleasant. HEENT: Normocephalic, atraumatic. CV: Regular rate and rhythm. Resp: No respiratory distress. Abd: Soft, nontender, active bowel sounds present  Ext: No edema. Knee immobilizers in place bilaterally. Neuro: Alert, oriented, appropriately interactive. Wt Readings from Last 3 Encounters:   01/12/22 273 lb (123.8 kg)   01/06/22 274 lb 6.4 oz (124.5 kg)   04/14/20 270 lb (122.5 kg)       Laboratory data:   Lab Results   Component Value Date    WBC 9.2 01/17/2022    HGB 10.2 (L) 01/17/2022    HCT 32.0 (L) 01/17/2022    MCV 74.2 (L) 01/17/2022     (H) 01/17/2022       Lab Results   Component Value Date     01/17/2022    K 3.8 01/17/2022     01/17/2022    CO2 25 01/17/2022    BUN 15 01/17/2022    CREATININE 0.8 01/17/2022    GLUCOSE 108 01/17/2022    CALCIUM 8.5 01/17/2022        Therapy progress:  PT  Required Braces or Orthoses  Right Lower Extremity Brace: Knee Immobilizer  Left Lower Extremity Brace: Knee Immobilizer  Position Activity Restriction  Other position/activity restrictions: No B knee ROM. Bilateral legs while in knee immobilizers. Ensure that immobilizers are appropriately positioned, holding knees in full extension. PA cleared pt to remove KI while bathing with knees kept extended throughout bathing.   Objective     Sit to Stand: Contact guard assistance,Stand by assistance,Minimal Assistance  Stand to sit: Contact guard assistance,Stand by assistance,Minimal Assistance  Bed to Chair: Stand by assistance,Contact guard assistance,Minimal assistance  Device: Standard Walker  Other Apparatus: Knee Immobilizer,Right,Left  Assistance: Stand by assistance  Distance: 120' + 48' + 200' + 120'  OT  PT Equipment Recommendations  Equipment Needed: Yes  Mobility Devices: Laquetta Marshall: Standard  Other: TBD     Assessment        SLP                Body mass index is 39.17 kg/m². Rehabilitation Diagnosis:  Orthopedic, 8.9, Other Orthopedic     Assessment and Plan:  BLE distal quadriceps tendon rupture s/p repair  - restrictions per o/s  - pain control     Pneumonia  - completed abx     HTN  - follow bp on home regimen       Obesity  - dietary consulted     Bowels: Schedule stool softener. Follow bowel movements. Enema or suppository if needed.      Bladder: Check PVR x 3.   [facility] if PVR > 200ml or if any volume is > 500 ml.      Sleep: Trazodone provided prn.      Follow up appointments: orthopedics, pcp    Electronically signed by Gregory Chavira MD on 1/17/2022 at 12:10 PM

## 2022-01-17 NOTE — PROGRESS NOTES
Occupational Therapy  Facility/Department: [facility]  Daily Treatment Note  NAME: Cora Jimenez  : 1955  MRN: [id number]    Date of Service: 2022    Discharge Recommendations:  Home with assist PRN,Home with Home health OT,S Level 1  OT Equipment Recommendations  Equipment Needed: Yes  Mobility Devices: ADL Assistive Devices  ADL Assistive Devices: Transfer Tub Bench;Grab Bars - tub;Grab Bars - toilet; Toileting - Raised Toilet Seat with arms;Long-handled Sponge;Sock-Aid Hard;Reacher    Assessment   Performance deficits / Impairments: Decreased functional mobility ; Decreased ADL status; Decreased balance;Decreased endurance;Decreased strength;Decreased high-level IADLs;Decreased posture  Assessment: Pt seen for OT ADL session this date denying need for toileting and grooming. Pt performing fx mobility with SB with SW. Fx tx with mod A this date w/ max VC to maintain precaution of no B knee flexion, with BKI donned. Pt difficutlies with transfers to lower surfaces this date while maintaining knee extension. Pt completed UB dressing/bathing SPV, and LBD wiht min a using AD PRN. Pt completed LBD with SPV. Pt continues to make good progress towards goals but demo'd difficulties with fx transfers this date maintaining knee extension. Pt benefit from educaiton during session on fx tx, bed mobility, AE for return to home, and safety at home after d/c. Continue POC  Prognosis: Good  OT Education: OT Role;Plan of Care;Transfer Training;Precautions; ADL Adaptive Strategies; Equipment;Home Exercise Program  Patient Education: Disease specific: precautions, safe mobiltiy/t/fs, bed mobility, fx transfers,importance of precautions  Barriers to Learning: Pt verbalized understanding but will benefit from reinforcement with bathing without KIs. REQUIRES OT FOLLOW UP: Yes  Activity Tolerance  Activity Tolerance: Patient limited by fatigue;Patient Tolerated treatment well  Activity Tolerance: , O2 968%.  [facility] throughout session. multiple breaks throughout session / fatigue and SOB  Safety Devices  Safety Devices in place: Yes  Type of devices: Call light within reach;Nurse notified;Gait belt;Left in chair       Patient Diagnosis(es): There were no encounter diagnoses. has a past medical history of 01 minute Apgar score 0, Hypertension, Kidney stone, Mitral valve prolapse, Sarcoidosis, and Spinal stenosis. has a past surgical history that includes back surgery (); Colonoscopy (2015); and Leg Muscle Surgery (Bilateral, 2022). Restrictions  Restrictions/Precautions  Restrictions/Precautions: Weight Bearing,Fall Risk,General Precautions,ROM Restrictions  Required Braces or Orthoses?: Yes  Lower Extremity Weight Bearing Restrictions  Right Lower Extremity Weight Bearing: Weight Bearing As Tolerated  Left Lower Extremity Weight Bearing: Weight Bearing As Tolerated  Required Braces or Orthoses  Right Lower Extremity Brace: Knee Immobilizer  Left Lower Extremity Brace: Knee Immobilizer  Position Activity Restriction  Other position/activity restrictions: No B knee ROM. No B knee flexion. WBAT Bilateral legs while in knee immobilizers. Ensure that immobilizers are appropriately positioned, holding knees in full extension. PA cleared pt to remove KI while bathing with knees kept extended throughout bathing. Subjective   General  Chart Reviewed: Durene Couch and Physical,Progress Notes,Labs  Patient assessed for rehabilitation services?: Yes  Additional Pertinent Hx: HTN, PNA  Response to previous treatment: Patient with no complaints from previous session  Family / Caregiver Present: No  Referring Practitioner: Israel Peterson MD  Diagnosis: B quad tendon rupture s/p B quad tendon repair 22  Subjective  Subjective: Pt in chair, pleasant, agreeable to OT session.   General Comment  Comments: RN approved therapy  Vital Signs  Pulse: 105  BP: 107/65  BP Location: Right upper arm  Patient Currently in Pain: Denies  Oxygen Therapy  SpO2: 94 %  Pulse Oximeter Device Mode: Intermittent  Pulse Oximeter Device Location: Left;Finger  O2 Device: None (Room air)   Orientation  Orientation  Overall Orientation Status: Within Functional Limits  Objective    ADL  Equipment Provided: Reacher;Sock aid;Long-handled sponge  Grooming:  (denied need)  UE Bathing: Independent  LE Bathing: Supervision (max VC to keep B knee in extension during shower, use of long handled sponge)  UE Dressing: Independent  LE Dressing: Minimal assistance (independent don/doff underwear EOB, min A don/doff B socks, pants. mod A don b knee imbolizers with good tightness and positioning)  Toileting:  (denied need)  Additional Comments: mod VC throuhgout ADLs for knee extension        Balance  Sitting Balance: Independent  Standing Balance: Stand by assistance  Standing Balance  Time: ~3-4 minutes x 3 trials  Activity: to/from therapy gym, standing for shower (with BKI),  Comment: with SW. min VC for B knee extension during ambulation  Functional Mobility  Functional - Mobility Device: Standard Walker  Activity: To/From therapy gym; Other  Assist Level: Stand by assistance  Tub Transfers  Tub - Transfer From: Walker  Tub - Transfer Type: To and From  Tub - Transfer To: Transfer tub bench  Tub - Technique: Ambulating  Tub Transfers: Maximal assistance  Tub Transfers Comments: 2/2 following precautions and maintaining knee extension throughout tx, poor ability to maintain precautions  Shower Transfers  Shower - Transfer From: Samantha Harbour - Transfer Type: To and From  Shower - Transfer To: Transfer tub bench  Shower - Technique: Ambulating  Shower Transfers: Supervision  Bed mobility  Bridging: Unable to assess (unable to bridge 2/2 no knee flexion)  Transfers  Stand Step Transfers: Stand by assistance  Stand Pivot Transfers: Stand by assistance  Sit to stand:  Moderate assistance  Stand to sit: Moderate assistance  Transfer Comments: increased attempts at knee flexion during sit<>stands this date with max VC for knee extension x 5 sit<>stands         Type of ROM/Therapeutic Exercise  Comment: 10 reps x 2 sets modified tricep dips in chair to work on BUE strength for transfers and clearing buttocks off surface of chair                    Plan   Plan  Times per week: 5/7 days/week  Plan weeks: 10 days  Specific instructions for Next Treatment: Tub tx, sit<>stands, LBD, bed mobilty, don/doff BKI  Current Treatment Recommendations: Endurance Training,Balance Training,Functional Mobility Training,Safety Education & Training,Self-Care / ADL,Patient/Caregiver Education & Training,Strengthening,ROM,Equipment Evaluation, Education, & procurement,Home Management Training    Goals  Short term goals  Time Frame for Short term goals: 6 days- 1/17/22  Short term goal 1: Pt will complete functional transfers with min A. GOAL MET 1/14/22 Pt completed functional transfers with SBA. Short term goal 2: Pt will perform toileting with min A. Short term goal 3: Pt will complete LB dressing with mod A. GOAL MET 1/14/22 Pt completed LB dressing with min A. Long term goals  Time Frame for Long term goals : 10 days 1/21/22  Long term goal 1: Pt will perform functional transfers with SBA. Long term goal 2: Pt will complete toileting with supervision. Long term goal 3: Pt will perform LB bathing with supervision. Long term goal 4: Pt will complete LB dressing with min A. 1/17 MET min A LBD  Long term goal 5: Pt will complete simple IADL task with supervision.   Patient Goals   Patient goals : \"to get to the point where I can bend my knees, to get stronger, and to get out\"       Therapy Time   Individual Concurrent Group Co-treatment   Time In 1230         Time Out 1400         Minutes 90         Timed Code Treatment Minutes: 80 Minutes       [place], Willian [place]

## 2022-01-17 NOTE — PROGRESS NOTES
Physical Therapy  Facility/Department: [facility]  Daily Treatment Note  NAME: Richie Johnson  : 1955  MRN: [id number]    Date of Service: 2022    Discharge Recommendations:  Home with assist PRN,Home with Home health PT   PT Equipment Recommendations  Equipment Needed: Yes  Walker: Standard    Assessment   Body structures, Functions, Activity limitations: Decreased functional mobility ; Decreased ROM; Decreased endurance;Decreased balance; Increased pain;Decreased strength  Assessment: pt found supine in bed, denies pain. increased time at start of session for therapist to figure 8 wrap pt's BLEs to assist with edema control and improve KI positioning. pt requires multiple rest breaks throughout due to fatigue. grossly supv for transfers from surface with rail, min a from bed. grossly supv-mod ind for gait with SW up to 180 ft. conitnue to rec home with prn assist and OPPT when indicated. DME: SW  Treatment Diagnosis: Decreased independence with functional mobility  Specific instructions for Next Treatment: progress mobility as tolerated  Prognosis: Good  Decision Making: Medium Complexity  PT Education: Goals; General Safety; Disease Specific Education;PT Role;Plan of Care;Precautions;Transfer Training;Weight-bearing Education;Equipment;Gait Training;Functional Mobility Training; Injury Prevention;Pressure Relief;Home Exercise Program  Patient Education: Educated on safety with mobility, use of AD and importance of KI positioning and maintaining full knee extension with mobility. Educated no car t/f sitting laterally across back seat. Pt verbalized understanding  Barriers to Learning: none  REQUIRES PT FOLLOW UP: Yes  Activity Tolerance  Activity Tolerance: Patient Tolerated treatment well  Activity Tolerance: 121/71, 96% on ra, 96 bpm     Patient Diagnosis(es): There were no encounter diagnoses.      has a past medical history of 01 minute Apgar score 0, Hypertension, Kidney stone, Mitral valve prolapse, Sarcoidosis, and Spinal stenosis. has a past surgical history that includes back surgery (1992); Colonoscopy (5/5/2015); and Leg Muscle Surgery (Bilateral, 1/5/2022). Restrictions  Restrictions/Precautions  Restrictions/Precautions: Weight Bearing,Fall Risk,General Precautions,ROM Restrictions  Required Braces or Orthoses?: Yes  Lower Extremity Weight Bearing Restrictions  Right Lower Extremity Weight Bearing: Weight Bearing As Tolerated  Left Lower Extremity Weight Bearing: Weight Bearing As Tolerated  Required Braces or Orthoses  Right Lower Extremity Brace: Knee Immobilizer  Left Lower Extremity Brace: Knee Immobilizer  Position Activity Restriction  Other position/activity restrictions: No B knee ROM. No B knee flexion. WBAT Bilateral legs while in knee immobilizers. Ensure that immobilizers are appropriately positioned, holding knees in full extension. PA cleared pt to remove KI while bathing with knees kept extended throughout bathing. Subjective   General  Chart Reviewed: Yes  Response To Previous Treatment: Patient with no complaints from previous session. Family / Caregiver Present: No  Referring Practitioner: Jasmin  Subjective  Subjective: found in bed  General Comment  Comments: denies pain, therapist assisted with figure 8 wrapping prior to getting out of bed  Pain Screening  Patient Currently in Pain: No  Vital Signs  Patient Currently in Pain: No       Orientation  Orientation  Overall Orientation Status: Within Functional Limits  Cognition      Objective   Bed mobility  Supine to Sit: Modified independent (with HOB elevated, bed rail and increased time)  Transfers  Sit to Stand: Minimal Assistance;Supervision  Stand to sit: Supervision  Comment: sit to stand EOB to SW with min a and mutliple attempts due to low surface.  remaining sit to stands from w/c to SW completed with supv  Ambulation  Ambulation?: Yes  WB Status: WBAT with BLE KI  More Ambulation?: Yes  Ambulation 1  Surface: level tile  Device: Standard Walker  Assistance: Supervision;Modified Independent  Quality of Gait: Slow partial step through pattern, B decreased toe clearance with B circumduction d/t B KI, B decreased heel strike, forward flexed trunk, increased UE support. Pt steady, no overt LOB. Favors RLE over LLE in advancement, per pt LLE is significantly weaker and he has experienced \"buckling\" before. Distance: 100 ft  Ambulation 2  Surface - 2: level tile  Device 2: Standard Walker  Assistance 2: Supervision;Modified Independent  Quality of Gait 2: partial step through, lacks B swing phase 2* B KI donned, compensates with hip circumduction to progress BLE, decreased velocity  Distance: 90 ft, 180 ft (10 ft turf)  Stairs/Curb  Stairs?: Yes  Stairs  # Steps : 12  Stairs Height: 6\"  Rails: Bilateral  Assistance: Stand by assistance  Comment: pt ascended/descended 12 6\" steps with BHR, step to pattern and CGA-SBA, B KIs donned with increase circumduction and lateral lean for limb advancement     Balance  Sitting - Static: Good  Sitting - Dynamic: Good  Standing - Static: Fair;+  Standing - Dynamic: Fair  Comments: dyn stand activity: pt reaching with RUE across midline in various planes for beanbag to toss to target 7 ft away with 1 UE uspport at all times on SWx 12 reps. pt then retrieved beanbags from ground with reacher and SW for support, supv without LOB, tolerating 7-8 min of standing total prior to rest. grossly SBa-supv.    sit to stand w/c to SW x 8 reps with supv  For increased strengthening. Second Session:   Pt found in recliner. Denies pain and agreeable to session. Sit to stand recliner to SW with supv    Gait x 200 ft with Sw, supv-mod ind with increased time and above stated gait deficits. Pt completed 15 reps of BLE standing hip abduction, hip extension, hip flexion. x20 BLE seated ankle pumps  Pt left in recliner with alarm donned and call light/needs within reach.    Goals  Short term goals  Time Frame for Short term goals: 1/17/22  Short term goal 1: Pt will complete bed mobility independently. Short term goal 2: Pt will complete transfers with SBA and LRAD. Short term goal 3: Pt will ambulate 100 ft with SBA and LRAD. GOAL MET 1/15: 115 ft x2 w/ SW and SBA  Short term goal 4: Pt will tolerate 3 stairs with 1 handrail and CGA. Long term goals  Time Frame for Long term goals : 10 days (7/21/22)  Long term goal 1: Pt will complete transfers mod I with LRAD. Long term goal 2: Pt will ambulate 150 ft mod I with LRAD. Long term goal 3: Pt will negotiate 10 stairs with 1 handrail and supervision. Long term goal 4: Pt will complete car transfer with LRAD mod I. Patient Goals   Patient goals : \"Bend my knees, get stronger, and get back home\"    Plan    Plan  Times per week: 5-7 days/week  Times per day: Daily  Plan weeks: 1 week 1/13/22  Specific instructions for Next Treatment: progress mobility as tolerated  Current Treatment Recommendations: Strengthening,Balance Training,Endurance Training,Functional Mobility Training,Transfer Training,Gait Training,Positioning,Equipment Evaluation, Education, & procurement,Patient/Caregiver Education & Training,Safety Education & Training,Home Exercise Program,Wheelchair Mobility Training,Stair training  Safety Devices  Type of devices:  All fall risk precautions in place,Call light within reach,Gait belt,Patient at risk for falls,Nurse notified,Left in chair,Chair alarm in place  Restraints  Initially in place: No     Therapy Time   Individual Concurrent Group Co-treatment   Time In 0730         Time Out 0830         Minutes 60         Timed Code Treatment Minutes: [place] 61 Time:   Individual Concurrent Group Co-treatment   Time In 0900         Time Out 0930         Minutes 30           Timed Code Treatment Minutes:  30    Total Treatment Minutes:  90    Lacy Tillman PT

## 2022-01-18 LAB
GLUCOSE BLD-MCNC: 100 MG/DL (ref 70–99)
GLUCOSE BLD-MCNC: 109 MG/DL (ref 70–99)
GLUCOSE BLD-MCNC: 111 MG/DL (ref 70–99)
GLUCOSE BLD-MCNC: 71 MG/DL (ref 70–99)
PERFORMED ON: ABNORMAL
PERFORMED ON: NORMAL

## 2022-01-18 PROCEDURE — 1280000000 HC REHAB R&B

## 2022-01-18 PROCEDURE — 97530 THERAPEUTIC ACTIVITIES: CPT

## 2022-01-18 PROCEDURE — 97116 GAIT TRAINING THERAPY: CPT

## 2022-01-18 PROCEDURE — 6360000002 HC RX W HCPCS: Performed by: PHYSICAL MEDICINE & REHABILITATION

## 2022-01-18 PROCEDURE — 6370000000 HC RX 637 (ALT 250 FOR IP): Performed by: PHYSICAL MEDICINE & REHABILITATION

## 2022-01-18 PROCEDURE — 97110 THERAPEUTIC EXERCISES: CPT

## 2022-01-18 PROCEDURE — 6370000000 HC RX 637 (ALT 250 FOR IP): Performed by: NURSE PRACTITIONER

## 2022-01-18 PROCEDURE — 97535 SELF CARE MNGMENT TRAINING: CPT

## 2022-01-18 RX ADMIN — LISINOPRIL 10 MG: 10 TABLET ORAL at 21:12

## 2022-01-18 RX ADMIN — CYCLOBENZAPRINE 5 MG: 10 TABLET, FILM COATED ORAL at 21:13

## 2022-01-18 RX ADMIN — GABAPENTIN 300 MG: 300 CAPSULE ORAL at 21:12

## 2022-01-18 RX ADMIN — SENNOSIDES AND DOCUSATE SODIUM 1 TABLET: 50; 8.6 TABLET ORAL at 21:13

## 2022-01-18 RX ADMIN — SENNOSIDES AND DOCUSATE SODIUM 1 TABLET: 50; 8.6 TABLET ORAL at 12:13

## 2022-01-18 RX ADMIN — METOPROLOL SUCCINATE 25 MG: 25 TABLET, FILM COATED, EXTENDED RELEASE ORAL at 12:14

## 2022-01-18 RX ADMIN — OXYCODONE HYDROCHLORIDE AND ACETAMINOPHEN 250 MG: 500 TABLET ORAL at 12:13

## 2022-01-18 RX ADMIN — ENOXAPARIN SODIUM 30 MG: 100 INJECTION SUBCUTANEOUS at 12:14

## 2022-01-18 RX ADMIN — CYCLOBENZAPRINE 5 MG: 10 TABLET, FILM COATED ORAL at 12:13

## 2022-01-18 RX ADMIN — ENOXAPARIN SODIUM 30 MG: 100 INJECTION SUBCUTANEOUS at 21:13

## 2022-01-18 RX ADMIN — MULTIPLE VITAMINS W/ MINERALS TAB 1 TABLET: TAB at 12:13

## 2022-01-18 ASSESSMENT — PAIN SCALES - GENERAL
PAINLEVEL_OUTOF10: 0
PAINLEVEL_OUTOF10: 0

## 2022-01-18 NOTE — PROGRESS NOTES
Physical Therapy  Facility/Department: [facility]  Daily Treatment Note  NAME: Leo Wiseman  : 1955  MRN: [id number]    Date of Service: 2022    Discharge Recommendations:  Home with assist PRN (OPPT when applicable)   PT Equipment Recommendations  Equipment Needed: Yes  Walker: Standard  Other: bed rail    Assessment   Body structures, Functions, Activity limitations: Decreased functional mobility ; Decreased ROM; Decreased endurance;Decreased balance; Increased pain;Decreased strength  Assessment: pt found in gym with OT, expressing fatiuge from earlier therapy sessions but agreeable to participate as able. grossly mod ind with transfers/gait with SW, supv-mod ind with stair navigation. able to scoot self back in car for car transfer with mod ind. conitnue to rec home with prn and OPPT when able. DME: SW, bed rail for ease of bed transfers with B KI  Treatment Diagnosis: Decreased independence with functional mobility  Specific instructions for Next Treatment: progress mobility as tolerated  Prognosis: Good  Decision Making: Medium Complexity  PT Education: Goals; General Safety; Disease Specific Education;PT Role;Plan of Care;Precautions;Transfer Training;Weight-bearing Education;Equipment;Gait Training;Functional Mobility Training; Injury Prevention;Pressure Relief;Home Exercise Program  Patient Education: Educated on safety with mobility, use of AD and importance of KI positioning and maintaining full knee extension with mobility. Educated no car t/f sitting laterally across back seat. Pt verbalized understanding  Barriers to Learning: none  REQUIRES PT FOLLOW UP: Yes  Activity Tolerance  Activity Tolerance: Patient limited by fatigue;Patient limited by endurance  Activity Tolerance: 119/72, 104 bpm, Spo2= 97%     Patient Diagnosis(es): There were no encounter diagnoses.      has a past medical history of 01 minute Apgar score 0, Hypertension, Kidney stone, Mitral valve prolapse, Sarcoidosis, and Spinal stenosis. has a past surgical history that includes back surgery (1992); Colonoscopy (5/5/2015); and Leg Muscle Surgery (Bilateral, 1/5/2022). Restrictions  Restrictions/Precautions  Restrictions/Precautions: Weight Bearing,Fall Risk,General Precautions,ROM Restrictions  Required Braces or Orthoses?: Yes  Lower Extremity Weight Bearing Restrictions  Right Lower Extremity Weight Bearing: Weight Bearing As Tolerated  Left Lower Extremity Weight Bearing: Weight Bearing As Tolerated  Required Braces or Orthoses  Right Lower Extremity Brace: Knee Immobilizer  Left Lower Extremity Brace: Knee Immobilizer  Position Activity Restriction  Other position/activity restrictions: No B knee ROM. No B knee flexion. WBAT Bilateral legs while in knee immobilizers. Ensure that immobilizers are appropriately positioned, holding knees in full extension. PA cleared pt to remove KI while bathing with knees kept extended throughout bathing. Subjective   General  Chart Reviewed: Yes  Response To Previous Treatment: Patient with no complaints from previous session. Family / Caregiver Present: No  Referring Practitioner: Jasmin  Subjective  Subjective: found in gym  General Comment  Comments: reports increased fatigue from earlier OT session but agreeable to particiapte as able.  denies pain  Pain Screening  Patient Currently in Pain: No  Vital Signs  Patient Currently in Pain: No       Orientation  Orientation  Overall Orientation Status: Within Functional Limits  Cognition      Objective      Transfers  Sit to Stand: Modified independent  Stand to sit: Modified independent  Car Transfer: Modified independent  Comment: sit to stand w/c to SW, recliner to SW and low mat to SW with mod ind throughout session  Ambulation  Ambulation?: Yes  WB Status: WBAT with BLE KI  More Ambulation?: Yes  Ambulation 1  Device: Standard Walker  Other Apparatus: Knee Immobilizer;Right;Left  Assistance: Modified Independent  Quality of Gait: Slow partial step through pattern, B decreased toe clearance with B circumduction d/t B KI, B decreased heel strike, forward flexed trunk, increased UE support. Pt steady, no overt LOB. Favors RLE over LLE in advancement, per pt LLE is significantly weaker and he has experienced \"buckling\" before. Distance: 150 ft, 100 ft  Ambulation 2  Surface - 2: level tile  Device 2: Standard Walker  Assistance 2: Modified Independent  Quality of Gait 2: partial step through, lacks B swing phase 2* B KI donned, compensates with hip circumduction to progress BLE, decreased velocity  Distance: 250 ft (10 ft turf)  Comments: pt completed item retrieval task about unit with use of reacher for items on ground, no cues provided for environment scanning or obstacle awareness, no LOB demod. utilizes legnthy rest following due to fatigue  Stairs/Curb  Stairs?: Yes  Stairs  # Steps : 12  Stairs Height: 6\"  Rails: Bilateral  Assistance: Supervision;Modified independent   Comment: pt ascended/descended 12 6\" steps with BHR, step to pattern and supv-mod ind, B KIs donned with increase circumduction and lateral lean for limb advancement     Balance  Sitting - Static: Good  Sitting - Dynamic: Good  Standing - Static: Fair;+  Standing - Dynamic: Fair  Comments: pt completed lengty dyn stand task x 13 min with 1 UE support and supv without LOB, maintains shoulder width or stagger stance position without LOB while completing crossword puzzle.  dyn stand task: pt picked up beanbag from low mat- > ambualtes with SW to same colored disc on ground-> turns to throw beanbag to central target-> repeats x 12 reps, a total of 7 min with supv-mod ind. pt demos ability to complete 90*-180* turns without LOB  Other exercises  Other exercises 1: with 4 # medicine ball, pt completed 15 reps of seated abdominal crunch; R/L trunk rotation; PNF D1 flexion pattern bilaterally with rest breaks as needed         Goals  Short term goals  Time Frame for Short term goals: 1/17/22  Short term goal 1: Pt will complete bed mobility independently. Short term goal 2: Pt will complete transfers with SBA and LRAD. Short term goal 3: Pt will ambulate 100 ft with SBA and LRAD. GOAL MET 1/15: 115 ft x2 w/ SW and SBA  Short term goal 4: Pt will tolerate 3 stairs with 1 handrail and CGA. Long term goals  Time Frame for Long term goals : 10 days (7/21/22)  Long term goal 1: Pt will complete transfers mod I with LRAD. Long term goal 2: Pt will ambulate 150 ft mod I with LRAD. Long term goal 3: Pt will negotiate 10 stairs with 1 handrail and supervision. Long term goal 4: Pt will complete car transfer with LRAD mod I. Patient Goals   Patient goals : \"Bend my knees, get stronger, and get back home\"    Plan    Plan  Times per week: 5-7 days/week  Times per day: Daily  Plan weeks: 1 week 1/13/22  Specific instructions for Next Treatment: progress mobility as tolerated  Current Treatment Recommendations: Strengthening,Balance Training,Endurance Training,Functional Mobility Training,Transfer Training,Gait Training,Positioning,Equipment Evaluation, Education, & procurement,Patient/Caregiver Education & Training,Safety Education & Training,Home Exercise Program,Wheelchair Mobility Training,Stair training  Safety Devices  Type of devices:  All fall risk precautions in place,Call light within reach,Gait belt,Patient at risk for falls,Nurse notified,Left in chair,Chair alarm in place  Restraints  Initially in place: No     Therapy Time   Individual Concurrent Group Co-treatment   Time In 1030         Time Out 1200         Minutes 90         Timed Code Treatment Minutes: 90 Minutes       Janet Vegas, PT

## 2022-01-18 NOTE — PATIENT CARE CONFERENCE
[address]  Inpatient Rehabilitation  Weekly Team Conference Note    Date: 2022  Patient Name: Marissa Arana        MRN: [id number]    : 1955  (77 y.o.)  Gender: male   Referring Practitioner: Sumi Church  Diagnosis: s/p fall, devyn quad tendon ruptures, s/p repair 1/5      Interventions to be utilized toward barriers to discharge, per discipline:  NURSING  Nursing observed barriers to dc: Decreased endurance  Nursing interventions:Assist with ADL's, toileting, and medication management  Family Education: no  Fall Risk:  Yes      Physical therapy observed barriers to dc:    Baseline: ind with no AD   Current level: mod ind bed mobility, mod ind transfers, mod ind gait with SW, supv-mod ind stairs with BHR   Barriers to DC: B KIs,    Needs in order to achieve dc home/next level of care: mod ind with mobility with LRAD. rec pt have OPPT when appropriate and cleared by MD      Physical therapy interventions:   Current Treatment Recommendations: Strengthening,Balance Training,Endurance Training,Functional Mobility Training,Transfer Training,Gait Training,Positioning,Equipment Evaluation, Education, & procurement,Patient/Caregiver Education & Training,Safety Education & Training,Home Exercise Program,Wheelchair Mobility Training,Stair training      PHYSICAL THERAPY    PT Equipment Recommendations  Equipment Needed: Yes  Mobility Devices: Rebecca Mattock: Standard  Other: bed rail    Assessment: Patient seen for gait and core strengthening. Patient completed transfers and ambulation with SW mod I with Devyn KI donned throughout session. Pt completes seated core exercises with 6 lb med ball at [facility]. Pt has progressed well with therapy and will require assist prn.     Occupational therapy observed barriers to dc:    Baseline: mod I all ADLs and transfers   Current level: SPV/SBA transfers, min A LB ADLs   Barriers to DC: none   Needs in order to achieve dc home/next level of care: none to discharge home with assist from spouse PRN    Occupational Therapy interventions:  Current Treatment Recommendations: Endurance Training,Balance Training,Functional Mobility Training,Safety Education & Training,Self-Care / ADL,Patient/Caregiver Education & Training,Strengthening,ROM,Equipment Evaluation, Education, & procurement,Home Management Training      OCCUPATIONAL THERAPY  Assessment: Pt agreeable to OT session. Pt completed sit<>stands from bed and shower chair with SBA but mod I for mobility, standing balance, and sit<>stands with B armrests. Pt completed bathing with mod I, LB dressing with SBA when standing to pull pants up with min VCs for placement/removal of KIs. Pt completed BUE ther ex with good strength and understanding of HEP. Pt demonstrated good standing tolerance to stand for 15 minutes at table top. Pt verbalized understanding of all recommendations and DME. SPEECH THERAPY   No speech therapy for this patient. NUTRITION  Weight: 258 lb 1.6 oz (117.1 kg) / Body mass index is 37.03 kg/m². Diet Order: ADULT DIET; Regular  PO Meals Eaten (%): 76 - 100%  Education: Declined       CASE MANAGEMENT  Assessment: [facility]. DME needed for discharge: standard walker. Patient will need cab voucher (placed on chart) on discharge. CM will continue to support for discharge needs. Interdisciplinary Goals:   1.) Safe Discharge Home      Discharge Plan   Estimated discharge date: 1/20/2022  Destination: Home health   Pass:No  Services at Discharge: Home health physical therapy. Equipment at Discharge: Tub transfer bench, 3 in 1 toilet seat for over the toilet, standard walker.      Team Members Present at Conference:  : Kushal Ann    Occupational Therapist: Emmie Holguin OTR/L  Physical Therapist: Narciso Sexton PT, DPT   Speech Therapist: N/A   Nurse: Jean Wolf, DRAKE  Dietician: Mei Phipps RDN, LD  : Lexi Michelle Elvi Rogers, OTR/L  Psychiatry: N/A    Family members present at conference: No      I led this team conference and I approve the established interdisciplinary plan of care as documented within the medical record of Sydnie Hurst. MD: Alysha Liu. Bassem Veliz MD 1/19/2022, 11:22 AM   MD lead team conference remotely this date; via phone.

## 2022-01-18 NOTE — PROGRESS NOTES
Comprehensive Nutrition Assessment    Type and Reason for Visit:  Reassess    Nutrition Recommendations/Plan:   1. Continue regular diet and encourage PO intake  2. RD to order new updated weight  3. Consider increased bowel regimen due to no BM since admission- MD to advise  4. Monitor nutrition adequacy, pertinent labs, bowel habits, wt changes, and clinical progress    Nutrition Assessment:  Follow up: Pt improving nutritionally AEB by increased PO intakes of % of meals and good appetite. Pt declined ONS and denied any diet questions at this time. WIll order new updated weight to monitor. WIll continue to monitor. Malnutrition Assessment:  Malnutrition Status: At risk for malnutrition (Comment)    Context:  Acute Illness       Estimated Daily Nutrient Needs:  Energy (kcal):  9695-6333 kcal; Weight Used for Energy Requirements:  Ideal (75 kg)     Protein (g):  75-90 g; Weight Used for Protein Requirements:  Ideal (1.0-1.2 g/kg)        Method Used for Fluid Requirements:  1 ml/kcal      Nutrition Related Findings:  No BM since admission, on glycolax ans senokot-s. Labs reviewed. RLE and LLE nonpitting edema. Wounds:  Surgical Incision       Current Nutrition Therapies:    ADULT DIET;  Regular    Anthropometric Measures:  · Height: 5' 10\" (177.8 cm)  · Current Body Weight: 273 lb (123.8 kg)    · Usual Body Weight: 274 lb (124.3 kg) (bed scale on 1/4/21)     · Ideal Body Weight: 166 lbs; % Ideal Body Weight 164.5 %   · BMI: 39.2  · BMI Categories: Obese Class 2 (BMI 35.0 -39.9)       Nutrition Diagnosis:   No nutrition diagnosis at this time   Nutrition Interventions:   Food and/or Nutrient Delivery:  Continue Current Diet  Nutrition Education/Counseling:  Education declined   Coordination of Nutrition Care:  Continue to monitor while inpatient    Goals:  Pt will consume greater than 50% of meals this ARU stay       Nutrition Monitoring and Evaluation:   Behavioral-Environmental Outcomes:  None Identified   Food/Nutrient Intake Outcomes:  Food and Nutrient Intake  Physical Signs/Symptoms Outcomes:  Nutrition Focused Physical Findings,Weight     Discharge Planning:    Continue current diet     Electronically signed by Destini Rutledge MS, RD, LD on 1/18/22 at 2:50 PM EST    Contact: Office: [phone]; [address] [place] [address] Road: [phone]

## 2022-01-18 NOTE — PROGRESS NOTES
Mati Tellez  1/18/2022  [id number]    Chief Complaint: Rupture of distal quadriceps tendon    Subjective:   No issues overnight; resting in bed without complaint. ROS: no n/v cp, sob, f/c    Objective:  Patient Vitals for the past 24 hrs:   BP Temp Temp src Pulse Resp SpO2   01/17/22 2152 130/78 98.2 °F (36.8 °C) Oral 94 16 96 %   01/17/22 1243 107/65 -- -- 105 -- 94 %     Gen: No distress, pleasant. HEENT: Normocephalic, atraumatic. CV: Regular rate and rhythm. Resp: No respiratory distress. Abd: Soft, nontender, active bowel sounds present  Ext: No edema. Knee immobilizers in place bilaterally. Neuro: Alert, oriented, appropriately interactive. Wt Readings from Last 3 Encounters:   01/12/22 273 lb (123.8 kg)   01/06/22 274 lb 6.4 oz (124.5 kg)   04/14/20 270 lb (122.5 kg)       Laboratory data:   Lab Results   Component Value Date    WBC 9.2 01/17/2022    HGB 10.2 (L) 01/17/2022    HCT 32.0 (L) 01/17/2022    MCV 74.2 (L) 01/17/2022     (H) 01/17/2022       Lab Results   Component Value Date     01/17/2022    K 3.8 01/17/2022     01/17/2022    CO2 25 01/17/2022    BUN 15 01/17/2022    CREATININE 0.8 01/17/2022    GLUCOSE 108 01/17/2022    CALCIUM 8.5 01/17/2022        Therapy progress:  PT  Required Braces or Orthoses  Right Lower Extremity Brace: Knee Immobilizer  Left Lower Extremity Brace: Knee Immobilizer  Position Activity Restriction  Other position/activity restrictions: No B knee ROM. No B knee flexion. WBAT Bilateral legs while in knee immobilizers. Ensure that immobilizers are appropriately positioned, holding knees in full extension. PA cleared pt to remove KI while bathing with knees kept extended throughout bathing.   Objective     Sit to Stand: Minimal Assistance,Supervision  Stand to sit: Supervision  Bed to Chair: Stand by assistance,Contact guard assistance,Minimal assistance  Device: Standard Walker  Other Apparatus: Knee Immobilizer,Right,Left  Assistance: Supervision,Modified Independent  Distance: 100 ft  OT  PT Equipment Recommendations  Equipment Needed: Yes  Mobility Devices: Bettyjo Hylan: Standard  Other: TBD     Assessment        SLP                Body mass index is 39.17 kg/m². Rehabilitation Diagnosis:  Orthopedic, 8.9, Other Orthopedic     Assessment and Plan:  BLE distal quadriceps tendon rupture s/p repair  - restrictions per o/s  - pain control     Pneumonia  - completed abx     HTN  - follow bp on home regimen       Obesity  - dietary consulted     Bowels: Schedule stool softener. Follow bowel movements. Enema or suppository if needed.      Bladder: Check PVR x 3.   [facility] if PVR > 200ml or if any volume is > 500 ml.      Sleep: Trazodone provided prn.      Follow up appointments: orthopedics, pcp    Electronically signed by Scotty Martin MD on 1/18/2022 at 8:11 AM

## 2022-01-18 NOTE — PROGRESS NOTES
Occupational Therapy  Facility/Department: [facility] ARU  Daily Treatment Note  NAME: Omar James  : 1955  MRN: [id number]    Date of Service: 2022    Discharge Recommendations:  Home with assist PRN,Home with Home health OT,S Level 1  OT Equipment Recommendations  Equipment Needed: Yes  Mobility Devices: ADL Assistive Devices  ADL Assistive Devices: Transfer Tub Bench;Long-handled Sponge;Sock-Aid Hard;Reacher;Grab Bars - tub;Grab Bars - toilet; Toileting - 3-in-1 Commode    Assessment   Performance deficits / Impairments: Decreased functional mobility ; Decreased ADL status; Decreased balance;Decreased endurance;Decreased strength;Decreased high-level IADLs;Decreased posture  Assessment: First Session: Pt agreeable to OT session. Pt completed donning pants with SPV and socks with setup with use of a/e. Pt demonstrated poor ability to stand on first attempt when pulling pants up with feet sliding forward and hips sliding toward edge of bed. Pt required max A of 2 to safely scoot hips backward on bed before re-attempting stand with CGA/SBA on second attempt. Pt completed grooming in stance with mod I and good balance and tolerance for mobility to/from gym with SPV. Pt completed core exercises with good strength and good balance for hitting ball with dowel afshin in stance for 3 minutes x2. Pt completed mobility around kitchen with SPV and good ability to reach into cabinets and fridge. Second Session: Pt performed functional transfers and mobility to/from gym with SPV/mod I. Pt performed BUE ther ex with good strength for 5# weight. Pt educated on DME, including BSC for placing over toilet and TTB. Pt educated on pricing and location to acquire. Pt verbalized understanding. Continue POC. Prognosis: Good  OT Education: OT Role;Plan of Care;Transfer Training;Precautions; ADL Adaptive Strategies; Equipment;Home Exercise Program;IADL Safety  Patient Education: Disease specific: precautions, safe mobiltiy/t/fs, bed mobility, fx transfers,importance of precautions, DME, IADL safety in kitchen  Barriers to Learning: Pt verbalized understanding of all recommendations and safety. REQUIRES OT FOLLOW UP: Yes  Activity Tolerance  Activity Tolerance: Patient limited by fatigue;Patient Tolerated treatment well  Safety Devices  Safety Devices in place: Yes  Type of devices: Call light within reach;Nurse notified;Gait belt;Left in chair         Patient Diagnosis(es): There were no encounter diagnoses. has a past medical history of 01 minute Apgar score 0, Hypertension, Kidney stone, Mitral valve prolapse, Sarcoidosis, and Spinal stenosis. has a past surgical history that includes back surgery (); Colonoscopy (2015); and Leg Muscle Surgery (Bilateral, 2022). Restrictions  Restrictions/Precautions  Restrictions/Precautions: Weight Bearing,Fall Risk,General Precautions,ROM Restrictions  Required Braces or Orthoses?: Yes  Lower Extremity Weight Bearing Restrictions  Right Lower Extremity Weight Bearing: Weight Bearing As Tolerated  Left Lower Extremity Weight Bearing: Weight Bearing As Tolerated  Required Braces or Orthoses  Right Lower Extremity Brace: Knee Immobilizer  Left Lower Extremity Brace: Knee Immobilizer  Position Activity Restriction  Other position/activity restrictions: No B knee ROM. No B knee flexion. WBAT Bilateral legs while in knee immobilizers. Ensure that immobilizers are appropriately positioned, holding knees in full extension. PA cleared pt to remove KI while bathing with knees kept extended throughout bathing.   Subjective   General  Chart Reviewed: Gianluca Elizabeth and Physical,Progress Notes,Labs  Patient assessed for rehabilitation services?: Yes  Additional Pertinent Hx: HTN, PNA  Response to previous treatment: Patient with no complaints from previous session  Family / Caregiver Present: No  Referring Practitioner: Dulce Lucas MD  Diagnosis: B quad tendon rupture s/p B quad tendon repair 1/5/22  Subjective  Subjective: Pt in bed, pleasant, agreeable to OT session. General Comment  Comments: RN approved therapy  Vital Signs  Temp: 97.5 °F (36.4 °C)  Temp Source: Oral  Pulse: 92  Heart Rate Source: Monitor  BP: 124/76  BP Location: Right upper arm  Patient Position: Semi fowlers  Patient Currently in Pain: Denies  Oxygen Therapy  SpO2: 97 %  Pulse Oximeter Device Mode: Intermittent  Pulse Oximeter Device Location: Right;Finger  O2 Device: None (Room air)   Orientation  Orientation  Overall Orientation Status: Within Functional Limits  Objective    ADL  Grooming: Independent (to brush teeth at sink)  LE Dressing: Stand by assistance (good use of reacher and sock aid for donning pants/socks, SBA for balance to stand from bed)        Balance  Sitting Balance: Independent  Standing Balance: Dependent/Total (Max A of 2 when trying to stand from bed initially, mod I at sink with SW)  Standing Balance  Time: 7 minutes, 11 minutes, 3 minutes  Activity: transfer to/from bathroom, LB dressing, functional mobility to/from gym, standing coordination task, kitchen mobility  Comment: max A of 2 to keep from lowering to floor when trying to stand from bed to pull pants up, SBA for all other stands and mod I static stance with SW  Functional Mobility  Functional - Mobility Device: Standard Walker  Activity: To/from bathroom; To/From therapy gym  Assist Level: Stand by assistance  Bed mobility  Supine to Sit: Modified independent  Transfers  Stand Step Transfers: Stand by assistance  Sit to stand: Dependent/Total  Stand to sit: Dependent/Total  Transfer Comments: max A of 2 to keep from lowering to floor on one stand from bed.  SBA for all other stands and transfers        Coordination  Gross Motor: good coordination for hitting ball with 5# dowel afshin for 3 minutes x2 while standing with SPV for balance, good use of a/e for LB ADLs              Cognition  Overall Cognitive Status: [facility] Type of ROM/Therapeutic Exercise  Type of ROM/Therapeutic Exercise: Free weights  Comment: 5#  Exercises  Shoulder Flexion: x25  Shoulder Extension: x25  Horizontal ABduction: x25  Horizontal ADduction: x25  Elbow Flexion: x25  Elbow Extension: x25  Supination: x25  Pronation: x25  Wrist Flexion: x25  Wrist Extension: x25  Other: chest press x25; core rotation, obliques, and a-p leaning 2x25 with 6# medicine ball seated                    Plan   Plan  Times per week: 5/7 days/week  Plan weeks: 10 days  Specific instructions for Next Treatment: Tub tx, sit<>stands, LBD, bed mobilty, don/doff BKI  Current Treatment Recommendations: Endurance Training,Balance Training,Functional Mobility Training,Safety Education & Training,Self-Care / ADL,Patient/Caregiver Education & Training,Strengthening,ROM,Equipment Evaluation, Education, & procurement,Home Management Training    Goals  Short term goals  Time Frame for Short term goals: 6 days- 1/17/22  Short term goal 1: Pt will complete functional transfers with min A. GOAL MET 1/14/22 Pt completed functional transfers with SBA. Short term goal 2: Pt will perform toileting with min A. GOAL MET 1/17/22 Pt reports he completed toileting with SBA on BSC for bowel movement. Short term goal 3: Pt will complete LB dressing with mod A. GOAL MET 1/14/22 Pt completed LB dressing with min A. Long term goals  Time Frame for Long term goals : 10 days 1/21/22  Long term goal 1: Pt will perform functional transfers with SBA. Long term goal 2: Pt will complete toileting with supervision. Long term goal 3: Pt will perform LB bathing with supervision. Long term goal 4: Pt will complete LB dressing with min A. 1/17 MET min A LBD  Long term goal 5: Pt will complete simple IADL task with supervision. GOAL MET 1/18/22 Pt completed simulated meal prep with supervision to gather items in kitchen.   Patient Goals   Patient goals : \"to get to the point where I can bend my knees, to get stronger, and to get out\"       Therapy Time   Individual Concurrent Group Co-treatment   Time In 0725         Time Out 0825         Minutes 60         Timed Code Treatment Minutes: 60 Minutes    Second Session Therapy Time:   Individual Concurrent Group Co-treatment   Time In 1000         Time Out 1030         Minutes 30           Timed Code Treatment Minutes:  30 Minutes    Total Treatment Minutes:  90 minutes      Oscar Garcia, OT

## 2022-01-19 LAB
GLUCOSE BLD-MCNC: 111 MG/DL (ref 70–99)
GLUCOSE BLD-MCNC: 114 MG/DL (ref 70–99)
GLUCOSE BLD-MCNC: 115 MG/DL (ref 70–99)
GLUCOSE BLD-MCNC: 98 MG/DL (ref 70–99)
PERFORMED ON: ABNORMAL
PERFORMED ON: NORMAL

## 2022-01-19 PROCEDURE — 97110 THERAPEUTIC EXERCISES: CPT

## 2022-01-19 PROCEDURE — 97116 GAIT TRAINING THERAPY: CPT

## 2022-01-19 PROCEDURE — 6360000002 HC RX W HCPCS: Performed by: PHYSICAL MEDICINE & REHABILITATION

## 2022-01-19 PROCEDURE — 97535 SELF CARE MNGMENT TRAINING: CPT

## 2022-01-19 PROCEDURE — 97530 THERAPEUTIC ACTIVITIES: CPT

## 2022-01-19 PROCEDURE — 6370000000 HC RX 637 (ALT 250 FOR IP): Performed by: PHYSICAL MEDICINE & REHABILITATION

## 2022-01-19 PROCEDURE — 6370000000 HC RX 637 (ALT 250 FOR IP): Performed by: NURSE PRACTITIONER

## 2022-01-19 PROCEDURE — 1280000000 HC REHAB R&B

## 2022-01-19 RX ORDER — OXYCODONE HYDROCHLORIDE 5 MG/1
5 TABLET ORAL EVERY 6 HOURS PRN
Qty: 28 TABLET | Refills: 0 | Status: SHIPPED | OUTPATIENT
Start: 2022-01-19 | End: 2022-01-26

## 2022-01-19 RX ORDER — METOPROLOL SUCCINATE 25 MG/1
25 TABLET, EXTENDED RELEASE ORAL DAILY
Qty: 30 TABLET | Refills: 3 | Status: ON HOLD | OUTPATIENT
Start: 2022-01-20 | End: 2022-04-09 | Stop reason: HOSPADM

## 2022-01-19 RX ADMIN — ENOXAPARIN SODIUM 30 MG: 100 INJECTION SUBCUTANEOUS at 09:51

## 2022-01-19 RX ADMIN — LISINOPRIL 10 MG: 10 TABLET ORAL at 20:29

## 2022-01-19 RX ADMIN — POLYETHYLENE GLYCOL 3350 17 G: 17 POWDER, FOR SOLUTION ORAL at 09:49

## 2022-01-19 RX ADMIN — CYCLOBENZAPRINE 5 MG: 10 TABLET, FILM COATED ORAL at 09:50

## 2022-01-19 RX ADMIN — SENNOSIDES AND DOCUSATE SODIUM 1 TABLET: 50; 8.6 TABLET ORAL at 20:29

## 2022-01-19 RX ADMIN — METOPROLOL SUCCINATE 25 MG: 25 TABLET, FILM COATED, EXTENDED RELEASE ORAL at 09:50

## 2022-01-19 RX ADMIN — ENOXAPARIN SODIUM 30 MG: 100 INJECTION SUBCUTANEOUS at 20:30

## 2022-01-19 RX ADMIN — CYCLOBENZAPRINE 5 MG: 10 TABLET, FILM COATED ORAL at 20:29

## 2022-01-19 RX ADMIN — GABAPENTIN 300 MG: 300 CAPSULE ORAL at 20:29

## 2022-01-19 RX ADMIN — MULTIPLE VITAMINS W/ MINERALS TAB 1 TABLET: TAB at 09:50

## 2022-01-19 RX ADMIN — OXYCODONE HYDROCHLORIDE AND ACETAMINOPHEN 250 MG: 500 TABLET ORAL at 09:50

## 2022-01-19 RX ADMIN — SENNOSIDES AND DOCUSATE SODIUM 1 TABLET: 50; 8.6 TABLET ORAL at 09:50

## 2022-01-19 ASSESSMENT — PAIN SCALES - WONG BAKER: WONGBAKER_NUMERICALRESPONSE: 0

## 2022-01-19 ASSESSMENT — PAIN SCALES - GENERAL
PAINLEVEL_OUTOF10: 2
PAINLEVEL_OUTOF10: 0
PAINLEVEL_OUTOF10: 0

## 2022-01-19 ASSESSMENT — PAIN DESCRIPTION - DESCRIPTORS: DESCRIPTORS: ACHING

## 2022-01-19 ASSESSMENT — PAIN DESCRIPTION - FREQUENCY: FREQUENCY: CONTINUOUS

## 2022-01-19 ASSESSMENT — PAIN DESCRIPTION - LOCATION: LOCATION: KNEE

## 2022-01-19 ASSESSMENT — PAIN DESCRIPTION - ORIENTATION: ORIENTATION: LEFT

## 2022-01-19 ASSESSMENT — PAIN DESCRIPTION - PAIN TYPE: TYPE: SURGICAL PAIN;ACUTE PAIN

## 2022-01-19 NOTE — PROGRESS NOTES
Occupational Therapy  Facility/Department: [facility]  Daily Treatment Note  NAME: Lazara Ann  : 1955  MRN: [id number]    Date of Service: 2022    Discharge Recommendations:  Home with assist PRN,Home with Home health OT,S Level 1  OT Equipment Recommendations  Equipment Needed: Yes  Mobility Devices: ADL Assistive Devices  ADL Assistive Devices: Transfer Tub Bench;Long-handled Sponge;Sock-Aid Hard;Reacher;Grab Bars - tub;Grab Bars - toilet; Toileting - 3-in-1 Commode    Assessment   Performance deficits / Impairments: Decreased functional mobility ; Decreased ADL status; Decreased balance;Decreased endurance;Decreased strength;Decreased high-level IADLs;Decreased posture  Assessment: Pt agreeable to OT session. Pt completed sit<>stands from bed and shower chair with SBA but mod I for mobility, standing balance, and sit<>stands with B armrests. Pt completed bathing with mod I, LB dressing with SBA when standing to pull pants up with min VCs for placement/removal of KIs. Pt completed BUE ther ex with good strength and understanding of HEP. Pt demonstrated good standing tolerance to stand for 15 minutes at table top. Pt verbalized understanding of all recommendations and DME. Prognosis: Good  OT Education: OT Role;Plan of Care;Transfer Training;Precautions; ADL Adaptive Strategies; Equipment;Home Exercise Program;IADL Safety  Patient Education: Disease specific: precautions, safe mobiltiy/t/fs, bed mobility, fx transfers,importance of precautions, DME  Barriers to Learning: Pt verbalized understanding of all recommendations and safety. REQUIRES OT FOLLOW UP: Yes  Activity Tolerance  Activity Tolerance: Patient Tolerated treatment well  Safety Devices  Safety Devices in place: Yes  Type of devices: Call light within reach;Nurse notified;Gait belt;Left in chair         Patient Diagnosis(es): The encounter diagnosis was Tendon rupture, traumatic. quadriceps, left, initial encounter.       has a past medical history of 01 minute Apgar score 0, Hypertension, Kidney stone, Mitral valve prolapse, Sarcoidosis, and Spinal stenosis. has a past surgical history that includes back surgery (); Colonoscopy (2015); and Leg Muscle Surgery (Bilateral, 2022). Restrictions  Restrictions/Precautions  Restrictions/Precautions: Weight Bearing,Fall Risk,General Precautions,ROM Restrictions  Required Braces or Orthoses?: Yes  Lower Extremity Weight Bearing Restrictions  Right Lower Extremity Weight Bearing: Weight Bearing As Tolerated  Left Lower Extremity Weight Bearing: Weight Bearing As Tolerated  Required Braces or Orthoses  Right Lower Extremity Brace: Knee Immobilizer  Left Lower Extremity Brace: Knee Immobilizer  Position Activity Restriction  Other position/activity restrictions: No B knee ROM. No B knee flexion. WBAT Bilateral legs while in knee immobilizers. Ensure that immobilizers are appropriately positioned, holding knees in full extension. PA cleared pt to remove KI while bathing with knees kept extended throughout bathing. Subjective   General  Chart Reviewed: James Grim and Physical,Progress Notes,Labs  Patient assessed for rehabilitation services?: Yes  Additional Pertinent Hx: HTN, PNA  Response to previous treatment: Patient with no complaints from previous session  Family / Caregiver Present: No  Referring Practitioner: Kathryn Gaviria MD  Diagnosis: B quad tendon rupture s/p B quad tendon repair 22  Subjective  Subjective: Pt in bed, pleasant, states did not sleep great again last night, agreeable to OT session. General Comment  Comments: RN approved therapy  Pain Assessment  Pain Assessment: 0-10  Pain Level: 2  Pain Type: Surgical pain;Acute pain  Pain Location: Knee  Pain Orientation: Left  Pain Descriptors: Aching  Pain Frequency: Continuous  Non-Pharmaceutical Pain Intervention(s): Ambulation/Increased Activity;Repositioned; Emotional support; Shower  Response to Pain Intervention: Patient Satisfied  Vital Signs  Temp: 96.3 °F (35.7 °C)  Temp Source: Oral  Pulse: 86  Heart Rate Source: Monitor  BP: 124/75  BP Location: Right upper arm  Patient Position: Semi fowlers  Patient Currently in Pain: Yes  Oxygen Therapy  SpO2: 98 %  Pulse Oximeter Device Mode: Intermittent  Pulse Oximeter Device Location: Right;Finger  O2 Device: None (Room air)   Orientation  Orientation  Overall Orientation Status: Within Functional Limits  Objective    ADL  Equipment Provided: Reacher;Sock aid;Long-handled sponge  Feeding: Independent  Grooming: Independent (brushing teeth in stance at sink)  UE Bathing: Independent  LE Bathing: Independent  UE Dressing: Independent  LE Dressing: Stand by assistance (when standing to manage pants up, mod I for socks, good ability to don own KIs)  Toileting: Independent (standing to urinate)        Balance  Sitting Balance: Independent  Standing Balance: Stand by assistance (initially on sit<>stands, mod I once in stance)  Standing Balance  Time: 7 minutes, 4-5 minutes x2  Activity: transfer to/from bathroom, grooming at sink, LB dressing, toileting, collection of clothes, functional mobility to/from gym  Comment: with SW  Functional Mobility  Functional - Mobility Device: Standard Walker  Activity: To/from bathroom; To/From therapy gym  Assist Level: Modified independent   Toilet Transfers  Toilet - Technique: Ambulating  Equipment Used: Drop-arm extra wide bedside commode  Toilet Transfer: Supervision  Tub Transfers  Tub - Transfer From: Walker  Tub - Transfer Type: To and From  Tub - Transfer To: Transfer tub bench  Tub - Technique: Ambulating  Tub Transfers: Supervision  Tub Transfers Comments: education regarding keeping KIs on for swinging legs over tub  Shower Transfers  Shower - Transfer From: Fredy Su - Transfer Type: To and From  Shower - Transfer To:  Transfer tub bench  Shower - Technique: Ambulating  Shower Transfers: Stand by assistance  Bed mobility  Supine to Sit: Modified independent  Transfers  Stand Step Transfers: Stand by assistance  Stand Pivot Transfers: Stand by assistance  Sit to stand: Stand by assistance  Stand to sit: Supervision                       Cognition  Overall Cognitive Status: WFL  Cognition Comment: improved adherence to precautions in shower                    Type of ROM/Therapeutic Exercise  Type of ROM/Therapeutic Exercise: Free weights  Comment: 5#  Exercises  Shoulder Flexion: x25  Shoulder Extension: x25  Horizontal ABduction: x25  Horizontal ADduction: x25  Elbow Flexion: x25  Elbow Extension: x25  Supination: x25  Pronation: x25  Wrist Flexion: x25  Wrist Extension: x25  Other: chest press x25                    Plan   Plan  Times per week: 5/7 days/week  Plan weeks: 10 days  Specific instructions for Next Treatment: Tub tx, sit<>stands, LBD, bed mobilty, don/doff BKI  Current Treatment Recommendations: Endurance Training,Balance Training,Functional Mobility Training,Safety Education & Training,Self-Care / ADL,Patient/Caregiver Education & Training,Strengthening,ROM,Equipment Evaluation, Education, & procurement,Home Management Training    Goals  Short term goals  Time Frame for Short term goals: 6 days- 1/17/22  Short term goal 1: Pt will complete functional transfers with min A. GOAL MET 1/14/22 Pt completed functional transfers with SBA. Short term goal 2: Pt will perform toileting with min A. GOAL MET 1/17/22 Pt reports he completed toileting with SBA on BSC for bowel movement. Short term goal 3: Pt will complete LB dressing with mod A. GOAL MET 1/14/22 Pt completed LB dressing with min A. Long term goals  Time Frame for Long term goals : 10 days 1/21/22  Long term goal 1: Pt will perform functional transfers with SBA. GOAL MET 1/19/22 Pt completed functional transfers with SBA. Long term goal 2: Pt will complete toileting with supervision.  GOAL MET 1/19/22 Pt completed toileting with mod I.  Long term goal 3: Pt will perform LB bathing with supervision. GOAL MET 1/19/22 Pt completed LB bathing with mod I.  Long term goal 4: Pt will complete LB dressing with min A. 1/17 MET min A LBD  Long term goal 5: Pt will complete simple IADL task with supervision. GOAL MET 1/18/22 Pt completed simulated meal prep with supervision to gather items in kitchen.   Patient Goals   Patient goals : \"to get to the point where I can bend my knees, to get stronger, and to get out\"       Therapy Time   Individual Concurrent Group Co-treatment   Time In 0730         Time Out 0900         Minutes 90         Timed Code Treatment Minutes: Franklyn Pablo , OT

## 2022-01-19 NOTE — PROGRESS NOTES
Physical Therapy  Discharge Summary    Name:Nabil Santa  MWM:[id number]  HYW:[id number]/[id number]  Treatment Diagnosis: Decreased independence with functional mobility  Diagnosis: s/p fall, devyn quad tendon ruptures, s/p repair 1/5    Restrictions/Precautions  Restrictions/Precautions: Weight Bearing,Fall Risk,General Precautions,ROM Restrictions  Required Braces or Orthoses?: Yes   Lower Extremity Weight Bearing Restrictions  Right Lower Extremity Weight Bearing: Weight Bearing As Tolerated  Left Lower Extremity Weight Bearing: Weight Bearing As Tolerated       Position Activity Restriction  Other position/activity restrictions: No B knee ROM. No B knee flexion. WBAT Bilateral legs while in knee immobilizers. Ensure that immobilizers are appropriately positioned, holding knees in full extension. PA cleared pt to remove KI while bathing with knees kept extended throughout bathing. Goals:                  Short term goals  Time Frame for Short term goals: 1/17/22  Short term goal 1: Pt will complete bed mobility independently. -GOAL NOT MET< requires mod ind  Short term goal 2: Pt will complete transfers with SBA and LRAD.- GOAL MET, completes with SBA  Short term goal 3: Pt will ambulate 100 ft with SBA and LRAD. GOAL MET 1/15: 115 ft x2 w/ SW and SBA  Short term goal 4: Pt will tolerate 3 stairs with 1 handrail and CGA.- GOAL NOT MET, requires  BHR            Long term goals  Time Frame for Long term goals : 10 days (7/21/22)  Long term goal 1: Pt will complete transfers mod I with LRAD.- GOAL MET, completes with mod ind and SW  Long term goal 2: Pt will ambulate 150 ft mod I with LRAD.- GOAL MET x 150 ft with SW and mod ind  Long term goal 3: Pt will negotiate 10 stairs with 1 handrail and supervision.- GOAL NOT MET, requires B HR and supv-mod ind x 12 steps  Long term goal 4: Pt will complete car transfer with LRAD mod I.- GOAL MET, completes with mod ind in car simulator    Pt.  Met 2/4 short term goals and 3/4 long term goals. Discharge PT IRF:    CARE Score: 4                                   Pt. Currently ambulates >150 feet with SW and mod ind  Up/down 12 steps with mod ind and BHR  Up/down curb step with BHR and mod ind  Sit to/from stand with mod ind from chair with B arm rests, SBA from low bed due to B KIs. Bed mobility with ind  Recommend HHPT in order to ensure safety with home mobility, progress to OPPT when cleared pt ortho. Pt. Safe to return home with assistance from family prn.      Electronically signed by Seda Dawn PT on 1/19/2022 at 3:22 PM

## 2022-01-19 NOTE — PROGRESS NOTES
Physical Therapy  Facility/Department: [facility]  Daily Treatment Note  NAME: Falguni Gould  : 1955  MRN: [id number]    Date of Service: 2022    Discharge Recommendations:  Home with assist PRN   PT Equipment Recommendations  Equipment Needed: Yes  Mobility Devices: Monica Dragon: Standard  Other: bed rail    Assessment    Body structures, Functions, Activity limitations: Decreased functional mobility ; Decreased ROM; Decreased endurance;Decreased balance; Increased pain;Decreased strength  Assessment: Patient seen for gait and core strengthening. Patient completed transfers and ambulation with SW mod I with Keanu KI donned throughout session. Pt completes seated core exercises with 6 lb med ball at [facility]. Pt has progressed well with therapy and will require assist prn. Treatment Diagnosis: Decreased independence with functional mobility  Specific instructions for Next Treatment: progress mobility as tolerated  Prognosis: Good  Decision Making: Medium Complexity  PT Education: Goals; General Safety; Disease Specific Education;PT Role;Plan of Care;Precautions;Transfer Training;Weight-bearing Education;Equipment;Gait Training;Functional Mobility Training; Injury Prevention;Pressure Relief;Home Exercise Program  Patient Education: pt educated on benefits of core and UE strengthening to progress strength and endurance - demos understanding  Barriers to Learning: none  REQUIRES PT FOLLOW UP: Yes  Activity Tolerance  Activity Tolerance: Patient limited by fatigue;Patient limited by endurance  Activity Tolerance: 109 bpm, 99%, 106/70     Patient Diagnosis(es): The encounter diagnosis was Tendon rupture, traumatic. quadriceps, left, initial encounter. has a past medical history of 01 minute Apgar score 0, Hypertension, Kidney stone, Mitral valve prolapse, Sarcoidosis, and Spinal stenosis. has a past surgical history that includes back surgery ();  Colonoscopy (2015); and Leg Muscle Surgery (Bilateral, 1/5/2022). Restrictions  Restrictions/Precautions  Restrictions/Precautions: Weight Bearing,Fall Risk,General Precautions,ROM Restrictions  Required Braces or Orthoses?: Yes  Lower Extremity Weight Bearing Restrictions  Right Lower Extremity Weight Bearing: Weight Bearing As Tolerated  Left Lower Extremity Weight Bearing: Weight Bearing As Tolerated  Required Braces or Orthoses  Right Lower Extremity Brace: Knee Immobilizer  Left Lower Extremity Brace: Knee Immobilizer  Position Activity Restriction  Other position/activity restrictions: No B knee ROM. No B knee flexion. WBAT Bilateral legs while in knee immobilizers. Ensure that immobilizers are appropriately positioned, holding knees in full extension. PA cleared pt to remove KI while bathing with knees kept extended throughout bathing. Subjective   General  Chart Reviewed: Yes  Response To Previous Treatment: Patient with no complaints from previous session. Family / Caregiver Present: No  Referring Practitioner: Jasmin  Subjective  Subjective: pt in recliner, agreeable to PT  Pain Screening  Patient Currently in Pain: No  Vital Signs  Patient Currently in Pain: No       Orientation  Orientation  Overall Orientation Status: Within Functional Limits     Objective   Bed mobility  Supine to Sit: Unable to assess  Sit to Supine: Unable to assess  Transfers  Sit to Stand: Modified independent (with SW)  Stand to sit: Modified independent (with SW)  Ambulation  Ambulation?: Yes  WB Status: WBAT with BLE KI  Ambulation 1  Surface: level tile  Device: Standard Walker  Other Apparatus: Knee Immobilizer;Right;Left  Assistance: Modified Independent  Quality of Gait: Pt ambulates with slow gavin, partial step through pattern without LOB with use of SW.  Gait Deviations: Slow Gavin;Decreased step length;Decreased step height; Increased AMNA  Distance: 2x 100 ft        Other exercises  Other exercises 1: 6 lb med ball exercises seated EOM x20 each: partial range sit ups, PNF diagonal each direction, trunk rotations each direction        Second Session:  Pt set to dc home with therapy recommending assist prn and HHPT progressing to OPPT when applicable. Pt denies pain ,reports no questions/concerns about d/c. Bed mobility completed with ind. Sit to stand from EOB to SW with SBA due to use of B KIs. Sit to stand recliner to Sw and w/c to SW with mod ind as pt has ability to use B arm rests. Gait x 180 ft (10 ft turf) with mod ind and SW, above stated gait deficits. Pt completed modified car transfer with mod ind. Pt ascended/descended 12 steps with BHR (or use of wall) with mod ind and B KIs. Dyn stand activity: pt reaching for beanbags with RUE/LUE in various planes to toss to target 5 ft away x 12 reps. Pt then retrieved beanbags from ground with reacher and mod ind. Pt completed 15 reps of seated abdominal crunches, trunk R/L rotation, PNF D1 flexion pattern Bilaterally with 4# medicine ball. Pt completed 4 min of seated chetan afshin taps with 2.5# chetan afshin and no trunk support, ind.     Pt completed 15 reps of BLE standing hip flexion, hip abduction, hip extension. Seated AP bilaterally x 30. Gait  X 150 ft with mod ind to room   Pt in recliner with alarm donned and call light/needs within reach. Goals  Short term goals  Time Frame for Short term goals: 1/17/22  Short term goal 1: Pt will complete bed mobility independently. -GOAL NOT MET< requires mod ind  Short term goal 2: Pt will complete transfers with SBA and LRAD.- GOAL MET, completes with SBA  Short term goal 3: Pt will ambulate 100 ft with SBA and LRAD.  GOAL MET 1/15: 115 ft x2 w/ SW and SBA  Short term goal 4: Pt will tolerate 3 stairs with 1 handrail and CGA.- GOAL NOT MET, requires  BHR  Long term goals  Time Frame for Long term goals : 10 days (7/21/22)  Long term goal 1: Pt will complete transfers mod I with LRAD.- GOAL MET, completes with mod ind and SW  Long term goal 2: Pt will ambulate 150 ft mod I with LRAD.- GOAL MET x 150 ft with SW and mod ind  Long term goal 3: Pt will negotiate 10 stairs with 1 handrail and supervision.- GOAL NOT MET, requires B HR and supv-mod ind x 12 steps  Long term goal 4: Pt will complete car transfer with LRAD mod I.- GOAL MET, completes with mod ind in car simulator  Patient Goals   Patient goals : \"Bend my knees, get stronger, and get back home\"    Plan    Plan  Times per week: 5-7 days/week  Times per day: Daily  Plan weeks: 1 week 1/13/22  Specific instructions for Next Treatment: progress mobility as tolerated  Current Treatment Recommendations: Strengthening,Balance Training,Endurance Training,Functional Mobility Training,Transfer Training,Gait Training,Positioning,Equipment Evaluation, Education, & procurement,Patient/Caregiver Education & Training,Safety Education & Training,Home Exercise Program,Wheelchair Mobility Training,Stair training  Safety Devices  Type of devices:  All fall risk precautions in place,Call light within reach,Gait belt,Patient at risk for falls,Nurse notified,Left in chair  Restraints  Initially in place: No     Therapy Time   Individual Concurrent Group Co-treatment   Time In 1030         Time Out 1100         Minutes 30         Timed Code Treatment Minutes: EL Montoya     Second Session Therapy Time: Jaja Cui PT, DPT (Second session only)   Individual Concurrent Group Co-treatment   Time In 1300         Time Out 1400         Minutes 60           Timed Code Treatment Minutes:  60    Total Treatment Minutes:  90

## 2022-01-19 NOTE — PROGRESS NOTES
Occupational Therapy  Discharge Summary     Name:Nabil Mccabe  PBI:[id number]  :1955  Treatment Diagnosis: Decreased independence with functional mobility  Diagnosis: s/p fall, devyn quad tendon ruptures, s/p repair     Restrictions/Precautions  Restrictions/Precautions: Weight Bearing,Fall Risk,General Precautions,ROM Restrictions  Required Braces or Orthoses?: Yes   Lower Extremity Weight Bearing Restrictions  Right Lower Extremity Weight Bearing: Weight Bearing As Tolerated  Left Lower Extremity Weight Bearing: Weight Bearing As Tolerated       Position Activity Restriction  Other position/activity restrictions: No B knee ROM. No B knee flexion. WBAT Bilateral legs while in knee immobilizers. Ensure that immobilizers are appropriately positioned, holding knees in full extension. PA cleared pt to remove KI while bathing with knees kept extended throughout bathing. Goals:   Short term goals  Time Frame for Short term goals: 6 days- 22  Short term goal 1: Pt will complete functional transfers with min A. GOAL MET 22 Pt completed functional transfers with SBA. Short term goal 2: Pt will perform toileting with min A. GOAL MET 22 Pt reports he completed toileting with SBA on BSC for bowel movement. Short term goal 3: Pt will complete LB dressing with mod A. GOAL MET 22 Pt completed LB dressing with min A. Long term goals  Time Frame for Long term goals : 10 days 22  Long term goal 1: Pt will perform functional transfers with SBA. GOAL MET 22 Pt completed functional transfers with SBA. Long term goal 2: Pt will complete toileting with supervision. GOAL MET 22 Pt completed toileting with mod I.  Long term goal 3: Pt will perform LB bathing with supervision. GOAL MET 22 Pt completed LB bathing with mod I.  Long term goal 4: Pt will complete LB dressing with min A.  MET min A LBD  Long term goal 5: Pt will complete simple IADL task with supervision.  GOAL MET 1/18/22 Pt completed simulated meal prep with supervision to gather items in kitchen. Pt. Met 3/3 short term goals and 5/5 long term goals. Current Functional Status:   ADL  Equipment Provided: Reacher,Sock aid,Long-handled sponge  Feeding: Independent  Grooming: Independent (brushing teeth in stance at sink)  UE Bathing: Independent  LE Bathing: Independent  UE Dressing: Independent  LE Dressing: Stand by assistance (when standing to manage pants up, mod I for socks, good ability to don own KIs)  Toileting: Independent (standing to urinate)  Additional Comments: mod VC throuhgout ADLs for knee extension    Bed mobility  Bridging: Unable to assess (unable to bridge 2/2 no knee flexion)  Rolling to Left: Stand by assistance  Rolling to Right: Stand by assistance  Supine to Sit: Unable to assess  Sit to Supine: Unable to assess  Scooting: Modified independent (to scoot forward in chairs.)  Comment: Pt started and ended session seated in chair. Functional Transfers: Toilet Transfers  Toilet - Technique: Ambulating  Equipment Used: Drop-arm extra wide bedside commode  Toilet Transfer: Supervision    Tub Transfers  Tub - Transfer From: Walker  Tub - Transfer Type: To and From  Tub - Transfer To: Transfer tub bench  Tub - Technique: Ambulating  Tub Transfers: Supervision  Tub Transfers Comments: education regarding keeping KIs on for swinging legs over tub    Shower Transfers  Shower - Transfer From: Maddie Patches - Transfer Type: To and From  Shower - Transfer To: Transfer tub bench  Shower - Technique: Ambulating  Shower Transfers: Stand by assistance           Functional Mobility  Functional - Mobility Device: Standard Walker  Activity: To/from bathroom,To/From therapy gym  Assist Level: Modified independent   Functional Mobility Comments: 1 instance of CGA with LOB taking steps backward. Primarily SBA-Supervision.                            Perception  Overall Perceptual Status: WFL         UE Function:  Hand Dominance  Hand Dominance: Right             Assessment:   Assessment: Pt agreeable to OT session. Pt completed sit<>stands from bed and shower chair with SBA but mod I for mobility, standing balance, and sit<>stands with B armrests. Pt completed bathing with mod I, LB dressing with SBA when standing to pull pants up with min VCs for placement/removal of KIs. Pt completed BUE ther ex with good strength and understanding of HEP. Pt demonstrated good standing tolerance to stand for 15 minutes at table top. Pt verbalized understanding of all recommendations and DME. Prognosis: Good  Barriers to Learning: Pt verbalized understanding of all recommendations and safety. REQUIRES OT FOLLOW UP: Yes  Discharge Recommendations: Home with assist PRN,Home with Home health OT,S Level 1    Equipment Recommendations:  ADL Assistive Devices: Transfer Tub Bench;Long-handled Sponge;Sock-Aid Hard;Reacher;Grab Bars - tub;Grab Bars - toilet; Toileting - 3-in-1 Commode         Home Exercise Program  Provided Pt with education regarding HEP. Pt demonstrated understanding of all exercises with no handout.     Electronically signed by Apolinar Bills OT on 1/19/2022 at 11:20 AM

## 2022-01-19 NOTE — CARE COORDINATION
ARU Team Conference       Planned Discharge Date: 01/20/22  Durable medical equipment needed:Standard walker>[employer]    Discharge Plan: Patient will return home with home care services>[facility]. Patient will need cab voucher for home. Patient agreeable with DCP. CM will continue to support for discharge needs.  Charla Gama, DRAKE

## 2022-01-20 ENCOUNTER — TELEPHONE (OUTPATIENT)
Dept: ORTHOPEDIC SURGERY | Age: 67
End: 2022-01-20

## 2022-01-20 VITALS
HEIGHT: 70 IN | DIASTOLIC BLOOD PRESSURE: 80 MMHG | SYSTOLIC BLOOD PRESSURE: 153 MMHG | TEMPERATURE: 97.9 F | BODY MASS INDEX: 36.95 KG/M2 | WEIGHT: 258.1 LBS | HEART RATE: 99 BPM | OXYGEN SATURATION: 100 % | RESPIRATION RATE: 20 BRPM

## 2022-01-20 LAB
ANION GAP SERPL CALCULATED.3IONS-SCNC: 11 MMOL/L (ref 3–16)
BASOPHILS ABSOLUTE: 0.1 K/UL (ref 0–0.2)
BASOPHILS RELATIVE PERCENT: 0.9 %
BUN BLDV-MCNC: 16 MG/DL (ref 7–20)
CALCIUM SERPL-MCNC: 8.5 MG/DL (ref 8.3–10.6)
CHLORIDE BLD-SCNC: 107 MMOL/L (ref 99–110)
CO2: 23 MMOL/L (ref 21–32)
CREAT SERPL-MCNC: 0.9 MG/DL (ref 0.8–1.3)
EOSINOPHILS ABSOLUTE: 0.4 K/UL (ref 0–0.6)
EOSINOPHILS RELATIVE PERCENT: 6.1 %
GFR AFRICAN AMERICAN: >60
GFR NON-AFRICAN AMERICAN: >60
GLUCOSE BLD-MCNC: 102 MG/DL (ref 70–99)
GLUCOSE BLD-MCNC: 156 MG/DL (ref 70–99)
GLUCOSE BLD-MCNC: 90 MG/DL (ref 70–99)
HCT VFR BLD CALC: 30.3 % (ref 40.5–52.5)
HEMOGLOBIN: 9.6 G/DL (ref 13.5–17.5)
LYMPHOCYTES ABSOLUTE: 1.5 K/UL (ref 1–5.1)
LYMPHOCYTES RELATIVE PERCENT: 21.8 %
MCH RBC QN AUTO: 24 PG (ref 26–34)
MCHC RBC AUTO-ENTMCNC: 31.7 G/DL (ref 31–36)
MCV RBC AUTO: 75.7 FL (ref 80–100)
MONOCYTES ABSOLUTE: 0.8 K/UL (ref 0–1.3)
MONOCYTES RELATIVE PERCENT: 11.3 %
NEUTROPHILS ABSOLUTE: 4.1 K/UL (ref 1.7–7.7)
NEUTROPHILS RELATIVE PERCENT: 59.9 %
PDW BLD-RTO: 13.9 % (ref 12.4–15.4)
PERFORMED ON: ABNORMAL
PERFORMED ON: NORMAL
PLATELET # BLD: 509 K/UL (ref 135–450)
PMV BLD AUTO: 7.1 FL (ref 5–10.5)
POTASSIUM REFLEX MAGNESIUM: 4.1 MMOL/L (ref 3.5–5.1)
RBC # BLD: 4 M/UL (ref 4.2–5.9)
SODIUM BLD-SCNC: 141 MMOL/L (ref 136–145)
WBC # BLD: 6.8 K/UL (ref 4–11)

## 2022-01-20 PROCEDURE — 6370000000 HC RX 637 (ALT 250 FOR IP): Performed by: NURSE PRACTITIONER

## 2022-01-20 PROCEDURE — 85025 COMPLETE CBC W/AUTO DIFF WBC: CPT

## 2022-01-20 PROCEDURE — 80048 BASIC METABOLIC PNL TOTAL CA: CPT

## 2022-01-20 PROCEDURE — 6360000002 HC RX W HCPCS: Performed by: PHYSICAL MEDICINE & REHABILITATION

## 2022-01-20 PROCEDURE — 36415 COLL VENOUS BLD VENIPUNCTURE: CPT

## 2022-01-20 PROCEDURE — 6370000000 HC RX 637 (ALT 250 FOR IP): Performed by: PHYSICAL MEDICINE & REHABILITATION

## 2022-01-20 RX ADMIN — SENNOSIDES AND DOCUSATE SODIUM 1 TABLET: 50; 8.6 TABLET ORAL at 08:29

## 2022-01-20 RX ADMIN — OXYCODONE HYDROCHLORIDE AND ACETAMINOPHEN 250 MG: 500 TABLET ORAL at 08:30

## 2022-01-20 RX ADMIN — CYCLOBENZAPRINE 5 MG: 10 TABLET, FILM COATED ORAL at 08:30

## 2022-01-20 RX ADMIN — ENOXAPARIN SODIUM 30 MG: 100 INJECTION SUBCUTANEOUS at 08:28

## 2022-01-20 RX ADMIN — METOPROLOL SUCCINATE 25 MG: 25 TABLET, FILM COATED, EXTENDED RELEASE ORAL at 08:31

## 2022-01-20 RX ADMIN — MULTIPLE VITAMINS W/ MINERALS TAB 1 TABLET: TAB at 08:29

## 2022-01-20 NOTE — PROGRESS NOTES
Discharge instructions reviewed with the patient and he verbalized understanding. Prescriptions were picked up at the outpatient pharmacy. The personal belongings were collected and confirmed by the patient. He was talkative, in no distress, eager to go home. He was taken out by wheelchair to a taxi for transport to his home. A taxi voucher was given to the .

## 2022-01-20 NOTE — PROGRESS NOTES
Spoke with Dr. Azam Falcon assistant regarding patient staples, they stated they can remove them at his follow up appt.

## 2022-01-20 NOTE — CARE COORDINATION
CASE MANAGEMENT DISCHARGE SUMMARY      Discharge to: Home with [facility], [facility].    Precertification completed: [facility] Exemption Notification (HENS) completed: N/A    IMM given: 01/20/22     New Durable Medical Equipment ordered/agency:     Transportation:    Family/car: Cab      Confirmed discharge plan with:     Patient: yes     Family:  yes    Name:Jessica Acosta (Spouse)   Contact number:[phone]      Facility/Agency, name:       [facility]   [address]   [phone]: RAVI/AVS faxed        RN, name: Leatha Lanes    Note: Discharging nurse to complete RAVI, reconcile AVS, and place final copy with patient's discharge packet. RN to ensure that written prescriptions for  Level II medications are sent with patient to the facility as per protocol.

## 2022-01-21 ENCOUNTER — CARE COORDINATION (OUTPATIENT)
Dept: CASE MANAGEMENT | Age: 67
End: 2022-01-21

## 2022-01-21 NOTE — CARE COORDINATION
Clinicals faxed to [facility]#[phone] and notified them patient was discharged 1/20/22 Kiran Lang RN

## 2022-01-21 NOTE — CARE COORDINATION
[facility] Transitions Initial Follow Up Call    Call within 2 business days of discharge: Yes    Patient: Omar James Patient : 1955   MRN: [id number]  Reason for Admission: quadricep tendon rupture  Discharge Date: 22 RARS: Readmission Risk Score: 14.6 ( )      Last Discharge [facility]       Complaint Diagnosis Description Type Department Provider    22  Tendon rupture, traumatic. quadriceps, left, initial encounter Admission (Discharged) Aury Molina MD           Spoke withOrly Cardeans at [address] to [place] at [address] [address] and [facility] scheduled today for nursing and PT scheduled 22  Facility: [facility]    Attempted to reach patient via home phone for initial post hospital transition call. VM left stating purpose of call along with my contact information requesting a return call. Patient received voicemail and called back. CTN was unable to get call at time of call back. Patient left voicemail and CTN attempted to call back. Unable to successfully contact patient and left another vm. CTN to re-attempt at a later time. Incoming call from patient successful. Patient verified . Patient pleasant and agreeable to transition call. Patient doing well and happy to be back home. D/C summary reviewed and confirmed that he is taking all medication as directed. Patient had home care nurse visit this morning and aware that PT is to start on Monday. Patient denied any acute needs at present time. Agreeable to f/u calls. Educated on the use of urgent care or physicians 24 hr access line if assistance is needed after hours.     Transitions of Care Initial Call  Challenges to be reviewed by the provider   Additional needs identified to be addressed with provider: No  none             Method of communication with provider : none      Advance Care Planning:   Does patient have an Advance Directive: reviewed and current, reviewed and needs to be updated, not on file; education provided, not on file, patient declined education, decision maker updated and referral to internal ACP facilitator. Was this a readmission? No  Patient stated reason for admission: injury  Patients top risk factors for readmission: medical condition-injury    Care Transition Nurse (CTN) contacted the patient by telephone to perform post hospital discharge assessment. Verified name and  with patient as identifiers. Provided introduction to self, and explanation of the CTN role. CTN reviewed discharge instructions, medical action plan and red flags with patient who verbalized understanding. Patient given an opportunity to ask questions and does not have any further questions or concerns at this time. Were discharge instructions available to patient? Yes. Reviewed appropriate site of care based on symptoms and resources available to patient including: PCP and Specialist. The patient agrees to contact the PCP office for questions related to their healthcare. Medication reconciliation was performed with patient, who verbalizes understanding of administration of home medications. Advised obtaining a 90-day supply of all daily and as-needed medications. CTN provided contact information. Plan for follow-up call in 5-7 days based on severity of symptoms and risk factors.     Care Transitions 24 Hour Call    Care Transitions Interventions         Follow Up  Future Appointments   Date Time Provider Shawn Bowling   2022 10:15 AM Christian Miller MD  Constantino HERNDON   2022 10:00 AM Christian Miller MD [facility] Eleanora Apgar, RN

## 2022-01-26 ENCOUNTER — OFFICE VISIT (OUTPATIENT)
Dept: ORTHOPEDIC SURGERY | Age: 67
End: 2022-01-26
Payer: COMMERCIAL

## 2022-01-26 VITALS — BODY MASS INDEX: 38.37 KG/M2 | HEIGHT: 70 IN | WEIGHT: 268 LBS

## 2022-01-26 DIAGNOSIS — S76.112D QUADRICEPS TENDON RUPTURE, LEFT, SUBSEQUENT ENCOUNTER: Primary | ICD-10-CM

## 2022-01-26 DIAGNOSIS — S76.111D QUADRICEPS TENDON RUPTURE, RIGHT, SUBSEQUENT ENCOUNTER: ICD-10-CM

## 2022-01-26 PROCEDURE — L1832 KO ADJ JNT POS R SUP PRE CST: HCPCS | Performed by: STUDENT IN AN ORGANIZED HEALTH CARE EDUCATION/TRAINING PROGRAM

## 2022-01-26 PROCEDURE — 99024 POSTOP FOLLOW-UP VISIT: CPT | Performed by: STUDENT IN AN ORGANIZED HEALTH CARE EDUCATION/TRAINING PROGRAM

## 2022-01-26 NOTE — PROGRESS NOTES
History:  Mohan Mayberry is here for follow up after bilateral quadriceps tendon repair. Surgery date was 1/5/22. Pain is controlled with current analgesics. Medication(s) being used: narcotic analgesics including oxycodone. The patient's pain is rated at 0/10. Post op problems reported: none. Physical Examination:  Patient is awake, alert, and in no acute distress. He comes in in bilateral knee immobilizers today. The knee immobilizer sitting penitentiary below his knees. As he ambulates, there does appear to be knee flexion. Incisions are healing. No erythema. No warmth. No signs of infection. Assessment:   2 weeks status post bilateral quadriceps tendon repair      Plan:         Procedures    Breg T Scope Knee Brace     Patient was prescribed a Breg T-Scope Brace. The bilateral knee will require stabilization / immobilization from this semi-rigid / rigid orthosis to improve their function. The orthosis will assist in protecting the affected area, provide functional support and facilitate healing. The prefabricated orthosis was modified in the following manner to provide a customizable fit for the patient at the time of delivery. 1.  Identification of appropriate positioning and alignment of anatomical landmarks. 2.  Trimming of straps and adjustment of frame to specifically fit patient. 3.  Polycentric hinge adjustment in flexion and extension. The patient was educated and fit by a healthcare professional with expert knowledge and specialization in brace application while under the direct supervision of the treating physician. Verbal and written instructions for the use of and application of this item were provided. They were instructed to contact the office immediately should the brace result in increased pain, decreased sensation, increased swelling or worsening of the condition. PT protocol provided. He has been set up with home physical therapy to start today. Weightbearing as tolerated with T scope locked in full extension. We discussed he will need to keep the braces on at all time unless he is working with his therapist.     The patient should use the cold pad or apply ice to the knee continuously for 3 days after surgery. Then use cold pad or ice 20-30 minutes only, 2-3 times per day as needed. Refill pain medications as needed. No NSAIDs. Follow up in 4 weeks for evaluation of progress or prn if problems. Heather Boucher PA-C   Orthopaedic Surgery and Sports Medicine     Disclaimer: This note was generated with use of a verbal recognition program (DRAGON) and an attempt was made to check for errors. It is possible that there are still dictated errors within this office note. If so, please bring any significant errors to my attention for an addendum. All efforts were made to ensure that this office note is accurate.

## 2022-01-28 ENCOUNTER — CARE COORDINATION (OUTPATIENT)
Dept: CASE MANAGEMENT | Age: 67
End: 2022-01-28

## 2022-01-28 NOTE — CARE COORDINATION
[facility] Transitions Follow Up Call    2022    Patient: Narendra Hernandez  Patient : 1955   MRN: [id number]  Reason for Admission: quadricep tendon rupture  Discharge Date: 22 RARS: Readmission Risk Score: 14.6 ( )         Spoke with: na    Attempted to reach patient via phone for transition call. VM left stating purpose of call along with my contact information requesting a return call. Care Transitions Subsequent and Final Call    Subsequent and Final Calls  Care Transitions Interventions  Other Interventions:            Follow Up  Future Appointments   Date Time Provider Shawn Bowling   2022  9:00 AM Jaqueline Ortez MD [facility]       Alek Noel RN

## 2022-02-04 ENCOUNTER — CARE COORDINATION (OUTPATIENT)
Dept: CASE MANAGEMENT | Age: 67
End: 2022-02-04

## 2022-02-04 NOTE — CARE COORDINATION
[facility] Transitions Follow Up Call    2022    Patient: Sammy Crystal  Patient : 1955   MRN: [id number]  Reason for Admission: Quadricep tendon rupture  Discharge Date: 22 RARS: Readmission Risk Score: 14.6 ( )         Spoke with: [facility] Transitions Follow Up Call    Needs to be reviewed by the provider   Additional needs identified to be addressed with provider: No  none             Method of communication with provider : none      Care Transition Nurse (CTN) contacted the patient by telephone to follow up after admission. Verified name and  with patient as identifiers. Addressed changes since last contact: none  Discussed follow-up appointments. If no appointment was previously scheduled, appointment scheduling offered: Yes. Is follow up appointment scheduled within 7 days of discharge? Yes. Advance Care Planning:   Does patient have an Advance Directive: reviewed and current, reviewed and needs to be updated, not on file; education provided, not on file, patient declined education, decision maker updated and referral to internal ACP facilitator. CTN reviewed discharge instructions, medical action plan and red flags with patient and discussed any barriers to care and/or understanding of plan of care after discharge. Discussed appropriate site of care based on symptoms and resources available to patient including: PCP, Specialist and Home health. The patient agrees to contact the PCP office for questions related to their healthcare. CTN provided contact information for future needs. No further follow-up call indicated based on severity of symptoms and risk factors. Patient answered call and verified . Patient pleasant and agreeable to transition call. Continues to have home physical therapy and scheduled for exercises today. No longer needing pain medications and discussed having minimal pain. Patient stable and episode ended.  Denied any acute needs at present time. Educated on the use of urgent care or physicians 24 hr access line if assistance is needed after hours. Care Transitions Subsequent and Final Call    Subsequent and Final Calls  Do you have any ongoing symptoms?: No  Have your medications changed?: No  Do you have any questions related to your medications?: No  Do you currently have any active services?: Yes  Are you currently active with any services?: Home Health  Do you have any needs or concerns that I can assist you with?: No  Identified Barriers: None  Care Transitions Interventions  Other Interventions:            Follow Up  Future Appointments   Date Time Provider Shawn Bowling   2/23/2022  9:00 AM Akash Johnson MD [facility] KATRINA Villarreal RN

## 2022-02-14 ENCOUNTER — HOSPITAL ENCOUNTER (OUTPATIENT)
Dept: PHYSICAL THERAPY | Age: 67
Setting detail: THERAPIES SERIES
Discharge: HOME | End: 2022-02-14
Payer: COMMERCIAL

## 2022-02-14 PROCEDURE — 97110 THERAPEUTIC EXERCISES: CPT | Performed by: PHYSICAL THERAPIST

## 2022-02-14 PROCEDURE — 97161 PT EVAL LOW COMPLEX 20 MIN: CPT | Performed by: PHYSICAL THERAPIST

## 2022-02-14 NOTE — FLOWSHEET NOTE
[facility] [facility], [address]  Phone: [phone]  Fax [phone]    Physical Therapy Treatment Note/ Progress Report:           Date:  2022    Patient Name:  Debo Cameron    :  1955  MRN: [id number]  Restrictions/Precautions:    Medical/Treatment Diagnosis Information:  · Diagnosis: Bilat quad strain  S96.112D, S96.111D    bilateral quad tendon repair 22  Treatment Diagnosis: bilateral knee pain M25.561, M25.562    gait abnormality r26.9,  difficulty walking W30.5  Insurance/Certification information:  PT Insurance Information: BCBS_  o copay 80/20 coins    PT/OT/ST 60 visits,  No auth  Physician Information:  Referring Practitioner: Dr. Negra James  Has the plan of care been signed (Y/N):        []  Yes  [x]  No     Date of Patient follow up with Physician: 22      Is this a Progress Report:     []  Yes  [x]  No        If Yes:  Date Range for reporting period:  Beginnin22  Ending:     Progress report will be due (10 Rx or 30 days whichever is less):        Recertification will be due (POC Duration  / 90 days whichever is less):       Visit # Insurance Allowable Auth Required   In-person 1 60 visits []  Yes [x]  No    Telehealth   []  Yes []  No    Total          Therapy Diagnosis/Practice Pattern:E      Number of Comorbidities:  []0     [x]1-2    []3+    Latex Allergy:  [x]NO      []YES  Preferred Language for Healthcare:   [x]English       []other:    SUBJECTIVE:  See eval    OBJECTIVE: See eval   Observation:    Test measurements:    ROM LEFT RIGHT   Knee ext 0 0   Knee Flex 75 80   Ankle DF 0 0   Strength  LEFT RIGHT   Quad set Fair + Fair +   SLR indep with lag indep           Pain Scale 0/10     LEFS 30/80 = 63%         RESTRICTIONS/PRECAUTIONS: 2 back surgeries,   Allergic to bees,  HBP    Pt to amb with legs locked in brace.       Exercises/Interventions: Therapeutic Ex (49056) Sets/sec Reps Notes/CUES         QS :10 X 10 hep   SLR  Flex, abd, add, ext  X 10 hep   tA :05 X 10 hep   Supine hip abd/ add iso :05 X 10 hep   Seated HS s/  Seated towel s :30 3x hep         Pt had LAQ with HEP with home PT  - Eliminated it from program.                            Manual Intervention ([id number])                                          NMR re-education (37138)   CUES NEEDED                                                         Therapeutic Activity (83897)                                            Access Code: [email or web address]  URL: Zipmark/  Date: 63/57/8799  Prepared by: Tawny Arthur    Exercises  Hooklying Transversus Abdominis Palpation - 1 x daily - 7 x weekly - 2 sets - 10 reps - 5 hold  Supine Hip Adduction Isometric with Ball - 1 x daily - 7 x weekly - 2 sets - 10 reps - 5 hold  Seated Hamstring Stretch with Chair - 1 x daily - 7 x weekly - 1 sets - 3 reps - 30 hold  Long Sitting Calf Stretch with Strap - 1 x daily - 7 x weekly - 1 sets - 3 reps - 30 hold      Therapeutic Exercise and NMR EXR  [x] (19765) Provided verbal/tactile cueing for activities related to strengthening, flexibility, endurance, ROM for improvements in LE, proximal hip, and core control with self care, mobility, lifting, ambulation.  [] (44842) Provided verbal/tactile cueing for activities related to improving balance, coordination, kinesthetic sense, posture, motor skill, proprioception  to assist with LE, proximal hip, and core control in self care, mobility, lifting, ambulation and eccentric single leg control.      NMR and Therapeutic Activities:    [] (46748 or 08412) Provided verbal/tactile cueing for activities related to improving balance, coordination, kinesthetic sense, posture, motor skill, proprioception and motor activation to allow for proper function of core, proximal hip and LE with self care and ADLs  [] (46819) Gait Re-education- Provided training and instruction to the patient for proper LE, core and proximal hip recruitment and positioning and eccentric body weight control with ambulation re-education including up and down stairs     Home Exercise Program:    [x] (85036) Reviewed/Progressed HEP activities related to strengthening, flexibility, endurance, ROM of core, proximal hip and LE for functional self-care, mobility, lifting and ambulation/stair navigation   [] (12225)Reviewed/Progressed HEP activities related to improving balance, coordination, kinesthetic sense, posture, motor skill, proprioception of core, proximal hip and LE for self care, mobility, lifting, and ambulation/stair navigation      Manual Treatments:  PROM / STM / Oscillations-Mobs:  G-I, II, III, IV (PA's, Inf., Post.)  [x] (16333) Provided manual therapy to mobilize LE, proximal hip and/or LS spine soft tissue/joints for the purpose of modulating pain, promoting relaxation,  increasing ROM, reducing/eliminating soft tissue swelling/inflammation/restriction, improving soft tissue extensibility and allowing for proper ROM for normal function with self care, mobility, lifting and ambulation. Modalities:     [] GAME READY (VASO)- for significant edema, swelling, pain control. Charges  Timed Code Treatment Minutes: 20   Total Treatment Minutes: 45     [x] EVAL (LOW) 50118   [] EVAL (MOD) 03630  [] EVAL (HIGH) 04968   [] RE-EVAL     [x] EP(65251) x 1    [] IONTO  [] NMR (90059) x     [] VASO  [] Manual (80033) x      [] Other:  [] TA x      [] Mech Traction (87533)  [] ES(attended) (31320)      [] ES (un) (24563):       GOALS:   Patient stated goal: normal function/ return to work. []? Progressing: []? Met: []? Not Met: []? Adjusted     Therapist goals for Patient:   Short Term Goals: To be achieved in: 2 weeks  1. Independent in HEP and progression per patient tolerance, in order to prevent re-injury. []? Progressing: []? Met: []? Not Met: []? Adjusted  2.  Patient will have a decrease in pain to facilitate improvement in movement, function, and ADLs as indicated by Functional Deficits. []? Progressing: []? Met: []? Not Met: []? Adjusted     Long Term Goals: To be achieved in: 12 weeks  1. Disability index score of 30% or less for the LEFS to assist with reaching prior level of function. []? Progressing: []? Met: []? Not Met: []? Adjusted  2. Patient will demonstrate increased AROM of bilateral knees from 0 - 130 to allow for proper joint functioning as indicated by patients Functional Deficits. []? Progressing: []? Met: []? Not Met: []? Adjusted  3. Patient will demonstrate an increase in Strength to good proximal hip strength and control, within 5lb HHD in LE to allow for proper functional mobility as indicated by patients Functional Deficits. []? Progressing: []? Met: []? Not Met: []? 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 []? Progressing: []? Met: []? Not Met: []? Adjusted  5. Able to ascend and descend stairs with symmetrical, reciprocal gait. []? Progressing: []? Met: []? Not Met: []? Adjusted          Progression Towards Functional goals:  [] Patient is progressing as expected towards functional goals listed. [] Progression is slowed due to complexities listed. [] Progression has been slowed due to co-morbidities. [x] Plan just implemented, too soon to assess goals progression  [] Other:         Overall Progression Towards Functional goals/ Treatment Progress Update:  [] Patient is progressing as expected towards functional goals listed. [] Progression is slowed due to complexities/Impairments listed. [] Progression has been slowed due to co-morbidities.   [x] Plan just implemented, too soon to assess goals progression <30days   [] Goals require adjustment due to lack of progress  [] Patient is not progressing as expected and requires additional follow up with physician  [] Other    Prognosis for POC: [x] Good [] Fair  [] Poor      Patient requires continued skilled intervention: [x] Yes  [] No    Treatment/Activity Tolerance:  [x] Patient able to complete treatment  [] Patient limited by fatigue  [] Patient limited by pain    [] Patient limited by other medical complications  [] Other:     ASSESSMENT: see eval     PLAN: See eval  [] Continue per plan of care [] Alter current plan (see comments above)  [x] Plan of care initiated [] Hold pending MD visit [] Discharge      Electronically signed by:  Anthony Eduardo PT    Note: If patient does not return for scheduled/ recommended follow up visits, this note will serve as a discharge from care along with most recent update on progress.

## 2022-02-14 NOTE — PLAN OF CARE
[facility], [address], [address] [place] [address]  Phone: [phone]  Fax [phone]     Physical Therapy Certification    Dear Referring Practitioner: Dr. Nash Osei,    We had the pleasure of evaluating the following patient for physical therapy services at [address] [place] [address]. A summary of our findings can be found in the initial assessment below. This includes our plan of care. If you have any questions or concerns regarding these findings, please do not hesitate to contact me at the office phone number checked above. Thank you for the referral.       Physician Signature:_______________________________Date:__________________  By signing above (or electronic signature), therapists plan is approved by physician    Patient: Sharon Feliz   : 1955   MRN: [id number]  Referring Physician: Referring Practitioner: Dr. Nash Osei      Evaluation Date: 2022      Medical Diagnosis Information:  Diagnosis: Bilat quad strain  T84.590T, S96.111D    bilateral quad tendon repair 22   Treatment Diagnosis: bilateral knee pain M25.561, M25.562    gait abnormality r26.9,  difficulty walking R26.2                                         Insurance information: PT Insurance Information: BCBS_  o copay 80/20 coins    PT/OT/ST 60 visits,  No auth     Precautions/ Contra-indications: HBP,  Allergic to bees,  2 back surgeries    C-SSRS Triggered by Intake questionnaire (Past 2 wk assessment):   [x] No, Questionnaire did not trigger screening.   [] Yes, Patient intake triggered further evaluation      [] C-SSRS Screening completed  [] PCP notified via Plan of Care  [] Emergency services notified     Latex Allergy:  [x]NO      []YES  Preferred Language for Healthcare:   [x]English       []other:    SUBJECTIVE: Patient stated complaint:Pt fell while walking dog on 22.   Pt was pulled to ground and was unable to stand up. He called EMS. They took him to hospital, and he was diagnosed with partial quad tendon right/ full left. He had surgery 1/5/22, and he spent two weeks in hospital.  He went home, and he had home health PT come to the house 2 x a week. Pt is currently amb with knee braces and standard walker. He does live in a tri level with his wife. 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 Pt reports very little pain. Relevant Medical History:pt has had two back surgeries  Functional Disability Index: LEFS  30/80 = 63%    Pain Scale: 0/10  Easing factors: rest  Provocative factors: squatting, walking, reaching, lifting,      Type: []Constant   []Intermittent  []Radiating []Localized []other:     Numbness/Tingling: n/a    Occupation/School: at [employer] Level of Function: Independent with ADLs and IADLs, walking dog, strengthening. OBJECTIVE:     ROM LEFT RIGHT   Knee ext 0 0   Knee Flex 75 80   Ankle DF 0 0   Strength  LEFT RIGHT   Quad set Fair + Fair +   SLR indep with lag indep        Pain Scale 0/10    LEFS 30/80 = 63%      Reflexes/Sensation:    [x]Dermatomes/Myotomes intact    []Reflexes equal and normal bilaterally   []Other:    Joint mobility:    [x]Normal -   []Hypo   []Hyper    Palpation: non tender to light palpation     Functional Mobility/Transfers: indep    Posture:     Bandages/Dressings/Incisions:     Gait: (include devices/WB status) amb with standard walker and two braces. _   Reminded pt that braces should be locked in ext per protocol. Orthopedic Special Tests: Negative Glynn's. Pt presents with tight hamstrings. [x] Patient history, allergies, meds reviewed. Medical chart reviewed. See intake form. Review Of Systems (ROS):  [x]Performed Review of systems (Integumentary, CardioPulmonary, Neurological) by intake and observation.  Intake form has been scanned into medical record. Patient has been instructed to contact their primary care physician regarding ROS issues if not already being addressed at this time. Co-morbidities/Complexities (which will affect course of rehabilitation):   []None           Arthritic conditions   []Rheumatoid arthritis (M05.9)  []Osteoarthritis (M19.91)   Cardiovascular conditions   [x]Hypertension (I10)  []Hyperlipidemia (E78.5)  []Angina pectoris (I20)  []Atherosclerosis (I70)   Musculoskeletal conditions   [x]Disc pathology   []Congenital spine pathologies   []Prior surgical intervention  []Osteoporosis (M81.8)  []Osteopenia (M85.8)   Endocrine conditions   []Hypothyroid (E03.9)  []Hyperthyroid Gastrointestinal conditions   []Constipation (A10.42)   Metabolic conditions   []Morbid obesity (E66.01)  []Diabetes type 1(E10.65) or 2 (E11.65)   []Neuropathy (G60.9)     Pulmonary conditions   []Asthma (J45)  []Coughing   []COPD (J44.9)   Psychological Disorders  []Anxiety (F41.9)  []Depression (F32.9)   []Other:   []Other:          Barriers to/and or personal factors that will affect rehab potential:              []Age  []Sex              []Motivation/Lack of Motivation                        [x]Co-Morbidities              []Cognitive Function, education/learning barriers              []Environmental, home barriers              []profession/work barriers  []past PT/medical experience  []other:  Justification: Being that the injuries are bilateral.  Healing will take a bit longer. Falls Risk Assessment (30 days):   [] Falls Risk assessed and no intervention required. [x] Falls Risk assessed and Patient requires intervention due to being higher risk   TUG score (>12s at risk):     [x] Falls education provided, including  Pt's tug is greater than 12, but he is following precautions. He is amb with standard walker.        ASSESSMENT:   Functional Impairments:     []Noted lumbar/proximal hip/LE joint hypomobility   [x]Decreased LE functional ROM   [x]Decreased core/proximal hip strength and neuromuscular control    [x]Decreased LE functional strength   [x]Reduced balance/proprioceptive control   []other:      Functional Activity Limitations (from functional questionnaire and intake)   [x]Reduced ability to tolerate prolonged functional positions   [x]Reduced ability or difficulty with changes of positions or transfers between positions   [x]Reduced ability to maintain good posture and demonstrate good body mechanics with sitting, bending, and lifting   [x]Reduced ability to sleep   [x] Reduced ability or tolerance with driving and/or computer work   [x]Reduced ability to perform lifting, carrying tasks   [x]Reduced ability to squat   []Reduced ability to forward bend   [x]Reduced ability to ambulate prolonged functional periods/distances/surfaces   [x]Reduced ability to ascend/descend stairs   [x]Reduced ability to run, hop, cut or jump   []other:    Participation Restrictions   [x]Reduced participation in self care activities   []Reduced participation in home management activities   [x]Reduced participation in work activities   [x]Reduced participation in social activities. []Reduced participation in sport/recreation activities. Classification :    [x]Signs/symptoms consistent with post-surgical status including decreased ROM, strength and function.    []Signs/symptoms consistent with joint sprain/strain   []Signs/symptoms consistent with patella-femoral syndrome   []Signs/symptoms consistent with knee OA/hip OA   []Signs/symptoms consistent with internal derangement of knee/Hip   []Signs/symptoms consistent with functional hip weakness/NMR control      []Signs/symptoms consistent with tendinitis/tendinosis    []signs/symptoms consistent with pathology which may benefit from Dry needling      []other:      Prognosis/Rehab Potential:      []Excellent   [x]Good    []Fair   []Poor    Tolerance of evaluation/treatment: []Excellent   [x]Good    []Fair   []Poor    Physical Therapy Evaluation Complexity Justification  [x] A history of present problem with:  [] no personal factors and/or comorbidities that impact the plan of care;  []1-2 personal factors and/or comorbidities that impact the plan of care  [x]3 personal factors and/or comorbidities that impact the plan of care  [x] An examination of body systems using standardized tests and measures addressing any of the following: body structures and functions (impairments), activity limitations, and/or participation restrictions;:  [] a total of 1-2 or more elements   [] a total of 3 or more elements   [x] a total of 4 or more elements   [x] A clinical presentation with:  [x] stable and/or uncomplicated characteristics   [] evolving clinical presentation with changing characteristics  [] unstable and unpredictable characteristics;   [x] Clinical decision making of [x] low, [] moderate, [] high complexity using standardized patient assessment instrument and/or measurable assessment of functional outcome. [x] EVAL (LOW) 32722 (typically 20 minutes face-to-face)  [] EVAL (MOD) 06763 (typically 30 minutes face-to-face)  [] EVAL (HIGH) 34113 (typically 45 minutes face-to-face)  [] RE-EVAL       PLAN:   Frequency/Duration:1-2 days per week for 12 Weeks:  Interventions:  [x]  Therapeutic exercise including: strength training, ROM, for Lower extremity and core   [x]  NMR activation and proprioception for LE, Glutes and Core   [x]  Manual therapy as indicated for LE, Hip and spine to include: Dry Needling/IASTM, STM, PROM, Gr I-IV mobilizations, manipulation. [x] Modalities as needed that may include: thermal agents, E-stim, Biofeedback, US, iontophoresis as indicated  [x] Patient education on joint protection, postural re-education, activity modification, progression of HEP. HEP instruction: Anitra Roberts    GOALS:  Patient stated goal: normal function/ return to work.    [] Progressing: [] Met: [] Not Met: [] Adjusted    Therapist goals for Patient:   Short Term Goals: To be achieved in: 2 weeks  1. Independent in HEP and progression per patient tolerance, in order to prevent re-injury. [] Progressing: [] Met: [] Not Met: [] Adjusted  2. Patient will have a decrease in pain to facilitate improvement in movement, function, and ADLs as indicated by Functional Deficits. [] Progressing: [] Met: [] Not Met: [] Adjusted    Long Term Goals: To be achieved in: 12 weeks  1. Disability index score of 30% or less for the LEFS to assist with reaching prior level of function. [] Progressing: [] Met: [] Not Met: [] Adjusted  2. Patient will demonstrate increased AROM of bilateral knees from 0 - 130 to allow for proper joint functioning as indicated by patients Functional Deficits. [] Progressing: [] Met: [] Not Met: [] Adjusted  3. Patient will demonstrate an increase in Strength to good proximal hip strength and control, within 5lb HHD in LE to allow for proper functional mobility as indicated by patients Functional Deficits. [] Progressing: [] Met: [] Not Met: [] 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 [] Progressing: [] Met: [] Not Met: [] Adjusted  5. Able to ascend and descend stairs with symmetrical, reciprocal gait.    [] Progressing: [] Met: [] Not Met: [] Adjusted     Electronically signed by:  Jia Basilio PT

## 2022-02-18 ENCOUNTER — HOSPITAL ENCOUNTER (OUTPATIENT)
Dept: PHYSICAL THERAPY | Age: 67
Setting detail: THERAPIES SERIES
Discharge: HOME | End: 2022-02-18
Payer: COMMERCIAL

## 2022-02-18 PROCEDURE — 97140 MANUAL THERAPY 1/> REGIONS: CPT | Performed by: PHYSICAL THERAPIST

## 2022-02-18 PROCEDURE — 97110 THERAPEUTIC EXERCISES: CPT | Performed by: PHYSICAL THERAPIST

## 2022-02-18 NOTE — FLOWSHEET NOTE
[facility],  [address] [place]  Phone: [phone]  Fax [phone]    Physical Therapy Treatment Note/ Progress Report:           Date:  2022    Patient Name:  Luis F Magallanes    :  1955  MRN: [id number]  Restrictions/Precautions:    Medical/Treatment Diagnosis Information:  · Diagnosis: Bilat quad strain  S96.112D, S96.111D    bilateral quad tendon repair 22  Treatment Diagnosis: bilateral knee pain M25.561, M25.562    gait abnormality r26.9,  difficulty walking W05.7  Insurance/Certification information:  PT Insurance Information: BCBS_  o copay 80/20 coins    PT/OT/ST 60 visits,  No auth  Physician Information:  Referring Practitioner: Dr. Grant Vickers  Has the plan of care been signed (Y/N):        []  Yes  [x]  No     Date of Patient follow up with Physician: 22      Is this a Progress Report:     []  Yes  [x]  No        If Yes:  Date Range for reporting period:  Beginnin22  Ending:     Progress report will be due (10 Rx or 30 days whichever is less):        Recertification will be due (POC Duration  / 90 days whichever is less):       Visit # Insurance Allowable Auth Required   In-person 2 60 visits []  Yes [x]  No    Telehealth   []  Yes []  No    Total          Therapy Diagnosis/Practice Pattern:E      Number of Comorbidities:  []0     [x]1-2    []3+    Latex Allergy:  [x]NO      []YES  Preferred Language for Healthcare:   [x]English       []other:    SUBJECTIVE:  Pt still has swelling, but he reports that it is less than two weeks ago. Pt says sleep is disrupted, but he feels it is more due to age than knee braces. He says he doesn't even notice the braces. Pt has no difficulty with HEP. Pt said right knee was painful yesterday, but it has resolved today.       OBJECTIVE: See eval   Observation:    Pt's braces were locked in ext, but the left had slid down towards ankle allowing knee to flex.  Test measurements:    ROM LEFT RIGHT left right   Knee ext 0 0     Knee Flex 75 80     Ankle DF 0 0     Strength  LEFT RIGHT     Quad set Fair + Fair +     SLR indep with lag indep Ext lag- brace to remain on with SLR              Pain Scale 0/10   0/10    LEFS 30/80 = 63%           RESTRICTIONS/PRECAUTIONS: 2 back surgeries,   Allergic to bees,  HBP    Pt to amb with legs locked in brace. Exercises/Interventions:     Therapeutic Ex (39088) Sets/sec Reps Notes/CUES         QS :10 X 10 hep   SLR  Flex, abd, add, ext  X 10 hep   TA :05 X 10 hep   Supine hip abd/ add iso - legs ext :05 X 10 hep   Seated HS s/  Seated towel s :30 3x hep         Pt had LAQ with HEP from home PT  - Eliminated it from program.                            Manual Intervention (64941)      Man HS/  Man gastroc/  Man ITB bilat 3 min each    Patella mobs  3 min each                            NMR re-education (69161)   CUES NEEDED                                                         Therapeutic Activity (48970)                                            Access Code: [email or web address]  URL: [employer]/  Date: 07/52/7248  Prepared by: Rashid Hunt    Exercises  Hooklying Transversus Abdominis Palpation - 1 x daily - 7 x weekly - 2 sets - 10 reps - 5 hold  Supine Hip Adduction Isometric with Ball - 1 x daily - 7 x weekly - 2 sets - 10 reps - 5 hold  Seated Hamstring Stretch with Chair - 1 x daily - 7 x weekly - 1 sets - 3 reps - 30 hold  Long Sitting Calf Stretch with Strap - 1 x daily - 7 x weekly - 1 sets - 3 reps - 30 hold      Therapeutic Exercise and NMR EXR  [x] (93412) Provided verbal/tactile cueing for activities related to strengthening, flexibility, endurance, ROM for improvements in LE, proximal hip, and core control with self care, mobility, lifting, ambulation.  [] (38060) Provided verbal/tactile cueing for activities related to improving balance, coordination, kinesthetic sense, posture, motor skill, proprioception  to assist with LE, proximal hip, and core control in self care, mobility, lifting, ambulation and eccentric single leg control. NMR and Therapeutic Activities:    [] (04298 or 46264) Provided verbal/tactile cueing for activities related to improving balance, coordination, kinesthetic sense, posture, motor skill, proprioception and motor activation to allow for proper function of core, proximal hip and LE with self care and ADLs  [] (98333) Gait Re-education- Provided training and instruction to the patient for proper LE, core and proximal hip recruitment and positioning and eccentric body weight control with ambulation re-education including up and down stairs     Home Exercise Program:    [x] (36606) Reviewed/Progressed HEP activities related to strengthening, flexibility, endurance, ROM of core, proximal hip and LE for functional self-care, mobility, lifting and ambulation/stair navigation   [] (60063)Reviewed/Progressed HEP activities related to improving balance, coordination, kinesthetic sense, posture, motor skill, proprioception of core, proximal hip and LE for self care, mobility, lifting, and ambulation/stair navigation      Manual Treatments:  PROM / STM / Oscillations-Mobs:  G-I, II, III, IV (PA's, Inf., Post.)  [x] (68312) Provided manual therapy to mobilize LE, proximal hip and/or LS spine soft tissue/joints for the purpose of modulating pain, promoting relaxation,  increasing ROM, reducing/eliminating soft tissue swelling/inflammation/restriction, improving soft tissue extensibility and allowing for proper ROM for normal function with self care, mobility, lifting and ambulation. Modalities:     [] GAME READY (VASO)- for significant edema, swelling, pain control.      Charges  Timed Code Treatment Minutes: 35   Total Treatment Minutes: 35     [] EVAL (LOW) 48883   [] EVAL (MOD) 35220  [] EVAL (HIGH) 41636   [] RE-EVAL     [x] BW(64728) x 1    [] IONTO  [] NMR (29008) x     [] VASO  [x] Manual (78298) x 1     [] Other:  [] TA x      [] Mech Traction (98077)  [] ES(attended) (09063)      [] ES (un) (45186):       GOALS:   Patient stated goal: normal function/ return to work. []? Progressing: []? Met: []? Not Met: []? Adjusted     Therapist goals for Patient:   Short Term Goals: To be achieved in: 2 weeks  1. Independent in HEP and progression per patient tolerance, in order to prevent re-injury. []? Progressing: []? Met: []? Not Met: []? Adjusted  2. Patient will have a decrease in pain to facilitate improvement in movement, function, and ADLs as indicated by Functional Deficits. []? Progressing: []? Met: []? Not Met: []? Adjusted     Long Term Goals: To be achieved in: 12 weeks  1. Disability index score of 30% or less for the LEFS to assist with reaching prior level of function. []? Progressing: []? Met: []? Not Met: []? Adjusted  2. Patient will demonstrate increased AROM of bilateral knees from 0 - 130 to allow for proper joint functioning as indicated by patients Functional Deficits. []? Progressing: []? Met: []? Not Met: []? Adjusted  3. Patient will demonstrate an increase in Strength to good proximal hip strength and control, within 5lb HHD in LE to allow for proper functional mobility as indicated by patients Functional Deficits. []? Progressing: []? Met: []? Not Met: []? 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 []? Progressing: []? Met: []? Not Met: []? Adjusted  5. Able to ascend and descend stairs with symmetrical, reciprocal gait. []? Progressing: []? Met: []? Not Met: []? Adjusted          Progression Towards Functional goals:  [] Patient is progressing as expected towards functional goals listed. [] Progression is slowed due to complexities listed. [] Progression has been slowed due to co-morbidities.   [x] Plan just implemented, too soon to assess goals progression  [] Other:         Overall Progression Towards Functional goals/ Treatment Progress Update:  [] Patient is progressing as expected towards functional goals listed. [] Progression is slowed due to complexities/Impairments listed. [] Progression has been slowed due to co-morbidities. [x] Plan just implemented, too soon to assess goals progression <30days   [] Goals require adjustment due to lack of progress  [] Patient is not progressing as expected and requires additional follow up with physician  [] Other    Prognosis for POC: [x] Good [] Fair  [] Poor      Patient requires continued skilled intervention: [x] Yes  [] No    Treatment/Activity Tolerance:  [x] Patient able to complete treatment  [] Patient limited by fatigue  [] Patient limited by pain    [] Patient limited by other medical complications  [] Other:     ASSESSMENT: Pt does have ext lag. Encouraged to readjust brace to keep leg locked in ext. PLAN: See eval  [x] Continue per plan of care [] Alter current plan (see comments above)  [] Plan of care initiated [] Hold pending MD visit [] Discharge      Electronically signed by:  Michelle Cash PT    Note: If patient does not return for scheduled/ recommended follow up visits, this note will serve as a discharge from care along with most recent update on progress.

## 2022-02-23 ENCOUNTER — HOSPITAL ENCOUNTER (OUTPATIENT)
Dept: PHYSICAL THERAPY | Age: 67
Setting detail: THERAPIES SERIES
Discharge: HOME | End: 2022-02-23
Payer: COMMERCIAL

## 2022-02-23 ENCOUNTER — OFFICE VISIT (OUTPATIENT)
Dept: ORTHOPEDIC SURGERY | Age: 67
End: 2022-02-23

## 2022-02-23 VITALS — HEIGHT: 70 IN | BODY MASS INDEX: 40.94 KG/M2 | WEIGHT: 286 LBS

## 2022-02-23 DIAGNOSIS — S76.112D QUADRICEPS TENDON RUPTURE, LEFT, SUBSEQUENT ENCOUNTER: Primary | ICD-10-CM

## 2022-02-23 DIAGNOSIS — S76.111D QUADRICEPS TENDON RUPTURE, RIGHT, SUBSEQUENT ENCOUNTER: ICD-10-CM

## 2022-02-23 PROCEDURE — 97140 MANUAL THERAPY 1/> REGIONS: CPT | Performed by: PHYSICAL THERAPIST

## 2022-02-23 PROCEDURE — 99024 POSTOP FOLLOW-UP VISIT: CPT | Performed by: STUDENT IN AN ORGANIZED HEALTH CARE EDUCATION/TRAINING PROGRAM

## 2022-02-23 PROCEDURE — 97110 THERAPEUTIC EXERCISES: CPT | Performed by: PHYSICAL THERAPIST

## 2022-02-23 NOTE — FLOWSHEET NOTE
[facility], [address] [place]  Phone: [phone]  Fax [phone]    Physical Therapy Treatment Note/ Progress Report:           Date:  2022    Patient Name:  Debo Cameron    :  1955  MRN: [id number]  Restrictions/Precautions:    Medical/Treatment Diagnosis Information:  · Diagnosis: Bilat quad strain  S96.112D, S96.111D    bilateral quad tendon repair 22  Treatment Diagnosis: bilateral knee pain M25.561, M25.562    gait abnormality r26.9,  difficulty walking I92.1  Insurance/Certification information:  PT Insurance Information: BCBS_  o copay 80/20 coins    PT/OT/ST 60 visits,  No auth  Physician Information:  Referring Practitioner: Dr. Negra James  Has the plan of care been signed (Y/N):        []  Yes  [x]  No     Date of Patient follow up with Physician: 22      Is this a Progress Report:     []  Yes  [x]  No        If Yes:  Date Range for reporting period:  Beginnin22  Ending:     Progress report will be due (10 Rx or 30 days whichever is less): 81       Recertification will be due (POC Duration  / 90 days whichever is less):       Visit # Insurance Allowable Auth Required   In-person 3 60 visits []  Yes [x]  No    Telehealth   []  Yes []  No    Total          Therapy Diagnosis/Practice Pattern:E      Number of Comorbidities:  []0     [x]1-2    []3+    Latex Allergy:  [x]NO      []YES  Preferred Language for Healthcare:   [x]English       []other:    SUBJECTIVE:  Patient has not taken braces off at this time. Had MD appt today and reports PA suggested we would dictate when appropriate to d/c braces. Only taking sponge baths. Does not have a shower chair. Reports he is a wrestles sleeper and moves often at night. OBJECTIVE: See eval   Observation:    Pt's braces were locked in ext, but the left had slid down towards ankle allowing knee to flex.          Test measurements:    ROM LEFT RIGHT left right   Knee ext 0 0     Knee Flex 75 80     Ankle DF 0 0     Strength  LEFT RIGHT     Quad set Fair + Fair +     SLR indep with lag indep Ext lag- brace to remain on with SLR              Pain Scale 0/10   0/10    LEFS 30/80 = 63%           RESTRICTIONS/PRECAUTIONS: 2 back surgeries,   Allergic to bees,  HBP    2/23/22:  Physical therapist will determine when he can remove the braces based on ability for full extension without lag.      Weightbearing as tolerated with T scope locked in full extension until cleared by therapist.      Exercises/Interventions:     Therapeutic Ex (19824) Sets/sec Reps Notes/CUES         QS :10 X 10 hep   SLR  Flex, abd, add, ext  X 10 hep   TA hep   Supine hip abd/ add iso - legs ext :05 X 10 hep   Seated HS s/  Seated towel s :30 3x hep   Heel prop on left for knee ext  2 x 1 min           Prone TKE 5\"  10 x     SB Bridges CGA for balance 10 x     Pt had LAQ with HEP from home PT  - Eliminated it from program.                            Manual Intervention (08532)      Man HS/  Man gastroc/  Man ITB bilat 3 min each    Patella mobs  3 min each                            NMR re-education (20114)   CUES NEEDED   Side stepping along wall with QS  2 laps    Tandem stance  2 x 30\" ea                                              Therapeutic Activity (73848)                                            Access Code: [email or web address]  URL: [email or web address]  Date: 50/94/4782  Prepared by: Joseph Galeano    Exercises  Hooklying Transversus Abdominis Palpation - 1 x daily - 7 x weekly - 2 sets - 10 reps - 5 hold  Supine Hip Adduction Isometric with Ball - 1 x daily - 7 x weekly - 2 sets - 10 reps - 5 hold  Seated Hamstring Stretch with Chair - 1 x daily - 7 x weekly - 1 sets - 3 reps - 30 hold  Long Sitting Calf Stretch with Strap - 1 x daily - 7 x weekly - 1 sets - 3 reps - 30 hold      Therapeutic Exercise and NMR EXR  [x] (87972) Provided verbal/tactile cueing for activities related to strengthening, flexibility, endurance, ROM for improvements in LE, proximal hip, and core control with self care, mobility, lifting, ambulation. [x] (42751) Provided verbal/tactile cueing for activities related to improving balance, coordination, kinesthetic sense, posture, motor skill, proprioception  to assist with LE, proximal hip, and core control in self care, mobility, lifting, ambulation and eccentric single leg control.      NMR and Therapeutic Activities:    [] (90274 or 45452) Provided verbal/tactile cueing for activities related to improving balance, coordination, kinesthetic sense, posture, motor skill, proprioception and motor activation to allow for proper function of core, proximal hip and LE with self care and ADLs  [] (37165) Gait Re-education- Provided training and instruction to the patient for proper LE, core and proximal hip recruitment and positioning and eccentric body weight control with ambulation re-education including up and down stairs     Home Exercise Program:    [x] (07878) Reviewed/Progressed HEP activities related to strengthening, flexibility, endurance, ROM of core, proximal hip and LE for functional self-care, mobility, lifting and ambulation/stair navigation   [] (62919)Reviewed/Progressed HEP activities related to improving balance, coordination, kinesthetic sense, posture, motor skill, proprioception of core, proximal hip and LE for self care, mobility, lifting, and ambulation/stair navigation      Manual Treatments:  PROM / STM / Oscillations-Mobs:  G-I, II, III, IV (PA's, Inf., Post.)  [x] (70985) Provided manual therapy to mobilize LE, proximal hip and/or LS spine soft tissue/joints for the purpose of modulating pain, promoting relaxation,  increasing ROM, reducing/eliminating soft tissue swelling/inflammation/restriction, improving soft tissue extensibility and allowing for proper ROM for normal function with self care, mobility, lifting and ambulation. Modalities:     [] GAME READY (VASO)- for significant edema, swelling, pain control. Charges  Timed Code Treatment Minutes: 40   Total Treatment Minutes: 40     [] EVAL (LOW) 92543   [] EVAL (MOD) 19851  [] EVAL (HIGH) 98781   [] RE-EVAL     [x] TK(42734) x 2    [] IONTO  [] NMR (93468) x     [] VASO  [x] Manual (87467) x 1     [] Other:  [] TA x      [] Mech Traction (00488)  [] ES(attended) (66693)      [] ES (un) (36425):       GOALS:   Patient stated goal: normal function/ return to work. []? Progressing: []? Met: []? Not Met: []? Adjusted     Therapist goals for Patient:   Short Term Goals: To be achieved in: 2 weeks  1. Independent in HEP and progression per patient tolerance, in order to prevent re-injury. [x]? Progressing: []? Met: []? Not Met: []? Adjusted  2. Patient will have a decrease in pain to facilitate improvement in movement, function, and ADLs as indicated by Functional Deficits. [x]? Progressing: []? Met: []? Not Met: []? Adjusted     Long Term Goals: To be achieved in: 12 weeks  1. Disability index score of 30% or less for the LEFS to assist with reaching prior level of function. []? Progressing: []? Met: []? Not Met: []? Adjusted  2. Patient will demonstrate increased AROM of bilateral knees from 0 - 130 to allow for proper joint functioning as indicated by patients Functional Deficits. []? Progressing: []? Met: []? Not Met: []? Adjusted  3. Patient will demonstrate an increase in Strength to good proximal hip strength and control, within 5lb HHD in LE to allow for proper functional mobility as indicated by patients Functional Deficits. []? Progressing: []? Met: []? Not Met: []? 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 []? Progressing: []? Met: []? Not Met: []? Adjusted  5. Able to ascend and descend stairs with symmetrical, reciprocal gait. []?  Progressing: []? Met: []? Not Met: []? Adjusted          Progression Towards Functional goals:  [] Patient is progressing as expected towards functional goals listed. [] Progression is slowed due to complexities listed. [] Progression has been slowed due to co-morbidities. [x] Plan just implemented, too soon to assess goals progression  [] Other:         Overall Progression Towards Functional goals/ Treatment Progress Update:  [] Patient is progressing as expected towards functional goals listed. [] Progression is slowed due to complexities/Impairments listed. [] Progression has been slowed due to co-morbidities. [x] Plan just implemented, too soon to assess goals progression <30days   [] Goals require adjustment due to lack of progress  [] Patient is not progressing as expected and requires additional follow up with physician  [] Other    Prognosis for POC: [x] Good [] Fair  [] Poor      Patient requires continued skilled intervention: [x] Yes  [] No    Treatment/Activity Tolerance:  [x] Patient able to complete treatment  [] Patient limited by fatigue  [] Patient limited by pain    [] Patient limited by other medical complications  [] Other:     ASSESSMENT: Encourage patient to reach out to family and friends in regards to shower chair. Ok to remove braces for hygiene and resting at night. Needs to sleep in braces as moves positions. Discussed transfer from toilet to tub with shower chair. Asked patient to wear shorts next visit so braces could be removed and do more exercises. PLAN: See eval  [x] Continue per plan of care [] Alter current plan (see comments above)  [] Plan of care initiated [] Hold pending MD visit [] Discharge      Electronically signed by:  Brianna Haney PT    Note: If patient does not return for scheduled/ recommended follow up visits, this note will serve as a discharge from care along with most recent update on progress.

## 2022-02-23 NOTE — PROGRESS NOTES
History:  Omar James is here for follow up after bilateral quadriceps tendon repair. Surgery date was 1/5/22. Pain is controlled without medication. The patient's pain is rated at 0/10. Post op problems reported: none. Interval history: Patient is not reporting any pain. Patient had been working with a home therapist until 2/1/22 when he started out patient physical therapy. He is inquiring about taking off the T-scope braces. There is still slight extensor lag on the left according to therapy notes.      Physical Examination:  Patient is awake, alert, and in no acute distress. He comes in in bilateral knee immobilizers today. As he ambulates, there does appear to be knee flexion. Incisions are healed. Quad tendon is intact bilaterally. Able to perform active full extension on the right, but still some extensor lag on the left.         Assessment:   7 weeks status post bilateral quadriceps tendon repair        Plan:   Continue with physical therapy. Physical therapist will determine when he can remove the braces based on ability for full extension without lag.      Weightbearing as tolerated with T scope locked in full extension until cleared by therapist.      No NSAIDs.     Follow up in 5 weeks for evaluation of progress or prn if problems.       Tico Reyes, [address]  [facility]

## 2022-02-25 ENCOUNTER — HOSPITAL ENCOUNTER (OUTPATIENT)
Dept: PHYSICAL THERAPY | Age: 67
Setting detail: THERAPIES SERIES
Discharge: HOME | End: 2022-02-25
Payer: COMMERCIAL

## 2022-02-25 ENCOUNTER — HOSPITAL ENCOUNTER (OUTPATIENT)
Dept: VASCULAR LAB | Age: 67
Discharge: HOME | End: 2022-02-25
Payer: COMMERCIAL

## 2022-02-25 DIAGNOSIS — M79.89 RIGHT LEG SWELLING: Primary | ICD-10-CM

## 2022-02-25 DIAGNOSIS — M79.89 LEFT LEG SWELLING: ICD-10-CM

## 2022-02-25 PROCEDURE — 93971 EXTREMITY STUDY: CPT

## 2022-02-25 PROCEDURE — 97140 MANUAL THERAPY 1/> REGIONS: CPT | Performed by: PHYSICAL THERAPIST

## 2022-02-25 PROCEDURE — 97110 THERAPEUTIC EXERCISES: CPT | Performed by: PHYSICAL THERAPIST

## 2022-02-25 NOTE — FLOWSHEET NOTE
[facility],  [address] [place]  Phone: [phone]  Fax [phone]    Physical Therapy Treatment Note/ Progress Report:           Date:  2022    Patient Name:  Laina Newberry    :  1955  MRN: [id number]  Restrictions/Precautions:    Medical/Treatment Diagnosis Information:  · Diagnosis: Bilat quad strain  S96.112D, S96.111D    bilateral quad tendon repair 22  Treatment Diagnosis: bilateral knee pain M25.561, M25.562    gait abnormality r26.9,  difficulty walking I97.7  Insurance/Certification information:  PT Insurance Information: BCBS_  o copay 80/20 coins    PT/OT/ST 60 visits,  No auth  Physician Information:  Referring Practitioner: Dr. Reino Jeans  Has the plan of care been signed (Y/N):        []  Yes  [x]  No     Date of Patient follow up with Physician: 22      Is this a Progress Report:     []  Yes  [x]  No        If Yes:  Date Range for reporting period:  Beginnin22  Ending:     Progress report will be due (10 Rx or 30 days whichever is less):        Recertification will be due (POC Duration  / 90 days whichever is less):       Visit # Insurance Allowable Auth Required   In-person 4 60 visits []  Yes [x]  No    Telehealth   []  Yes []  No    Total          Therapy Diagnosis/Practice Pattern:E      Number of Comorbidities:  []0     [x]1-2    []3+    Latex Allergy:  [x]NO      []YES  Preferred Language for Healthcare:   [x]English       []other:    SUBJECTIVE:  Pt arrived today with shorts. Pt drove self here. OBJECTIVE: See eval  Observation:   + eddi's,  Pitting edema in left leg from knee to foot. Braces had both slid down legs. Both were readjusted and restrapped.     Renata Severe, PA facilitated a doppler at [facility] today at 3pm     Test measurements:    ROM LEFT RIGHT left right   Knee ext 0 0     Knee Flex 75 80     Ankle DF 0 0     Strength LEFT RIGHT     Quad set Fair + Fair +     SLR indep with lag indep Ext lag- brace to remain on with SLR              Pain Scale 0/10   0/10    LEFS 30/80 = 63%           RESTRICTIONS/PRECAUTIONS: 2 back surgeries,   Allergic to bees,  HBP    2/23/22:  Physical therapist will determine when he can remove the braces based on ability for full extension without lag.      Weightbearing as tolerated with T scope locked in full extension until cleared by therapist.      Exercises/Interventions:     Therapeutic Ex (19921) Sets/sec Reps Notes/CUES         QS :10 X 10 NMES on left. hep   SLR  Flex, abd, add, ext   2 X 10 hep   TA hep   Supine hip abd/ add iso - legs ext :05 X 15 hep   Seated HS s/  Seated towel s :30 3x hep   Heel prop on left for knee ext  2 x 1 min           Prone TKE 5\"  10 x     SB Bridges CGA for balance 10 x     Pt had LAQ with HEP from home PT  - Eliminated it from program.    Weight shift  X 10    1/4 squat  X 10                 Manual Intervention (46934)      Man HS/  Man gastroc/  Man ITB bilat 3 min each    Patella mobs  3 min each                            NMR re-education (53371)   CUES NEEDED   Side stepping along wall with QS  2 laps    Tandem stance  2 x 30\" ea                                              Therapeutic Activity (58890)                                            Access Code: [email or web address]  URL: [email or web address]  Date: 92/75/6092  Prepared by: Adelaida Mora    Exercises  Hooklying Transversus Abdominis Palpation - 1 x daily - 7 x weekly - 2 sets - 10 reps - 5 hold  Supine Hip Adduction Isometric with Ball - 1 x daily - 7 x weekly - 2 sets - 10 reps - 5 hold  Seated Hamstring Stretch with Chair - 1 x daily - 7 x weekly - 1 sets - 3 reps - 30 hold  Long Sitting Calf Stretch with Strap - 1 x daily - 7 x weekly - 1 sets - 3 reps - 30 hold      Therapeutic Exercise and NMR EXR  [x] (75957) Provided verbal/tactile cueing for activities related to strengthening, flexibility, significant edema, swelling, pain control. Charges  Timed Code Treatment Minutes: 40   Total Treatment Minutes: 40     [] EVAL (LOW) 47267   [] EVAL (MOD) 34459  [] EVAL (HIGH) 82686   [] RE-EVAL     [x] CP(12504) x 2    [] IONTO  [] NMR (60380) x     [] VASO  [x] Manual (71107) x 1     [] Other:  [] TA x      [] Mech Traction (80194)  [] ES(attended) (84775)      [] ES (un) (48274):       GOALS:   Patient stated goal: normal function/ return to work. []? Progressing: []? Met: []? Not Met: []? Adjusted     Therapist goals for Patient:   Short Term Goals: To be achieved in: 2 weeks  1. Independent in HEP and progression per patient tolerance, in order to prevent re-injury. [x]? Progressing: []? Met: []? Not Met: []? Adjusted  2. Patient will have a decrease in pain to facilitate improvement in movement, function, and ADLs as indicated by Functional Deficits. [x]? Progressing: []? Met: []? Not Met: []? Adjusted     Long Term Goals: To be achieved in: 12 weeks  1. Disability index score of 30% or less for the LEFS to assist with reaching prior level of function. []? Progressing: []? Met: []? Not Met: []? Adjusted  2. Patient will demonstrate increased AROM of bilateral knees from 0 - 130 to allow for proper joint functioning as indicated by patients Functional Deficits. []? Progressing: []? Met: []? Not Met: []? Adjusted  3. Patient will demonstrate an increase in Strength to good proximal hip strength and control, within 5lb HHD in LE to allow for proper functional mobility as indicated by patients Functional Deficits. []? Progressing: []? Met: []? Not Met: []? 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 []? Progressing: []? Met: []? Not Met: []? Adjusted  5. Able to ascend and descend stairs with symmetrical, reciprocal gait. []? Progressing: []? Met: []? Not Met: []?  Adjusted          Progression Towards Functional goals:  [] Patient is progressing as expected towards functional goals listed. [] Progression is slowed due to complexities listed. [] Progression has been slowed due to co-morbidities. [x] Plan just implemented, too soon to assess goals progression  [] Other:         Overall Progression Towards Functional goals/ Treatment Progress Update:  [] Patient is progressing as expected towards functional goals listed. [] Progression is slowed due to complexities/Impairments listed. [] Progression has been slowed due to co-morbidities. [x] Plan just implemented, too soon to assess goals progression <30days   [] Goals require adjustment due to lack of progress  [] Patient is not progressing as expected and requires additional follow up with physician  [] Other    Prognosis for POC: [x] Good [] Fair  [] Poor      Patient requires continued skilled intervention: [x] Yes  [] No    Treatment/Activity Tolerance:  [x] Patient able to complete treatment  [] Patient limited by fatigue  [] Patient limited by pain    [] Patient limited by other medical complications  [] Other:     ASSESSMENT: due to increased swelling and discomfort in calf, pt is being sent for doppler. If doppler is -, pt will benefit from edema massage next visit. Focused efforts on right leg today due to concern on left. Pt responds well to v cue to address ext lag on right. PLAN: See eval  [x] Continue per plan of care [] Alter current plan (see comments above)  [] Plan of care initiated [] Hold pending MD visit [] Discharge      Electronically signed by:  Raheem Hermosillo PT    Note: If patient does not return for scheduled/ recommended follow up visits, this note will serve as a discharge from care along with most recent update on progress.

## 2022-02-25 NOTE — PROGRESS NOTES
Pt contacted for yolanda jordan plata appt scheduled at [facility] at 3:00pm with arrival [facility], [place], [facility]

## 2022-02-25 NOTE — PROGRESS NOTES
Notified through e-mail this afternoon by the physical therapist that the patient is experiencing left lower extremity swelling with positive Glynn's. The patient is status post bilateral quadriceps tendon repair on 1/5/2022. Order placed for left lower extremity US Doppler at [facility] to rule out DVT.  The patient is scheduled for today 3 pm.

## 2022-02-28 ENCOUNTER — TELEPHONE (OUTPATIENT)
Dept: ORTHOPEDIC SURGERY | Age: 67
End: 2022-02-28

## 2022-03-02 ENCOUNTER — HOSPITAL ENCOUNTER (OUTPATIENT)
Dept: PHYSICAL THERAPY | Age: 67
Setting detail: THERAPIES SERIES
Discharge: HOME | End: 2022-03-02
Payer: COMMERCIAL

## 2022-03-02 PROCEDURE — 97110 THERAPEUTIC EXERCISES: CPT | Performed by: PHYSICAL THERAPIST

## 2022-03-02 PROCEDURE — 97140 MANUAL THERAPY 1/> REGIONS: CPT | Performed by: PHYSICAL THERAPIST

## 2022-03-02 NOTE — FLOWSHEET NOTE
x weekly - 1 sets - 3 reps - 30 hold      Therapeutic Exercise and NMR EXR  [x] (55215) Provided verbal/tactile cueing for activities related to strengthening, flexibility, endurance, ROM for improvements in LE, proximal hip, and core control with self care, mobility, lifting, ambulation. [x] (29207) Provided verbal/tactile cueing for activities related to improving balance, coordination, kinesthetic sense, posture, motor skill, proprioception  to assist with LE, proximal hip, and core control in self care, mobility, lifting, ambulation and eccentric single leg control.      NMR and Therapeutic Activities:    [x] (67321 or 86538) Provided verbal/tactile cueing for activities related to improving balance, coordination, kinesthetic sense, posture, motor skill, proprioception and motor activation to allow for proper function of core, proximal hip and LE with self care and ADLs  [x] (99281) Gait Re-education- Provided training and instruction to the patient for proper LE, core and proximal hip recruitment and positioning and eccentric body weight control with ambulation re-education including up and down stairs     Home Exercise Program:    [x] (58232) Reviewed/Progressed HEP activities related to strengthening, flexibility, endurance, ROM of core, proximal hip and LE for functional self-care, mobility, lifting and ambulation/stair navigation   [] (95217)Reviewed/Progressed HEP activities related to improving balance, coordination, kinesthetic sense, posture, motor skill, proprioception of core, proximal hip and LE for self care, mobility, lifting, and ambulation/stair navigation      Manual Treatments:  PROM / STM / Oscillations-Mobs:  G-I, II, III, IV (PA's, Inf., Post.)  [x] (21586) Provided manual therapy to mobilize LE, proximal hip and/or LS spine soft tissue/joints for the purpose of modulating pain, promoting relaxation,  increasing ROM, reducing/eliminating soft tissue swelling/inflammation/restriction, improving soft tissue extensibility and allowing for proper ROM for normal function with self care, mobility, lifting and ambulation. Modalities:     [] GAME READY (VASO)- for significant edema, swelling, pain control. Charges  Timed Code Treatment Minutes: 40   Total Treatment Minutes: 40     [] EVAL (LOW) 36267   [] EVAL (MOD) 66122  [] EVAL (HIGH) 80267   [] RE-EVAL     [x] ZY(88399) x 2    [] IONTO  [] NMR (06741) x     [] VASO  [x] Manual (24930) x 1     [] Other:  [] TA x      [] Mech Traction (77902)  [] ES(attended) (77677)      [] ES (un) (37148):       GOALS:   Patient stated goal: normal function/ return to work. []? Progressing: []? Met: []? Not Met: []? Adjusted     Therapist goals for Patient:   Short Term Goals: To be achieved in: 2 weeks  1. Independent in HEP and progression per patient tolerance, in order to prevent re-injury. [x]? Progressing: []? Met: []? Not Met: []? Adjusted  2. Patient will have a decrease in pain to facilitate improvement in movement, function, and ADLs as indicated by Functional Deficits. [x]? Progressing: []? Met: []? Not Met: []? Adjusted     Long Term Goals: To be achieved in: 12 weeks  1. Disability index score of 30% or less for the LEFS to assist with reaching prior level of function. []? Progressing: []? Met: []? Not Met: []? Adjusted  2. Patient will demonstrate increased AROM of bilateral knees from 0 - 130 to allow for proper joint functioning as indicated by patients Functional Deficits. []? Progressing: []? Met: []? Not Met: []? Adjusted  3. Patient will demonstrate an increase in Strength to good proximal hip strength and control, within 5lb HHD in LE to allow for proper functional mobility as indicated by patients Functional Deficits. []? Progressing: []? Met: []? Not Met: []? 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 []? Progressing: []?  Met: []? Not Met: []? Adjusted  5. Able to ascend and descend stairs with symmetrical, reciprocal gait. []? Progressing: []? Met: []? Not Met: []? Adjusted          Progression Towards Functional goals:  [] Patient is progressing as expected towards functional goals listed. [] Progression is slowed due to complexities listed. [] Progression has been slowed due to co-morbidities. [x] Plan just implemented, too soon to assess goals progression  [] Other:         Overall Progression Towards Functional goals/ Treatment Progress Update:  [] Patient is progressing as expected towards functional goals listed. [] Progression is slowed due to complexities/Impairments listed. [] Progression has been slowed due to co-morbidities. [x] Plan just implemented, too soon to assess goals progression <30days   [] Goals require adjustment due to lack of progress  [] Patient is not progressing as expected and requires additional follow up with physician  [] Other    Prognosis for POC: [x] Good [] Fair  [] Poor      Patient requires continued skilled intervention: [x] Yes  [] No    Treatment/Activity Tolerance:  [x] Patient able to complete treatment  [] Patient limited by fatigue  [] Patient limited by pain    [] Patient limited by other medical complications  [] Other:     ASSESSMENT: Continues to lack control of left knee into extension. Fatigued with TKE in prone along with challenged significantly on left. Trial bike and low load leg press next visit. Continue to utilize TROM for left knee support in weight bearing as lacks quad control. PLAN: See eval  [x] Continue per plan of care [] Alter current plan (see comments above)  [] Plan of care initiated [] Hold pending MD visit [] Discharge      Electronically signed by:  Munira Knight PT    Note: If patient does not return for scheduled/ recommended follow up visits, this note will serve as a discharge from care along with most recent update on progress.

## 2022-03-04 ENCOUNTER — HOSPITAL ENCOUNTER (OUTPATIENT)
Dept: PHYSICAL THERAPY | Age: 67
Setting detail: THERAPIES SERIES
Discharge: HOME | End: 2022-03-04
Payer: COMMERCIAL

## 2022-03-04 PROCEDURE — 97110 THERAPEUTIC EXERCISES: CPT | Performed by: PHYSICAL THERAPIST

## 2022-03-04 PROCEDURE — 97016 VASOPNEUMATIC DEVICE THERAPY: CPT | Performed by: PHYSICAL THERAPIST

## 2022-03-04 PROCEDURE — 97140 MANUAL THERAPY 1/> REGIONS: CPT | Performed by: PHYSICAL THERAPIST

## 2022-03-04 NOTE — FLOWSHEET NOTE
[facility],  [address]  Phone: [phone]  Fax [phone]    Physical Therapy Treatment Note/ Progress Report:           Date:  3/4/2022    Patient Name:  Sammy Crystal    :  1955  MRN: [id number]  Restrictions/Precautions:    Medical/Treatment Diagnosis Information:  · Diagnosis: Bilat quad strain  S96.112D, S96.111D    bilateral quad tendon repair 22  Treatment Diagnosis: bilateral knee pain M25.561, M25.562    gait abnormality r26.9,  difficulty walking H84.9  Insurance/Certification information:  PT Insurance Information: [employer]_  o copay 80/20 coins    PT/OT/ST 60 visits,  No auth  Physician Information:  Referring Practitioner: Dr. Ann Hopson  Has the plan of care been signed (Y/N):        []  Yes  [x]  No     Date of Patient follow up with Physician: 22      Is this a Progress Report:     []  Yes  [x]  No        If Yes:  Date Range for reporting period:  Beginnin22  Ending:     Progress report will be due (10 Rx or 30 days whichever is less):        Recertification will be due (POC Duration  / 90 days whichever is less):       Visit # Insurance Allowable Auth Required   In-person 6 60 visits []  Yes [x]  No    Telehealth   []  Yes []  No    Total          Therapy Diagnosis/Practice Pattern:E      Number of Comorbidities:  []0     [x]1-2    []3+    Latex Allergy:  [x]NO      []YES  Preferred Language for Healthcare:   [x]English       []other:    SUBJECTIVE:  8 week s/p. Pt states that he has no pain, but he doesn't feel like left leg is getting any stronger. OBJECTIVE: See eval  Observation:   + eddi's,  Pitting edema in left leg from knee to foot. Braces had both slid down legs. Both were readjusted and restrapped.     MILTON Florence facilitated a doppler at [facility] today at 3pm     Test measurements:    ROM LEFT RIGHT left right   Knee ext 0 0 Knee Flex 75 80     Ankle DF 0 0     Strength  LEFT RIGHT     Quad set Fair + Fair +     SLR indep with lag indep Ext lag- brace to remain on with SLR              Pain Scale 0/10   0/10    LEFS 30/80 = 63%           RESTRICTIONS/PRECAUTIONS: 2 back surgeries,   Allergic to bees,  HBP    2/23/22:  Physical therapist will determine when he can remove the braces based on ability for full extension without lag.      Weightbearing as tolerated with T scope locked in full extension until cleared by therapist.      Exercises/Interventions:     Therapeutic Ex (58171) Sets/sec Reps Notes/CUES   Bike  6 min  Left TROM brace needed to walk to bike    QS  NMES 10/10 3 min NMES on left. hep   SLR  Flex, abd, add, ext   2 X 10 Hep; Assisted on left    TA hep   Supine hip abd/ add iso - legs ext hep   Seated HS s/  Seated towel s :30 3x hep               Prone TKE 5\"  10 x        Pt had LAQ with HEP from home PT  - Eliminated it from program.    Weight shift  X 10    1/4 squat walker X 15     TB  Seated Leg press BL 2 x 10     TB PF BL 2 x 10     Standing TB TKE  nv? Seated HS curl bl  2 x 10 bilat   Reformer mini squat 2rd,1 bl X 30     Reformer HR  2rd, 1 bl 2 x 10  With support         Manual Intervention (18664)      Man HS/  Man gastroc/  Man ITB bilat 3 min each    Patella mobs  3 min each    PROM of knee  X 2 min each leg                      NMR re-education (04026)   CUES NEEDED   Side stepping along wall with QS  2 laps In braces    Tandem stance  2 x 30\" ea CGA; left cory                                             Therapeutic Activity (41880)      Adjustment of TROM braces  X 5 min                                     Access Code: [email or web address]  URL: [email or web address]  Date: 50/80/3364  Prepared by: Michelle Cash    Exercises  Hooklying Transversus Abdominis Palpation - 1 x daily - 7 x weekly - 2 sets - 10 reps - 5 hold  Supine Hip Adduction Isometric with Ball - 1 x daily - 7 x weekly - 2 sets - 10 reps - 5 hold  Seated Hamstring Stretch with Chair - 1 x daily - 7 x weekly - 1 sets - 3 reps - 30 hold  Long Sitting Calf Stretch with Strap - 1 x daily - 7 x weekly - 1 sets - 3 reps - 30 hold      Therapeutic Exercise and NMR EXR  [x] (39784) Provided verbal/tactile cueing for activities related to strengthening, flexibility, endurance, ROM for improvements in LE, proximal hip, and core control with self care, mobility, lifting, ambulation. [x] (73475) Provided verbal/tactile cueing for activities related to improving balance, coordination, kinesthetic sense, posture, motor skill, proprioception  to assist with LE, proximal hip, and core control in self care, mobility, lifting, ambulation and eccentric single leg control.      NMR and Therapeutic Activities:    [x] (11884 or 47644) Provided verbal/tactile cueing for activities related to improving balance, coordination, kinesthetic sense, posture, motor skill, proprioception and motor activation to allow for proper function of core, proximal hip and LE with self care and ADLs  [x] (44674) Gait Re-education- Provided training and instruction to the patient for proper LE, core and proximal hip recruitment and positioning and eccentric body weight control with ambulation re-education including up and down stairs     Home Exercise Program:    [x] (91105) Reviewed/Progressed HEP activities related to strengthening, flexibility, endurance, ROM of core, proximal hip and LE for functional self-care, mobility, lifting and ambulation/stair navigation   [] (86332)Reviewed/Progressed HEP activities related to improving balance, coordination, kinesthetic sense, posture, motor skill, proprioception of core, proximal hip and LE for self care, mobility, lifting, and ambulation/stair navigation      Manual Treatments:  PROM / STM / Oscillations-Mobs:  G-I, II, III, IV (PA's, Inf., Post.)  [x] (01493) Provided manual therapy to mobilize LE, proximal hip and/or LS spine soft tissue/joints for the purpose of modulating pain, promoting relaxation,  increasing ROM, reducing/eliminating soft tissue swelling/inflammation/restriction, improving soft tissue extensibility and allowing for proper ROM for normal function with self care, mobility, lifting and ambulation. Modalities:     [] GAME READY (VASO)- for significant edema, swelling, pain control. Charges  Timed Code Treatment Minutes: 40   Total Treatment Minutes: 40     [] EVAL (LOW) 90854   [] EVAL (MOD) 12315  [] EVAL (HIGH) 54055   [] RE-EVAL     [x] VH(75193) x 2    [] IONTO  [] NMR (96876) x     [x] VASO  [x] Manual (67192) x 1     [] Other:  [] TA x      [] Mech Traction (68192)  [] ES(attended) (37508)      [] ES (un) (01309):       GOALS:   Patient stated goal: normal function/ return to work. []? Progressing: []? Met: []? Not Met: []? Adjusted     Therapist goals for Patient:   Short Term Goals: To be achieved in: 2 weeks  1. Independent in HEP and progression per patient tolerance, in order to prevent re-injury. [x]? Progressing: []? Met: []? Not Met: []? Adjusted  2. Patient will have a decrease in pain to facilitate improvement in movement, function, and ADLs as indicated by Functional Deficits. [x]? Progressing: []? Met: []? Not Met: []? Adjusted     Long Term Goals: To be achieved in: 12 weeks  1. Disability index score of 30% or less for the LEFS to assist with reaching prior level of function. []? Progressing: []? Met: []? Not Met: []? Adjusted  2. Patient will demonstrate increased AROM of bilateral knees from 0 - 130 to allow for proper joint functioning as indicated by patients Functional Deficits. []? Progressing: []? Met: []? Not Met: []? Adjusted  3. Patient will demonstrate an increase in Strength to good proximal hip strength and control, within 5lb HHD in LE to allow for proper functional mobility as indicated by patients Functional Deficits. []? Progressing: []? Met: []?  Not Met: []? 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 []? Progressing: []? Met: []? Not Met: []? Adjusted  5. Able to ascend and descend stairs with symmetrical, reciprocal gait. []? Progressing: []? Met: []? Not Met: []? Adjusted          Progression Towards Functional goals:  [] Patient is progressing as expected towards functional goals listed. [] Progression is slowed due to complexities listed. [] Progression has been slowed due to co-morbidities. [x] Plan just implemented, too soon to assess goals progression  [] Other:         Overall Progression Towards Functional goals/ Treatment Progress Update:  [] Patient is progressing as expected towards functional goals listed. [] Progression is slowed due to complexities/Impairments listed. [] Progression has been slowed due to co-morbidities. [x] Plan just implemented, too soon to assess goals progression <30days   [] Goals require adjustment due to lack of progress  [] Patient is not progressing as expected and requires additional follow up with physician  [] Other    Prognosis for POC: [x] Good [] Fair  [] Poor      Patient requires continued skilled intervention: [x] Yes  [] No    Treatment/Activity Tolerance:  [x] Patient able to complete treatment  [] Patient limited by fatigue  [] Patient limited by pain    [] Patient limited by other medical complications  [] Other:     ASSESSMENT: Instructed pt to cont to use left brace at all times. Pt is allowed to graduate from right brace when walking outside. Pt needs to use both when amb outdoors. Pt was able to perform full rev on bike. Added reformer for safe closed chained strengthening. Sled was moved to avoid knee flex greater than 90.      PLAN: See eval  [x] Continue per plan of care [] Alter current plan (see comments above)  [] Plan of care initiated [] Hold pending MD visit [] Discharge      Electronically signed by:  Tawny Arthur PT    Note: If patient does not return for scheduled/ recommended follow up visits, this note will serve as a discharge from care along with most recent update on progress.

## 2022-03-09 ENCOUNTER — HOSPITAL ENCOUNTER (OUTPATIENT)
Dept: PHYSICAL THERAPY | Age: 67
Setting detail: THERAPIES SERIES
Discharge: HOME | End: 2022-03-09
Payer: COMMERCIAL

## 2022-03-09 PROCEDURE — 97110 THERAPEUTIC EXERCISES: CPT | Performed by: PHYSICAL THERAPIST

## 2022-03-09 PROCEDURE — 97140 MANUAL THERAPY 1/> REGIONS: CPT | Performed by: PHYSICAL THERAPIST

## 2022-03-09 NOTE — FLOWSHEET NOTE
[facility], [address] [place]  Phone: [phone]  Fax [phone]    Physical Therapy Treatment Note/ Progress Report:           Date:  3/9/2022    Patient Name:  Lin Xavier    :  1955  MRN: [id number]  Restrictions/Precautions:    Medical/Treatment Diagnosis Information:  · Diagnosis: Bilat quad strain  S96.112D, S96.111D    bilateral quad tendon repair 22  Treatment Diagnosis: bilateral knee pain M25.561, M25.562    gait abnormality r26.9,  difficulty walking V27.0  Insurance/Certification information:  PT Insurance Information: BCBS_  o copay 80/20 coins    PT/OT/ST 60 visits,  No auth  Physician Information:  Referring Practitioner: Dr. Luh Conteh  Has the plan of care been signed (Y/N):        []  Yes  [x]  No     Date of Patient follow up with Physician: 22      Is this a Progress Report:     []  Yes  [x]  No        If Yes:  Date Range for reporting period:  Beginnin22  Ending:     Progress report will be due (10 Rx or 30 days whichever is less):        Recertification will be due (POC Duration  / 90 days whichever is less):       Visit # Insurance Allowable Auth Required   In-person 7 60 visits []  Yes [x]  No    Telehealth   []  Yes []  No    Total          Therapy Diagnosis/Practice Pattern:E      Number of Comorbidities:  []0     [x]1-2    []3+    Latex Allergy:  [x]NO      []YES  Preferred Language for Healthcare:   [x]English       []other:    SUBJECTIVE:  Went to grocery store and used cart as walker for about 10-15 minutes. Walking around home without use of right brace. Slept without braces and seemed to help edema. Patient went to campus over weekend. No issues for steps into building. May attempt to return to class next week.      OBJECTIVE: See eval  Observation:     MILTON Rodriguez facilitated a doppler at [facility] today at [facility] measurements:    ROM LEFT RIGHT left right   Knee ext 0 0     Knee Flex 75 80     Ankle DF 0 0     Strength  LEFT RIGHT     Quad set Fair + Fair +     SLR indep with lag indep Ext lag- brace to remain on with SLR              Pain Scale 0/10   0/10    LEFS 30/80 = 63%           RESTRICTIONS/PRECAUTIONS: 2 back surgeries,   Allergic to bees,  HBP    2/23/22:  Physical therapist will determine when he can remove the braces based on ability for full extension without lag.      Weightbearing as tolerated with T scope locked in full extension until cleared by therapist.      Exercises/Interventions:     Therapeutic Ex (80805) Sets/sec Reps Notes/CUES   Bike  6 min  Level 5; Left TROM brace needed to walk to bike    QS  NMES 10/10 4 min NMES on left. hep   SLR NMES 10/10 X 3 min  Min assist for ext lag   SLR  Flex, abd, add, ext   Hep; Assisted on left    TA hep   Supine hip abd/ add iso - legs ext hep   Seated HS s/  Seated towel s :30 3x hep               Prone TKE 5\"  10 x        Pt had LAQ with HEP from home PT  - Eliminated it from program.    Weight shift  X 10    1/4 squat walker X 15     TB  Seated Leg press    TB PF    Standing TB TKE BL X 15 bilat    Seated HS curl bl  2 x 10 bilat   Reformer mini squat 2rd,1 bl X 30 / single 15 x     Reformer HR  2rd, 1 bl 2 x 10  With support   Reformer walking 2rd1Bl 15 x           Manual Intervention (11752)      Man HS/  Man gastroc/  Man ITB bilat 3 min each    Patella mobs  3 min each    PROM of knee  X 2 min each leg                      NMR re-education (33192)   CUES NEEDED   Side stepping along wall with QS  In braces    Tandem stance  CGA; left cory                                             Therapeutic Activity (46927)                                         Access Code: [email or web address]  URL: [employer]/  Date: 67/47/2198  Prepared by: Alfred Danielle    Exercises  Hooklying Transversus Abdominis Palpation - 1 x daily - 7 x weekly - 2 sets - 10 reps - 5 hold  Supine Hip Adduction Isometric with Ball - 1 x daily - 7 x weekly - 2 sets - 10 reps - 5 hold  Seated Hamstring Stretch with Chair - 1 x daily - 7 x weekly - 1 sets - 3 reps - 30 hold  Long Sitting Calf Stretch with Strap - 1 x daily - 7 x weekly - 1 sets - 3 reps - 30 hold      Therapeutic Exercise and NMR EXR  [x] (93371) Provided verbal/tactile cueing for activities related to strengthening, flexibility, endurance, ROM for improvements in LE, proximal hip, and core control with self care, mobility, lifting, ambulation. [x] (30593) Provided verbal/tactile cueing for activities related to improving balance, coordination, kinesthetic sense, posture, motor skill, proprioception  to assist with LE, proximal hip, and core control in self care, mobility, lifting, ambulation and eccentric single leg control.      NMR and Therapeutic Activities:    [x] (62984 or 99538) Provided verbal/tactile cueing for activities related to improving balance, coordination, kinesthetic sense, posture, motor skill, proprioception and motor activation to allow for proper function of core, proximal hip and LE with self care and ADLs  [x] (14355) Gait Re-education- Provided training and instruction to the patient for proper LE, core and proximal hip recruitment and positioning and eccentric body weight control with ambulation re-education including up and down stairs     Home Exercise Program:    [x] (68982) Reviewed/Progressed HEP activities related to strengthening, flexibility, endurance, ROM of core, proximal hip and LE for functional self-care, mobility, lifting and ambulation/stair navigation   [] (29427)Reviewed/Progressed HEP activities related to improving balance, coordination, kinesthetic sense, posture, motor skill, proprioception of core, proximal hip and LE for self care, mobility, lifting, and ambulation/stair navigation      Manual Treatments:  PROM / STM / Oscillations-Mobs:  G-I, II, III, IV (PA's, Inf., Post.)  [x] (10775) Provided manual therapy to mobilize LE, proximal hip and/or LS spine soft tissue/joints for the purpose of modulating pain, promoting relaxation,  increasing ROM, reducing/eliminating soft tissue swelling/inflammation/restriction, improving soft tissue extensibility and allowing for proper ROM for normal function with self care, mobility, lifting and ambulation. Modalities: declined   [] GAME READY (VASO)- for significant edema, swelling, pain control. Charges  Timed Code Treatment Minutes: 45   Total Treatment Minutes: 45     [] EVAL (LOW) 56078   [] EVAL (MOD) 58582  [] EVAL (HIGH) 86292   [] RE-EVAL     [x] WX(72812) x 2    [] IONTO  [] NMR (37040) x     [] VASO  [x] Manual (89753) x 1     [] Other:  [] TA x      [] Mech Traction (78401)  [] ES(attended) (87290)      [] ES (un) (58688):       GOALS:   Patient stated goal: normal function/ return to work. []? Progressing: []? Met: []? Not Met: []? Adjusted     Therapist goals for Patient:   Short Term Goals: To be achieved in: 2 weeks  1. Independent in HEP and progression per patient tolerance, in order to prevent re-injury. [x]? Progressing: []? Met: []? Not Met: []? Adjusted  2. Patient will have a decrease in pain to facilitate improvement in movement, function, and ADLs as indicated by Functional Deficits. [x]? Progressing: []? Met: []? Not Met: []? Adjusted     Long Term Goals: To be achieved in: 12 weeks  1. Disability index score of 30% or less for the LEFS to assist with reaching prior level of function. []? Progressing: []? Met: []? Not Met: []? Adjusted  2. Patient will demonstrate increased AROM of bilateral knees from 0 - 130 to allow for proper joint functioning as indicated by patients Functional Deficits. []? Progressing: []? Met: []? Not Met: []? Adjusted  3.  Patient will demonstrate an increase in Strength to good proximal hip strength and control, within 5lb HHD in LE to allow for proper functional mobility as indicated by patients Functional Deficits. []? Progressing: []? Met: []? Not Met: []? 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 []? Progressing: []? Met: []? Not Met: []? Adjusted  5. Able to ascend and descend stairs with symmetrical, reciprocal gait. []? Progressing: []? Met: []? Not Met: []? Adjusted          Progression Towards Functional goals:  [] Patient is progressing as expected towards functional goals listed. [x] Progression is slowed due to complexities listed. [] Progression has been slowed due to co-morbidities. [] Plan just implemented, too soon to assess goals progression  [] Other:         Overall Progression Towards Functional goals/ Treatment Progress Update:  [] Patient is progressing as expected towards functional goals listed. [x] Progression is slowed due to complexities/Impairments listed. [] Progression has been slowed due to co-morbidities. [] Plan just implemented, too soon to assess goals progression <30days   [] Goals require adjustment due to lack of progress  [] Patient is not progressing as expected and requires additional follow up with physician  [] Other    Prognosis for POC: [x] Good [] Fair  [] Poor      Patient requires continued skilled intervention: [x] Yes  [] No    Treatment/Activity Tolerance:  [x] Patient able to complete treatment  [] Patient limited by fatigue  [] Patient limited by pain    [] Patient limited by other medical complications  [] Other:     ASSESSMENT: Improving left knee control into extension, avoiding hyperextension more often. Tolerated session well and demonstrating bilateral quad activation. Continue to utilize left brace during ambulation. Ok to d/c braces during sleep.      PLAN: See eval  [x] Continue per plan of care [] Alter current plan (see comments above)  [] Plan of care initiated [] Hold pending MD visit [] Discharge      Electronically signed by:  Tayler Nunez PT    Note: If patient does not return for scheduled/ recommended follow up visits, this note will serve as a discharge from care along with most recent update on progress.

## 2022-03-11 ENCOUNTER — HOSPITAL ENCOUNTER (OUTPATIENT)
Dept: PHYSICAL THERAPY | Age: 67
Setting detail: THERAPIES SERIES
Discharge: HOME | End: 2022-03-11
Payer: COMMERCIAL

## 2022-03-11 PROCEDURE — 97110 THERAPEUTIC EXERCISES: CPT | Performed by: PHYSICAL THERAPIST

## 2022-03-11 PROCEDURE — 97140 MANUAL THERAPY 1/> REGIONS: CPT | Performed by: PHYSICAL THERAPIST

## 2022-03-11 NOTE — FLOWSHEET NOTE
[facility],  [address]  Phone: [phone]  Fax [phone]    Physical Therapy Treatment Note/ Progress Report:           Date:  3/11/2022    Patient Name:  Richie Johnson    :  1955  MRN: [id number]  Restrictions/Precautions:    Medical/Treatment Diagnosis Information:  · Diagnosis: Bilat quad strain  S96.112D, S96.111D    bilateral quad tendon repair 22  Treatment Diagnosis: bilateral knee pain M25.561, M25.562    gait abnormality r26.9,  difficulty walking O32.2  Insurance/Certification information:  PT Insurance Information: BCBS_  o copay 80/20 coins    PT/OT/ST 60 visits,  No auth  Physician Information:  Referring Practitioner: Dr. Sampson Parent  Has the plan of care been signed (Y/N):        []  Yes  [x]  No     Date of Patient follow up with Physician: 22      Is this a Progress Report:     []  Yes  [x]  No        If Yes:  Date Range for reporting period:  Beginnin22  Ending:     Progress report will be due (10 Rx or 30 days whichever is less): 3/79/71       Recertification will be due (POC Duration  / 90 days whichever is less):       Visit # Insurance Allowable Auth Required   In-person 8 60 visits []  Yes [x]  No    Telehealth   []  Yes []  No    Total          Therapy Diagnosis/Practice Pattern:E      Number of Comorbidities:  []0     [x]1-2    []3+    Latex Allergy:  [x]NO      []YES  Preferred Language for Healthcare:   [x]English       []other:    SUBJECTIVE:  [place]ed [employer] without use of walker because arms were carrying things. Some instability on the left.       OBJECTIVE: See eval  Observation:     MILTON Escamilla facilitated a doppler at [facility] today at 3pm     Test measurements:    ROM LEFT RIGHT left right   Knee ext 0 0     Knee Flex 75 80     Ankle DF 0 0     Strength  LEFT RIGHT     Quad set Fair + Fair +     SLR indep with lag indep Ext lag- brace to remain on with SLR              Pain Scale 0/10   0/10    LEFS 30/80 = 63%           RESTRICTIONS/PRECAUTIONS: 2 back surgeries,   Allergic to bees,  HBP    2/23/22:  Physical therapist will determine when he can remove the braces based on ability for full extension without lag.      Weightbearing as tolerated with T scope locked in full extension until cleared by therapist.      Exercises/Interventions:     Therapeutic Ex (55375) Sets/sec Reps Notes/CUES   Bike  6 min  Level 5; Left TROM brace needed to walk to bike    QS  NMES 10/10 4 min NMES on left. hep   SLR NMES 10/10 X 3 min  Min assist for ext lag   SLR  Flex, abd, add, ext   Hep; Assisted on left    TA hep   Supine hip abd/ add iso - legs ext hep   Seated HS s/  Seated towel s hep   Bridges  15 x     Clamshells  15 x OVL    Prone TKE       Pt had LAQ with HEP from home PT  - Eliminated it from program.    Weight shift      1/4 squat walker X 20    TB  Seated Leg press    TB PF    Standing TB TKE BL X 20 bilat    Seated HS curl bl  2 x 10 bilat   Reformer mini squat 2rd,1 bl X 30 / single 15 x     Reformer HR  2rd, 1 bl 2 x 10  With support   Reformer walking 2rd1Bl 15 x           Manual Intervention (21846)      Man HS/  Man gastroc/  Man ITB bilat 3 min each    Patella mobs  3 min each    PROM of knee  X 2 min each leg                      NMR re-education (37209)   CUES NEEDED   Side stepping along wall with QS  In braces    Tandem stance  2 x 30\" eaCGA; left cory   FSU 4 in  10 x right only  Left hyperextends                                        Therapeutic Activity (81935)                                         Access Code: [email or web address]  URL: [employer]/  Date: 32/35/4469  Prepared by: Eugene Sprung    Exercises  Hooklying Transversus Abdominis Palpation - 1 x daily - 7 x weekly - 2 sets - 10 reps - 5 hold  Supine Hip Adduction Isometric with Ball - 1 x daily - 7 x weekly - 2 sets - 10 reps - 5 hold  Seated Hamstring Stretch with Chair - 1 x daily - 7 x weekly - 1 sets - 3 reps - 30 hold  Long Sitting Calf Stretch with Strap - 1 x daily - 7 x weekly - 1 sets - 3 reps - 30 hold      Therapeutic Exercise and NMR EXR  [x] (85777) Provided verbal/tactile cueing for activities related to strengthening, flexibility, endurance, ROM for improvements in LE, proximal hip, and core control with self care, mobility, lifting, ambulation. [x] (84282) Provided verbal/tactile cueing for activities related to improving balance, coordination, kinesthetic sense, posture, motor skill, proprioception  to assist with LE, proximal hip, and core control in self care, mobility, lifting, ambulation and eccentric single leg control.      NMR and Therapeutic Activities:    [x] (13348 or 44923) Provided verbal/tactile cueing for activities related to improving balance, coordination, kinesthetic sense, posture, motor skill, proprioception and motor activation to allow for proper function of core, proximal hip and LE with self care and ADLs  [x] (27213) Gait Re-education- Provided training and instruction to the patient for proper LE, core and proximal hip recruitment and positioning and eccentric body weight control with ambulation re-education including up and down stairs     Home Exercise Program:    [x] (56861) Reviewed/Progressed HEP activities related to strengthening, flexibility, endurance, ROM of core, proximal hip and LE for functional self-care, mobility, lifting and ambulation/stair navigation   [] (81676)Reviewed/Progressed HEP activities related to improving balance, coordination, kinesthetic sense, posture, motor skill, proprioception of core, proximal hip and LE for self care, mobility, lifting, and ambulation/stair navigation      Manual Treatments:  PROM / STM / Oscillations-Mobs:  G-I, II, III, IV (PA's, Inf., Post.)  [x] (74319) Provided manual therapy to mobilize LE, proximal hip and/or LS spine soft tissue/joints for the purpose of modulating pain, promoting relaxation,  increasing ROM, reducing/eliminating soft tissue swelling/inflammation/restriction, improving soft tissue extensibility and allowing for proper ROM for normal function with self care, mobility, lifting and ambulation. Modalities: declined   [] GAME READY (VASO)- for significant edema, swelling, pain control. Charges  Timed Code Treatment Minutes: 50   Total Treatment Minutes: 50     [] EVAL (LOW) 23250   [] EVAL (MOD) 42803  [] EVAL (HIGH) 19224   [] RE-EVAL     [x] XC(42518) x 2    [] IONTO  [] NMR (83984) x     [] VASO  [x] Manual (87522) x 1     [] Other:  [] TA x      [] Mech Traction (41295)  [] ES(attended) (97820)      [] ES (un) (20383):       GOALS:   Patient stated goal: normal function/ return to work. []? Progressing: []? Met: []? Not Met: []? Adjusted     Therapist goals for Patient:   Short Term Goals: To be achieved in: 2 weeks  1. Independent in HEP and progression per patient tolerance, in order to prevent re-injury. [x]? Progressing: []? Met: []? Not Met: []? Adjusted  2. Patient will have a decrease in pain to facilitate improvement in movement, function, and ADLs as indicated by Functional Deficits. [x]? Progressing: []? Met: []? Not Met: []? Adjusted     Long Term Goals: To be achieved in: 12 weeks  1. Disability index score of 30% or less for the LEFS to assist with reaching prior level of function. []? Progressing: []? Met: []? Not Met: []? Adjusted  2. Patient will demonstrate increased AROM of bilateral knees from 0 - 130 to allow for proper joint functioning as indicated by patients Functional Deficits. []? Progressing: []? Met: []? Not Met: []? Adjusted  3. Patient will demonstrate an increase in Strength to good proximal hip strength and control, within 5lb HHD in LE to allow for proper functional mobility as indicated by patients Functional Deficits. []? Progressing: []? Met: []? Not Met: []? 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 []? Progressing: []? Met: []? Not Met: []? Adjusted  5. Able to ascend and descend stairs with symmetrical, reciprocal gait. []? Progressing: []? Met: []? Not Met: []? Adjusted          Progression Towards Functional goals:  [] Patient is progressing as expected towards functional goals listed. [x] Progression is slowed due to complexities listed. [] Progression has been slowed due to co-morbidities. [] Plan just implemented, too soon to assess goals progression  [] Other:         Overall Progression Towards Functional goals/ Treatment Progress Update:  [] Patient is progressing as expected towards functional goals listed. [x] Progression is slowed due to complexities/Impairments listed. [] Progression has been slowed due to co-morbidities. [] Plan just implemented, too soon to assess goals progression <30days   [] Goals require adjustment due to lack of progress  [] Patient is not progressing as expected and requires additional follow up with physician  [] Other    Prognosis for POC: [x] Good [] Fair  [] Poor      Patient requires continued skilled intervention: [x] Yes  [] No    Treatment/Activity Tolerance:  [x] Patient able to complete treatment  [] Patient limited by fatigue  [] Patient limited by pain    [] Patient limited by other medical complications  [] Other:     ASSESSMENT: Still unable to perform TKE on the left. Lacked control from hyperextension with forward step up today on left, right tolerated well and able to perform with little UE support. Tolerated session well and demonstrating bilateral quad activation. Continue to utilize left brace during ambulation, right unlocked. Ok to d/c braces during sleep.      PLAN: See eval  [x] Continue per plan of care [] Alter current plan (see comments above)  [] Plan of care initiated [] Hold pending MD visit [] Discharge      Electronically signed by:  Ivory Dimas PT    Note: If patient does not return for scheduled/ recommended follow up visits, this note will serve as a discharge from care along with most recent update on progress.

## 2022-03-16 ENCOUNTER — HOSPITAL ENCOUNTER (OUTPATIENT)
Dept: PHYSICAL THERAPY | Age: 67
Setting detail: THERAPIES SERIES
Discharge: HOME | End: 2022-03-16
Payer: COMMERCIAL

## 2022-03-16 PROCEDURE — 97110 THERAPEUTIC EXERCISES: CPT | Performed by: PHYSICAL THERAPIST

## 2022-03-16 PROCEDURE — 97112 NEUROMUSCULAR REEDUCATION: CPT | Performed by: PHYSICAL THERAPIST

## 2022-03-16 PROCEDURE — 97140 MANUAL THERAPY 1/> REGIONS: CPT | Performed by: PHYSICAL THERAPIST

## 2022-03-16 NOTE — FLOWSHEET NOTE
[facility], [address]  Phone: [phone]  Fax [phone]    Physical Therapy Treatment Note/ Progress Report:           Date:  3/16/2022    Patient Name:  Kwasi Farr    :  1955  MRN: [id number]  Restrictions/Precautions:    Medical/Treatment Diagnosis Information:  · Diagnosis: Bilat quad strain  S96.112D, S96.111D    bilateral quad tendon repair 22  Treatment Diagnosis: bilateral knee pain M25.561, M25.562    gait abnormality r26.9,  difficulty walking M70.2  Insurance/Certification information:  PT Insurance Information: BCBS_  o copay 80/20 coins    PT/OT/ST 60 visits,  No auth  Physician Information:  Referring Practitioner: Dr. Ada Wang  Has the plan of care been signed (Y/N):        []  Yes  [x]  No     Date of Patient follow up with Physician: 22      Is this a Progress Report:     []  Yes  [x]  No        If Yes:  Date Range for reporting period:  Beginnin22  Ending:     Progress report will be due (10 Rx or 30 days whichever is less):        Recertification will be due (POC Duration  / 90 days whichever is less):       Visit # Insurance Allowable Auth Required   In-person 9 60 visits []  Yes [x]  No    Telehealth   []  Yes []  No    Total          Therapy Diagnosis/Practice Pattern:E      Number of Comorbidities:  []0     [x]1-2    []3+    Latex Allergy:  [x]NO      []YES  Preferred Language for Healthcare:   [x]English       []other:    SUBJECTIVE:  Went to campus yesterday. ABle to take steps into building to get to first floor. Hoping to return to campus soon. No longer using braces to ambulate. Stopped using cane. OBJECTIVE: See eval  Observation: Unable to perform controlled TKE on left. Hyperextends in ambulation occasionally during gait training.       Test measurements:    ROM LEFT RIGHT left right   Knee ext 0 0     Knee Flex 75 80     Ankle DF 0 0     Strength  LEFT RIGHT     Quad set Fair + Fair +     SLR indep with lag indep Ext lag- brace to remain on with SLR              Pain Scale 0/10   0/10    LEFS 30/80 = 63%           RESTRICTIONS/PRECAUTIONS: 2 back surgeries,   Allergic to bees,  HBP    2/23/22:  Physical therapist will determine when he can remove the braces based on ability for full extension without lag.      Weightbearing as tolerated with T scope locked in full extension until cleared by therapist.      Exercises/Interventions:     Therapeutic Ex (62886) Sets/sec Reps Notes/CUES   Bike  6 min  Level 5; Left TROM brace needed to walk to bike    QS  NMES 10/10 4 min NMES on left. hep   SLR NMES 10/10 X 3 min  Min assist for ext lag   SLR  Flex, abd, add, ext   Hep; Assisted on left    TA hep   Supine hip abd/ add iso - legs ext hep   Seated HS s/  Seated towel s hep   Bridges  15 x     Clamshells  15 x OVL    Prone TKE       Pt had LAQ with HEP from home PT  - Eliminated it from program.    Weight shift      1/4 squat  X 20    TB  Seated Leg press    TB PF    Standing TB TKE 60# X 20 bilat    Leg press 80# 30 x bilat    Seated HS curl bl  2 x 10 bilat   Reformer mini squat    Reformer HR  With support   Reformer walking          Manual Intervention (31453)      Man HS/  Man gastroc/  Man ITB bilat 3 min each    Patella mobs  2 min each    PROM of knee  X 2 min each leg                      NMR re-education (84997)   CUES NEEDED   Side stepping along wall with QS  In braces    Tandem stance  2 x 45\" eaCGA; left cory   FSU 4 in  15 x right only  Left hyperextends    Gait training in left brace unlocked and no brace on right; quad cane  X 1 lap around clinic                                   Therapeutic Activity (52439)                                         Access Code: [email or web address]  URL: [email or web address]  Date: 48/68/3295  Prepared by: Joseph Galeano    Exercises  Hooklying Transversus Abdominis Palpation - 1 x daily - 7 x weekly - 2 sets - 10 reps - 5 hold  Supine Hip Adduction Isometric with Ball - 1 x daily - 7 x weekly - 2 sets - 10 reps - 5 hold  Seated Hamstring Stretch with Chair - 1 x daily - 7 x weekly - 1 sets - 3 reps - 30 hold  Long Sitting Calf Stretch with Strap - 1 x daily - 7 x weekly - 1 sets - 3 reps - 30 hold      Therapeutic Exercise and NMR EXR  [x] (85298) Provided verbal/tactile cueing for activities related to strengthening, flexibility, endurance, ROM for improvements in LE, proximal hip, and core control with self care, mobility, lifting, ambulation. [x] (08095) Provided verbal/tactile cueing for activities related to improving balance, coordination, kinesthetic sense, posture, motor skill, proprioception  to assist with LE, proximal hip, and core control in self care, mobility, lifting, ambulation and eccentric single leg control.      NMR and Therapeutic Activities:    [x] (42363 or 34010) Provided verbal/tactile cueing for activities related to improving balance, coordination, kinesthetic sense, posture, motor skill, proprioception and motor activation to allow for proper function of core, proximal hip and LE with self care and ADLs  [x] (93938) Gait Re-education- Provided training and instruction to the patient for proper LE, core and proximal hip recruitment and positioning and eccentric body weight control with ambulation re-education including up and down stairs     Home Exercise Program:    [x] (25038) Reviewed/Progressed HEP activities related to strengthening, flexibility, endurance, ROM of core, proximal hip and LE for functional self-care, mobility, lifting and ambulation/stair navigation   [] (30109)Reviewed/Progressed HEP activities related to improving balance, coordination, kinesthetic sense, posture, motor skill, proprioception of core, proximal hip and LE for self care, mobility, lifting, and ambulation/stair navigation      Manual Treatments:  PROM / STM / Oscillations-Mobs:  G-I, II, III, IV (Seble, Inf., Post.)  [x] (31065) Provided manual therapy to mobilize LE, proximal hip and/or LS spine soft tissue/joints for the purpose of modulating pain, promoting relaxation,  increasing ROM, reducing/eliminating soft tissue swelling/inflammation/restriction, improving soft tissue extensibility and allowing for proper ROM for normal function with self care, mobility, lifting and ambulation. Modalities: declined   [] GAME READY (VASO)- for significant edema, swelling, pain control. Charges  Timed Code Treatment Minutes: 50   Total Treatment Minutes: 50     [] EVAL (LOW) 88176   [] EVAL (MOD) 96181  [] EVAL (HIGH) 95038   [] RE-EVAL     [x] PF(07512) x 1    [] IONTO  [x] NMR (75886) x 1    [] VASO  [x] Manual (91234) x 1     [] Other:  [] TA x      [] Mech Traction (70253)  [] ES(attended) (48550)      [] ES (un) (31708):       GOALS:   Patient stated goal: normal function/ return to work. []? Progressing: []? Met: []? Not Met: []? Adjusted     Therapist goals for Patient:   Short Term Goals: To be achieved in: 2 weeks  1. Independent in HEP and progression per patient tolerance, in order to prevent re-injury. [x]? Progressing: []? Met: []? Not Met: []? Adjusted  2. Patient will have a decrease in pain to facilitate improvement in movement, function, and ADLs as indicated by Functional Deficits. [x]? Progressing: []? Met: []? Not Met: []? Adjusted     Long Term Goals: To be achieved in: 12 weeks  1. Disability index score of 30% or less for the LEFS to assist with reaching prior level of function. []? Progressing: []? Met: []? Not Met: []? Adjusted  2. Patient will demonstrate increased AROM of bilateral knees from 0 - 130 to allow for proper joint functioning as indicated by patients Functional Deficits. []? Progressing: []? Met: []? Not Met: []? Adjusted  3.  Patient will demonstrate an increase in Strength to good proximal hip strength and control, within 5lb HHD in LE to allow for proper functional mobility as indicated by patients Functional Deficits. []? Progressing: []? Met: []? Not Met: []? 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 []? Progressing: []? Met: []? Not Met: []? Adjusted  5. Able to ascend and descend stairs with symmetrical, reciprocal gait. []? Progressing: []? Met: []? Not Met: []? Adjusted          Progression Towards Functional goals:  [] Patient is progressing as expected towards functional goals listed. [x] Progression is slowed due to complexities listed. [] Progression has been slowed due to co-morbidities. [] Plan just implemented, too soon to assess goals progression  [] Other:         Overall Progression Towards Functional goals/ Treatment Progress Update:  [] Patient is progressing as expected towards functional goals listed. [x] Progression is slowed due to complexities/Impairments listed. [] Progression has been slowed due to co-morbidities. [] Plan just implemented, too soon to assess goals progression <30days   [] Goals require adjustment due to lack of progress  [] Patient is not progressing as expected and requires additional follow up with physician  [] Other    Prognosis for POC: [x] Good [] Fair  [] Poor      Patient requires continued skilled intervention: [x] Yes  [] No    Treatment/Activity Tolerance:  [x] Patient able to complete treatment  [] Patient limited by fatigue  [] Patient limited by pain    [] Patient limited by other medical complications  [] Other:     ASSESSMENT: Still unable to perform active TKE on the left. Only one episode of hyperextension on the left with ambulation. Tolerated leg press and TKE on machine well today. SBA for left. Tolerated session well and demonstrating bilateral quad activation. Continue to utilize left brace during ambulation, locked and use of quad cane. Patient ok to d/c brace on right.       PLAN: See eval  [x] Continue per plan of care [] Alter current plan (see comments above)  [] Plan of care initiated [] Hold pending MD visit [] Discharge      Electronically signed by:  Kasi Maciel, PT    Note: If patient does not return for scheduled/ recommended follow up visits, this note will serve as a discharge from care along with most recent update on progress.

## 2022-03-18 ENCOUNTER — HOSPITAL ENCOUNTER (OUTPATIENT)
Dept: PHYSICAL THERAPY | Age: 67
Setting detail: THERAPIES SERIES
Discharge: HOME | End: 2022-03-18
Payer: COMMERCIAL

## 2022-03-18 PROCEDURE — 97140 MANUAL THERAPY 1/> REGIONS: CPT | Performed by: PHYSICAL THERAPIST

## 2022-03-18 PROCEDURE — 97112 NEUROMUSCULAR REEDUCATION: CPT | Performed by: PHYSICAL THERAPIST

## 2022-03-18 PROCEDURE — 97110 THERAPEUTIC EXERCISES: CPT | Performed by: PHYSICAL THERAPIST

## 2022-03-18 NOTE — FLOWSHEET NOTE
[facility], [address]  Phone: [phone]  Fax [phone]    Physical Therapy Treatment Note/ Progress Report:           Date:  3/18/2022    Patient Name:  José Miguel Riley    :  1955  MRN: [id number]  Restrictions/Precautions:    Medical/Treatment Diagnosis Information:  · Diagnosis: Bilat quad strain  S96.112D, S96.111D    bilateral quad tendon repair 22  Treatment Diagnosis: bilateral knee pain M25.561, M25.562    gait abnormality r26.9,  difficulty walking Y65.6  Insurance/Certification information:  PT Insurance Information: BCBS_  o copay 80/20 coins    PT/OT/ST 60 visits,  No auth  Physician Information:  Referring Practitioner: Dr. Jayson Purdy  Has the plan of care been signed (Y/N):        []  Yes  [x]  No     Date of Patient follow up with Physician: 22      Is this a Progress Report:     []  Yes  [x]  No        If Yes:  Date Range for reporting period:  Beginnin22  Ending:     Progress report will be due (10 Rx or 30 days whichever is less): 16       Recertification will be due (POC Duration  / 90 days whichever is less):       Visit # Insurance Allowable Auth Required   In-person 10 60 visits []  Yes [x]  No    Telehealth   []  Yes []  No    Total          Therapy Diagnosis/Practice Pattern:E      Number of Comorbidities:  []0     [x]1-2    []3+    Latex Allergy:  [x]NO      []YES  Preferred Language for Healthcare:   [x]English       []other:    SUBJECTIVE:  Pt reports that he has very little discomfort. Swelling in the ankle persists. Pt is still using brace on left leg, and he recognizes the lack of control on left side. OBJECTIVE: See eval  Observation: Unable to perform controlled TKE on left. Hyperextends in ambulation occasionally during gait training.       Test measurements:    ROM LEFT RIGHT left right   Knee ext 0 0     Knee Flex 75 80     Ankle DF 0 0 Strength  LEFT RIGHT     Quad set Fair + Fair +     SLR indep with lag indep Ext lag- brace to remain on with SLR              Pain Scale 0/10   0/10    LEFS 30/80 = 63%           RESTRICTIONS/PRECAUTIONS: 2 back surgeries,   Allergic to bees,  HBP    2/23/22:  Physical therapist will determine when he can remove the braces based on ability for full extension without lag.      Weightbearing as tolerated with T scope locked in full extension until cleared by therapist.      Exercises/Interventions:     Therapeutic Ex (94788) Sets/sec Reps Notes/CUES   Bike  7 min  Level 5; Left TROM brace needed to walk to bike    QS  NMES 10/10 4 min NMES on left. hep   SLR NMES 10/10 X 3 min  Min assist for ext lag   SLR  Flex, abd, add, ext   Hep; Assisted on left    TA hep   Supine hip abd/ add iso - legs ext hep   Seated HS s/  Seated towel s hep   Bridges  15 x     Clamshells  15 x OVL    Prone TKE       Pt had LAQ with HEP from home PT  - Eliminated it from program.    Weight shift      1/4 squat  X 20    TB  Seated Leg press    TB PF    Standing TB TKE 60# X 20 bilat    Leg press 100# 30 x bilat    Spider killers ;05 X 10     Seated HS curl BK 2 x 10 bilat   Reformer mini squat    Reformer HR  With support   Reformer walking          Manual Intervention (62174)      Man HS/  Man gastroc/  Man ITB bilat 3 min each    Patella mobs  2 min each    PROM of knee  X 2 min each leg                      NMR re-education (69371)   CUES NEEDED   Side stepping along wall with QS  In braces    Tandem stance  2 x 45\" eaCGA; left cory   FSU 4 in  15 x right only  Left hyperextends    Gait training in left brace unlocked and no brace on right; quad cane  X 1 lap around clinic                                   Therapeutic Activity (79147)                                         Access Code: [email or web address]  URL: [email or web address]  Date: 38/95/6343  Prepared by: Venkat Ayoub    Exercises  Hooklying Transversus Abdominis Palpation - 1 x daily - 7 x weekly - 2 sets - 10 reps - 5 hold  Supine Hip Adduction Isometric with Ball - 1 x daily - 7 x weekly - 2 sets - 10 reps - 5 hold  Seated Hamstring Stretch with Chair - 1 x daily - 7 x weekly - 1 sets - 3 reps - 30 hold  Long Sitting Calf Stretch with Strap - 1 x daily - 7 x weekly - 1 sets - 3 reps - 30 hold      Therapeutic Exercise and NMR EXR  [x] (66649) Provided verbal/tactile cueing for activities related to strengthening, flexibility, endurance, ROM for improvements in LE, proximal hip, and core control with self care, mobility, lifting, ambulation. [x] (81321) Provided verbal/tactile cueing for activities related to improving balance, coordination, kinesthetic sense, posture, motor skill, proprioception  to assist with LE, proximal hip, and core control in self care, mobility, lifting, ambulation and eccentric single leg control.      NMR and Therapeutic Activities:    [x] (60477 or 05405) Provided verbal/tactile cueing for activities related to improving balance, coordination, kinesthetic sense, posture, motor skill, proprioception and motor activation to allow for proper function of core, proximal hip and LE with self care and ADLs  [x] (72335) Gait Re-education- Provided training and instruction to the patient for proper LE, core and proximal hip recruitment and positioning and eccentric body weight control with ambulation re-education including up and down stairs     Home Exercise Program:    [x] (90562) Reviewed/Progressed HEP activities related to strengthening, flexibility, endurance, ROM of core, proximal hip and LE for functional self-care, mobility, lifting and ambulation/stair navigation   [] (69786)Reviewed/Progressed HEP activities related to improving balance, coordination, kinesthetic sense, posture, motor skill, proprioception of core, proximal hip and LE for self care, mobility, lifting, and ambulation/stair navigation      Manual Treatments:  PROM / STM / Oscillations-Mobs:  G-I, II, III, IV (PA's, Inf., Post.)  [x] (25196) Provided manual therapy to mobilize LE, proximal hip and/or LS spine soft tissue/joints for the purpose of modulating pain, promoting relaxation,  increasing ROM, reducing/eliminating soft tissue swelling/inflammation/restriction, improving soft tissue extensibility and allowing for proper ROM for normal function with self care, mobility, lifting and ambulation. Modalities: declined   [] GAME READY (VASO)- for significant edema, swelling, pain control. Charges  Timed Code Treatment Minutes: 50   Total Treatment Minutes: 50     [] EVAL (LOW) 72242   [] EVAL (MOD) 25400  [] EVAL (HIGH) 90723   [] RE-EVAL     [x] AU(06523) x 1    [] IONTO  [x] NMR (39214) x 1    [] VASO  [x] Manual (94042) x 1     [] Other:  [] TA x      [] Mech Traction (70223)  [] ES(attended) (94880)      [] ES (un) (64142):       GOALS:   Patient stated goal: normal function/ return to work. []? Progressing: []? Met: []? Not Met: []? Adjusted     Therapist goals for Patient:   Short Term Goals: To be achieved in: 2 weeks  1. Independent in HEP and progression per patient tolerance, in order to prevent re-injury. [x]? Progressing: []? Met: []? Not Met: []? Adjusted  2. Patient will have a decrease in pain to facilitate improvement in movement, function, and ADLs as indicated by Functional Deficits. [x]? Progressing: []? Met: []? Not Met: []? Adjusted     Long Term Goals: To be achieved in: 12 weeks  1. Disability index score of 30% or less for the LEFS to assist with reaching prior level of function. []? Progressing: []? Met: []? Not Met: []? Adjusted  2. Patient will demonstrate increased AROM of bilateral knees from 0 - 130 to allow for proper joint functioning as indicated by patients Functional Deficits. []? Progressing: []? Met: []? Not Met: []? Adjusted  3.  Patient will demonstrate an increase in Strength to good proximal hip strength and control, within 5lb HHD in LE to allow for proper functional mobility as indicated by patients Functional Deficits. []? Progressing: []? Met: []? Not Met: []? 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 []? Progressing: []? Met: []? Not Met: []? Adjusted  5. Able to ascend and descend stairs with symmetrical, reciprocal gait. []? Progressing: []? Met: []? Not Met: []? Adjusted          Progression Towards Functional goals:  [] Patient is progressing as expected towards functional goals listed. [x] Progression is slowed due to complexities listed. [] Progression has been slowed due to co-morbidities. [] Plan just implemented, too soon to assess goals progression  [] Other:         Overall Progression Towards Functional goals/ Treatment Progress Update:  [] Patient is progressing as expected towards functional goals listed. [x] Progression is slowed due to complexities/Impairments listed. [] Progression has been slowed due to co-morbidities. [] Plan just implemented, too soon to assess goals progression <30days   [] Goals require adjustment due to lack of progress  [] Patient is not progressing as expected and requires additional follow up with physician  [] Other    Prognosis for POC: [x] Good [] Fair  [] Poor      Patient requires continued skilled intervention: [x] Yes  [] No    Treatment/Activity Tolerance:  [x] Patient able to complete treatment  [] Patient limited by fatigue  [] Patient limited by pain    [] Patient limited by other medical complications  [] Other:     ASSESSMENT: Pt demonstrates good ROM bilaterally. Weakness persists on the left quad. Ext lag present with SLR despite using NMES. Pt amb with leg moving into hyper ext.      PLAN: See eval  [x] Continue per plan of care [] Alter current plan (see comments above)  [] Plan of care initiated [] Hold pending MD visit [] Discharge      Electronically signed by: Thomas Doyle, PT    Note: If patient does not return for scheduled/ recommended follow up visits, this note will serve as a discharge from care along with most recent update on progress.

## 2022-03-21 ENCOUNTER — TELEPHONE (OUTPATIENT)
Dept: ORTHOPEDIC SURGERY | Age: 67
End: 2022-03-21

## 2022-03-21 NOTE — TELEPHONE ENCOUNTER
I spoke with the patient's physical therapist this morning regarding her concerns for Mr. Mary Ann Rae' left knee. She states he is still experiencing extensor lag, despite 10 visits of formal physical therapy. She states the patient was doing therapy with a home therapist prior to coming to outpatient PT and long arc quads were included in the rehab program. She states the patient told her he was unable to perform these, however she is unsure if he was doing them prior to seeing her. The therapist has kept him locked in extension in his brace due to the extensor lag, but he still needs a cane for ambulation. When ambulating without the brace, his knee hyperextends. She is concerned for re-tear. I discussed with her that we will schedule follow up with Dr. Mick Goodman as soon as possible. The right knee is doing great.

## 2022-03-21 NOTE — TELEPHONE ENCOUNTER
S/W Tabitha Gregorio regarding moving his 3/30/2022 appointment from the morning to the afternoon. He states he received a message from someone indicating he needed to r/s the 3/30 appointment to this week. I did explain to him that per Dr. Enzo Roper, the 3/30 appointment is acceptable, but it does need to be moved from the morning to the afternoon. Patient voiced understanding of the conversation and will contact the office with further questions or concerns.

## 2022-03-23 ENCOUNTER — HOSPITAL ENCOUNTER (OUTPATIENT)
Dept: PHYSICAL THERAPY | Age: 67
Setting detail: THERAPIES SERIES
Discharge: HOME | End: 2022-03-23
Payer: COMMERCIAL

## 2022-03-23 PROCEDURE — 97112 NEUROMUSCULAR REEDUCATION: CPT | Performed by: PHYSICAL THERAPIST

## 2022-03-23 PROCEDURE — 97110 THERAPEUTIC EXERCISES: CPT | Performed by: PHYSICAL THERAPIST

## 2022-03-23 PROCEDURE — 97140 MANUAL THERAPY 1/> REGIONS: CPT | Performed by: PHYSICAL THERAPIST

## 2022-03-23 NOTE — FLOWSHEET NOTE
[facility], [address] [place]  Phone: [phone]  Fax [phone]    Physical Therapy Treatment Note/ Progress Report:           Date:  3/23/2022    Patient Name:  Mercy Valerio    :  1955  MRN: [id number]  Restrictions/Precautions:    Medical/Treatment Diagnosis Information:  · Diagnosis: Bilat quad strain  S96.112D, S96.111D    bilateral quad tendon repair 22  Treatment Diagnosis: bilateral knee pain M25.561, M25.562    gait abnormality r26.9,  difficulty walking H44.1  Insurance/Certification information:  PT Insurance Information: BCBS_  o copay 80/20 coins    PT/OT/ST 60 visits,  No auth  Physician Information:  Referring Practitioner: Dr. Tiffanie Mariscal  Has the plan of care been signed (Y/N):        []  Yes  [x]  No     Date of Patient follow up with Physician: 22      Is this a Progress Report:     []  Yes  [x]  No        If Yes:  Date Range for reporting period:  Beginnin22  Ending:     Progress report will be due (10 Rx or 30 days whichever is less):        Recertification will be due (POC Duration  / 90 days whichever is less):       Visit # Insurance Allowable Auth Required   In-person 11 60 visits []  Yes [x]  No    Telehealth   []  Yes []  No    Total          Therapy Diagnosis/Practice Pattern:E      Number of Comorbidities:  []0     [x]1-2    []3+    Latex Allergy:  [x]NO      []YES  Preferred Language for Healthcare:   [x]English       []other:    SUBJECTIVE:  Went to [address] [place] [address] this week. Managed steps well with step to gait. No episode of giving away. MD follow up next week. OBJECTIVE: See eval  Observation: Unable to perform controlled TKE on left. Hyperextends in ambulation occasionally during gait training.       Test measurements:    ROM LEFT RIGHT left right   Knee ext 0 0     Knee Flex 75 80     Ankle DF 0 0     Strength  LEFT RIGHT     Quad set [place]  + [place]  +     SLR indep with lag indep Ext lag- brace to remain on with SLR              Pain Scale 0/10   0/10    LEFS 30/80 = 63%           RESTRICTIONS/PRECAUTIONS: 2 back surgeries,   Allergic to bees,  HBP    2/23/22:  Physical therapist will determine when he can remove the braces based on ability for full extension without lag.      Weightbearing as tolerated with T scope locked in full extension until cleared by therapist.      Exercises/Interventions:     Therapeutic Ex (32240) Sets/sec Reps Notes/CUES   Bike  7 min  Level 5; Left TROM brace needed to walk to bike    QS  NMES 10/10 4 min NMES on left. hep   SLR NMES 10/10 X 3 min  Min assist for ext lag   SLR  Flex, abd, add, ext   Hep; Assisted on left    TA hep   Supine hip abd/ add iso - legs ext hep   Seated HS s/  Seated towel s hep   Bridges  15 x     Clamshells  15 x OVL    Prone TKE       Pt had LAQ with HEP from home PT  - Eliminated it from program.    Weight shift      1/4 squat  X 20    TB  Seated Leg press    TB PF    Standing TB TKE 60# X 20 bilat    Leg press 100# / 80# right 60# left  30 x bila/ 20x     Spider killers ;05 X 10     Seated HS curl BK 2 x 10 bilat   Reformer mini squat    Reformer HR  With support   Reformer walking          Manual Intervention (81430)      Man HS/  Man gastroc/  Man ITB bilat 3 min each    Patella mobs  2 min each    PROM of knee  X 2 min each leg                      NMR re-education (30406)   CUES NEEDED   Side stepping along wall with QS  In braces    CGA; left cory   FSU 4 in  15 x right only  Left hyperextends                                     Therapeutic Activity (21432)                                         Access Code: [email or web address]  URL: [email or web address]  Date: 03/63/2894  Prepared by: Carlos Crooked    Exercises  Hooklying Transversus Abdominis Palpation - 1 x daily - 7 x weekly - 2 sets - 10 reps - 5 hold  Supine Hip Adduction Isometric with Ball - 1 x daily - 7 x weekly - 2 sets - 10 reps - 5 hold  Seated Hamstring Stretch with Chair - 1 x daily - 7 x weekly - 1 sets - 3 reps - 30 hold  Long Sitting Calf Stretch with Strap - 1 x daily - 7 x weekly - 1 sets - 3 reps - 30 hold      Therapeutic Exercise and NMR EXR  [x] (16600) Provided verbal/tactile cueing for activities related to strengthening, flexibility, endurance, ROM for improvements in LE, proximal hip, and core control with self care, mobility, lifting, ambulation. [x] (62570) Provided verbal/tactile cueing for activities related to improving balance, coordination, kinesthetic sense, posture, motor skill, proprioception  to assist with LE, proximal hip, and core control in self care, mobility, lifting, ambulation and eccentric single leg control.      NMR and Therapeutic Activities:    [x] (86255 or 36764) Provided verbal/tactile cueing for activities related to improving balance, coordination, kinesthetic sense, posture, motor skill, proprioception and motor activation to allow for proper function of core, proximal hip and LE with self care and ADLs  [x] (00783) Gait Re-education- Provided training and instruction to the patient for proper LE, core and proximal hip recruitment and positioning and eccentric body weight control with ambulation re-education including up and down stairs     Home Exercise Program:    [x] (34207) Reviewed/Progressed HEP activities related to strengthening, flexibility, endurance, ROM of core, proximal hip and LE for functional self-care, mobility, lifting and ambulation/stair navigation   [] (42867)Reviewed/Progressed HEP activities related to improving balance, coordination, kinesthetic sense, posture, motor skill, proprioception of core, proximal hip and LE for self care, mobility, lifting, and ambulation/stair navigation      Manual Treatments:  PROM / STM / Oscillations-Mobs:  G-I, II, III, IV (PA's, Inf., Post.)  [x] (53573) Provided manual therapy to mobilize LE, proximal hip and/or LS spine soft tissue/joints for the purpose of modulating pain, promoting relaxation,  increasing ROM, reducing/eliminating soft tissue swelling/inflammation/restriction, improving soft tissue extensibility and allowing for proper ROM for normal function with self care, mobility, lifting and ambulation. Modalities: declined   [] GAME READY (VASO)- for significant edema, swelling, pain control. Charges  Timed Code Treatment Minutes: 50   Total Treatment Minutes: 50     [] EVAL (LOW) 99006   [] EVAL (MOD) 74119  [] EVAL (HIGH) 78217   [] RE-EVAL     [x] LF(41509) x 1    [] IONTO  [x] NMR (92005) x 1    [] VASO  [x] Manual (78434) x 1     [] Other:  [] TA x      [] Mech Traction (53910)  [] ES(attended) (11254)      [] ES (un) (10701):       GOALS:   Patient stated goal: normal function/ return to work. []? Progressing: []? Met: []? Not Met: []? Adjusted     Therapist goals for Patient:   Short Term Goals: To be achieved in: 2 weeks  1. Independent in HEP and progression per patient tolerance, in order to prevent re-injury. [x]? Progressing: []? Met: []? Not Met: []? Adjusted  2. Patient will have a decrease in pain to facilitate improvement in movement, function, and ADLs as indicated by Functional Deficits. [x]? Progressing: []? Met: []? Not Met: []? Adjusted     Long Term Goals: To be achieved in: 12 weeks  1. Disability index score of 30% or less for the LEFS to assist with reaching prior level of function. []? Progressing: []? Met: []? Not Met: []? Adjusted  2. Patient will demonstrate increased AROM of bilateral knees from 0 - 130 to allow for proper joint functioning as indicated by patients Functional Deficits. []? Progressing: []? Met: []? Not Met: []? Adjusted  3. Patient will demonstrate an increase in Strength to good proximal hip strength and control, within 5lb HHD in LE to allow for proper functional mobility as indicated by patients Functional Deficits. []? Progressing: []? Met: []? Not Met: []? 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 []? Progressing: []? Met: []? Not Met: []? Adjusted  5. Able to ascend and descend stairs with symmetrical, reciprocal gait. []? Progressing: []? Met: []? Not Met: []? Adjusted          Progression Towards Functional goals:  [] Patient is progressing as expected towards functional goals listed. [x] Progression is slowed due to complexities listed. [] Progression has been slowed due to co-morbidities. [] Plan just implemented, too soon to assess goals progression  [] Other:         Overall Progression Towards Functional goals/ Treatment Progress Update:  [] Patient is progressing as expected towards functional goals listed. [x] Progression is slowed due to complexities/Impairments listed. [] Progression has been slowed due to co-morbidities. [] Plan just implemented, too soon to assess goals progression <30days   [] Goals require adjustment due to lack of progress  [] Patient is not progressing as expected and requires additional follow up with physician  [] Other    Prognosis for POC: [x] Good [] Fair  [] Poor      Patient requires continued skilled intervention: [x] Yes  [] No    Treatment/Activity Tolerance:  [x] Patient able to complete treatment  [] Patient limited by fatigue  [] Patient limited by pain    [] Patient limited by other medical complications  [] Other:     ASSESSMENT: Patient responding to increasing strength challenges on the right leg. Lack of left quad persists. Does well with shortened range. Tolerated initiation of single leg press today.      PLAN: See eval  [x] Continue per plan of care [] Alter current plan (see comments above)  [] Plan of care initiated [] Hold pending MD visit [] Discharge      Electronically signed by:  Valentin Adorno PT    Note: If patient does not return for scheduled/ recommended follow up visits, this note will serve as a discharge from care along with most recent update on progress.

## 2022-03-24 ENCOUNTER — TELEPHONE (OUTPATIENT)
Dept: ORTHOPEDIC SURGERY | Age: 67
End: 2022-03-24

## 2022-03-24 NOTE — TELEPHONE ENCOUNTER
L/M FOR KARISSA letting him know that the handicap placard will be written by Dr. Bhavani Cerda for 3 months. This has been sent to his [employer] portal. He is welcome to stop by the office to p/u the letter as well PRN.

## 2022-03-24 NOTE — TELEPHONE ENCOUNTER
General Question     Subject: HANDICAP PLACARD  Patient and /or Facility Request: Alicia Grace  Contact Number: [phone]    PATIENT CALLING REGARDING GETTING A HANDICAP PLACARD. [facility] ADVISED THAT HE NEEDED  PRESCRIPTION. PLEASE CALL BACK AT THE ABOVE NUMBER.

## 2022-03-24 NOTE — Clinical Note
[facility]  [address] [facility] [address]  Phone: [phone]  Fax: [phone]    Jony Elizabeth MD         March 24, 2022     Patient: Mer Littlejohn   YOB: 1955   Date of Visit: 3/24/2022       To Whom It May Concern: It is my medical opinion that Mer Littlejohn requires a disability parking placard for the following reasons:  {reasons:27612}  Duration of need: {time:02377}    If you have any questions or concerns, please don't hesitate to call.     Sincerely,        Jony Elizabeth MD

## 2022-03-25 ENCOUNTER — HOSPITAL ENCOUNTER (OUTPATIENT)
Dept: PHYSICAL THERAPY | Age: 67
Setting detail: THERAPIES SERIES
Discharge: HOME | End: 2022-03-25
Payer: COMMERCIAL

## 2022-03-25 PROCEDURE — 97140 MANUAL THERAPY 1/> REGIONS: CPT | Performed by: PHYSICAL THERAPIST

## 2022-03-25 PROCEDURE — 97112 NEUROMUSCULAR REEDUCATION: CPT | Performed by: PHYSICAL THERAPIST

## 2022-03-25 PROCEDURE — 97110 THERAPEUTIC EXERCISES: CPT | Performed by: PHYSICAL THERAPIST

## 2022-03-25 NOTE — FLOWSHEET NOTE
[facility], [address]  Phone: [phone]  Fax [phone]    Physical Therapy Treatment Note/ Progress Report:           Date:  3/25/2022    Patient Name:  Debo Cameron    :  1955  MRN: [id number]  Restrictions/Precautions:    Medical/Treatment Diagnosis Information:  · Diagnosis: Bilat quad strain  S96.112D, S96.111D    bilateral quad tendon repair 22  Treatment Diagnosis: bilateral knee pain M25.561, M25.562    gait abnormality r26.9,  difficulty walking J78.7  Insurance/Certification information:  PT Insurance Information: BCBS_  o copay 80/20 coins    PT/OT/ST 60 visits,  No auth  Physician Information:  Referring Practitioner: Dr. Negra James  Has the plan of care been signed (Y/N):        []  Yes  [x]  No     Date of Patient follow up with Physician: 22      Is this a Progress Report:     [x]  Yes  []  No        If Yes:  Date Range for reporting period:  Beginnin22  Ending: 3/25/22    Progress report will be due (10 Rx or 30 days whichever is less):        Recertification will be due (POC Duration  / 90 days whichever is less):       Visit # Insurance Allowable Auth Required   In-person 12 60 visits []  Yes [x]  No    Telehealth   []  Yes []  No    Total          Therapy Diagnosis/Practice Pattern:E      Number of Comorbidities:  []0     [x]1-2    []3+    Latex Allergy:  [x]NO      []YES  Preferred Language for Healthcare:   [x]English       []other:    SUBJECTIVE:  Pt has been working on [employer]. Pt has a lot of difficulty walking on declines. Pt felt unsteady. He did get a parking pass. OBJECTIVE: See eval  Observation: Unable to perform controlled TKE on left. Hyperextends in ambulation occasionally during gait training.       Test measurements:    ROM LEFT RIGHT left right   Knee ext 0 0 0 0   Knee Flex 75 80 115 120   Ankle DF 0 0     Strength  LEFT RIGHT     Quad set Fair + Fair + Fair + Good   SLR indep with lag Indep 25 degree lag indp without lag             Pain Scale 0/10   0/10    LEFS 30/80 = 63%   52/80 = 35%        RESTRICTIONS/PRECAUTIONS: 2 back surgeries,   Allergic to bees,  HBP    2/23/22:  Physical therapist will determine when he can remove the braces based on ability for full extension without lag.      Weightbearing as tolerated with T scope locked in full extension until cleared by therapist.      Exercises/Interventions:     Therapeutic Ex (51240) Sets/sec Reps Notes/CUES   Bike  7 min  Level 5; Left TROM brace needed to walk to bike    QS  NMES 10/10 4 min NMES on left. hep   SLR NMES 10/10 X 3 min  Min assist for ext lag   SLR  Flex, abd, add, ext   Hep; Assisted on left    TA hep   Supine hip abd/ add iso - legs ext hep   Seated HS s/  Seated towel s hep   Bridges  15 x     Clamshells  2 x 10  OVL    Prone TKE       Pt had LAQ with HEP from home PT  - Eliminated it from program.    Weight shift      1/4 squat  X 20    TB  Seated Leg press    TB PF    Standing TB TKE 60# X 20 bilat    Leg press 105# / 80# right 60# left  30 x bila/ 20x     Spider killers ;05 X 10     Seated HS curl BK 2 x 10 bilat   Reformer mini squat    Reformer HR  With support   Reformer walking          Manual Intervention (63199)      Man HS/  Man gastroc/  Man ITB bilat 3 min each    Patella mobs  2 min each    PROM of knee  X 2 min each leg                      NMR re-education (58248)   CUES NEEDED   Side stepping along wall with QS  In braces    CGA; left cory   FSU 4 in  15 x right only  Left hyperextends                                     Therapeutic Activity (39515)                                         Access Code: [email or web address]  URL: [email or web address] [employer]/  Date: 19/35/0093  Prepared by: Jamie Rizvi    Exercises  Hooklying Transversus Abdominis Palpation - 1 x daily - 7 x weekly - 2 sets - 10 reps - 5 hold  Supine Hip Adduction Isometric with Ball - 1 x daily - 7 x weekly - 2 sets - 10 reps - 5 hold  Seated Hamstring Stretch with Chair - 1 x daily - 7 x weekly - 1 sets - 3 reps - 30 hold  Long Sitting Calf Stretch with Strap - 1 x daily - 7 x weekly - 1 sets - 3 reps - 30 hold      Therapeutic Exercise and NMR EXR  [x] (00331) Provided verbal/tactile cueing for activities related to strengthening, flexibility, endurance, ROM for improvements in LE, proximal hip, and core control with self care, mobility, lifting, ambulation. [x] (57368) Provided verbal/tactile cueing for activities related to improving balance, coordination, kinesthetic sense, posture, motor skill, proprioception  to assist with LE, proximal hip, and core control in self care, mobility, lifting, ambulation and eccentric single leg control.      NMR and Therapeutic Activities:    [x] (35688 or 05914) Provided verbal/tactile cueing for activities related to improving balance, coordination, kinesthetic sense, posture, motor skill, proprioception and motor activation to allow for proper function of core, proximal hip and LE with self care and ADLs  [x] (81601) Gait Re-education- Provided training and instruction to the patient for proper LE, core and proximal hip recruitment and positioning and eccentric body weight control with ambulation re-education including up and down stairs     Home Exercise Program:    [x] (32177) Reviewed/Progressed HEP activities related to strengthening, flexibility, endurance, ROM of core, proximal hip and LE for functional self-care, mobility, lifting and ambulation/stair navigation   [] (59315)Reviewed/Progressed HEP activities related to improving balance, coordination, kinesthetic sense, posture, motor skill, proprioception of core, proximal hip and LE for self care, mobility, lifting, and ambulation/stair navigation      Manual Treatments:  PROM / STM / Oscillations-Mobs:  G-I, II, III, IV (PA's, Inf., Post.)  [x] (49122) Provided manual therapy to mobilize LE, proximal hip and/or LS spine soft tissue/joints for the purpose of modulating pain, promoting relaxation,  increasing ROM, reducing/eliminating soft tissue swelling/inflammation/restriction, improving soft tissue extensibility and allowing for proper ROM for normal function with self care, mobility, lifting and ambulation. Modalities: declined   [] GAME READY (VASO)- for significant edema, swelling, pain control. Charges  Timed Code Treatment Minutes: 50   Total Treatment Minutes: 50     [] EVAL (LOW) 75346   [] EVAL (MOD) 67910  [] EVAL (HIGH) 43123   [] RE-EVAL     [x] VF(68402) x 1    [] IONTO  [x] NMR (56986) x 1    [] VASO  [x] Manual (93540) x 1     [] Other:  [] TA x      [] Mech Traction (75705)  [] ES(attended) (60877)      [] ES (un) (37998):       GOALS:   Patient stated goal: normal function/ return to work. []? Progressing: []? Met: []? Not Met: []? Adjusted     Therapist goals for Patient:   Short Term Goals: To be achieved in: 2 weeks  1. Independent in HEP and progression per patient tolerance, in order to prevent re-injury. [x]? Progressing: []? Met: []? Not Met: []? Adjusted  2. Patient will have a decrease in pain to facilitate improvement in movement, function, and ADLs as indicated by Functional Deficits. [x]? Progressing: []? Met: []? Not Met: []? Adjusted     Long Term Goals: To be achieved in: 12 weeks  1. Disability index score of 30% or less for the LEFS to assist with reaching prior level of function. []? Progressing: []? Met: []? Not Met: []? Adjusted  2. Patient will demonstrate increased AROM of bilateral knees from 0 - 130 to allow for proper joint functioning as indicated by patients Functional Deficits. []? Progressing: []? Met: []? Not Met: []? Adjusted  3. Patient will demonstrate an increase in Strength to good proximal hip strength and control, within 5lb HHD in LE to allow for proper functional mobility as indicated by patients Functional Deficits.    []? Progressing: []? Met: []? Not Met: []? 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 []? Progressing: []? Met: []? Not Met: []? Adjusted  5. Able to ascend and descend stairs with symmetrical, reciprocal gait. []? Progressing: []? Met: []? Not Met: []? Adjusted          Progression Towards Functional goals:  [] Patient is progressing as expected towards functional goals listed. [x] Progression is slowed due to complexities listed. [] Progression has been slowed due to co-morbidities. [] Plan just implemented, too soon to assess goals progression  [] Other:         Overall Progression Towards Functional goals/ Treatment Progress Update:  [] Patient is progressing as expected towards functional goals listed. [x] Progression is slowed due to complexities/Impairments listed. [] Progression has been slowed due to co-morbidities. [] Plan just implemented, too soon to assess goals progression <30days   [] Goals require adjustment due to lack of progress  [] Patient is not progressing as expected and requires additional follow up with physician  [] Other    Prognosis for POC: [x] Good [] Fair  [] Poor      Patient requires continued skilled intervention: [x] Yes  [] No    Treatment/Activity Tolerance:  [x] Patient able to complete treatment  [] Patient limited by fatigue  [] Patient limited by pain    [] Patient limited by other medical complications  [] Other:     ASSESSMENT: Pt is able to perform leg press activities without difficulty. Pt cont to have ext lag with SLR and hyperext during gait.      PLAN: See eval  [x] Continue per plan of care [] Alter current plan (see comments above)  [] Plan of care initiated [] Hold pending MD visit [] Discharge      Electronically signed by:  Day Johnson PT    Note: If patient does not return for scheduled/ recommended follow up visits, this note will serve as a discharge from care along with most recent update on progress.

## 2022-03-30 ENCOUNTER — TELEPHONE (OUTPATIENT)
Dept: ORTHOPEDIC SURGERY | Age: 67
End: 2022-03-30

## 2022-03-30 ENCOUNTER — OFFICE VISIT (OUTPATIENT)
Dept: ORTHOPEDIC SURGERY | Age: 67
End: 2022-03-30
Payer: COMMERCIAL

## 2022-03-30 VITALS — HEIGHT: 70 IN | BODY MASS INDEX: 40.94 KG/M2 | WEIGHT: 286 LBS

## 2022-03-30 DIAGNOSIS — S76.112D RUPTURE OF LEFT QUADRICEPS TENDON, SUBSEQUENT ENCOUNTER: Primary | ICD-10-CM

## 2022-03-30 PROCEDURE — 99024 POSTOP FOLLOW-UP VISIT: CPT | Performed by: ORTHOPAEDIC SURGERY

## 2022-03-30 NOTE — PROGRESS NOTES
History:  Mati Tellez is here for follow up after bilateral quadriceps tendon repair. Surgery date was 1/5/22. Pain is controlled without medication. The patient's pain is rated at 0-1/10. He has been doing physical therapy. They are concerned about his left leg not responding as well to treatment of the right. Physical Examination:  Ht 5' 10\" (1.778 m)   Wt 286 lb (129.7 kg)   BMI 41.04 kg/m²   Patient is awake, alert, and in no acute distress. Bilateral incisions are well-healed. Right knee ROM 0-120. No palpable defect at the quad tendon. He is able to straight leg raise. Left knee ROM 0-120. He does have extensor lag. There does appear to be palpable defect at the quad tendon on the left.        Assessment:   Almost 4 months status post bilateral quadriceps tendon repair        Plan:   Discussed with patient that I have concerned that he may have either retorn or had failure to heal of his left quadriceps tendon repair. MRI of left knee to evaluate left quadriceps tendon. Given the possibility of reinjury on the left side, I want him to keep the left leg extended in the T scope brace.     NSAIDs as needed. Follow-up after MRI. Donah Rubinstein. Aury Bingham MD  Orthopaedic Surgery and Sports Medicine     Disclaimer: This note was generated with use of a verbal recognition program and an attempt was made to check for errors. It is possible that there are still dictated errors within this office note. If so, please bring any significant errors to my attention for an addendum. All efforts were made to ensure that this office note is accurate.

## 2022-03-30 NOTE — TELEPHONE ENCOUNTER
General Leonard MA  P [facility] Clinical Support    MRI LEFT KNEE approved Auth# [id number]     Was approved for [facility] due to insurance - JLB     We recieved Ins Auth for MRI. If MRI is for [facility], order/auth has been faxed. Patient will call to schedule the MRI, then call our office back to make a follow up appt, to go over the results. LVM for pt letting him know.

## 2022-04-01 ENCOUNTER — HOSPITAL ENCOUNTER (OUTPATIENT)
Dept: PHYSICAL THERAPY | Age: 67
Setting detail: THERAPIES SERIES
Discharge: HOME | End: 2022-04-01
Payer: COMMERCIAL

## 2022-04-01 PROCEDURE — 97110 THERAPEUTIC EXERCISES: CPT | Performed by: PHYSICAL THERAPIST

## 2022-04-01 PROCEDURE — 97140 MANUAL THERAPY 1/> REGIONS: CPT | Performed by: PHYSICAL THERAPIST

## 2022-04-01 NOTE — FLOWSHEET NOTE
[place] [facility] [facility], [address]  Phone: [phone]  Fax [phone]    Physical Therapy Treatment Note/ Progress Report:           Date:  2022    Patient Name:  Farhat Weber    :  1955  MRN: [id number]  Restrictions/Precautions:    Medical/Treatment Diagnosis Information:  · Diagnosis: Bilat quad strain  S96.112D, S96.111D    bilateral quad tendon repair 22  Treatment Diagnosis: bilateral knee pain M25.561, M25.562    gait abnormality r26.9,  difficulty walking E74.4  Insurance/Certification information:  PT Insurance Information: BCBS_  o copay 80/20 coins    PT/OT/ST 60 visits,  No auth  Physician Information:  Referring Practitioner: Dr. Jeannette Mi  Has the plan of care been signed (Y/N):        []  Yes  [x]  No     Date of Patient follow up with Physician: 22      Is this a Progress Report:     [x]  Yes  []  No        If Yes:  Date Range for reporting period:  Beginnin22  Ending: 3/25/22    Progress report will be due (10 Rx or 30 days whichever is less): 42       Recertification will be due (POC Duration  / 90 days whichever is less):       Visit # Insurance Allowable Auth Required   In-person 13 60 visits []  Yes [x]  No    Telehealth   []  Yes []  No    Total          Therapy Diagnosis/Practice Pattern:E      Number of Comorbidities:  []0     [x]1-2    []3+    Latex Allergy:  [x]NO      []YES  Preferred Language for Healthcare:   [x]English       []other:    SUBJECTIVE:  Patient saw MD this week and has MRI on Tuesday for left knee as not progressing as expected. Fatigued with walking around campus. OBJECTIVE: See eval  Observation: Unable to perform controlled TKE on left. Hyperextends in ambulation occasionally during gait training.       Test measurements:    ROM LEFT RIGHT left right   Knee ext 0 0 0 0   Knee Flex 75 80 115 120   Ankle DF 0 0     Strength  LEFT RIGHT Quad set Fair + Fair + Fair + Good   SLR indep with lag Indep 25 degree lag indp without lag             Pain Scale 0/10   0/10    LEFS 30/80 = 63%   52/80 = 35%        RESTRICTIONS/PRECAUTIONS: 2 back surgeries,   Allergic to bees,  HBP    2/23/22:  Physical therapist will determine when he can remove the braces based on ability for full extension without lag.      Weightbearing as tolerated with T scope locked in full extension until cleared by therapist.      Exercises/Interventions:     Therapeutic Ex (21910) Sets/sec Reps Notes/CUES   Bike  7 min  Level 5; Left TROM brace needed to walk to bike    QS  10\" 10 x   on left. hep   SLR    Min assist for ext lag   SLR  Flex, abd, add, ext   Hep; Assisted on left    TA hep   Supine hip abd/ add iso - legs ext hep   Seated HS s/  Seated towel s hep   Bridges  15 x     Clamshells  2 x 10  OVL    Prone TKE       Pt had LAQ with HEP from home PT  - Eliminated it from program.    Weight shift      1/4 squat  X 20    TB  Seated Leg press    TB PF    Standing TB TKE 75# R, 60 L X 20 bilat    Leg press 105# / 80# right 80# left  30 x bila/ 20x     Spider killers ;05 X 10     Seated HS curl BK 2 x 10 bilat   Reformer mini squat    Reformer HR  With support   Reformer walking          Manual Intervention (40151)      Man HS/  Man gastroc/  Man ITB bilat 3 min each    Patella mobs  2 min each    PROM of knee  X 2 min each leg                      NMR re-education (31741)   CUES NEEDED   Side stepping along wall with QS OVL nv? In braces    CGA; left cory   FSU 4 in  15 x right only  Left hyperextends                                     Therapeutic Activity (16780)                                         Access Code: [email or web address]  URL: [email or web address]  Date: 72/07/9031  Prepared by: Cayla Rivera    Exercises  Hooklying Transversus Abdominis Palpation - 1 x daily - 7 x weekly - 2 sets - 10 reps - 5 hold  Supine Hip Adduction Isometric with Ball - 1 x daily - 7 x weekly - 2 sets - 10 reps - 5 hold  Seated Hamstring Stretch with Chair - 1 x daily - 7 x weekly - 1 sets - 3 reps - 30 hold  Long Sitting Calf Stretch with Strap - 1 x daily - 7 x weekly - 1 sets - 3 reps - 30 hold      Therapeutic Exercise and NMR EXR  [x] (20738) Provided verbal/tactile cueing for activities related to strengthening, flexibility, endurance, ROM for improvements in LE, proximal hip, and core control with self care, mobility, lifting, ambulation. [x] (08139) Provided verbal/tactile cueing for activities related to improving balance, coordination, kinesthetic sense, posture, motor skill, proprioception  to assist with LE, proximal hip, and core control in self care, mobility, lifting, ambulation and eccentric single leg control.      NMR and Therapeutic Activities:    [x] (85578 or 98548) Provided verbal/tactile cueing for activities related to improving balance, coordination, kinesthetic sense, posture, motor skill, proprioception and motor activation to allow for proper function of core, proximal hip and LE with self care and ADLs  [x] (56036) Gait Re-education- Provided training and instruction to the patient for proper LE, core and proximal hip recruitment and positioning and eccentric body weight control with ambulation re-education including up and down stairs     Home Exercise Program:    [x] (52123) Reviewed/Progressed HEP activities related to strengthening, flexibility, endurance, ROM of core, proximal hip and LE for functional self-care, mobility, lifting and ambulation/stair navigation   [] (02841)Reviewed/Progressed HEP activities related to improving balance, coordination, kinesthetic sense, posture, motor skill, proprioception of core, proximal hip and LE for self care, mobility, lifting, and ambulation/stair navigation      Manual Treatments:  PROM / STM / Oscillations-Mobs:  G-I, II, III, IV (PA's, Inf., Post.)  [x] (59291) Provided manual therapy to mobilize LE, proximal hip and/or LS spine soft tissue/joints for the purpose of modulating pain, promoting relaxation,  increasing ROM, reducing/eliminating soft tissue swelling/inflammation/restriction, improving soft tissue extensibility and allowing for proper ROM for normal function with self care, mobility, lifting and ambulation. Modalities: declined   [] GAME READY (VASO)- for significant edema, swelling, pain control. Charges  Timed Code Treatment Minutes: 50   Total Treatment Minutes: 50     [] EVAL (LOW) 53330   [] EVAL (MOD) 45527  [] EVAL (HIGH) 52843   [] RE-EVAL     [x] VB(41915) x 2   [] IONTO  [] NMR (31036) x    [] VASO  [x] Manual (67884) x 1     [] Other:  [] TA x      [] Mech Traction (91209)  [] ES(attended) (74482)      [] ES (un) (46138):       GOALS:   Patient stated goal: normal function/ return to work. []? Progressing: []? Met: []? Not Met: []? Adjusted     Therapist goals for Patient:   Short Term Goals: To be achieved in: 2 weeks  1. Independent in HEP and progression per patient tolerance, in order to prevent re-injury. [x]? Progressing: []? Met: []? Not Met: []? Adjusted  2. Patient will have a decrease in pain to facilitate improvement in movement, function, and ADLs as indicated by Functional Deficits. [x]? Progressing: []? Met: []? Not Met: []? Adjusted     Long Term Goals: To be achieved in: 12 weeks  1. Disability index score of 30% or less for the LEFS to assist with reaching prior level of function. []? Progressing: []? Met: []? Not Met: []? Adjusted  2. Patient will demonstrate increased AROM of bilateral knees from 0 - 130 to allow for proper joint functioning as indicated by patients Functional Deficits. []? Progressing: []? Met: []? Not Met: []? Adjusted  3. Patient will demonstrate an increase in Strength to good proximal hip strength and control, within 5lb HHD in LE to allow for proper functional mobility as indicated by patients Functional Deficits. []? Progressing: []? Met: []?  Not Met: []? 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 []? Progressing: []? Met: []? Not Met: []? Adjusted  5. Able to ascend and descend stairs with symmetrical, reciprocal gait. []? Progressing: []? Met: []? Not Met: []? Adjusted          Progression Towards Functional goals:  [] Patient is progressing as expected towards functional goals listed. [x] Progression is slowed due to complexities listed. [] Progression has been slowed due to co-morbidities. [] Plan just implemented, too soon to assess goals progression  [] Other:         Overall Progression Towards Functional goals/ Treatment Progress Update:  [] Patient is progressing as expected towards functional goals listed. [x] Progression is slowed due to complexities/Impairments listed. [] Progression has been slowed due to co-morbidities. [] Plan just implemented, too soon to assess goals progression <30days   [] Goals require adjustment due to lack of progress  [] Patient is not progressing as expected and requires additional follow up with physician  [] Other    Prognosis for POC: [x] Good [] Fair  [] Poor      Patient requires continued skilled intervention: [x] Yes  [] No    Treatment/Activity Tolerance:  [x] Patient able to complete treatment  [] Patient limited by fatigue  [] Patient limited by pain    [] Patient limited by other medical complications  [] Other:     ASSESSMENT: Patient able to increase weight on leg press and TKE machines today. Right knee responding well to treatment. Plan to continue progression. Awaiting response to left knee following MRI.      PLAN: See eval  [x] Continue per plan of care [] Alter current plan (see comments above)  [] Plan of care initiated [] Hold pending MD visit [] Discharge      Electronically signed by:  Rekha Loyola, PT    Note: If patient does not return for scheduled/ recommended follow up visits, this note will serve as a discharge from care along with most recent update on progress.

## 2022-04-06 ENCOUNTER — APPOINTMENT (OUTPATIENT)
Dept: PHYSICAL THERAPY | Age: 67
End: 2022-04-06
Payer: COMMERCIAL

## 2022-04-06 ENCOUNTER — OFFICE VISIT (OUTPATIENT)
Dept: ORTHOPEDIC SURGERY | Age: 67
End: 2022-04-06
Payer: COMMERCIAL

## 2022-04-06 VITALS — RESPIRATION RATE: 16 BRPM | BODY MASS INDEX: 40.94 KG/M2 | WEIGHT: 286 LBS | HEIGHT: 70 IN

## 2022-04-06 DIAGNOSIS — S76.112D RUPTURE OF LEFT QUADRICEPS TENDON, SUBSEQUENT ENCOUNTER: Primary | ICD-10-CM

## 2022-04-06 PROCEDURE — 99214 OFFICE O/P EST MOD 30 MIN: CPT | Performed by: ORTHOPAEDIC SURGERY

## 2022-04-06 NOTE — PROGRESS NOTES
(including, but not limited to injury to nerve or blood vessel, infection, bleeding, DVT or PE, stiffness, incomplete pain relief, need for further surgery, and anesthetic complications), limitations, expectations, alternatives, and risks of the surgical procedure. We also discussed the importance of postoperative rehabilitation. He has had full opportunity to ask his questions. I have answered them all to his satisfaction. I feel that Mr. Elva Gitelman understands our discussion today and he will provide written informed consent for the procedure. Plan for left revision quadriceps tendon repair. I did discuss with the patient that there may be the potential need for allograft augmentation. I will perform his history and physical on the day of surgery as he was already cleared for surgery at the time of his initial procedure in January. He was instructed to bring his T scope brace on the day of surgery. Brenda Pereyra. Micaela Bonilla MD  Orthopaedic Surgery and Sports Medicine     Disclaimer: This note was generated with use of a verbal recognition program and an attempt was made to check for errors. It is possible that there are still dictated errors within this office note. If so, please bring any significant errors to my attention for an addendum. All efforts were made to ensure that this office note is accurate.

## 2022-04-06 NOTE — LETTER
Surgery Scheduling Form:    Patient Name:  Tarah Long  Patient :  1955     Patient MR#:  [id number]    Patient Address:  [facility] [address] [place] [address]    Surgeon:  Darion Dolan M.D.    PCP:  Yvonne Boone MD  Insurance:    Payor/Plan Subscr  Sub. Ins. ID Effective Group Num   1. [id number] Lauri Jones 1955 [id number]     Diagnosis:  Left quadriceps tendon tear S76.112D    Operation:  Left revision quadriceps tendon repair 28742    Location:  [facility]  Surgeon's Scheduling Instruction:  Within 7 days  Requested Date:  2022 OR Time:  10:50am     Patient Arrival Time:  8:50am  OR Time Required:  90 Minutes    Anesthesia:  General  Surgical Assistant required:  Yes   Equipment: Arthrex. Have Achilles tendon allograft available  Mini C-Arm:  No Standard C-Arm:  No  Status:  Outpatient    PAT Required:  Yes    Comments: I will perform H&P on day of surgery. 2-week postop. Covid: As of 3/23/2022: no test needed if symptom free             Darion Dolan M.D.  22 3:26 PM EDT                                                                     Pre-Admission Testing Order Set   In accordance with our formulary system, a generic equivalent drug may be dispensed and administered unless a D.A. W. is written with the medication order  All orders without checkboxes will processed automatically unless crossed out. Orders with checkboxes MUST be checked in order to be carried out. Tarah Long                                                        1955  Date of Procedure/Surgery:2022 Left revision quadriceps tendon repair  1. H & P for ALL procedure/surgery completed within 30 days, to be updated day of procedure/surgery  a. to be completed by Dr. Win Dolan the day of the procedure  2. [ ]Consults: PT/OT evaluation  3. [X ]Defer to Anesthesia for Pre-Admission testing orders  4.  Diagnostic Tests:  [ ]EKG (if not completed in last 3 months) IF: (check all that apply)   Other:  [ Trecameliaia Busbrooks (if not completed in last 6 months) IF: (check all that apply)   Hx Malignancy   Hx CVS/Thoracic Surg   CVS Disease   Hx Pneumonia last 3 months   Chronic Pulm Disease  Other:  [ ]Radiology   Knee Right Left Standing AP knee, hip to ankle on scoliosis film Other:  5. Labs  [ ]Basic Metabolic Profile  IF: (check all that apply)  [ ]Albumin    Other:     ________________________________________________________________  [ ]CBC without diff   Pre op exam      ________________________________________________________________  [ ]PT/INR  PTT   Pre op exam         ________________________________________________________________  [ ]Type & Screen   Surg with potiential for signif. blood loss  [ ]Type & cross match 2 units         ________________________________________________________________  [ ]Urine routine reflex to culture (UAR) If cultures positive, repeat on                  admission [ Orlean Pal catch urine  [ ]Nasal culture for MRSA   [ ]Nasal MRSA culture  ________________________________________________________________  6. [ ]Other:    TO: __________________________________ Date: _______ Time:_________    Physician Signature:         4/6/2022    3:39 PM         Pre-operative Physician Prophylaxis Orders    Trang Chrisapril  1955  All orders without checkboxes will be processed automatically unless crossed out. Orders with checkboxes MUST be checked in order to be carried out. 1. Allergies: Bee venom  2. Procedure: Left revision quadriceps tendon repair        Ht Readings from Last 1 Encounters:   04/06/22 5' 10\" (1.778 m)       Wt Readings from Last 1 Encounters:   04/06/22 286 lb (129.7 kg)   4.    [ ] DVT Prophylaxis-Contraindication (Do not give)  Allergy to heparin Currently on anticoagulation  Not indicated, low risk patient  Thrombocytopenia Admitted for bleeding diagnosis Surgery or invasive procedure  Reanna.Appl ] Sequential Compression Boots to unaffected or bilateral extremities  [ ] Venous Compression Hose (BHUPENDRA hose) to unaffected or bilateral extremities        Knee high  [ ] Heparin 5,000 units subcutaneously 1-2 hours pre op into the thigh opposite of operative site  5.   [ ] Beta Blocker  Patient to take beta blocker the morning of surgery with a sip of water as prescribed at home  Other:  6. Fercho.Pierce ] Antimicrobial Prophylaxis (Consensus Guideline Recommendations) for       S.C.I. P. procedures  All antibiotics to be initiated 30 minutes pre-op (prior to leaving pre-op hold area/ACU) EXCEPT: Vancomycin and Ciprofloxacin. Initiate Vancomycin and Ciprofloxacin 2 hours pre-op. For combination antibiotic orders, start Ciprofloxacin first, then start second antibiotic ordered. In house patients, send pre-op antibiotics with patient to pre-op hold, do not initiate.   Surgical Procedure Routine pre-op Antibiotic  Penicillin or Cephalosporin Allergy  Orthopaedic  X Cefazolin (Ancef) 2 gm IVPB x1 X Clindamycin 900 mg IVPB x1    or     If > 80 kg Cefazolin 2 gm IVPB x1 Vancomycin 1 gm IVPB x1  Approved indications for Vancomycin:  MRSA related chronic wound dialysis  Other antibiotic to be given:  TO: ________________________________Date: ____________ Time: _______  Physician Signature: Date: 4/6/2022      Time: 3:39 PM

## 2022-04-06 NOTE — LETTER
Your surgery has been scheduled with Dr. Ghulam Aranda. DATE OF SURGERY:      4/8/2022     ARRIVAL TIME:      8:45am     TIME OF SURGERY:      10:45am     LOCATION: [address] [place] [address].[place], [place] [address]    **PLEASE NOTE: Due to medical conditions, your surgery time is subject to change. If this is the case, you will receive a call from the hospital or clinic staff to notify you.**    Report to Surgery Registration, which is located in the main lobby of the surgery center. PLEASE DO NOT EAT OR DRINK ANYTHING AFTER MIDNIGHT THE NIGHT BEFORE YOUR SURGERY. YOU WILL ALSO NEED TO HAVE A . POST-OP APPOINTMENT: 4/27/2022 at 1:00pm at the [facility] will need to make an appointment with your family physician so he/she can do a pre-operative history and physical.  The history & physical cannot be completed more than 30 days prior to surgery. Please have the family physician fax the completed form to [facility]) at the number on your packet. Your pre-operative instructions and history and physical forms are included in this folder. Due to the [address] pandemic you will need to be tested prior to your surgery, unless otherwise noted. This test will need to be negative by the date of surgery. This may be required regardless of vaccination status. The hospital also requires several routine tests that will be done the day of your surgery. Because your surgery is scheduled as an outpatient procedure it will be necessary to have a responsible person with you for transportation. Please review all information given to you prior to your surgery date. If there are any questions, please do not hesitate to call our office at [phone]. Dear Patient:    As a valued customer, we would like to thank you for choosing [facility] for your upcoming surgery/procedure.     CHECKLIST FOR SURGERY:    [] History & physical exam by your primary care physician within 30 days of your surgery date. To be done the day of your surgery. [x] COVID testing is not currently required unless you develop symptoms. Should this be the case, you will need to contact our office immediately to discuss how to proceed. [x] No aspirin products or blood thinners for one week prior to surgery. This includes NSAIDs, such as Aleve, Advil, Ibuprofen, Motrin, Naproxen, etc.    **If you are on an anti-coagulant, such as Plavix, Brilinta, Warfarin, etc., please consult your prescribing provider regarding stopping this medication. [] Medical clearance by a cardiologist, pulmonologist, endocrinologist, oncologist, or other specialist(s) if you are under their care. Please contact their office to obtain the clearance needed based on their discretion. [x] Nothing to eat or drink after midnight the night before surgery. [x] You will need to have a  the day of surgery. [x] Inform office of any changes in insurance, address or phone numbers. [] Provide a copy of your advanced directive/durable power of /living will on the day of your surgery/procedure. ** You will receive at least two calls from the Hospital.  One will be from Registration to pre-register you and go over your address, phone number, and insurance information. You will also hear from the Pre-Admission testing nurses. They will want to get a brief medical history and also go over your list of medications.

## 2022-04-07 ENCOUNTER — ANESTHESIA EVENT (OUTPATIENT)
Dept: OPERATING ROOM | Age: 67
DRG: 501 | End: 2022-04-07
Payer: COMMERCIAL

## 2022-04-07 ENCOUNTER — TELEPHONE (OUTPATIENT)
Dept: ORTHOPEDIC SURGERY | Age: 67
End: 2022-04-07

## 2022-04-07 NOTE — TELEPHONE ENCOUNTER
Auth: NPR  Date: 04/08/22  Reference # [id number]  Spoke with: Marley  Type of SX: Outpatient  Location: [facility]  CPT: 88402   DX: [phone]  SX area: [facility]  Insurance: [id number], Susan. \Hospital Has a New Name and Outlook.\""

## 2022-04-08 ENCOUNTER — ANESTHESIA (OUTPATIENT)
Dept: OPERATING ROOM | Age: 67
DRG: 501 | End: 2022-04-08
Payer: COMMERCIAL

## 2022-04-08 ENCOUNTER — APPOINTMENT (OUTPATIENT)
Dept: PHYSICAL THERAPY | Age: 67
End: 2022-04-08
Payer: COMMERCIAL

## 2022-04-08 ENCOUNTER — HOSPITAL ENCOUNTER (INPATIENT)
Age: 67
LOS: 1 days | Discharge: HOME | DRG: 501 | End: 2022-04-09
Attending: ORTHOPAEDIC SURGERY | Admitting: INTERNAL MEDICINE
Payer: COMMERCIAL

## 2022-04-08 ENCOUNTER — APPOINTMENT (OUTPATIENT)
Dept: GENERAL RADIOLOGY | Age: 67
DRG: 501 | End: 2022-04-08
Attending: ORTHOPAEDIC SURGERY
Payer: COMMERCIAL

## 2022-04-08 VITALS
DIASTOLIC BLOOD PRESSURE: 67 MMHG | RESPIRATION RATE: 29 BRPM | SYSTOLIC BLOOD PRESSURE: 102 MMHG | OXYGEN SATURATION: 98 % | TEMPERATURE: 96.8 F

## 2022-04-08 DIAGNOSIS — S76.112A QUADRICEPS TENDON RUPTURE, LEFT, INITIAL ENCOUNTER: Primary | ICD-10-CM

## 2022-04-08 PROBLEM — I16.0 HYPERTENSIVE URGENCY: Status: ACTIVE | Noted: 2022-04-08

## 2022-04-08 PROBLEM — I16.1 HYPERTENSIVE EMERGENCY: Status: ACTIVE | Noted: 2022-04-08

## 2022-04-08 LAB
A/G RATIO: 1.5 (ref 1.1–2.2)
ALBUMIN SERPL-MCNC: 3.9 G/DL (ref 3.4–5)
ALP BLD-CCNC: 77 U/L (ref 40–129)
ALT SERPL-CCNC: 15 U/L (ref 10–40)
ANION GAP SERPL CALCULATED.3IONS-SCNC: 13 MMOL/L (ref 3–16)
AST SERPL-CCNC: 21 U/L (ref 15–37)
BASOPHILS ABSOLUTE: 0 K/UL (ref 0–0.2)
BASOPHILS RELATIVE PERCENT: 0.5 %
BILIRUB SERPL-MCNC: 0.4 MG/DL (ref 0–1)
BUN BLDV-MCNC: 10 MG/DL (ref 7–20)
CALCIUM SERPL-MCNC: 8.9 MG/DL (ref 8.3–10.6)
CHLORIDE BLD-SCNC: 103 MMOL/L (ref 99–110)
CO2: 23 MMOL/L (ref 21–32)
CREAT SERPL-MCNC: 1 MG/DL (ref 0.8–1.3)
EOSINOPHILS ABSOLUTE: 0 K/UL (ref 0–0.6)
EOSINOPHILS RELATIVE PERCENT: 0.1 %
GFR AFRICAN AMERICAN: >60
GFR NON-AFRICAN AMERICAN: >60
GLUCOSE BLD-MCNC: 141 MG/DL (ref 70–99)
HCT VFR BLD CALC: 46.1 % (ref 40.5–52.5)
HEMOGLOBIN: 14.7 G/DL (ref 13.5–17.5)
LYMPHOCYTES ABSOLUTE: 0.6 K/UL (ref 1–5.1)
LYMPHOCYTES RELATIVE PERCENT: 7.2 %
MCH RBC QN AUTO: 23.8 PG (ref 26–34)
MCHC RBC AUTO-ENTMCNC: 31.8 G/DL (ref 31–36)
MCV RBC AUTO: 74.7 FL (ref 80–100)
MONOCYTES ABSOLUTE: 0.3 K/UL (ref 0–1.3)
MONOCYTES RELATIVE PERCENT: 4.1 %
NEUTROPHILS ABSOLUTE: 7 K/UL (ref 1.7–7.7)
NEUTROPHILS RELATIVE PERCENT: 88.1 %
PDW BLD-RTO: 14 % (ref 12.4–15.4)
PLATELET # BLD: 188 K/UL (ref 135–450)
PMV BLD AUTO: 8.3 FL (ref 5–10.5)
POTASSIUM SERPL-SCNC: 3.9 MMOL/L (ref 3.5–5.1)
PRO-BNP: 114 PG/ML (ref 0–124)
RBC # BLD: 6.17 M/UL (ref 4.2–5.9)
SODIUM BLD-SCNC: 139 MMOL/L (ref 136–145)
TOTAL PROTEIN: 6.5 G/DL (ref 6.4–8.2)
TROPONIN: <0.01 NG/ML
WBC # BLD: 7.9 K/UL (ref 4–11)

## 2022-04-08 PROCEDURE — C1713 ANCHOR/SCREW BN/BN,TIS/BN: HCPCS | Performed by: ORTHOPAEDIC SURGERY

## 2022-04-08 PROCEDURE — 6360000002 HC RX W HCPCS: Performed by: ANESTHESIOLOGY

## 2022-04-08 PROCEDURE — 6360000002 HC RX W HCPCS: Performed by: ORTHOPAEDIC SURGERY

## 2022-04-08 PROCEDURE — 2580000003 HC RX 258: Performed by: ANESTHESIOLOGY

## 2022-04-08 PROCEDURE — 2500000003 HC RX 250 WO HCPCS

## 2022-04-08 PROCEDURE — 7100000001 HC PACU RECOVERY - ADDTL 15 MIN: Performed by: ORTHOPAEDIC SURGERY

## 2022-04-08 PROCEDURE — 2580000003 HC RX 258: Performed by: ORTHOPAEDIC SURGERY

## 2022-04-08 PROCEDURE — 7100000000 HC PACU RECOVERY - FIRST 15 MIN: Performed by: ORTHOPAEDIC SURGERY

## 2022-04-08 PROCEDURE — 2500000003 HC RX 250 WO HCPCS: Performed by: INTERNAL MEDICINE

## 2022-04-08 PROCEDURE — 0LQM0ZZ REPAIR LEFT UPPER LEG TENDON, OPEN APPROACH: ICD-10-PCS | Performed by: INTERNAL MEDICINE

## 2022-04-08 PROCEDURE — A4217 STERILE WATER/SALINE, 500 ML: HCPCS | Performed by: ORTHOPAEDIC SURGERY

## 2022-04-08 PROCEDURE — 83880 ASSAY OF NATRIURETIC PEPTIDE: CPT

## 2022-04-08 PROCEDURE — 84484 ASSAY OF TROPONIN QUANT: CPT

## 2022-04-08 PROCEDURE — 27386 REPAIR/GRAFT OF THIGH MUSCLE: CPT | Performed by: ORTHOPAEDIC SURGERY

## 2022-04-08 PROCEDURE — 2580000003 HC RX 258: Performed by: INTERNAL MEDICINE

## 2022-04-08 PROCEDURE — G0378 HOSPITAL OBSERVATION PER HR: HCPCS

## 2022-04-08 PROCEDURE — 3700000001 HC ADD 15 MINUTES (ANESTHESIA): Performed by: ORTHOPAEDIC SURGERY

## 2022-04-08 PROCEDURE — 2709999900 HC NON-CHARGEABLE SUPPLY: Performed by: ORTHOPAEDIC SURGERY

## 2022-04-08 PROCEDURE — 3600000004 HC SURGERY LEVEL 4 BASE: Performed by: ORTHOPAEDIC SURGERY

## 2022-04-08 PROCEDURE — 3600000014 HC SURGERY LEVEL 4 ADDTL 15MIN: Performed by: ORTHOPAEDIC SURGERY

## 2022-04-08 PROCEDURE — 36415 COLL VENOUS BLD VENIPUNCTURE: CPT

## 2022-04-08 PROCEDURE — 2500000003 HC RX 250 WO HCPCS: Performed by: ORTHOPAEDIC SURGERY

## 2022-04-08 PROCEDURE — 85025 COMPLETE CBC W/AUTO DIFF WBC: CPT

## 2022-04-08 PROCEDURE — 2500000003 HC RX 250 WO HCPCS: Performed by: ANESTHESIOLOGY

## 2022-04-08 PROCEDURE — 80053 COMPREHEN METABOLIC PANEL: CPT

## 2022-04-08 PROCEDURE — 6370000000 HC RX 637 (ALT 250 FOR IP): Performed by: ANESTHESIOLOGY

## 2022-04-08 PROCEDURE — 71045 X-RAY EXAM CHEST 1 VIEW: CPT

## 2022-04-08 PROCEDURE — 6370000000 HC RX 637 (ALT 250 FOR IP): Performed by: INTERNAL MEDICINE

## 2022-04-08 PROCEDURE — C9359 IMPLNT,BON VOID FILLER-PUTTY: HCPCS | Performed by: ORTHOPAEDIC SURGERY

## 2022-04-08 PROCEDURE — 2000000000 HC ICU R&B

## 2022-04-08 PROCEDURE — 3700000000 HC ANESTHESIA ATTENDED CARE: Performed by: ORTHOPAEDIC SURGERY

## 2022-04-08 DEVICE — SYSTEM IMPL TEND 4.75MM SWIVELOCK BAN SUT LASSO W/ NIT WIRE: Type: IMPLANTABLE DEVICE | Site: KNEE | Status: FUNCTIONAL

## 2022-04-08 DEVICE — DBX PUTTY, 5CC
Type: IMPLANTABLE DEVICE | Site: KNEE | Status: FUNCTIONAL
Brand: DBX®

## 2022-04-08 RX ORDER — ONDANSETRON 2 MG/ML
4 INJECTION INTRAMUSCULAR; INTRAVENOUS EVERY 6 HOURS PRN
Status: DISCONTINUED | OUTPATIENT
Start: 2022-04-08 | End: 2022-04-09 | Stop reason: HOSPADM

## 2022-04-08 RX ORDER — LABETALOL HYDROCHLORIDE 5 MG/ML
INJECTION, SOLUTION INTRAVENOUS PRN
Status: DISCONTINUED | OUTPATIENT
Start: 2022-04-08 | End: 2022-04-08 | Stop reason: SDUPTHER

## 2022-04-08 RX ORDER — LABETALOL HYDROCHLORIDE 5 MG/ML
10 INJECTION, SOLUTION INTRAVENOUS
Status: COMPLETED | OUTPATIENT
Start: 2022-04-08 | End: 2022-04-08

## 2022-04-08 RX ORDER — SODIUM CHLORIDE 0.9 % (FLUSH) 0.9 %
5-40 SYRINGE (ML) INJECTION EVERY 12 HOURS SCHEDULED
Status: DISCONTINUED | OUTPATIENT
Start: 2022-04-08 | End: 2022-04-08 | Stop reason: HOSPADM

## 2022-04-08 RX ORDER — METOPROLOL SUCCINATE 25 MG/1
25 TABLET, EXTENDED RELEASE ORAL DAILY
Status: DISCONTINUED | OUTPATIENT
Start: 2022-04-08 | End: 2022-04-09 | Stop reason: HOSPADM

## 2022-04-08 RX ORDER — HYDRALAZINE HYDROCHLORIDE 20 MG/ML
5 INJECTION INTRAMUSCULAR; INTRAVENOUS ONCE
Status: COMPLETED | OUTPATIENT
Start: 2022-04-08 | End: 2022-04-08

## 2022-04-08 RX ORDER — SODIUM CHLORIDE 0.9 % (FLUSH) 0.9 %
5-40 SYRINGE (ML) INJECTION EVERY 12 HOURS SCHEDULED
Status: DISCONTINUED | OUTPATIENT
Start: 2022-04-08 | End: 2022-04-09 | Stop reason: HOSPADM

## 2022-04-08 RX ORDER — SODIUM CHLORIDE 9 MG/ML
INJECTION, SOLUTION INTRAVENOUS CONTINUOUS
Status: DISCONTINUED | OUTPATIENT
Start: 2022-04-08 | End: 2022-04-09

## 2022-04-08 RX ORDER — METOPROLOL SUCCINATE 25 MG/1
25 TABLET, EXTENDED RELEASE ORAL ONCE
Status: COMPLETED | OUTPATIENT
Start: 2022-04-08 | End: 2022-04-08

## 2022-04-08 RX ORDER — OXYCODONE HYDROCHLORIDE 5 MG/1
5 TABLET ORAL
Status: DISCONTINUED | OUTPATIENT
Start: 2022-04-08 | End: 2022-04-08 | Stop reason: HOSPADM

## 2022-04-08 RX ORDER — FENTANYL CITRATE 50 UG/ML
INJECTION, SOLUTION INTRAMUSCULAR; INTRAVENOUS PRN
Status: DISCONTINUED | OUTPATIENT
Start: 2022-04-08 | End: 2022-04-08 | Stop reason: SDUPTHER

## 2022-04-08 RX ORDER — BUPIVACAINE HYDROCHLORIDE 5 MG/ML
INJECTION, SOLUTION EPIDURAL; INTRACAUDAL
Status: COMPLETED | OUTPATIENT
Start: 2022-04-08 | End: 2022-04-08

## 2022-04-08 RX ORDER — ESMOLOL HYDROCHLORIDE 10 MG/ML
INJECTION INTRAVENOUS PRN
Status: DISCONTINUED | OUTPATIENT
Start: 2022-04-08 | End: 2022-04-08 | Stop reason: SDUPTHER

## 2022-04-08 RX ORDER — ONDANSETRON 2 MG/ML
4 INJECTION INTRAMUSCULAR; INTRAVENOUS
Status: DISCONTINUED | OUTPATIENT
Start: 2022-04-08 | End: 2022-04-08 | Stop reason: HOSPADM

## 2022-04-08 RX ORDER — SODIUM CHLORIDE 0.9 % (FLUSH) 0.9 %
10 SYRINGE (ML) INJECTION EVERY 12 HOURS SCHEDULED
Status: DISCONTINUED | OUTPATIENT
Start: 2022-04-08 | End: 2022-04-08 | Stop reason: HOSPADM

## 2022-04-08 RX ORDER — ONDANSETRON 2 MG/ML
INJECTION INTRAMUSCULAR; INTRAVENOUS PRN
Status: DISCONTINUED | OUTPATIENT
Start: 2022-04-08 | End: 2022-04-08 | Stop reason: SDUPTHER

## 2022-04-08 RX ORDER — SODIUM CHLORIDE 9 MG/ML
INJECTION, SOLUTION INTRAVENOUS CONTINUOUS PRN
Status: DISCONTINUED | OUTPATIENT
Start: 2022-04-08 | End: 2022-04-08 | Stop reason: SDUPTHER

## 2022-04-08 RX ORDER — MAGNESIUM HYDROXIDE 1200 MG/15ML
LIQUID ORAL CONTINUOUS PRN
Status: COMPLETED | OUTPATIENT
Start: 2022-04-08 | End: 2022-04-08

## 2022-04-08 RX ORDER — ACETAMINOPHEN 325 MG/1
650 TABLET ORAL EVERY 6 HOURS PRN
Status: DISCONTINUED | OUTPATIENT
Start: 2022-04-08 | End: 2022-04-09 | Stop reason: HOSPADM

## 2022-04-08 RX ORDER — SODIUM CHLORIDE 0.9 % (FLUSH) 0.9 %
10 SYRINGE (ML) INJECTION PRN
Status: DISCONTINUED | OUTPATIENT
Start: 2022-04-08 | End: 2022-04-08 | Stop reason: HOSPADM

## 2022-04-08 RX ORDER — ROCURONIUM BROMIDE 10 MG/ML
INJECTION, SOLUTION INTRAVENOUS PRN
Status: DISCONTINUED | OUTPATIENT
Start: 2022-04-08 | End: 2022-04-08 | Stop reason: SDUPTHER

## 2022-04-08 RX ORDER — HYDRALAZINE HYDROCHLORIDE 20 MG/ML
10 INJECTION INTRAMUSCULAR; INTRAVENOUS ONCE
Status: COMPLETED | OUTPATIENT
Start: 2022-04-08 | End: 2022-04-08

## 2022-04-08 RX ORDER — DEXAMETHASONE SODIUM PHOSPHATE 4 MG/ML
INJECTION, SOLUTION INTRA-ARTICULAR; INTRALESIONAL; INTRAMUSCULAR; INTRAVENOUS; SOFT TISSUE PRN
Status: DISCONTINUED | OUTPATIENT
Start: 2022-04-08 | End: 2022-04-08 | Stop reason: SDUPTHER

## 2022-04-08 RX ORDER — LABETALOL HYDROCHLORIDE 5 MG/ML
10 INJECTION, SOLUTION INTRAVENOUS ONCE
Status: COMPLETED | OUTPATIENT
Start: 2022-04-08 | End: 2022-04-08

## 2022-04-08 RX ORDER — SUCCINYLCHOLINE/SOD CL,ISO/PF 200MG/10ML
SYRINGE (ML) INTRAVENOUS PRN
Status: DISCONTINUED | OUTPATIENT
Start: 2022-04-08 | End: 2022-04-08 | Stop reason: SDUPTHER

## 2022-04-08 RX ORDER — SODIUM CHLORIDE 9 MG/ML
INJECTION, SOLUTION INTRAVENOUS PRN
Status: DISCONTINUED | OUTPATIENT
Start: 2022-04-08 | End: 2022-04-08 | Stop reason: HOSPADM

## 2022-04-08 RX ORDER — OXYCODONE HYDROCHLORIDE AND ACETAMINOPHEN 5; 325 MG/1; MG/1
1 TABLET ORAL EVERY 4 HOURS PRN
Qty: 40 TABLET | Refills: 0 | Status: SHIPPED | OUTPATIENT
Start: 2022-04-08 | End: 2022-04-09 | Stop reason: SDUPTHER

## 2022-04-08 RX ORDER — AMLODIPINE BESYLATE 5 MG/1
10 TABLET ORAL NIGHTLY
Status: DISCONTINUED | OUTPATIENT
Start: 2022-04-08 | End: 2022-04-09 | Stop reason: HOSPADM

## 2022-04-08 RX ORDER — SODIUM CHLORIDE 9 MG/ML
INJECTION, SOLUTION INTRAVENOUS PRN
Status: DISCONTINUED | OUTPATIENT
Start: 2022-04-08 | End: 2022-04-09 | Stop reason: HOSPADM

## 2022-04-08 RX ORDER — SODIUM CHLORIDE 9 MG/ML
25 INJECTION, SOLUTION INTRAVENOUS PRN
Status: DISCONTINUED | OUTPATIENT
Start: 2022-04-08 | End: 2022-04-08 | Stop reason: HOSPADM

## 2022-04-08 RX ORDER — MEPERIDINE HYDROCHLORIDE 25 MG/ML
12.5 INJECTION INTRAMUSCULAR; INTRAVENOUS; SUBCUTANEOUS EVERY 5 MIN PRN
Status: DISCONTINUED | OUTPATIENT
Start: 2022-04-08 | End: 2022-04-08 | Stop reason: HOSPADM

## 2022-04-08 RX ORDER — DROPERIDOL 2.5 MG/ML
0.62 INJECTION, SOLUTION INTRAMUSCULAR; INTRAVENOUS
Status: DISCONTINUED | OUTPATIENT
Start: 2022-04-08 | End: 2022-04-08 | Stop reason: HOSPADM

## 2022-04-08 RX ORDER — ACETAMINOPHEN 650 MG/1
650 SUPPOSITORY RECTAL EVERY 6 HOURS PRN
Status: DISCONTINUED | OUTPATIENT
Start: 2022-04-08 | End: 2022-04-09 | Stop reason: HOSPADM

## 2022-04-08 RX ORDER — SODIUM CHLORIDE 0.9 % (FLUSH) 0.9 %
5-40 SYRINGE (ML) INJECTION PRN
Status: DISCONTINUED | OUTPATIENT
Start: 2022-04-08 | End: 2022-04-08 | Stop reason: HOSPADM

## 2022-04-08 RX ORDER — PROPOFOL 10 MG/ML
INJECTION, EMULSION INTRAVENOUS PRN
Status: DISCONTINUED | OUTPATIENT
Start: 2022-04-08 | End: 2022-04-08 | Stop reason: SDUPTHER

## 2022-04-08 RX ORDER — PROMETHAZINE HYDROCHLORIDE 25 MG/1
25 TABLET ORAL EVERY 6 HOURS PRN
Qty: 5 TABLET | Refills: 0 | Status: SHIPPED | OUTPATIENT
Start: 2022-04-08 | End: 2022-06-27

## 2022-04-08 RX ORDER — SODIUM CHLORIDE 0.9 % (FLUSH) 0.9 %
5-40 SYRINGE (ML) INJECTION PRN
Status: DISCONTINUED | OUTPATIENT
Start: 2022-04-08 | End: 2022-04-09 | Stop reason: HOSPADM

## 2022-04-08 RX ORDER — ONDANSETRON 4 MG/1
4 TABLET, ORALLY DISINTEGRATING ORAL EVERY 8 HOURS PRN
Status: DISCONTINUED | OUTPATIENT
Start: 2022-04-08 | End: 2022-04-09 | Stop reason: HOSPADM

## 2022-04-08 RX ADMIN — ESMOLOL HYDROCHLORIDE 20 MG: 100 INJECTION, SOLUTION INTRAVENOUS at 11:31

## 2022-04-08 RX ADMIN — Medication 3000 MG: at 11:07

## 2022-04-08 RX ADMIN — PROPOFOL 200 MG: 10 INJECTION, EMULSION INTRAVENOUS at 10:57

## 2022-04-08 RX ADMIN — FENTANYL CITRATE 50 MCG: 50 INJECTION INTRAMUSCULAR; INTRAVENOUS at 11:59

## 2022-04-08 RX ADMIN — ROCURONIUM BROMIDE 10 MG: 10 INJECTION INTRAVENOUS at 11:59

## 2022-04-08 RX ADMIN — LABETALOL HYDROCHLORIDE 10 MG: 5 INJECTION INTRAVENOUS at 16:35

## 2022-04-08 RX ADMIN — HYDRALAZINE HYDROCHLORIDE 5 MG: 20 INJECTION INTRAMUSCULAR; INTRAVENOUS at 13:47

## 2022-04-08 RX ADMIN — ESMOLOL HYDROCHLORIDE 20 MG: 100 INJECTION, SOLUTION INTRAVENOUS at 11:55

## 2022-04-08 RX ADMIN — HYDROMORPHONE HYDROCHLORIDE 0.6 MG: 1 INJECTION, SOLUTION INTRAMUSCULAR; INTRAVENOUS; SUBCUTANEOUS at 11:27

## 2022-04-08 RX ADMIN — LABETALOL HYDROCHLORIDE 5 MG: 5 INJECTION, SOLUTION INTRAVENOUS at 11:40

## 2022-04-08 RX ADMIN — SODIUM CHLORIDE, PRESERVATIVE FREE 10 ML: 5 INJECTION INTRAVENOUS at 21:37

## 2022-04-08 RX ADMIN — METOPROLOL SUCCINATE 25 MG: 25 TABLET, EXTENDED RELEASE ORAL at 14:49

## 2022-04-08 RX ADMIN — HYDROMORPHONE HYDROCHLORIDE 0.4 MG: 1 INJECTION, SOLUTION INTRAMUSCULAR; INTRAVENOUS; SUBCUTANEOUS at 12:02

## 2022-04-08 RX ADMIN — Medication 160 MG: at 10:57

## 2022-04-08 RX ADMIN — AMLODIPINE BESYLATE 10 MG: 5 TABLET ORAL at 21:02

## 2022-04-08 RX ADMIN — SODIUM CHLORIDE 10 MG/HR: 9 INJECTION, SOLUTION INTRAVENOUS at 17:59

## 2022-04-08 RX ADMIN — LABETALOL HYDROCHLORIDE 10 MG: 5 INJECTION INTRAVENOUS at 13:31

## 2022-04-08 RX ADMIN — SODIUM CHLORIDE: 9 INJECTION, SOLUTION INTRAVENOUS at 10:52

## 2022-04-08 RX ADMIN — SUGAMMADEX 200 MG: 100 INJECTION, SOLUTION INTRAVENOUS at 12:30

## 2022-04-08 RX ADMIN — ONDANSETRON 4 MG: 2 INJECTION INTRAMUSCULAR; INTRAVENOUS at 12:31

## 2022-04-08 RX ADMIN — HYDRALAZINE HYDROCHLORIDE 5 MG: 20 INJECTION INTRAMUSCULAR; INTRAVENOUS at 14:14

## 2022-04-08 RX ADMIN — HYDRALAZINE HYDROCHLORIDE 10 MG: 20 INJECTION INTRAMUSCULAR; INTRAVENOUS at 15:41

## 2022-04-08 RX ADMIN — SODIUM CHLORIDE: 9 INJECTION, SOLUTION INTRAVENOUS at 09:38

## 2022-04-08 RX ADMIN — SODIUM CHLORIDE 2.5 MG/HR: 9 INJECTION, SOLUTION INTRAVENOUS at 22:09

## 2022-04-08 RX ADMIN — HYDRALAZINE HYDROCHLORIDE 10 MG: 20 INJECTION INTRAMUSCULAR; INTRAVENOUS at 16:17

## 2022-04-08 RX ADMIN — FENTANYL CITRATE 50 MCG: 50 INJECTION INTRAMUSCULAR; INTRAVENOUS at 12:31

## 2022-04-08 RX ADMIN — ROCURONIUM BROMIDE 30 MG: 10 INJECTION INTRAVENOUS at 11:07

## 2022-04-08 RX ADMIN — LABETALOL HYDROCHLORIDE 5 MG: 5 INJECTION, SOLUTION INTRAVENOUS at 12:23

## 2022-04-08 RX ADMIN — LABETALOL HYDROCHLORIDE 5 MG: 5 INJECTION, SOLUTION INTRAVENOUS at 12:13

## 2022-04-08 RX ADMIN — DEXAMETHASONE SODIUM PHOSPHATE 8 MG: 4 INJECTION, SOLUTION INTRAMUSCULAR; INTRAVENOUS at 12:31

## 2022-04-08 RX ADMIN — LABETALOL HYDROCHLORIDE 10 MG: 5 INJECTION INTRAVENOUS at 14:44

## 2022-04-08 RX ADMIN — ESMOLOL HYDROCHLORIDE 10 MG: 100 INJECTION, SOLUTION INTRAVENOUS at 11:33

## 2022-04-08 RX ADMIN — ESMOLOL HYDROCHLORIDE 20 MG: 100 INJECTION, SOLUTION INTRAVENOUS at 11:49

## 2022-04-08 RX ADMIN — LABETALOL HYDROCHLORIDE 10 MG: 5 INJECTION INTRAVENOUS at 15:18

## 2022-04-08 ASSESSMENT — PULMONARY FUNCTION TESTS
PIF_VALUE: 20
PIF_VALUE: 20
PIF_VALUE: 1
PIF_VALUE: 21
PIF_VALUE: 18
PIF_VALUE: 3
PIF_VALUE: 19
PIF_VALUE: 17
PIF_VALUE: 22
PIF_VALUE: 16
PIF_VALUE: 18
PIF_VALUE: 22
PIF_VALUE: 2
PIF_VALUE: 18
PIF_VALUE: 21
PIF_VALUE: 20
PIF_VALUE: 18
PIF_VALUE: 21
PIF_VALUE: 20
PIF_VALUE: 20
PIF_VALUE: 19
PIF_VALUE: 18
PIF_VALUE: 21
PIF_VALUE: 19
PIF_VALUE: 20
PIF_VALUE: 4
PIF_VALUE: 20
PIF_VALUE: 18
PIF_VALUE: 2
PIF_VALUE: 23
PIF_VALUE: 3
PIF_VALUE: 21
PIF_VALUE: 18
PIF_VALUE: 20
PIF_VALUE: 29
PIF_VALUE: 4
PIF_VALUE: 18
PIF_VALUE: 0
PIF_VALUE: 0
PIF_VALUE: 20
PIF_VALUE: 21
PIF_VALUE: 17
PIF_VALUE: 21
PIF_VALUE: 18
PIF_VALUE: 26
PIF_VALUE: 21
PIF_VALUE: 30
PIF_VALUE: 17
PIF_VALUE: 18
PIF_VALUE: 2
PIF_VALUE: 21
PIF_VALUE: 16
PIF_VALUE: 20
PIF_VALUE: 21
PIF_VALUE: 18
PIF_VALUE: 4
PIF_VALUE: 4
PIF_VALUE: 21
PIF_VALUE: 20
PIF_VALUE: 20
PIF_VALUE: 21
PIF_VALUE: 4
PIF_VALUE: 4
PIF_VALUE: 20
PIF_VALUE: 17
PIF_VALUE: 21
PIF_VALUE: 19
PIF_VALUE: 18
PIF_VALUE: 20
PIF_VALUE: 28
PIF_VALUE: 1
PIF_VALUE: 21
PIF_VALUE: 16
PIF_VALUE: 15
PIF_VALUE: 21
PIF_VALUE: 19
PIF_VALUE: 17
PIF_VALUE: 21
PIF_VALUE: 20
PIF_VALUE: 18
PIF_VALUE: 17
PIF_VALUE: 20
PIF_VALUE: 20
PIF_VALUE: 21
PIF_VALUE: 1
PIF_VALUE: 21
PIF_VALUE: 20
PIF_VALUE: 21
PIF_VALUE: 17
PIF_VALUE: 18
PIF_VALUE: 20
PIF_VALUE: 18
PIF_VALUE: 21
PIF_VALUE: 18
PIF_VALUE: 20
PIF_VALUE: 17
PIF_VALUE: 15
PIF_VALUE: 21
PIF_VALUE: 17
PIF_VALUE: 20
PIF_VALUE: 20
PIF_VALUE: 21
PIF_VALUE: 18
PIF_VALUE: 23
PIF_VALUE: 19
PIF_VALUE: 20
PIF_VALUE: 17
PIF_VALUE: 19
PIF_VALUE: 20
PIF_VALUE: 22
PIF_VALUE: 21
PIF_VALUE: 18
PIF_VALUE: 20
PIF_VALUE: 6
PIF_VALUE: 20
PIF_VALUE: 2
PIF_VALUE: 19
PIF_VALUE: 21

## 2022-04-08 ASSESSMENT — PAIN SCALES - GENERAL
PAINLEVEL_OUTOF10: 0

## 2022-04-08 ASSESSMENT — ENCOUNTER SYMPTOMS: SHORTNESS OF BREATH: 0

## 2022-04-08 ASSESSMENT — PAIN - FUNCTIONAL ASSESSMENT: PAIN_FUNCTIONAL_ASSESSMENT: 0-10

## 2022-04-08 NOTE — ANESTHESIA POSTPROCEDURE EVALUATION
[facility] Department of Anesthesiology  Post-Anesthesia Note       Name:  Fariha Pimentel                                  Age:  75 y.o. MRN:  [id number]     Last Vitals & Oxygen Saturation: BP (!) 171/115   Pulse 94   Temp 97.7 °F (36.5 °C) (Temporal)   Resp 18   Ht 5' 10\" (1.778 m)   Wt 286 lb (129.7 kg)   SpO2 95%   BMI 41.04 kg/m²   Patient Vitals for the past 4 hrs:   BP Pulse Resp SpO2   04/08/22 1615 (!) 171/115 94 18 95 %   04/08/22 1613 (!) 168/118 96 16 96 %   04/08/22 1600 (!) 170/118 93 14 94 %   04/08/22 1545 (!) 173/112 92 16 96 %   04/08/22 1537 (!) 178/119 92 18 97 %   04/08/22 1536 (!) 177/115 89 10 96 %   04/08/22 1530 (!) 174/109 89 16 96 %   04/08/22 1515 (!) 163/104 94 17 96 %   04/08/22 1511 (!) 163/103 89 12 94 %   04/08/22 1500 (!) 167/112 90 16 95 %   04/08/22 1445 (!) 160/119 92 16 (!) 89 %   04/08/22 1440 (!) 161/107 93 20 99 %   04/08/22 1439 (!) 162/110 92 18 97 %   04/08/22 1437 (!) 164/112 89 21 97 %   04/08/22 1430 (!) 185/118 90 10 97 %   04/08/22 1416 (!) 191/114 91 19 96 %   04/08/22 1410 (!) 187/112 89 24 97 %   04/08/22 1401 (!) 184/109 89 11 97 %   04/08/22 1345 (!) 181/114 89 11 93 %   04/08/22 1343 (!) 181/113 89 16 97 %   04/08/22 1341 (!) 173/113 87 (!) 3 96 %       Level of consciousness:  Awake, alert to baseline    Respiratory: Respirations easy, no distress. Stable. Cardiovascular:  Uncontrolled blood pressure in recovery. Attempted to control with hydralazine and labetalol over extended period of time with no success. Consulted with Dr. Rosalva Steve who agreed with plan for patient admit for further blood pressure control. Spoke with hospitalist about plan and agrees to admit patient for blood pressure control. Care transferred to hospitalist.  Patient acceptable with plan. Questions and concerns addressed. Hydration: Adequate. PONV: Adequately managed. Post-op pain: Adequately controlled.     Post-op assessment: Tolerated anesthetic well without complication. Complications:  None.     Brii Alva MD  April 8, 2022   5:34 PM

## 2022-04-08 NOTE — BRIEF OP NOTE
Brief Postoperative Note      Patient: Triny Zamora  YOB: 1955  MRN: [id number]    Date of Procedure: 4/8/2022    Pre-Op Diagnosis: LEFT QUADRICEPS TENDON RETEAR    Post-Op Diagnosis: Same       Procedure(s):  LEFT REVISION QUADRICEPS TENDON REPAIR    Surgeon(s):  Donna Valladares MD    Assistant:  Surgical Assistant: Juventino Mitchell RN    Anesthesia: General    Estimated Blood Loss (mL): Minimal    Complications: None    Specimens:   * No specimens in log *    Implants:  Implant Name Type Inv.  Item Serial No.  Lot No. LRB No. Used Action   SYSTEM IMPL TEND 4.75MM SWIVELOCK BAN SUT LASSO W/ NIT WIRE - [id number]  SYSTEM IMPL TEND 4.75MM SWIVELOCK BAN SUT LASSO W/ NIT WIRE  [employer]- [id number] Left 1 Implanted   GRAFT BNE SUB 5CC DEMIN BNE MTRX PTTY OSTEOINDUCTIVE INJ - [id number]  GRAFT BNE SUB 5CC DEMIN BNE MTRX PTTY OSTEOINDUCTIVE INJ [id number] [facility]  Left 1 Implanted         Drains: * No LDAs found *        Electronically signed by Donna Valladares MD on 4/8/2022 at 12:30 PM

## 2022-04-08 NOTE — H&P
Hospital Medicine History & Physical      PCP: Leopoldo Grebe, MD    Date of Admission: 2022    Date of Service: Pt seen/examined on 22 and Admitted to Inpatient with expected LOS greater than two midnights due to medical therapy. Chief Complaint: Hypertensive emergency after surgery      History Of Present Illness: 21-year-old male with past medical history significant for scented today for elective surgery. Patient underwent left quadricep tendon repair successfully. After surgery, anesthesiology was unable to control elevated blood pressure. Reported systolic blood pressure of 188, reported diastolic blood pressure of 120. Upon my examination, patient is awake alert and oriented x3. No chest pain, no shortness of breath. His blood pressure at bedside was 206/105 with a mean arterial blood pressure of 129. Past Medical History:          Diagnosis Date    01 minute Apgar score 0     Hypertension     Kidney stone     Mitral valve prolapse     resolved on own    Sarcoidosis     Sleep apnea     Spinal stenosis        Past Surgical History:          Procedure Laterality Date    BACK SURGERY      inflamed nerve    COLONOSCOPY  2015    LEG MUSCLE SURGERY Bilateral 2022    BILATERAL QUADRICEPS  TENDON REPAIR performed by Selene Soares MD at [facility]       Medications Prior to Admission:      Prior to Admission medications    Medication Sig Start Date End Date Taking? Authorizing Provider   oxyCODONE-acetaminophen (PERCOCET) 5-325 MG per tablet Take 1 tablet by mouth every 4 hours as needed for Pain for up to 7 days.  4/8/22 4/15/22 Yes Selene Soares MD   promethazine (PHENERGAN) 25 MG tablet Take 1 tablet by mouth every 6 hours as needed for Nausea 22  Yes Selene Soares MD   ASPIRIN PO Take by mouth    Historical Provider, MD   metoprolol succinate (TOPROL XL) 25 MG extended release tablet Take 1 tablet by mouth daily 22   Garrison Ludwig MD lisinopril (PRINIVIL;ZESTRIL) 10 MG tablet Take 10 mg by mouth every evening     Historical Provider, MD       Allergies:  Bee venom    Social History:      The patient currently lives home    TOBACCO:   reports that he has never smoked. He has never used smokeless tobacco.  ETOH:   reports current alcohol use. E-Cigarettes/Vaping Use     Questions Responses    E-Cigarette/Vaping Use Never User    Start Date     Passive Exposure     Quit Date     Counseling Given     Comments             Family History:       Reviewed in detail and negative for DM, CAD, Cancer, CVA. Positive as follows:        Problem Relation Age of Onset    High Blood Pressure Mother     Stroke Father        REVIEW OF SYSTEMS COMPLETED:   Pertinent positives as noted in the HPI. All other systems reviewed and negative. PHYSICAL EXAM PERFORMED:    BP (!) 171/115   Pulse 94   Temp 97.7 °F (36.5 °C) (Temporal)   Resp 18   Ht 5' 10\" (1.778 m)   Wt 286 lb (129.7 kg)   SpO2 95%   BMI 41.04 kg/m²     General appearance:  No apparent distress, appears stated age and cooperative. HEENT:  Normal cephalic, atraumatic without obvious deformity. Pupils equal, round, and reactive to light. Extra ocular muscles intact. Conjunctivae/corneas clear. Neck: Supple, with full range of motion. No jugular venous distention. Trachea midline. Respiratory:  Normal respiratory effort. Clear to auscultation, bilaterally without Rales/Wheezes/Rhonchi. Cardiovascular:  Regular rate and rhythm with normal S1/S2 without murmurs, rubs or gallops. Abdomen: Soft, non-tender, non-distended with normal bowel sounds. Musculoskeletal:  No clubbing, cyanosis or edema bilaterally. Full range of motion without deformity. Left lower extremity covered with dressing and splint  Skin: Skin color, texture, turgor normal.  No rashes or lesions. Neurologic:  Neurovascularly intact without any focal sensory/motor deficits.  Cranial nerves: II-XII intact, grossly non-focal.  Psychiatric:  Alert and oriented, thought content appropriate, normal insight  Capillary Refill: Brisk,3 seconds, normal  Peripheral Pulses: +2 palpable, equal bilaterally       Labs:     No results for input(s): WBC, HGB, HCT, PLT in the last 72 hours. No results for input(s): NA, K, CL, CO2, BUN, CREATININE, CALCIUM, PHOS in the last 72 hours. Invalid input(s): MAGNES  No results for input(s): AST, ALT, BILIDIR, BILITOT, ALKPHOS in the last 72 hours. No results for input(s): INR in the last 72 hours. No results for input(s): Winford Lehigh in the last 72 hours. Urinalysis:      Lab Results   Component Value Date    NITRU Negative 01/05/2022    WBCUA 3-5 06/28/2017    BACTERIA 1+ 06/28/2017    RBCUA  06/28/2017    BLOODU Negative 01/05/2022    SPECGRAV 1.015 01/05/2022    GLUCOSEU Negative 01/05/2022       Radiology:         No orders to display       ASSESSMENT:    Active Hospital Problems    Diagnosis Date Noted    Hypertensive urgency [I16.0] 04/08/2022    Hypertensive emergency [I16.1] 04/08/2022     71-year-old male presenting with hypertensive emergency after left quadricep tendon repair    PLAN:    We will admit to the ICU  Cardene infusion, start at 10 mcg/h; goal should be reduction of map by 25%, which will equal to about a map of 100  2 home medications including metoprolol, start amlodipine  Check CBC and CMP, troponin  Check chest x-ray    DVT Prophylaxis: Lovenox  Diet: ADULT DIET; Regular; Low Sodium (2 gm)  Code Status: Full Code    PT/OT Eval Status: No need    Dispo -return home       Litzy Edwards MD    Thank you Savi Amaya MD for the opportunity to be involved in this patient's care. If you have any questions or concerns please feel free to contact me at [phone].

## 2022-04-08 NOTE — PROGRESS NOTES
Patient admitted from PACU for Cardene gtt. Alert and oriented, denies pain and SOB, on room air. LLE with ace wrap and brace in place. Reports he has had blurred vision for last couple of weeks, but thinks vision has been improving for the last couple of days. Current BP is 149/90, drip decreased from 10 to 7.5. Perfect serve sent to Dr. Annabel Rodriguez to verify scheduled dose of Toprol XL, as patient received a dose in PACU at 1500.

## 2022-04-08 NOTE — ANESTHESIA PRE PROCEDURE
Department of Anesthesiology  Preprocedure Note       Name:  Jairo Sauceda   Age:  75 y.o.  :  1955                                          MRN:  [id number]         Date:  2022      Surgeon: Azul Sheffield):  Wallace Love MD    Procedure: Procedure(s):  LEFT REVISION QUADRICEPS TENDON REPAIR    Medications prior to admission:   Prior to Admission medications    Medication Sig Start Date End Date Taking? Authorizing Provider   oxyCODONE-acetaminophen (PERCOCET) 5-325 MG per tablet Take 1 tablet by mouth every 4 hours as needed for Pain for up to 7 days.  4/8/22 4/15/22 Yes Wallace Love MD   promethazine (PHENERGAN) 25 MG tablet Take 1 tablet by mouth every 6 hours as needed for Nausea 22  Yes Wallace Love MD   ASPIRIN PO Take by mouth    Historical Provider, MD   metoprolol succinate (TOPROL XL) 25 MG extended release tablet Take 1 tablet by mouth daily 22   Suzanne Macdonald MD   lisinopril (PRINIVIL;ZESTRIL) 10 MG tablet Take 10 mg by mouth every evening     Historical Provider, MD       Current medications:    Current Facility-Administered Medications   Medication Dose Route Frequency Provider Last Rate Last Admin    0.9 % sodium chloride infusion   IntraVENous Continuous Ira Obando  mL/hr at 22 0938 New Bag at 22 0938    sodium chloride flush 0.9 % injection 10 mL  10 mL IntraVENous 2 times per day Ira Obando MD        sodium chloride flush 0.9 % injection 10 mL  10 mL IntraVENous PRN Ira Obando MD        0.9 % sodium chloride infusion  25 mL IntraVENous PRN Ira Obando MD        ceFAZolin (ANCEF) 3000 mg in dextrose 5 % 100 mL IVPB  3,000 mg IntraVENous Once Wallace Love MD        sodium chloride flush 0.9 % injection 5-40 mL  5-40 mL IntraVENous 2 times per day Mikaela Stone MD        sodium chloride flush 0.9 % injection 5-40 mL  5-40 mL IntraVENous PRN Mikaela Stone MD        0.9 % sodium chloride infusion   IntraVENous PRN Mikaela Stone MD  ondansetron (ZOFRAN) injection 4 mg  4 mg IntraVENous Once PRN Sagar Ibarra MD        HYDROmorphone (DILAUDID) injection 0.25 mg  0.25 mg IntraVENous Q5 Min PRN Sagar Ibarra MD        HYDROmorphone (DILAUDID) injection 0.5 mg  0.5 mg IntraVENous Q5 Min PRN Sagar Ibarra MD        oxyCODONE (ROXICODONE) immediate release tablet 5 mg  5 mg Oral Once PRN Sagar Ibarra MD        droperidol (INAPSINE) injection 0.625 mg  0.625 mg IntraVENous Once PRN Sagar Ibarra MD        meperidine (DEMEROL) injection 12.5 mg  12.5 mg IntraVENous Q5 Min PRN Sagar Ibarra MD           Allergies:     Allergies   Allergen Reactions    Bee Venom Swelling       Problem List:    Patient Active Problem List   Diagnosis Code    Quadriceps tendon rupture, left, initial encounter S76.112A    Quadriceps tendon rupture, right, initial encounter F61.328O    HTN (hypertension) I10    Rupture of distal quadriceps tendon S76.119A    Pre-diabetes R73.03       Past Medical History:        Diagnosis Date    01 minute Apgar score 0     Hypertension     Kidney stone     Mitral valve prolapse     resolved on own    Sarcoidosis     Sleep apnea     Spinal stenosis        Past Surgical History:        Procedure Laterality Date    BACK SURGERY      inflamed nerve    COLONOSCOPY  2015    LEG MUSCLE SURGERY Bilateral 2022    BILATERAL QUADRICEPS  TENDON REPAIR performed by Norbert Weiss MD at [place] [address] History:    Social History     Tobacco Use    Smoking status: Never Smoker    Smokeless tobacco: Never Used   Substance Use Topics    Alcohol use: Yes     Comment: 2 x week                                Counseling given: Not Answered      Vital Signs (Current):   Vitals:    22 1104 22 0933   BP:  133/88   Pulse:  56   Resp:  20   Temp:  96.8 °F (36 °C)   TempSrc:  Temporal   SpO2:  97%   Weight: 286 lb (129.7 kg)    Height: 5' 10\" (1.778 m)                                               BP Readings from Last 3 Encounters:   04/08/22 133/88   01/20/22 (!) 153/80   01/11/22 (!) 130/90       NPO Status: Time of last liquid consumption: 2100                        Time of last solid consumption: 2100                        Date of last liquid consumption: 04/07/22                        Date of last solid food consumption: 04/07/22    BMI:   Wt Readings from Last 3 Encounters:   04/07/22 286 lb (129.7 kg)   04/06/22 286 lb (129.7 kg)   03/30/22 286 lb (129.7 kg)     Body mass index is 41.04 kg/m². CBC:   Lab Results   Component Value Date    WBC 6.8 01/20/2022    RBC 4.00 01/20/2022    HGB 9.6 01/20/2022    HCT 30.3 01/20/2022    MCV 75.7 01/20/2022    RDW 13.9 01/20/2022     01/20/2022       CMP:   Lab Results   Component Value Date     01/20/2022    K 4.1 01/20/2022     01/20/2022    CO2 23 01/20/2022    BUN 16 01/20/2022    CREATININE 0.9 01/20/2022    GFRAA >60 01/20/2022    AGRATIO 0.9 01/12/2022    LABGLOM >60 01/20/2022    GLUCOSE 156 01/20/2022    PROT 6.5 01/12/2022    CALCIUM 8.5 01/20/2022    BILITOT 1.2 01/12/2022    ALKPHOS 69 01/12/2022    AST 61 01/12/2022    ALT 61 01/12/2022       POC Tests: No results for input(s): POCGLU, POCNA, POCK, POCCL, POCBUN, POCHEMO, POCHCT in the last 72 hours.     Coags:   Lab Results   Component Value Date    PROTIME 12.2 01/04/2022    INR 1.08 01/04/2022    APTT 22.5 01/04/2022       HCG (If Applicable): No results found for: PREGTESTUR, PREGSERUM, HCG, HCGQUANT     ABGs: No results found for: PHART, PO2ART, XCB2UYW, ZDH7IJX, BEART, E5XXMWYZ     Type & Screen (If Applicable):  No results found for: LABABO, LABRH    Drug/Infectious Status (If Applicable):  No results found for: HIV, HEPCAB    COVID-19 Screening (If Applicable):   Lab Results   Component Value Date    COVID19 Not Detected 01/11/2022           Anesthesia Evaluation  Patient summary reviewed and Nursing notes reviewed no history of anesthetic complications:   Airway: Mallampati: III  TM distance: >3 FB   Neck ROM: full  Mouth opening: > = 3 FB Dental:    (+) partials      Pulmonary: breath sounds clear to auscultation  (+) sleep apnea:      (-) pneumonia, COPD, asthma and shortness of breath                           Cardiovascular:  Exercise tolerance: good (>4 METS),   (+) hypertension (lisinopril):,     (-) CAD, CABG/stent and dysrhythmias      Rhythm: regular  Rate: normal           Beta Blocker:  Dose within 24 Hrs      ROS comment: TTE 1/17/2020:  Study Conclusions     - Left ventricle: The cavity size is normal. Wall thickness is     mildly increased. Normal ventricular mass. Systolic function was     normal. The estimated ejection fraction was in the range of 52%     to 72%. Wall motion was normal; there were no regional wall     motion abnormalities. Doppler parameters are consistent with     abnormal left ventricular relaxation (grade 1 diastolic     dysfunction). - Aortic valve: The peak systolic gradient is 6mm Hg.   - Mitral valve: The annulus is mildly calcified. - Inferior vena cava: The vessel was normal in size; the     respirophasic diameter changes were in the normal range (>= 50%);     findings are consistent with normal central venous pressure. Neuro/Psych:      (-) TIA and CVA           GI/Hepatic/Renal:   (+) morbid obesity     (-) GERD, liver disease and no renal disease       Endo/Other:    (+) blood dyscrasia: anemia:., .                 Abdominal:   (+) obese,           Vascular: negative vascular ROS.          Other Findings:           Anesthesia Plan      general     ASA 3     (GETA, PIV, multimodal analgesia, PACU     I discussed with the patient the risks and benefits to general anesthesia including possible anesthetic complications but not limited to: PONV, damage to the airway and surrounding structures (teeth, lips, gums, tongue, etc.), adverse reactions to medications, cardiac complications (MI, CHF, arrhythmias, etc.), respiratory complications (post-op ventilation, respiratory failure, etc.), neurologic complications (nerve damage, stroke, seizure), the potential for conversion to a general anesthetic, and death. The patient was given the opportunity to ask questions and all questions were answered to the patient's satisfaction.  )      MIPS: Postoperative opioids intended and Prophylactic antiemetics administered. This pre-anesthesia assessment may be used as a history and physical.    DOS STAFF ADDENDUM:    Pt seen and examined, chart reviewed (including anesthesia, drug and allergy history). No interval changes to history and physical examination. Anesthetic plan, risks, benefits, alternatives, and personnel involved discussed with patient. Patient verbalized an understanding and agrees to proceed.       Alejandro Mckeon MD  April 8, 2022  10:47 AM

## 2022-04-08 NOTE — H&P
History:  Giles Ramos here for left revision quadriceps tendon repair. Past Medical History:   Diagnosis Date    01 minute Apgar score 0     Hypertension     Kidney stone     Mitral valve prolapse     resolved on own    Sarcoidosis     Sleep apnea     Spinal stenosis      Past Surgical History:   Procedure Laterality Date    BACK SURGERY      inflamed nerve    COLONOSCOPY  2015    LEG MUSCLE SURGERY Bilateral 2022    BILATERAL QUADRICEPS  TENDON REPAIR performed by Selene Soares MD at [address] [place] [address]   Allergen Reactions    Bee Venom Swelling     No current facility-administered medications for this encounter.      Current Outpatient Medications   Medication Sig Dispense Refill    ASPIRIN PO Take by mouth      metoprolol succinate (TOPROL XL) 25 MG extended release tablet Take 1 tablet by mouth daily 30 tablet 3    lisinopril (PRINIVIL;ZESTRIL) 10 MG tablet Take 10 mg by mouth every evening          Family History   Problem Relation Age of Onset    High Blood Pressure Mother     Stroke Father      Social History     Socioeconomic History    Marital status:      Spouse name: Not on file    Number of children: Not on file    Years of education: Not on file    Highest education level: Not on file   Occupational History    Not on file   Tobacco Use    Smoking status: Never Smoker    Smokeless tobacco: Never Used   Vaping Use    Vaping Use: Never used   Substance and Sexual Activity    Alcohol use: Yes     Comment: 2 x week    Drug use: Never    Sexual activity: Not on file   Other Topics Concern    Not on file   Social History Narrative    Not on file     Social Determinants of Health     Financial Resource Strain:     Difficulty of Paying Living Expenses: Not on file   Food Insecurity:     Worried About Running Out of Food in the Last Year: Not on file    Dwight of Food in the Last Year: Not on file   Transportation Needs:     Lack of Transportation (Medical): Not on file    Lack of Transportation (Non-Medical): Not on file   Physical Activity:     Days of Exercise per Week: Not on file    Minutes of Exercise per Session: Not on file   Stress:     Feeling of Stress : Not on file   Social Connections:     Frequency of Communication with Friends and Family: Not on file    Frequency of Social Gatherings with Friends and Family: Not on file    Attends Sabianism Services: Not on file    Active Member of [address] [place] [address] or Organizations: Not on file    Attends Club or Organization Meetings: Not on file    Marital Status: Not on file   Intimate Partner Violence:     Fear of Current or Ex-Partner: Not on file    Emotionally Abused: Not on file    Physically Abused: Not on file    Sexually Abused: Not on file   Housing Stability:     Unable to Pay for Housing in the Last Year: Not on file    Number of [place] in the Last Year: Not on file    Unstable Housing in the Last Year: Not on file     Review of Systems:  General: negative  Psychological: negative  Ophthalmic: negative  ENT: negative  Hematological and Lymphatic: negative  Endocrine: negative  Respiratory: negative  Cardiovascular: negative  Gastrointestinal: negative  Genito-Urinary: negative  Musculoskeletal: negative. See HPI for pertinent positives. Neurological: negative  Dermatological: negative       Physical Exam:  Ht 5' 10\" (1.778 m)   Wt 286 lb (129.7 kg)   BMI 41.04 kg/m²   Airway is intact  Chest: breathing comfortably  Heart: regular rate  Findings on exam of the body region where surgery is to be performed include: see last office and/or consult note       Left knee MRI ProScan 4/5/2022:   1. Status post distal quadriceps tendon repair, with complete, full-thickness re-tear of the    tendon, retracted approximately 5.6 cm craniocaudal length.  Accompanying thickening and    delamination of the prepatellar plate, extending into the proximal patellar tendon.    2. Mild swelling within the distal vastus medialis and vastus lateralis muscle fibers and    myotendinous junction.    3. Thickening and scarring of the distal MPFL insertion, likely secondary to prior surgery.     Adjacent mild swelling of the superficial MCL and overlying soft tissue.             Assessment:   Left quadriceps tendon re-tear        Plan:   To OR for left revision quadriceps tendon repair        Electronically signed by Benigno Hebert MD on 4/8/2022 at 10:13 AM

## 2022-04-09 VITALS
OXYGEN SATURATION: 100 % | BODY MASS INDEX: 40.4 KG/M2 | HEIGHT: 70 IN | SYSTOLIC BLOOD PRESSURE: 113 MMHG | RESPIRATION RATE: 18 BRPM | HEART RATE: 85 BPM | WEIGHT: 282.19 LBS | TEMPERATURE: 98.7 F | DIASTOLIC BLOOD PRESSURE: 74 MMHG

## 2022-04-09 LAB
ALBUMIN SERPL-MCNC: 3.7 G/DL (ref 3.4–5)
ALP BLD-CCNC: 64 U/L (ref 40–129)
ALT SERPL-CCNC: 13 U/L (ref 10–40)
ANION GAP SERPL CALCULATED.3IONS-SCNC: 10 MMOL/L (ref 3–16)
AST SERPL-CCNC: 18 U/L (ref 15–37)
BILIRUB SERPL-MCNC: 0.6 MG/DL (ref 0–1)
BILIRUBIN DIRECT: <0.2 MG/DL (ref 0–0.3)
BILIRUBIN, INDIRECT: ABNORMAL MG/DL (ref 0–1)
BUN BLDV-MCNC: 12 MG/DL (ref 7–20)
CALCIUM SERPL-MCNC: 8.8 MG/DL (ref 8.3–10.6)
CHLORIDE BLD-SCNC: 104 MMOL/L (ref 99–110)
CO2: 24 MMOL/L (ref 21–32)
CREAT SERPL-MCNC: 0.9 MG/DL (ref 0.8–1.3)
GFR AFRICAN AMERICAN: >60
GFR NON-AFRICAN AMERICAN: >60
GLUCOSE BLD-MCNC: 131 MG/DL (ref 70–99)
POTASSIUM REFLEX MAGNESIUM: 4 MMOL/L (ref 3.5–5.1)
SODIUM BLD-SCNC: 138 MMOL/L (ref 136–145)
TOTAL PROTEIN: 6.3 G/DL (ref 6.4–8.2)

## 2022-04-09 PROCEDURE — 6360000002 HC RX W HCPCS: Performed by: INTERNAL MEDICINE

## 2022-04-09 PROCEDURE — G0378 HOSPITAL OBSERVATION PER HR: HCPCS

## 2022-04-09 PROCEDURE — 94660 CPAP INITIATION&MGMT: CPT

## 2022-04-09 PROCEDURE — 2580000003 HC RX 258: Performed by: INTERNAL MEDICINE

## 2022-04-09 PROCEDURE — 36415 COLL VENOUS BLD VENIPUNCTURE: CPT

## 2022-04-09 PROCEDURE — 6370000000 HC RX 637 (ALT 250 FOR IP): Performed by: INTERNAL MEDICINE

## 2022-04-09 PROCEDURE — 80048 BASIC METABOLIC PNL TOTAL CA: CPT

## 2022-04-09 PROCEDURE — 80076 HEPATIC FUNCTION PANEL: CPT

## 2022-04-09 RX ORDER — METOPROLOL SUCCINATE 25 MG/1
25 TABLET, EXTENDED RELEASE ORAL DAILY
Qty: 30 TABLET | Refills: 3 | Status: SHIPPED | OUTPATIENT
Start: 2022-04-10

## 2022-04-09 RX ORDER — OXYCODONE HYDROCHLORIDE AND ACETAMINOPHEN 5; 325 MG/1; MG/1
1 TABLET ORAL EVERY 4 HOURS PRN
Qty: 40 TABLET | Refills: 0 | Status: SHIPPED | OUTPATIENT
Start: 2022-04-09 | End: 2022-04-16

## 2022-04-09 RX ORDER — AMLODIPINE BESYLATE 10 MG/1
10 TABLET ORAL NIGHTLY
Qty: 30 TABLET | Refills: 3 | Status: SHIPPED | OUTPATIENT
Start: 2022-04-09

## 2022-04-09 RX ADMIN — SODIUM CHLORIDE, PRESERVATIVE FREE 10 ML: 5 INJECTION INTRAVENOUS at 09:35

## 2022-04-09 RX ADMIN — ENOXAPARIN SODIUM 40 MG: 40 INJECTION SUBCUTANEOUS at 09:34

## 2022-04-09 RX ADMIN — METOPROLOL SUCCINATE 25 MG: 25 TABLET, EXTENDED RELEASE ORAL at 09:34

## 2022-04-09 ASSESSMENT — PAIN SCALES - GENERAL
PAINLEVEL_OUTOF10: 0

## 2022-04-09 NOTE — CARE COORDINATION
CASE MANAGEMENT DISCHARGE SUMMARY:    DISCHARGE DATE: 4/9/2022    DISCHARGED TO HOME     TRANSPORTATION: will schedule his own Fredy Tracy RN, BSN, Case Management  [phone]  Electronically signed by Marissa Jack RN on 4/9/2022 at 12:04 PM

## 2022-04-09 NOTE — PROGRESS NOTES
[facility]  Progress Note        POD # 1, s/p quad tendon revision repair left knee. Pt comfortable, no c/o. Drsg left knee D/C/I,  No  pain with left ankle/toes ROM, NVI    CBC:   Lab Results   Component Value Date    WBC 7.9 04/08/2022    RBC 6.17 04/08/2022    HGB 14.7 04/08/2022    HCT 46.1 04/08/2022    MCV 74.7 04/08/2022    MCH 23.8 04/08/2022    MCHC 31.8 04/08/2022    RDW 14.0 04/08/2022     04/08/2022    MPV 8.3 04/08/2022     PT/INR:    Lab Results   Component Value Date    PROTIME 12.2 01/04/2022    INR 1.08 01/04/2022     PTT:    Lab Results   Component Value Date    APTT 22.5 01/04/2022   [APTT    A/P: s/p left revision quadriceps tendon repair  - Stable, BP well controlled  - PT/OT, WBAT with walker in locked T-rom brace in extension  - Ok to D/C to [facility] from ortho standpoint.  - F/U Dr Royal De Jesus in 2 weeks. Lynn Ruff MD 4/9/2022 1:27 PM       Sincerely,    Lynn Ruff  [address] [facility] [address] [place] [id number]  Email: [email or web address]  Office: [phone]    04/09/22  1:27 PM

## 2022-04-09 NOTE — CARE COORDINATION
INITIAL CASE MANAGEMENT ASSESSMENT    Spoke with patient to assess possible discharge needs. Explained Case Management role/services. Living Situation: confirmed address, lives in a 2 story home with his wife with no steps to enter    ADLs: independent     DME: cane and 2 walkers to use as needed, also has a reacher at home    PT/OT Recs: N/A, pt reports the physician does not want him to do therapy at this time     Active Services: none     Transportation: wife usually transports but she does not know the [facility] well so he will likely set up his own [employer] to transport himself home     Medications: confirmed Silvestre and uses [facility] in [place]    PCP: confirmed Refsamina Young MD      HD/PD: N/A    PLAN/COMMENTS: Plan is to return home today. Denies needs. He will reach out if he is not able to establish an [employer] ride home. Provided contact information for patient or family to call with any questions. Will follow and assist as needed.     Crys Dykes RN, BSN, Case Management  [phone]  Electronically signed by Crys Dykes RN on 4/9/2022 at 11:30 AM

## 2022-04-09 NOTE — DISCHARGE INSTR - COC
Continuity of Care Form    Patient Name: Luc Dorado   :  1955  MRN:  [id number]    [facility] date:  2022  Discharge date:  ***    Code Status Order: Full Code   Advance Directives:      Admitting Physician:  Jonelle Bhatt MD  PCP: Farhat Gusman MD    Discharging Nurse: [facility] Unit/Room#: E9G-4015/2113-01  Discharging Unit Phone Number: ***    Emergency Contact:   Extended Emergency Contact Information  Primary Emergency Contact: [facility]  Address: [place] [address]  Home Phone: [phone]  Work Phone: [phone]  Relation: Spouse    Past Surgical History:  Past Surgical History:   Procedure Laterality Date    BACK SURGERY      inflamed nerve    COLONOSCOPY  2015    LEG MUSCLE SURGERY Bilateral 2022    BILATERAL QUADRICEPS  TENDON REPAIR performed by Shelbi Miranda MD at [place] [address]       Immunization History:   Immunization History   Administered Date(s) Administered    COVID-19, [employer] top, DO NOT Dilute, Nate-Sucrose, 12+ yrs, PF, 30 mcg/0.3 mL dose 2021, 2021    COVID-19, Pfizer Purple top, DILUTE for use, 12+ yrs, 30mcg/0.3mL dose 10/22/2021       Active Problems:  Patient Active Problem List   Diagnosis Code    Quadriceps tendon rupture, left, initial encounter S76.112A    Quadriceps tendon rupture, right, initial encounter S76.111A    HTN (hypertension) I10    Rupture of distal quadriceps tendon S76.119A    Pre-diabetes R73.03    Hypertensive urgency I16.0    Hypertensive emergency I16.1       Isolation/Infection:   Isolation            No Isolation          Patient Infection Status       None to display            Nurse Assessment:  Last Vital Signs: /74   Pulse 85   Temp 98.7 °F (37.1 °C) (Oral)   Resp 18   Ht 5' 10\" (1.778 m)   Wt 282 lb 3 oz (128 kg)   SpO2 100%   BMI 40.49 kg/m²     Last documented pain score (0-10 scale): Pain Level: 0  Last Weight:   Wt Readings from Last 1 Encounters:   22 282 lb 3 oz (128 kg)     Mental Status:  {IP PT MENTAL STATUS:}    IV Access:  { RAVI IV ACCESS:338622137}    Nursing Mobility/ADLs:  Walking   {CHP DME FLTM:260511062}  Transfer  {CHP DME KIQJ:893600984}  Bathing  {CHP DME FHNP:759819840}  Dressing  {CHP DME EUKN:992920563}  Toileting  {CHP DME RIZ}  Feeding  {CHP DME CQHQ:863676204}  Med Admin  {CHP DME ECJM:276393752}  Med Delivery   { RAVI MED Delivery:816427383}    Wound Care Documentation and Therapy:        Elimination:  Continence:    Bowel: {YES / IV:04037}  Bladder: {YES / XX:53259}  Urinary Catheter: {Urinary Catheter:258776926}   Colostomy/Ileostomy/Ileal Conduit: {YES / FT:58124}       Date of Last BM: ***    Intake/Output Summary (Last 24 hours) at 2022 1121  Last data filed at 2022 0000  Gross per 24 hour   Intake --   Output 1390 ml   Net -1390 ml     I/O last 3 completed shifts:  In: -   Out: 2564 [HYJCA:9332; Blood:15]    Safety Concerns:     [employer] Safety Concerns:645942895}    Impairments/Disabilities:      [employer] Impairments/Disabilities:938285700}    Nutrition Therapy:  Current Nutrition Therapy:   [employer] Diet List:013759475}    Routes of Feeding: {CHP DME Other Feedings:056113000}  Liquids: {Slp liquid thickness:95845}  Daily Fluid Restriction: {CHP DME Yes amt example:157319602}  Last Modified Barium Swallow with Video (Video Swallowing Test): {Done Not Done QYJX:939810253}    Treatments at the Time of Hospital Discharge:   Respiratory Treatments: ***  Oxygen Therapy:  {Therapy; copd oxygen:34667}  Ventilator:    { CC Vent WRCC:712961744}    Rehab Therapies: {THERAPEUTIC INTERVENTION:9206152406}  Weight Bearing Status/Restrictions: [address]  Weight Bearin}  Other Medical Equipment (for information only, NOT a DME order):  {EQUIPMENT:407407187}  Other Treatments: ***    Patient's personal belongings (please select all that are sent with patient):  {CHP DME Belongings:546669386}    RN SIGNATURE:  {Esignature:272616525}    CASE MANAGEMENT/SOCIAL WORK SECTION    Inpatient Status Date: ***    Readmission Risk Assessment Score:  Readmission Risk              Risk of Unplanned Readmission:  9           Discharging to Facility/ Agency   Name:   Address:  Phone:  Fax:    Dialysis Facility (if applicable)   Name:  Address:  Dialysis Schedule:  Phone:  Fax:    / signature: {Esignature:878658645}    PHYSICIAN SECTION    Prognosis: Good    Condition at Discharge: Stable    Rehab Potential (if transferring to Rehab): Good    Recommended Labs or Other Treatments After Discharge: Follow-up with PCP within 7 days from discharge, not doing so could have life-threatening consequences. Take medication as instructed;  return to the emergency department if persistent symptoms, experiencing side effects from medication including nausea vomiting or unable to keep medications down.        PHYSICIAN SIGNATURE:  Electronically signed by Marciano Carver MD on 4/9/22 at 11:22 AM EDT

## 2022-04-09 NOTE — ACP (ADVANCE CARE PLANNING)
Advance Care Planning     Advance Care Planning Activator (Inpatient)  Conversation Note      Date of ACP Conversation: 4/9/2022     Conversation Conducted with: Patient with Decision Making Capacity    ACP Activator: Liz Lujan RN      Health Care Decision Maker:     Current Designated Health Care Decision Maker:     Primary Decision Maker: [place] - [phone]  Click here to complete [address] including section of the Healthcare Decision Maker Relationship (ie \"Primary\")  Today we documented Decision Maker(s) consistent with Legal Next of Kin hierarchy. Care Preferences    Ventilation: \"If you were in your present state of health and suddenly became very ill and were unable to breathe on your own, what would your preference be about the use of a ventilator (breathing machine) if it were available to you? \"      Would the patient desire the use of ventilator (breathing machine)?: yes    \"If your health worsens and it becomes clear that your chance of recovery is unlikely, what would your preference be about the use of a ventilator (breathing machine) if it were available to you? \"     Would the patient desire the use of ventilator (breathing machine)?: No      Resuscitation  \"CPR works best to restart the heart when there is a sudden event, like a heart attack, in someone who is otherwise healthy. Unfortunately, CPR does not typically restart the heart for people who have serious health conditions or who are very sick. \"    \"In the event your heart stopped as a result of an underlying serious health condition, would you want attempts to be made to restart your heart (answer \"yes\" for attempt to resuscitate) or would you prefer a natural death (answer \"no\" for do not attempt to resuscitate)? \" yes       [] Yes   [x] No   Educated Patient / Sharita Mittal regarding differences between Advance Directives and portable DNR orders.     Length of ACP Conversation in minutes: 5    Conversation Outcomes:  [x] ACP discussion completed  [] Existing advance directive reviewed with patient; no changes to patient's previously recorded wishes  [] New Advance Directive completed  [] Portable Do Not Rescitate prepared for Provider review and signature  [] POLST/POST/MOLST/MOST prepared for Provider review and signature      Follow-up plan:    [] Schedule follow-up conversation to continue planning  [] Referred individual to Provider for additional questions/concerns   [] Advised patient/agent/surrogate to review completed ACP document and update if needed with changes in condition, patient preferences or care setting    [x] This note routed to one or more involved healthcare providers               Abby Phipps RN, BSN, Case Management  Phone: [phone]  Electronically signed by Abby Phipps RN on 4/9/2022 at 11:35 AM

## 2022-04-09 NOTE — DISCHARGE SUMMARY
[address]  Discharge Summary    Date:  2022    Name:  Sreedhar Flores  Address:  [address] [address]    :  1955      Age:   75 y.o. Medical Record Number:  [id number]  Admit Date: 2022  Date of Discharge:     PROCEDURE:   (Left) Knee Revision Quadriceps tendon repair     REASON FOR ADMISSION:    Post operative hypertensive urgency  Postoperative pain control, vital sign monitoring, physical therapy, and IV fluid resuscitation. Current Medications:     metoprolol succinate  25 mg Oral Daily    sodium chloride flush  5-40 mL IntraVENous 2 times per day    enoxaparin  40 mg SubCUTAneous Daily    amLODIPine  10 mg Oral Nightly       Review of Systems:  Unchanged from preoperative H&P     Past Medical History:  Past Medical History:   Diagnosis Date    01 minute Apgar score 0     Hypertension     Kidney stone     Mitral valve prolapse     resolved on own    Sarcoidosis     Sleep apnea     Spinal stenosis        Past Surgical History:  Past Surgical History:   Procedure Laterality Date    BACK SURGERY      inflamed nerve    COLONOSCOPY  2015    LEG MUSCLE SURGERY Bilateral 2022    BILATERAL QUADRICEPS  TENDON REPAIR performed by Gala Way [facility]       Medications:  No current facility-administered medications on file prior to encounter. Current Outpatient Medications on File Prior to Encounter   Medication Sig Dispense Refill    ASPIRIN PO Take by mouth         Allergies:   Allergies   Allergen Reactions    Bee Venom Swelling       Social History:  Social History     Socioeconomic History    Marital status:      Spouse name: Not on file    Number of children: Not on file    Years of education: Not on file    Highest education level: Not on file   Occupational History    Not on file   Tobacco Use    Smoking status: Never Smoker    Smokeless tobacco: Never Used   Vaping Use  Vaping Use: Never used   Substance and Sexual Activity    Alcohol use: Yes     Comment: 2 x week    Drug use: Never    Sexual activity: Not on file   Other Topics Concern    Not on file   Social History Narrative    Not on file     Social Determinants of Health     Financial Resource Strain:     Difficulty of Paying Living Expenses: Not on file   Food Insecurity:     Worried About Running Out of Food in the Last Year: Not on file    Dwight of Food in the Last Year: Not on file   Transportation Needs:     Lack of Transportation (Medical): Not on file    Lack of Transportation (Non-Medical): Not on file   Physical Activity:     Days of Exercise per Week: Not on file    Minutes of Exercise per Session: Not on file   Stress:     Feeling of Stress : Not on file   Social Connections:     Frequency of Communication with Friends and Family: Not on file    Frequency of Social Gatherings with Friends and Family: Not on file    Attends [employer] Services: Not on file    Active Member of [address] [place] [address] or Organizations: Not on file    Attends Club or Organization Meetings: Not on file    Marital Status: Not on file   Intimate Partner Violence:     Fear of Current or Ex-Partner: Not on file    Emotionally Abused: Not on file    Physically Abused: Not on file    Sexually Abused: Not on file   Housing Stability:     Unable to Pay for Housing in the Last Year: Not on file    Number of [place] in the Last Year: Not on file    Unstable Housing in the Last Year: Not on file       Family History:  Family History   Problem Relation Age of Onset    High Blood Pressure Mother     Stroke Father        Vitals:    04/09/22 0930   BP: 113/74   Pulse: 85   Resp: 18   Temp: 98.7 °F (37.1 °C)   SpO2: 100%     General: No acute distress. Alert and oriented. Neuro: alert. oriented  Eyes: Extra-ocular muscles intact  Mouth: Oral mucosa moist. No perioral lesions  Pulm: Respirations unlabored and regular.   Heart: Regular rate and rhythm   Skin: warm, well perfused  MS:  Extremity pain, swelling, and limited ROM. DISCHARGE DIAGNOSES:  Problem List Items Addressed This Visit        Orthopedic Problems    Quadriceps tendon rupture, left, initial encounter - Primary    Relevant Medications    oxyCODONE-acetaminophen (PERCOCET) 5-325 MG per tablet          HOSPITAL COURSE:  Mo Thomas is a 75 y.o. patient who was admitted to the hospital on the day of surgery. Surgery went as planned. After surgery, the patient was admitted to the ICU unit for hypertensive urgency. This was then well controlled Postoperative course was uneventful. The patient tolerated a regular diet and had good pain control on oral pain medication. Discharged occurred on  and they were discharged home in stable condition. DISCHARGE INSTRUCTIONS:  --   Weightbearing as tolerated in full extension in knee immobilizer at all times  --  May take shower after the 14th post operative day after R/A by Dr Corrinne Harman in office. --  If you have a glued on adhesive dressing do NOT remove this. This will fall off on its own in 2-3 weeks. --  Resume pre-op diet. --  Take the medicines prescribed for pain. --  Home dose asa for DVT prophylaxis. --  Resume home medications per PCP. --  Follow up with orthopaedic surgeon as scheduled   --  Follow up with primary care physician within 6-8 weeks. --  For any questions or concerns, call call center at [phone] office or go to the nearest emergency department. Sincerely,    Yeimi Herrera MD [facility]   [place] [address] [place] [address]  Email: [email or web address]  Office: [phone]    04/09/22  1:26 PM        Date:  4/9/2022

## 2022-04-09 NOTE — FLOWSHEET NOTE
Rounded with Dr Ramila Dickerson, plan of care reviewed. MD will place discharge orders home as long as ok with surgeon. Call placed to Dr Lydia Sherman, awaiting call back. Pt informed.

## 2022-04-09 NOTE — PROGRESS NOTES
Pt BP stable, Cardene gtt now off. Pt has remained neurologically WDL since admission. This RN discussed pt with MD Tomi Dash, order to change Neuro checks to Q4 hr until 0700 to promote rest, then switch to unit policy. Pt resting comfortable at this time.

## 2022-04-09 NOTE — FLOWSHEET NOTE
Morning assessment completed, VSS. Pt still off cardene drip, BP stable. Pt ordered breakfast, denies any needs. Call light in reach will continue to monitor.

## 2022-04-09 NOTE — OP NOTE
[address] [place] [address] Javon Tripp [address]                                OPERATIVE REPORT    PATIENT NAME: Gayla Tracy                    :        1955  MED REC NO:   [id number]                          ROOM:       2113  ACCOUNT NO:   [de-identified]                           ADMIT DATE: 2022  PROVIDER:     Ghulam Aranda MD      DATE OF PROCEDURE:  2022    PREOPERATIVE DIAGNOSIS:  Left quadriceps tendon re-tear. POSTOPERATIVE DIAGNOSIS:  Left quadriceps tendon re-tear. OPERATION PERFORMED:  Left quadriceps tendon repair. SURGEON:  Ghulam Aranda MD    ASSISTANT:  Xena Ma. ANESTHESIA:  General.    EBL:  Minimal.    COMPLICATIONS:  None. DISPOSITION:  To PACU in good condition. INDICATIONS:  The patient is a 57-year-old gentleman who underwent  bilateral quadriceps tendon repair on 2022. He had done very well  with his right leg but had been noticed to have a _____ with extensor  lag on his left leg. There was questionable history of injury or fall  after his index surgery, maybe when he was sitting down without his knee  immobilizer on with his knee flexed or one time when he fell on the  ground, however, at that time he was in the knee immobilizer. I could  palpate a palpable defect that was in the superior pole of his patella  and an MRI was obtained, which demonstrated a full-thickness re-tear of  the quadriceps tendon with 5-cm of cranial retraction. Given the  re-tear, we did recommend revision of quadriceps tendon repair. We  discussed the risks, benefits, complications and alternatives of the  procedure. He subsequently provided written informed consent for the  procedure. OPERATIVE PROCEDURE:  The patient was seen in the preoperative holding  area. His left leg was marked. He was seen by anesthesia service. He  was then brought to the operating room.   He was transferred onto the  operating room table in supine position. He was induced under general  anesthesia. He received prophylactic preoperative IV antibiotics. He  had DVT prophylaxis with sequential compression devices on his  nonoperative lower extremity. A well-padded tourniquet was placed on  his left proximal thigh. His left leg was then sterilely prepped and  draped in the usual fashion. A time-out was taken where the patient,  the operative extremity, and the operative procedure were once again  verified. We then exsanguinated his leg with Esmarch bandage. Tourniquet was  inflated to 300 mmHg. We then made a midline incision through his prior  incision. Sharp dissection was carried down through the skin and  subcutaneous tissue. We identified the patella and then went proximally  and we could visualize the observable gap and the hole above his  patella. We dissected distally to the patellar tendon and we could  visualize our sutures still where we tied our knots distally. Proximally, on the medial and lateral side, I could actually see some  suture going into the patella from our most medial and lateral holes. It did appear that it was almost like a U-shaped tear with retractions  in the central portion. I could see some sutures within the retracted  tendon. There was extensive scar tissue formation anterior to the  tendon and I meticulously freed this both medial and laterally until I  could visualize both the vastus medialis and vastus lateralis from the  subcutaneous aspect. We then placed a FiberTape suture into the tendon to act as a traction  stitch with the knee extended. I could bring the tendon back down to  the superior pole of the patella. I then debrided the superior pole of  the patella to get a good bony healing surface using a rongeur. I then  decided to repair this using two suture anchors.   We placed [employer]rex  FiberTape in Krackow whipstitch type fashion up and down the medial and  lateral aspects of the tendon. We then drilled two parallel holes in  the superior pole of the patella. We then tapped these holes. We then  loaded our FiberTape into the [employer]rex SwiveLock anchor, which was then  placed in the patella. Tension was placed on our suture and then the  SwiveLock was fully inserted. This was done similarly for the other  anchor too. I then used the FiberTape and passed this again through near the distal  aspect of the tendon and tied knot anteriorly. This was done similarly  for the second anchor. We also used stay stitches to further tie knots. I then tried to pass more core sutures to get more stabilization both  medially and laterally. I also used one central suture and ran this  down anteriorly and through the superior aspect of the patella tendon  right at the inferior pole of the patella and tied this anteriorly along  the patella. I also reinforced the superior pole of the patella at the  tendon insertion site with some DBX bone matrix. We did copiously  irrigate the wound prior to putting the DBX in. We then closed subcutaneously with 2-0 Vicryl suture in simple inverted  interrupted fashion. The skin was then closed with staples. We then  injected 0.5% Marcaine for local anesthesia, Xeroform gauze was then  placed as well as 4x4s and Tegaderm and then sterile Webril, Ace wrap  and then his hinged knee brace locked at full extension. The patient was then awoken from anesthesia, transferred onto his  hospital cart, and transported to the PACU for recovery. He tolerated  the procedure well and without any complications. PLAN:  The patient will be recovered in the PACU and then be discharged  home. He will be weightbearing as tolerated with the brace locked at  full extension. We will keep him locked in the knee brace at full  extension for the next six weeks. He was given a prescription for  Percocet as well as Phenergan for nausea and vomiting. He will follow  up in the office in two weeks.         Ean Garay MD    D: 04/08/2022 12:38:33       T: 04/08/2022 13:24:32     [email or web address]  Job#: [id number]     Doc#: [id number]    CC:

## 2022-04-09 NOTE — DISCHARGE SUMMARY
Hospital Medicine Discharge Summary    Patient ID: Katrina Jacque      Patient's PCP: Tonya Higgins MD    Admit Date: 4/8/2022     Discharge Date:   04/09/22      Admitting Provider: Mercy Torre MD     Discharge Provider: Mercy Torre MD     Discharge Diagnoses: Active Hospital Problems    Diagnosis     Hypertensive urgency [I16.0]     Hypertensive emergency [I16.1]        The patient was seen and examined on day of discharge and this discharge summary is in conjunction with any daily progress note from day of discharge. Hospital Course: 50-year-old male with past medical history significant for scented today for elective surgery. Patient underwent left quadricep tendon repair successfully. After surgery, anesthesiology was unable to control elevated blood pressure. Reported systolic blood pressure of 188, reported diastolic blood pressure of 120. Patient was admitted to the ICU, requiring Cardene drip for blood pressure control. Eventually, Cardene drip was discontinued after reaching goal mean arterial pressure, p.o. medications were restarted. Changes to his blood pressure medications were made. Discussed with patient. Discharge home later today          Physical Exam Performed:     /74   Pulse 85   Temp 98.7 °F (37.1 °C) (Oral)   Resp 18   Ht 5' 10\" (1.778 m)   Wt 282 lb 3 oz (128 kg)   SpO2 100%   BMI 40.49 kg/m²       General appearance:  No apparent distress, appears stated age and cooperative. HEENT:  Normal cephalic, atraumatic without obvious deformity. Pupils equal, round, and reactive to light. Extra ocular muscles intact. Conjunctivae/corneas clear. Neck: Supple, with full range of motion. No jugular venous distention. Trachea midline. Respiratory:  Normal respiratory effort. Clear to auscultation, bilaterally without Rales/Wheezes/Rhonchi.   Cardiovascular:  Regular rate and rhythm with normal S1/S2 without murmurs, rubs or gallops. Abdomen: Soft, non-tender, non-distended with normal bowel sounds. Musculoskeletal:  No clubbing, cyanosis or edema bilaterally. Full range of motion without deformity. Skin: Skin color, texture, turgor normal.  No rashes or lesions. Neurologic:  Neurovascularly intact without any focal sensory/motor deficits. Cranial nerves: II-XII intact, grossly non-focal.  Psychiatric:  Alert and oriented, thought content appropriate, normal insight  Capillary Refill: Brisk,< 3 seconds   Peripheral Pulses: +2 palpable, equal bilaterally       Labs: For convenience and continuity at follow-up the following most recent labs are provided:      CBC:    Lab Results   Component Value Date    WBC 7.9 04/08/2022    HGB 14.7 04/08/2022    HCT 46.1 04/08/2022     04/08/2022       Renal:    Lab Results   Component Value Date     04/09/2022    K 4.0 04/09/2022     04/09/2022    CO2 24 04/09/2022    BUN 12 04/09/2022    CREATININE 0.9 04/09/2022    CALCIUM 8.8 04/09/2022    PHOS 2.9 01/11/2022         Significant Diagnostic Studies    Radiology:   XR CHEST PORTABLE   Final Result   No acute process. Consults:     None    Disposition:  home     Condition at Discharge: Stable    Discharge Instructions/Follow-up:  Follow-up with PCP within 7 days from discharge, not doing so could have life-threatening consequences. Take medication as instructed;  return to the emergency department if persistent symptoms, experiencing side effects from medication including nausea vomiting or unable to keep medications down.        Code Status:  Full Code     Activity: activity as tolerated    Diet: cardiac diet      Discharge Medications:     Current Discharge Medication List           Details   amLODIPine (NORVASC) 10 MG tablet Take 1 tablet by mouth nightly  Qty: 30 tablet, Refills: 3      oxyCODONE-acetaminophen (PERCOCET) 5-325 MG per tablet Take 1 tablet by mouth every 4 hours as needed for Pain for up to 7 days.  Jero Favor: 40 tablet, Refills: 0    Comments: Reduce doses taken as pain becomes manageable  Associated Diagnoses: Quadriceps tendon rupture, left, initial encounter      promethazine (PHENERGAN) 25 MG tablet Take 1 tablet by mouth every 6 hours as needed for Nausea  Qty: 5 tablet, Refills: 0              Details   metoprolol succinate (TOPROL XL) 25 MG extended release tablet Take 1 tablet by mouth daily  Qty: 30 tablet, Refills: 3              Details   ASPIRIN PO Take by mouth             Time Spent on discharge is more than 30 minutes in the examination, evaluation, counseling and review of medications and discharge plan. Signed:    Westley Cui MD   4/9/2022      Thank you Crista Garcia MD for the opportunity to be involved in this patient's care. If you have any questions or concerns please feel free to contact me at [phone].

## 2022-04-09 NOTE — FLOWSHEET NOTE
PIV dc'd, cath tip intact. Monitors removed. Pt assisted with getting dressed. Pt discharged with belongings, script and discharge paperwork, verbalized understanding.

## 2022-04-09 NOTE — PLAN OF CARE
Problem: Cardiac:  Goal: Hemodynamic stability will improve  Description: Hemodynamic stability will improve  Outcome: Ongoing  Goal: Complications related to the disease process, condition or treatment will be avoided or minimized  Description: Complications related to the disease process, condition or treatment will be avoided or minimized  Outcome: Ongoing  Goal: Cerebral tissue perfusion will improve  Description: Cerebral tissue perfusion will improve  Outcome: Ongoing     Problem: Mobility - Impaired:  Goal: Mobility will improve  Description: Mobility will improve  Outcome: Ongoing     Problem: Infection - Surgical Site:  Goal: Will show no infection signs and symptoms  Description: Will show no infection signs and symptoms  Outcome: Ongoing     Problem: Injury - Risk of, Postfracture Complications:  Goal: Absence of fat embolism  Description: Absence of fat embolism  Outcome: Ongoing  Goal: Absence of compartment syndrome signs and symptoms  Description: Absence of compartment syndrome signs and symptoms  Outcome: Ongoing

## 2022-04-13 ENCOUNTER — APPOINTMENT (OUTPATIENT)
Dept: PHYSICAL THERAPY | Age: 67
End: 2022-04-13
Payer: COMMERCIAL

## 2022-04-15 ENCOUNTER — APPOINTMENT (OUTPATIENT)
Dept: PHYSICAL THERAPY | Age: 67
End: 2022-04-15
Payer: COMMERCIAL

## 2022-04-27 ENCOUNTER — OFFICE VISIT (OUTPATIENT)
Dept: ORTHOPEDIC SURGERY | Age: 67
End: 2022-04-27

## 2022-04-27 VITALS — BODY MASS INDEX: 38.94 KG/M2 | HEIGHT: 70 IN | WEIGHT: 272 LBS

## 2022-04-27 DIAGNOSIS — S76.112D RUPTURE OF LEFT QUADRICEPS TENDON, SUBSEQUENT ENCOUNTER: Primary | ICD-10-CM

## 2022-04-27 PROCEDURE — 99024 POSTOP FOLLOW-UP VISIT: CPT | Performed by: STUDENT IN AN ORGANIZED HEALTH CARE EDUCATION/TRAINING PROGRAM

## 2022-04-27 NOTE — PROGRESS NOTES
History:  Carlin Mobley is here for follow up after left quadriceps tendon repair. Surgery date was 4/8/22. Pain is controlled without analgesics. The patient's pain is rated at 0/10. Post op problems reported: none. He states he feels like his knee has buckled slightly a couple of times but he assures me that he has been wearing his brace locked in full extension. There was one incident where he felt his knee bend slightly when walking down stairs but states he corrected it. Physical Examination:  Patient is awake, alert, and in no acute distress. He comes in in a left knee immobilizers today. The immobilizer is positioned correctly and locked in full extension. Incisions are healing. No erythema. No warmth. No signs of infection. I do not feel any deformity or divot in the quad tendon. Assessment:   2.5 weeks status post bilateral quadriceps tendon repair      Plan: We are holding physical therapy until 6 weeks post op. Weightbearing as tolerated with T scope locked in full extension. He understands the importance of this. The patient should use the cold pad or apply ice to the knee continuously for 3 days after surgery. Then use cold pad or ice 20-30 minutes only, 2-3 times per day as needed. Refill pain medications as needed. No NSAIDs. Follow up in 3-4 weeks for evaluation of progress or prn if problems. Tahira Francois PA-C   Orthopaedic Surgery and Sports Medicine     Disclaimer: This note was generated with use of a verbal recognition program (DRAGON) and an attempt was made to check for errors. It is possible that there are still dictated errors within this office note. If so, please bring any significant errors to my attention for an addendum. All efforts were made to ensure that this office note is accurate.

## 2022-05-13 ENCOUNTER — TELEPHONE (OUTPATIENT)
Dept: ORTHOPEDIC SURGERY | Age: 67
End: 2022-05-13

## 2022-05-13 NOTE — TELEPHONE ENCOUNTER
L/M FOR KARISSA explaining that due to a scheduling change, the office must reschedule the patient's currently scheduled appointment for 5/18/2022 at 10:30am.     The appointment has been rescheduled to 5/18/2022 at 2:00pm with Dr. Guillermo Tejada. He was asked to call the office to reschedule if this appointment is inconvenient for him.

## 2022-05-18 ENCOUNTER — OFFICE VISIT (OUTPATIENT)
Dept: ORTHOPEDIC SURGERY | Age: 67
End: 2022-05-18

## 2022-05-18 VITALS — HEIGHT: 70 IN | BODY MASS INDEX: 39.57 KG/M2 | WEIGHT: 276.4 LBS

## 2022-05-18 DIAGNOSIS — S76.112D RUPTURE OF LEFT QUADRICEPS TENDON, SUBSEQUENT ENCOUNTER: Primary | ICD-10-CM

## 2022-05-18 PROCEDURE — 99024 POSTOP FOLLOW-UP VISIT: CPT | Performed by: ORTHOPAEDIC SURGERY

## 2022-05-18 NOTE — PROGRESS NOTES
History:  Mer Littlejohn is here for follow up after left quadriceps tendon repair. Surgery date was 4/8/22. Pain is controlled without analgesics. The patient's pain is rated at 0/10. He has been weightbearing as tolerated in the T scope brace locked at full extension. Physical Examination:  Ht 5' 10\" (1.778 m)   Wt 276 lb 6.4 oz (125.4 kg)   BMI 39.66 kg/m²   Patient is awake, alert, and in no acute distress. Incision are healed. No palpable defect noted in quadriceps tendon at the superior pole of the patella. Assessment:   6 weeks status post bilateral quadriceps tendon repair      Plan: We will have him advance his weightbearing in the T scope brace unlocked from 0-90 degrees    PT referral provided    Ice/heat as needed. Refill pain medications as needed. No NSAIDs. Follow up in 2 months for evaluation of progress or prn if problems. Vickie Howell PA-C   Orthopaedic Surgery and Sports Medicine     Disclaimer: This note was generated with use of a verbal recognition program (DRAGON) and an attempt was made to check for errors. It is possible that there are still dictated errors within this office note. If so, please bring any significant errors to my attention for an addendum. All efforts were made to ensure that this office note is accurate.

## 2022-05-23 ENCOUNTER — HOSPITAL ENCOUNTER (OUTPATIENT)
Dept: PHYSICAL THERAPY | Age: 67
Setting detail: THERAPIES SERIES
Discharge: HOME | End: 2022-05-23
Payer: COMMERCIAL

## 2022-05-23 PROCEDURE — 97110 THERAPEUTIC EXERCISES: CPT | Performed by: PHYSICAL THERAPIST

## 2022-05-23 PROCEDURE — 97140 MANUAL THERAPY 1/> REGIONS: CPT | Performed by: PHYSICAL THERAPIST

## 2022-05-23 PROCEDURE — 97016 VASOPNEUMATIC DEVICE THERAPY: CPT | Performed by: PHYSICAL THERAPIST

## 2022-05-23 PROCEDURE — 97161 PT EVAL LOW COMPLEX 20 MIN: CPT | Performed by: PHYSICAL THERAPIST

## 2022-05-23 NOTE — PLAN OF CARE
[facility], [address] [place] [address]  Phone: [phone]  Fax [phone]     Physical Therapy Certification    Dear  ,    We had the pleasure of evaluating the following patient for physical therapy services at [address] [place] [address]. A summary of our findings can be found in the initial assessment below. This includes our plan of care. If you have any questions or concerns regarding these findings, please do not hesitate to contact me at the office phone number checked above. Thank you for the referral.       Physician Signature:_______________________________Date:__________________  By signing above (or electronic signature), therapists plan is approved by physician    Patient: Jairo Sauceda   : 1955   MRN: [id number]  Referring Physician: Fiorella Epperson      Evaluation Date: 2022      Medical Diagnosis Information:  Diagnosis: left quad tendon revision repair on 22   Treatment Diagnosis: left knee effusion M25.462, gait abnormality 26.9, left knee stiffness M 25.662, left leg weakness M62.82                                         Insurance information: PT Insurance Information: PT Insurance Information: BCBS_  o copay 80/20 coins    PT/OT/ST 60 visits,  No       Precautions/ Contra-indications: none noted    C-SSRS Triggered by Intake questionnaire (Past 2 wk assessment):   [x] No, Questionnaire did not trigger screening.   [] Yes, Patient intake triggered further evaluation      [] C-SSRS Screening completed  [] PCP notified via Plan of Care  [] Emergency services notified     Latex Allergy:  [x]NO      []YES  Preferred Language for Healthcare:   [x]English       []other:    SUBJECTIVE: Patient stated complaint:he was walking his dog and fell and tore both quad tendons on , and had surgery on both on 22.   Had been doing PT for a while and could not lift left leg independently. Had follow-up MRI which reveled re-tear of left quad tendon. He had a revision surgery on 4/8/22. He just had his 6 week post op check with Dr. Yolanda Rock team and is allowed to start PT. He has not been doing any HEP since the revision surgery. He reports he has been feeling pretty good lately. He has had quite a bit of swelling since the second surgery. He reports this was the same after the prior surgery in Jan. He reports he is kya to walk with brace 0-90 deg.     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    Living Status/Prior Level of Function: Independent with ADLs and IADLs,     OBJECTIVE:     ROM LEFT RIGHT   HIP Flex     HIP Abd     HIP Ext     HIP IR     HIP ER     Knee ext 0 0   Knee Flex 92  124   Ankle PF     Ankle DF     Ankle In     Ankle Ev     Strength  LEFT RIGHT   HIP Flexors     HIP Abductors     HIP Ext     Hip ER     Knee EXT (quad) Good - good   Knee Flex (HS)     Ankle DF     Ankle PF     Ankle Inv     Ankle EV          Circumference  Mid patella  7 cm prox  20cm distall     52.8 cm  54.5 cm  43cm   46.3 cm  38.5 cm  51 cm     Reflexes/Sensation:    []Dermatomes/Myotomes intact    [x]Reflexes equal and normal bilaterally   [x]Other:not assessed  Joint mobility:    []Normal    [x]Hypo   []Hyper    Palpation: no tenderness, but thickness in quad tendon     Functional Mobility/Transfers: independent    Posture: moderate to severe edema through entire left LE    Bandages/Dressings/Incisions: none    Gait: (include devices/WB status) ambulates independently without assistive devices in brace opened 0-90 deg. He demonstrates decreased knee flexion during gait cuycle    Orthopedic Special Tests: negative Glynn's                       [x] Patient history, allergies, meds reviewed. Medical chart reviewed. See intake form. Review Of Systems (ROS):  [x]Performed Review of systems (Integumentary, CardioPulmonary, Neurological) by intake and observation. Intake form has been scanned into medical record. Patient has been instructed to contact their primary care physician regarding ROS issues if not already being addressed at this time. Co-morbidities/Complexities (which will affect course of rehabilitation):   []None           Arthritic conditions   []Rheumatoid arthritis (M05.9)  []Osteoarthritis (M19.91)   Cardiovascular conditions   [x]Hypertension (I10)  []Hyperlipidemia (E78.5)  []Angina pectoris (I20)  []Atherosclerosis (I70)   Musculoskeletal conditions   []Disc pathology   []Congenital spine pathologies   []Prior surgical intervention  []Osteoporosis (M81.8)  []Osteopenia (M85.8)   Endocrine conditions   []Hypothyroid (E03.9)  []Hyperthyroid Gastrointestinal conditions   []Constipation (H03.31)   Metabolic conditions   []Morbid obesity (E66.01)  []Diabetes type 1(E10.65) or 2 (E11.65)   []Neuropathy (G60.9)     Pulmonary conditions   []Asthma (J45)  []Coughing   []COPD (J44.9)   Psychological Disorders  []Anxiety (F41.9)  []Depression (F32.9)   []Other:   [x]Other: This is a revision surgery      Barriers to/and or personal factors that will affect rehab potential:              []Age  []Sex              []Motivation/Lack of Motivation                        [x]Co-Morbidities              []Cognitive Function, education/learning barriers              []Environmental, home barriers              []profession/work barriers  []past PT/medical experience  []other:  Justification:  This is a revision surgery 4 months after initial surgery on left quad tendon,  He also had right quad tendon repair in January. Falls Risk Assessment (30 days):   [x] Falls Risk assessed and no intervention required. [] Falls Risk assessed and Patient requires intervention due to being higher risk   TUG score (>12s at risk):     [] Falls education provided, including           ASSESSMENT:   Functional Impairments:     []Noted lumbar/proximal hip/LE joint hypomobility   [x]Decreased LE functional ROM   [x]Decreased core/proximal hip strength and neuromuscular control   [x]Decreased LE functional strength   [x]Reduced balance/proprioceptive control   []other:      Functional Activity Limitations (from functional questionnaire and intake)   [x]Reduced ability to tolerate prolonged functional positions   []Reduced ability or difficulty with changes of positions or transfers between positions   []Reduced ability to maintain good posture and demonstrate good body mechanics with sitting, bending, and lifting   []Reduced ability to sleep   [] Reduced ability or tolerance with driving and/or computer work   [x]Reduced ability to perform lifting, carrying tasks   [x]Reduced ability to squat   []Reduced ability to forward bend   [x]Reduced ability to ambulate prolonged functional periods/distances/surfaces   [x]Reduced ability to ascend/descend stairs   []Reduced ability to run, hop, cut or jump   []other:    Participation Restrictions   []Reduced participation in self care activities   [x]Reduced participation in home management activities   []Reduced participation in work activities   [x]Reduced participation in social activities. []Reduced participation in sport/recreation activities. Classification :    [x]Signs/symptoms consistent with post-surgical status including decreased ROM, strength and function.    []Signs/symptoms consistent with joint sprain/strain   []Signs/symptoms consistent with patella-femoral syndrome   []Signs/symptoms consistent with knee OA/hip OA   []Signs/symptoms consistent with internal derangement of knee/Hip   []Signs/symptoms consistent with functional hip weakness/NMR control      []Signs/symptoms consistent with tendinitis/tendinosis    []signs/symptoms consistent with pathology which may benefit from Dry needling      []other:      Prognosis/Rehab Potential:      []Excellent   [x]Good    []Fair   []Poor    Tolerance of evaluation/treatment:    [x]Excellent   []Good    []Fair   []Poor  Physical Therapy Evaluation Complexity Justification  [x] A history of present problem with:  [] no personal factors and/or comorbidities that impact the plan of care;  [x]1-2 personal factors and/or comorbidities that impact the plan of care  []3 personal factors and/or comorbidities that impact the plan of care  [x] An examination of body systems using standardized tests and measures addressing any of the following: body structures and functions (impairments), activity limitations, and/or participation restrictions;:  [] a total of 1-2 or more elements   [] a total of 3 or more elements   [x] a total of 4 or more elements   [x] A clinical presentation with:  [x] stable and/or uncomplicated characteristics   [] evolving clinical presentation with changing characteristics  [] unstable and unpredictable characteristics;   [x] Clinical decision making of [x] low, [] moderate, [] high complexity using standardized patient assessment instrument and/or measurable assessment of functional outcome.     [x] EVAL (LOW) 80770 (typically 20 minutes face-to-face)  [] EVAL (MOD) 92413 (typically 30 minutes face-to-face)  [] EVAL (HIGH) 58775 (typically 45 minutes face-to-face)  [] RE-EVAL       PLAN:   Frequency/Duration:  2 days per week for 8 Weeks:  Interventions:  [x]  Therapeutic exercise including: strength training, ROM, for Lower extremity and core   [x]  NMR activation and proprioception for LE, Glutes and Core   [x]  Manual therapy as indicated for LE, Hip and spine to include: Dry Needling/IASTM, STM, PROM, Gr I-IV mobilizations, manipulation. [x] Modalities as needed that may include: thermal agents, E-stim, Biofeedback, US, iontophoresis as indicated  [x] Patient education on joint protection, postural re-education, activity modification, progression of HEP. HEP instruction: Access Code: [email or web address]  URL: [email or web address]  Date: 05/23/2022  Prepared by: Jayleen Lewis     Exercises  Seated Ankle Pumps - 4 x daily - 7 x weekly - 30 reps  Long Sitting Calf Stretch with Strap - 2 x daily - 7 x weekly - 5 reps - 30 hold  Sitting Heel Slide with Towel - 2-4 x daily - 7 x weekly - 10 reps - 10 hold  Quad Set - 3 x daily - 7 x weekly - 3 sets - 10 reps - 10 hold  Side to Side Weight Shift with Counter Support - 1-2 x daily - 7 x weekly - 10 reps - 10 hold       GOALS:  Patient stated goal: increase strength of left leg  [] Progressing: [] Met: [] Not Met: [] Adjusted    Therapist goals for Patient:   Short Term Goals: To be achieved in: 2 weeks  1. Independent in HEP and progression per patient tolerance, in order to prevent re-injury. [] Progressing: [] Met: [] Not Met: [] Adjusted  2. Patient will have a decrease in pain to facilitate improvement in movement, function, and ADLs as indicated by Functional Deficits. [] Progressing: [] Met: [] Not Met: [] Adjusted    Long Term Goals: To be achieved in: 8 weeks  1. Disability index score of 66% or more per FOTO to assist with reaching prior level of function. [] Progressing: [] Met: [] Not Met: [] Adjusted  2. Patient will demonstrate increased AROM to 0-110 to allow for proper joint functioning as indicated by patients Functional Deficits. [] Progressing: [] Met: [] Not Met: [] Adjusted  3. Patient will demonstrate an increase in Strength to good proximal hip strength and control, within 5lb HHD in LE to allow for proper functional mobility as indicated by patients Functional Deficits.  [] Progressing: [] Met: [] Not Met: [] Adjusted  4. Patient will return to normal gait functional activities without increased symptoms or restriction. [] Progressing: [] Met: [] Not Met: [] Adjusted  5.  Patient will be able to ambulate reciprocally up and down a flight of stairs    - Progressing: - Met: - Not Met: - Adjusted     Electronically signed by:  Nayla Chin PT

## 2022-05-23 NOTE — FLOWSHEET NOTE
[facility],  [address] [place]  Phone: [phone]  Fax [phone]    Physical Therapy Treatment Note/ Progress Report:           Date:  2022    Patient Name:  Sreedhar Flores    :  1955  MRN: [id number]  Restrictions/Precautions:    Medical/Treatment Diagnosis Information:  · Diagnosis: left quad tendon revision repair on 22  · Treatment Diagnosis:left knee effusion M25.462 left knee weakness M62.82    gait abnormality r26.9,  difficulty walking A81.8  Insurance/Certification information:  PT Insurance Information: PT Insurance Information: BCBS_  o copay 80/20 coins    PT/OT/ST 60 visits,  No auth  Physician Information:  Dr. Rosenda Humphrey  Has the plan of care been signed (Y/N):        []  Yes  [x]  No     Date of Patient follow up with Physician: Dr. Rosenda Humphrey 22      Is this a Progress Report:     []  Yes  [x]  No        If Yes:  Date Range for reporting period:  Beginning 22  Ending 22    Progress report will be due (10 Rx or 30 days whichever is less):       Recertification will be due (POC Duration  / 90 days whichever is less): 22     Visit # Insurance Allowable Auth Required   In-person 1 47 left    (13 used previously/600 []  Yes [x]  No    Telehealth   []  Yes []  No    Total          Functional Scale: FOTO left leg 52%    Date assessed:  22      Therapy Diagnosis/Practice Pattern: 4I     Number of Comorbidities:  []0     [x]1-2    []3+    Latex Allergy:  [x]NO      []YES  Preferred Language for Healthcare:   [x]English       []other:      Pain level:  0-2/10     SUBJECTIVE:  See eval    OBJECTIVE: See eval   Observation:    Test measurements:      RESTRICTIONS/PRECAUTIONS:  Revision left quad tendon repair left leg on 22.   He is ok to ambulate in TROM brace open to 0-90 deg per MD.    Exercises/Interventions:     Therapeutic Ex (66027) Sets/sec Reps Notes/CUES   Ankle pumps 10x  HEP   longsit gastroc stretch with strap 30 sec  5x hep   Quad set 10 sec 10x hep   PROM knee flex heelslides 10sec  10x hep   Weight shifts 10sec  10x hep                           Pt education regarding guidelines, brace wear, elevation 7 min  Adjusted straps of brace to fit better               Manual Intervention (31835)      Pat mobs, effluerage STM   12min                                  NMR re-education (99461)   CUES NEEDED                                                         Therapeutic Activity (42066)                                            Access Code: [email or web address]  URL: [email or web address]  Date: 05/23/2022  Prepared by: Brandon Nam    Exercises  Seated Ankle Pumps - 4 x daily - 7 x weekly - 30 reps  Long Sitting Calf Stretch with Strap - 2 x daily - 7 x weekly - 5 reps - 30 hold  Sitting Heel Slide with Towel - 2-4 x daily - 7 x weekly - 10 reps - 10 hold  Quad Set - 3 x daily - 7 x weekly - 3 sets - 10 reps - 10 hold  Side to Side Weight Shift with Counter Support - 1-2 x daily - 7 x weekly - 10 reps - 10 hold        Therapeutic Exercise and NMR EXR  [x] (64752) Provided verbal/tactile cueing for activities related to strengthening, flexibility, endurance, ROM for improvements in LE, proximal hip, and core control with self care, mobility, lifting, ambulation.  [] (22133) Provided verbal/tactile cueing for activities related to improving balance, coordination, kinesthetic sense, posture, motor skill, proprioception  to assist with LE, proximal hip, and core control in self care, mobility, lifting, ambulation and eccentric single leg control.      NMR and Therapeutic Activities:    [] (26696 or 28041) Provided verbal/tactile cueing for activities related to improving balance, coordination, kinesthetic sense, posture, motor skill, proprioception and motor activation to allow for proper function of core, proximal hip and LE with self care and ADLs  [] (08574) Gait Re-education- Provided training and instruction to the patient for proper LE, core and proximal hip recruitment and positioning and eccentric body weight control with ambulation re-education including up and down stairs     Home Exercise Program:    [x] (75233) Reviewed/Progressed HEP activities related to strengthening, flexibility, endurance, ROM of core, proximal hip and LE for functional self-care, mobility, lifting and ambulation/stair navigation   [] (19692)Reviewed/Progressed HEP activities related to improving balance, coordination, kinesthetic sense, posture, motor skill, proprioception of core, proximal hip and LE for self care, mobility, lifting, and ambulation/stair navigation      Manual Treatments:  PROM / STM / Oscillations-Mobs:  G-I, II, III, IV (PA's, Inf., Post.)  [x] (26500) Provided manual therapy to mobilize LE, proximal hip and/or LS spine soft tissue/joints for the purpose of modulating pain, promoting relaxation,  increasing ROM, reducing/eliminating soft tissue swelling/inflammation/restriction, improving soft tissue extensibility and allowing for proper ROM for normal function with self care, mobility, lifting and ambulation. Modalities:     [x] GAME READY (VASO)- for significant edema, swelling, pain control. Charges  Timed Code Treatment Minutes: 30   Total Treatment Minutes: 60     Medicare total (add KX at $2000)           [x] EVAL (LOW) 31168   [] EVAL (MOD) 44013  [] EVAL (HIGH) 40111   [] RE-EVAL     [x] JW(25159) x     [] IONTO  [] NMR (90048) x     [x] VASO  [x] Manual (79071) x      [] Other:  [] TA x      [] Mech Traction (99121)  [] ES(attended) (93239)      [] ES (un) (32228):       GOALS: GOALS:  Patient stated goal: increase strength of left leg  [] Progressing: [] Met: [] Not Met: [] Adjusted    Therapist goals for Patient:   Short Term Goals: To be achieved in: 2 weeks  1. Independent in HEP and progression per patient tolerance, in order to prevent re-injury.    [] Progressing: [] Met: [] Not Met: [] Adjusted  2. Patient will have a decrease in pain to facilitate improvement in movement, function, and ADLs as indicated by Functional Deficits. [] Progressing: [] Met: [] Not Met: [] Adjusted    Long Term Goals: To be achieved in: 8 weeks  1. Disability index score of 66% or more per FOTO to assist with reaching prior level of function. [] Progressing: [] Met: [] Not Met: [] Adjusted  2. Patient will demonstrate increased AROM to 0-110 to allow for proper joint functioning as indicated by patients Functional Deficits. [] Progressing: [] Met: [] Not Met: [] Adjusted  3. Patient will demonstrate an increase in Strength to good proximal hip strength and control, within 5lb HHD in LE to allow for proper functional mobility as indicated by patients Functional Deficits. [] Progressing: [] Met: [] Not Met: [] Adjusted  4. Patient will return to normal gait functional activities without increased symptoms or restriction. [] Progressing: [] Met: [] Not Met: [] Adjusted  5. Patient will be able to ambulate reciprocally up and down a flight of stairs    - Progressing: - Met: - Not Met: - Adjusted     Progression Towards Functional goals:  [] Patient is progressing as expected towards functional goals listed. [] Progression is slowed due to complexities listed. [] Progression has been slowed due to co-morbidities. [x] Plan just implemented, too soon to assess goals progression  [] Other:         Overall Progression Towards Functional goals/ Treatment Progress Update:  [] Patient is progressing as expected towards functional goals listed. [] Progression is slowed due to complexities/Impairments listed. [] Progression has been slowed due to co-morbidities.   [x] Plan just implemented, too soon to assess goals progression <30days   [] Goals require adjustment due to lack of progress  [] Patient is not progressing as expected and requires additional follow up with physician  [] Other    Prognosis for POC: [x] Good [] Fair  [] Poor      Patient requires continued skilled intervention: [x] Yes  [] No    Treatment/Activity Tolerance:  [x] Patient able to complete treatment  [] Patient limited by fatigue  [] Patient limited by pain    [] Patient limited by other medical complications  [] Other:     ASSESSMENT: Patient doing well at 6 weeks s/p revision left quad tendon repair. He had bilateral quad tendon repairs in Jan.  The right seems to be doing well. He demonstrates gait deviation, effusion, decreased ROM and strength consistent with surgery 6 weeks ago. He requires skilled intervention of PT to progress toward normal gait, ROM, functional strength. PLAN: Cont PT 1-2x/week for 8 weeks  [] Continue per plan of care [] Alter current plan (see comments above)  [x] Plan of care initiated [] Hold pending MD visit [] Discharge      Electronically signed by:  Ac Davila PT    Note: If patient does not return for scheduled/ recommended follow up visits, this note will serve as a discharge from care along with most recent update on progress.

## 2022-05-25 ENCOUNTER — HOSPITAL ENCOUNTER (OUTPATIENT)
Dept: PHYSICAL THERAPY | Age: 67
Setting detail: THERAPIES SERIES
Discharge: HOME | End: 2022-05-25
Payer: COMMERCIAL

## 2022-05-25 PROCEDURE — 97110 THERAPEUTIC EXERCISES: CPT | Performed by: PHYSICAL THERAPIST

## 2022-05-25 PROCEDURE — 97140 MANUAL THERAPY 1/> REGIONS: CPT | Performed by: PHYSICAL THERAPIST

## 2022-05-25 PROCEDURE — 97112 NEUROMUSCULAR REEDUCATION: CPT | Performed by: PHYSICAL THERAPIST

## 2022-05-25 NOTE — FLOWSHEET NOTE
[facility],  [address] [place]  Phone: [phone]  Fax [phone]    Physical Therapy Treatment Note/ Progress Report:           Date:  2022    Patient Name:  Fariha Pimentel    :  1955  MRN: [id number]  Restrictions/Precautions:    Medical/Treatment Diagnosis Information:  · Diagnosis: left quad tendon revision repair on 22  · Treatment Diagnosis:left knee effusion M25.462 left knee weakness M62.82    gait abnormality r26.9,  difficulty walking W00.2  Insurance/Certification information:  PT Insurance Information: PT Insurance Information: BCBS_  o copay 80/20 coins    PT/OT/ST 60 visits,  No auth  Physician Information:  Dr. Juan Alberto Gill  Has the plan of care been signed (Y/N):        []  Yes  [x]  No     Date of Patient follow up with Physician: Dr. Juan Alberto Gill 22      Is this a Progress Report:     []  Yes  [x]  No        If Yes:  Date Range for reporting period:  Beginning 22  Ending 22    Progress report will be due (10 Rx or 30 days whichever is less):       Recertification will be due (POC Duration  / 90 days whichever is less): 22     Visit # Insurance Allowable Auth Required   In-person 2 47 left    (13 used previously/600 []  Yes [x]  No    Telehealth   []  Yes []  No    Total          Functional Scale: FOTO left leg 52%    Date assessed:  22      Therapy Diagnosis/Practice Pattern: 4I     Number of Comorbidities:  []0     [x]1-2    []3+    Latex Allergy:  [x]NO      []YES  Preferred Language for Healthcare:   [x]English       []other:      Pain level:  0-2/10     SUBJECTIVE:  Patient reports unable to lift leg. Occasional episodes of buckling persists. Did HEP without any issues. OBJECTIVE: See eval   Observation:    Test measurements:      RESTRICTIONS/PRECAUTIONS:  Revision left quad tendon repair left leg on 22.   He is ok to ambulate in [facility] brace open to 0-90 deg per MD.    Exercises/Interventions:     Therapeutic Ex (44684) Sets/sec Reps Notes/CUES   Bike X 5 min    Ankle pumps HEP   longsit gastroc stretch with strap hep   Quad set hep   PROM knee flex heelslides hep   Weight shifts 10sec  10x hep   HR/ TR  15 x     Mini squat 30 deg  15 x     Seated HS curl BTB 15 x           Pt education regarding guidelines, brace wear, elevation 7 min  Adjusted straps of brace to fit better               Manual Intervention (62064)      Pat mobs, effluerage STM, PROM, HS stretch, GS stretch   12 min                                  NMR re-education (83305)   CUES NEEDED   NMES Quad set  8 min    NMES SLR lift off hold / eccentric control  X 3 min  PT max assisted lift with mod assist for eccentric. Therapeutic Activity (73479)                                            Access Code: [email or web address]  URL: [email or web address]  Date: 05/23/2022  Prepared by: Mata Marin    Exercises  Seated Ankle Pumps - 4 x daily - 7 x weekly - 30 reps  Long Sitting Calf Stretch with Strap - 2 x daily - 7 x weekly - 5 reps - 30 hold  Sitting Heel Slide with Towel - 2-4 x daily - 7 x weekly - 10 reps - 10 hold  Quad Set - 3 x daily - 7 x weekly - 3 sets - 10 reps - 10 hold  Side to Side Weight Shift with Counter Support - 1-2 x daily - 7 x weekly - 10 reps - 10 hold        Therapeutic Exercise and NMR EXR  [x] (84601) Provided verbal/tactile cueing for activities related to strengthening, flexibility, endurance, ROM for improvements in LE, proximal hip, and core control with self care, mobility, lifting, ambulation.  [] (13882) Provided verbal/tactile cueing for activities related to improving balance, coordination, kinesthetic sense, posture, motor skill, proprioception  to assist with LE, proximal hip, and core control in self care, mobility, lifting, ambulation and eccentric single leg control.      NMR and Therapeutic Activities: [] (13431 or 97278) Provided verbal/tactile cueing for activities related to improving balance, coordination, kinesthetic sense, posture, motor skill, proprioception and motor activation to allow for proper function of core, proximal hip and LE with self care and ADLs  [] (93164) Gait Re-education- Provided training and instruction to the patient for proper LE, core and proximal hip recruitment and positioning and eccentric body weight control with ambulation re-education including up and down stairs     Home Exercise Program:    [x] (04505) Reviewed/Progressed HEP activities related to strengthening, flexibility, endurance, ROM of core, proximal hip and LE for functional self-care, mobility, lifting and ambulation/stair navigation   [] (90348)Reviewed/Progressed HEP activities related to improving balance, coordination, kinesthetic sense, posture, motor skill, proprioception of core, proximal hip and LE for self care, mobility, lifting, and ambulation/stair navigation      Manual Treatments:  PROM / STM / Oscillations-Mobs:  G-I, II, III, IV (PA's, Inf., Post.)  [x] (82593) Provided manual therapy to mobilize LE, proximal hip and/or LS spine soft tissue/joints for the purpose of modulating pain, promoting relaxation,  increasing ROM, reducing/eliminating soft tissue swelling/inflammation/restriction, improving soft tissue extensibility and allowing for proper ROM for normal function with self care, mobility, lifting and ambulation. Modalities:  Declined   [] GAME READY (VASO)- for significant edema, swelling, pain control.      Charges  Timed Code Treatment Minutes: 45   Total Treatment Minutes: 45       [] EVAL (LOW) 53265   [] EVAL (MOD) 39719  [] EVAL (HIGH) 60274   [] RE-EVAL     [x] UE(95318) x 1   [] IONTO  [x] NMR (27370) x  1   [] VASO  [x] Manual (31426) x 1     [] Other:  [] TA x      [] Mech Traction (72356)  [] ES(attended) (25303)      [] ES (un) (11944):       GOALS: GOALS:  Patient stated goal: increase strength of left leg  [] Progressing: [] Met: [] Not Met: [] Adjusted    Therapist goals for Patient:   Short Term Goals: To be achieved in: 2 weeks  1. Independent in HEP and progression per patient tolerance, in order to prevent re-injury. [] Progressing: [] Met: [] Not Met: [] Adjusted  2. Patient will have a decrease in pain to facilitate improvement in movement, function, and ADLs as indicated by Functional Deficits. [] Progressing: [] Met: [] Not Met: [] Adjusted    Long Term Goals: To be achieved in: 8 weeks  1. Disability index score of 66% or more per FOTO to assist with reaching prior level of function. [] Progressing: [] Met: [] Not Met: [] Adjusted  2. Patient will demonstrate increased AROM to 0-110 to allow for proper joint functioning as indicated by patients Functional Deficits. [] Progressing: [] Met: [] Not Met: [] Adjusted  3. Patient will demonstrate an increase in Strength to good proximal hip strength and control, within 5lb HHD in LE to allow for proper functional mobility as indicated by patients Functional Deficits. [] Progressing: [] Met: [] Not Met: [] Adjusted  4. Patient will return to normal gait functional activities without increased symptoms or restriction. [] Progressing: [] Met: [] Not Met: [] Adjusted  5. Patient will be able to ambulate reciprocally up and down a flight of stairs    - Progressing: - Met: - Not Met: - Adjusted     Progression Towards Functional goals:  [] Patient is progressing as expected towards functional goals listed. [] Progression is slowed due to complexities listed. [] Progression has been slowed due to co-morbidities. [x] Plan just implemented, too soon to assess goals progression  [] Other:         Overall Progression Towards Functional goals/ Treatment Progress Update:  [] Patient is progressing as expected towards functional goals listed. [] Progression is slowed due to complexities/Impairments listed.   [] Progression has been slowed due to co-morbidities. [x] Plan just implemented, too soon to assess goals progression <30days   [] Goals require adjustment due to lack of progress  [] Patient is not progressing as expected and requires additional follow up with physician  [] Other    Prognosis for POC: [x] Good [] Fair  [] Poor      Patient requires continued skilled intervention: [x] Yes  [] No    Treatment/Activity Tolerance:  [x] Patient able to complete treatment  [] Patient limited by fatigue  [] Patient limited by pain    [] Patient limited by other medical complications  [] Other:     ASSESSMENT: Patient still lacks quad control and has episodes of knee buckling/ hyperextension. Initiated NMES to help with quad control and strengthening. Good PROM of knee. He requires skilled intervention of PT to progress toward normal gait, ROM, functional strength. PLAN: Cont PT 1-2x/week for 8 weeks  [] Continue per plan of care [] Alter current plan (see comments above)  [x] Plan of care initiated [] Hold pending MD visit [] Discharge      Electronically signed by:  Nicholas Phillips PT    Note: If patient does not return for scheduled/ recommended follow up visits, this note will serve as a discharge from care along with most recent update on progress.

## 2022-06-01 ENCOUNTER — HOSPITAL ENCOUNTER (OUTPATIENT)
Dept: PHYSICAL THERAPY | Age: 67
Setting detail: THERAPIES SERIES
Discharge: HOME | End: 2022-06-01
Payer: COMMERCIAL

## 2022-06-01 PROCEDURE — 97140 MANUAL THERAPY 1/> REGIONS: CPT | Performed by: PHYSICAL THERAPIST

## 2022-06-01 PROCEDURE — 97110 THERAPEUTIC EXERCISES: CPT | Performed by: PHYSICAL THERAPIST

## 2022-06-01 PROCEDURE — 97112 NEUROMUSCULAR REEDUCATION: CPT | Performed by: PHYSICAL THERAPIST

## 2022-06-01 NOTE — FLOWSHEET NOTE
[facility],  [address] [place]  Phone: [phone]  Fax [phone]    Physical Therapy Treatment Note/ Progress Report:           Date:  2022    Patient Name:  Amarilys Jeong    :  1955  MRN: [id number]  Restrictions/Precautions:    Medical/Treatment Diagnosis Information:  · Diagnosis: left quad tendon revision repair on 22  · Treatment Diagnosis:left knee effusion M25.462 left knee weakness M62.82    gait abnormality r26.9,  difficulty walking G66.3  Insurance/Certification information:  PT Insurance Information: PT Insurance Information: BCBS_  o copay 80/20 coins    PT/OT/ST 60 visits,  No auth  Physician Information:  Dr. Susana Kirkland  Has the plan of care been signed (Y/N):        []  Yes  [x]  No     Date of Patient follow up with Physician: Dr. Susana Kirkland 22      Is this a Progress Report:     []  Yes  [x]  No        If Yes:  Date Range for reporting period:  Beginning 22  Ending 22    Progress report will be due (10 Rx or 30 days whichever is less): 3/39/22      Recertification will be due (POC Duration  / 90 days whichever is less): 22     Visit # Insurance Allowable Auth Required   In-person 3 47 left    (13 used previously/600 []  Yes [x]  No    Telehealth   []  Yes []  No    Total          Functional Scale: FOTO left leg 52%    Date assessed:  22      Therapy Diagnosis/Practice Pattern: 4I     Number of Comorbidities:  []0     [x]1-2    []3+    Latex Allergy:  [x]NO      []YES  Preferred Language for Healthcare:   [x]English       []other:      Pain level:  0-2/10     SUBJECTIVE:  Patient reports he was in [facility] this past weekend. He drove with no issues. Walking was a little challenging. Reports no pain. Still has trouble with a leg lift.      OBJECTIVE:    Observation: : moderate effusion in lower leg   Test measurements:       Test used Initial score  5/23/22 Current Score  6/1/22   Pain Summary VAS 0-2/10 0-2/10   Functional questionnaire FOTO leg 52 n/a   ROM Knee flex 92 heelslide 110    ext 0 0               Strength Quad set Good - Good -   girth Mid pat 52.8 cm 48 cm    7cm prox to mid pat 54.5 cm 52 cm    20 cm distal to mid pat 43 cm 42.5 cm       RESTRICTIONS/PRECAUTIONS:  Revision left quad tendon repair left leg on 4/8/22. He is ok to ambulate in TROM brace open to 0-90 deg per MD.    Exercises/Interventions:     Therapeutic Ex (38615) Sets/sec Reps Notes/CUES      HEP   longsit gastroc stretch with strap hep    hep   P hep   Weight shifts 10sec  10x hep   HR/ T            Hip machine abd/ext 45#  2x10 each                Pt education regarding guidelines, brace wear, elevation 5 min  Adjusted straps of brace to fit better               Manual Intervention (06178)      Pat mobs, effluerage STM, PROM, HS stretch, GS stretch   10 min                                  NMR re-education (98595)   CUES NEEDED   NMES Quad set  8 min    NMES SLR lift off hold / eccentric control  X 3 min  PT max assisted lift with mod assist for eccentric. Therapeutic Activity (33608)                                            Access Code: [email or web address]  URL: [email or web address]  Date: 05/23/2022  Prepared by: Vero Quant    Exercises  Seated Ankle Pumps - 4 x daily - 7 x weekly - 30 reps  Long Sitting Calf Stretch with Strap - 2 x daily - 7 x weekly - 5 reps - 30 hold  Sitting Heel Slide with Towel - 2-4 x daily - 7 x weekly - 10 reps - 10 hold  Quad Set - 3 x daily - 7 x weekly - 3 sets - 10 reps - 10 hold  Side to Side Weight Shift with Counter Support - 1-2 x daily - 7 x weekly - 10 reps - 10 hold        Therapeutic Exercise and NMR EXR  [x] (53362) Provided verbal/tactile cueing for activities related to strengthening, flexibility, endurance, ROM for improvements in LE, proximal hip, and core control with self care, Minutes: 45   Total Treatment Minutes: 45       [] EVAL (LOW) 32405   [] EVAL (MOD) 63214  [] EVAL (HIGH) 78500   [] RE-EVAL     [x] SB(27576) x 1   [] IONTO  [x] NMR (01664) x  1   [] VASO  [x] Manual (43051) x 1     [] Other:  [] TA x      [] Mech Traction (18109)  [] ES(attended) (57444)      [] ES (un) (23380):       GOALS: GOALS:  Patient stated goal: increase strength of left leg  [] Progressing: [] Met: [] Not Met: [] Adjusted    Therapist goals for Patient:   Short Term Goals: To be achieved in: 2 weeks  1. Independent in HEP and progression per patient tolerance, in order to prevent re-injury. [] Progressing: [] Met: [] Not Met: [] Adjusted  2. Patient will have a decrease in pain to facilitate improvement in movement, function, and ADLs as indicated by Functional Deficits. [] Progressing: [] Met: [] Not Met: [] Adjusted    Long Term Goals: To be achieved in: 8 weeks  1. Disability index score of 66% or more per FOTO to assist with reaching prior level of function. [] Progressing: [] Met: [] Not Met: [] Adjusted  2. Patient will demonstrate increased AROM to 0-110 to allow for proper joint functioning as indicated by patients Functional Deficits. [] Progressing: [] Met: [] Not Met: [] Adjusted  3. Patient will demonstrate an increase in Strength to good proximal hip strength and control, within 5lb HHD in LE to allow for proper functional mobility as indicated by patients Functional Deficits. [] Progressing: [] Met: [] Not Met: [] Adjusted  4. Patient will return to normal gait functional activities without increased symptoms or restriction. [] Progressing: [] Met: [] Not Met: [] Adjusted  5. Patient will be able to ambulate reciprocally up and down a flight of stairs    - Progressing: - Met: - Not Met: - Adjusted     Progression Towards Functional goals:  [] Patient is progressing as expected towards functional goals listed. [] Progression is slowed due to complexities listed.   [] Progression has been slowed due to co-morbidities. [x] Plan just implemented, too soon to assess goals progression  [] Other:         Overall Progression Towards Functional goals/ Treatment Progress Update:  [] Patient is progressing as expected towards functional goals listed. [] Progression is slowed due to complexities/Impairments listed. [] Progression has been slowed due to co-morbidities. [x] Plan just implemented, too soon to assess goals progression <30days   [] Goals require adjustment due to lack of progress  [] Patient is not progressing as expected and requires additional follow up with physician  [] Other    Prognosis for POC: [x] Good [] Fair  [] Poor      Patient requires continued skilled intervention: [x] Yes  [] No    Treatment/Activity Tolerance:  [x] Patient able to complete treatment  [] Patient limited by fatigue  [] Patient limited by pain    [] Patient limited by other medical complications  [] Other:     ASSESSMENT: Demonstrates decreased effusion, but still moderate amount exp below knee. Patient still lacks quad control and has episodes of knee buckling/ hyperextension. Initiated NMES to help with quad control and strengthening. Good PROM of knee. He requires skilled intervention of PT to progress toward normal gait, ROM, functional strength. PLAN: Cont PT 1-2x/week for 8 weeks  [] Continue per plan of care [] Alter current plan (see comments above)  [x] Plan of care initiated [] Hold pending MD visit [] Discharge      Electronically signed by:  Britany Guevara PT    Note: If patient does not return for scheduled/ recommended follow up visits, this note will serve as a discharge from care along with most recent update on progress.

## 2022-06-03 ENCOUNTER — HOSPITAL ENCOUNTER (OUTPATIENT)
Dept: PHYSICAL THERAPY | Age: 67
Setting detail: THERAPIES SERIES
Discharge: HOME | End: 2022-06-03
Payer: COMMERCIAL

## 2022-06-03 PROCEDURE — 97140 MANUAL THERAPY 1/> REGIONS: CPT | Performed by: PHYSICAL THERAPIST

## 2022-06-03 PROCEDURE — 97110 THERAPEUTIC EXERCISES: CPT | Performed by: PHYSICAL THERAPIST

## 2022-06-03 PROCEDURE — 97016 VASOPNEUMATIC DEVICE THERAPY: CPT | Performed by: PHYSICAL THERAPIST

## 2022-06-03 NOTE — FLOWSHEET NOTE
[facility],  [address]  Phone: [phone]  Fax [phone]    Physical Therapy Treatment Note/ Progress Report:           Date:  6/3/2022    Patient Name:  Bill Suazo    :  1955  MRN: [id number]  Restrictions/Precautions:    Medical/Treatment Diagnosis Information:  · Diagnosis: left quad tendon revision repair on 22  · Treatment Diagnosis:left knee effusion M25.462 left knee weakness M62.82    gait abnormality r26.9,  difficulty walking X17.0  Insurance/Certification information:  PT Insurance Information: PT Insurance Information: BCBS_  0 copay 80/20 coins    PT/OT/ST 60 visits,  No auth  Physician Information:  Dr. Monster Rutherford  Has the plan of care been signed (Y/N):        []  Yes  [x]  No     Date of Patient follow up with Physician: Dr. Monster Rutherford 22      Is this a Progress Report:     []  Yes  [x]  No        If Yes:  Date Range for reporting period:  Beginning 22  Ending 22    Progress report will be due (10 Rx or 30 days whichever is less):       Recertification will be due (POC Duration  / 90 days whichever is less): 22     Visit # Insurance Allowable Auth Required   In-person 4 47 left    (13 used previously/600 []  Yes [x]  No    Telehealth   []  Yes []  No    Total          Functional Scale: FOTO left leg 52%    Date assessed:  22      Therapy Diagnosis/Practice Pattern: 4I     Number of Comorbidities:  []0     [x]1-2    []3+    Latex Allergy:  [x]NO      []YES  Preferred Language for Healthcare:   [x]English       []other:      Pain level:  0-2/10     SUBJECTIVE:  Pt is 8 weeks post op. Pt is not able to walk as long as he would like.    Pt moves to [place] on Aug 1st.      OBJECTIVE:    Observation: : moderate effusion in lower leg   Test measurements:       Test used Initial score  22 Current Score  22   Pain Summary VAS 0-2/10 0-2/10   Functional questionnaire FOTO leg 52 n/a   ROM Knee flex 92 heelslide 110    ext 0 0               Strength Quad set Good - Good -   girth Mid pat 52.8 cm 48 cm    7cm prox to mid pat 54.5 cm 52 cm    20 cm distal to mid pat 43 cm 42.5 cm       RESTRICTIONS/PRECAUTIONS:  Revision left quad tendon repair left leg on 4/8/22. He is ok to ambulate in TROM brace open to 0-90 deg per MD.    Exercises/Interventions:     Therapeutic Ex (91327) Sets/sec Reps Notes/CUES      HEP   longsit gastroc stretch with strap hep    hep   P hep   Weight shifts 10sec  10x hep   HR/ T      Mini squat 30 deg  15 x        Hip machine abd/ext 45#  2x10 each    Seated HS s :20 3x                       Manual Intervention (12766)      Pat mobs, effluerage STM, PROM, HS stretch, GS stretch   5 min    Edema MA  5 min                            NMR re-education (00870)   CUES NEEDED   NMES Quad set  10 min                                              Therapeutic Activity (00700)                                            Access Code: [email or web address]  URL: [email or web address]  Date: 05/23/2022  Prepared by: Angelica Lara    Exercises  Seated Ankle Pumps - 4 x daily - 7 x weekly - 30 reps  Long Sitting Calf Stretch with Strap - 2 x daily - 7 x weekly - 5 reps - 30 hold  Sitting Heel Slide with Towel - 2-4 x daily - 7 x weekly - 10 reps - 10 hold  Quad Set - 3 x daily - 7 x weekly - 3 sets - 10 reps - 10 hold  Side to Side Weight Shift with Counter Support - 1-2 x daily - 7 x weekly - 10 reps - 10 hold        Therapeutic Exercise and NMR EXR  [x] (86112) Provided verbal/tactile cueing for activities related to strengthening, flexibility, endurance, ROM for improvements in LE, proximal hip, and core control with self care, mobility, lifting, ambulation.  [] (18477) Provided verbal/tactile cueing for activities related to improving balance, coordination, kinesthetic sense, posture, motor skill, proprioception  to assist with LE, proximal hip, and core control in self care, mobility, lifting, ambulation and eccentric single leg control. NMR and Therapeutic Activities:    [] (80226 or 90772) Provided verbal/tactile cueing for activities related to improving balance, coordination, kinesthetic sense, posture, motor skill, proprioception and motor activation to allow for proper function of core, proximal hip and LE with self care and ADLs  [] (04831) Gait Re-education- Provided training and instruction to the patient for proper LE, core and proximal hip recruitment and positioning and eccentric body weight control with ambulation re-education including up and down stairs     Home Exercise Program:    [x] (51893) Reviewed/Progressed HEP activities related to strengthening, flexibility, endurance, ROM of core, proximal hip and LE for functional self-care, mobility, lifting and ambulation/stair navigation   [] (15751)Reviewed/Progressed HEP activities related to improving balance, coordination, kinesthetic sense, posture, motor skill, proprioception of core, proximal hip and LE for self care, mobility, lifting, and ambulation/stair navigation      Manual Treatments:  PROM / STM / Oscillations-Mobs:  G-I, II, III, IV (PA's, Inf., Post.)  [x] (83338) Provided manual therapy to mobilize LE, proximal hip and/or LS spine soft tissue/joints for the purpose of modulating pain, promoting relaxation,  increasing ROM, reducing/eliminating soft tissue swelling/inflammation/restriction, improving soft tissue extensibility and allowing for proper ROM for normal function with self care, mobility, lifting and ambulation. Modalities:  Declined   [x] GAME READY (VASO)- for significant edema, swelling, pain control.      Charges  Timed Code Treatment Minutes: 45   Total Treatment Minutes: 60       [] EVAL (LOW) 12143   [] EVAL (MOD) 20659  [] EVAL (HIGH) 48598   [] RE-EVAL     [x] OI(26823) x 1   [] IONTO  [x] NMR (22916) x  1   [x] VASO  [x] Manual (95056) x 1     [] Other:  [] TA x      [] [facility] T[facility] (56532)  [] ES(attended) (59757)      [] ES (un) (35129):       GOALS: GOALS:  Patient stated goal: increase strength of left leg  [] Progressing: [] Met: [] Not Met: [] Adjusted    Therapist goals for Patient:   Short Term Goals: To be achieved in: 2 weeks  1. Independent in HEP and progression per patient tolerance, in order to prevent re-injury. [] Progressing: [] Met: [] Not Met: [] Adjusted  2. Patient will have a decrease in pain to facilitate improvement in movement, function, and ADLs as indicated by Functional Deficits. [] Progressing: [] Met: [] Not Met: [] Adjusted    Long Term Goals: To be achieved in: 8 weeks  1. Disability index score of 66% or more per FOTO to assist with reaching prior level of function. [] Progressing: [] Met: [] Not Met: [] Adjusted  2. Patient will demonstrate increased AROM to 0-110 to allow for proper joint functioning as indicated by patients Functional Deficits. [] Progressing: [] Met: [] Not Met: [] Adjusted  3. Patient will demonstrate an increase in Strength to good proximal hip strength and control, within 5lb HHD in LE to allow for proper functional mobility as indicated by patients Functional Deficits. [] Progressing: [] Met: [] Not Met: [] Adjusted  4. Patient will return to normal gait functional activities without increased symptoms or restriction. [] Progressing: [] Met: [] Not Met: [] Adjusted  5. Patient will be able to ambulate reciprocally up and down a flight of stairs    - Progressing: - Met: - Not Met: - Adjusted     Progression Towards Functional goals:  [] Patient is progressing as expected towards functional goals listed. [] Progression is slowed due to complexities listed. [] Progression has been slowed due to co-morbidities.   [x] Plan just implemented, too soon to assess goals progression  [] Other:         Overall Progression Towards Functional goals/ Treatment Progress Update:  [] Patient is progressing as expected towards functional goals listed. [] Progression is slowed due to complexities/Impairments listed. [] Progression has been slowed due to co-morbidities. [x] Plan just implemented, too soon to assess goals progression <30days   [] Goals require adjustment due to lack of progress  [] Patient is not progressing as expected and requires additional follow up with physician  [] Other    Prognosis for POC: [x] Good [] Fair  [] Poor      Patient requires continued skilled intervention: [x] Yes  [] No    Treatment/Activity Tolerance:  [x] Patient able to complete treatment  [] Patient limited by fatigue  [] Patient limited by pain    [] Patient limited by other medical complications  [] Other:     ASSESSMENT: Pt still has extensive ext lag with SLR. Did not even attempt SLR due to excessive swelling in lower leg. Encouraged pt to focus every hour on quad setting. Consider compression socks. Utilized VASO to address lower leg edema                    PLAN: Cont PT 1-2x/week for 8 weeks  [] Continue per plan of care [] Alter current plan (see comments above)  [x] Plan of care initiated [] Hold pending MD visit [] Discharge      Electronically signed by:  Gia Garay PT    Note: If patient does not return for scheduled/ recommended follow up visits, this note will serve as a discharge from care along with most recent update on progress.

## 2022-06-08 ENCOUNTER — HOSPITAL ENCOUNTER (OUTPATIENT)
Dept: PHYSICAL THERAPY | Age: 67
Setting detail: THERAPIES SERIES
Discharge: HOME | End: 2022-06-08
Payer: COMMERCIAL

## 2022-06-08 NOTE — FLOWSHEET NOTE
[facility],  [address]        Physical Therapy  Cancellation/No-show Note  Patient Name:  Mo Thomas  :  1955   Date:  2022  Cancelled visits to date: 1  No-shows to date: 0    For today's appointment patient:  [x]  Cancelled  []  Rescheduled appointment  []  No-show     Reason given by patient:  []  Patient ill  []  Conflicting appointment  []  No transportation    []  Conflict with work  []  No reason given  []  Other:     Comments:  Had eye surgery    Electronically signed by:  Francisca Alvarado, PT

## 2022-06-10 ENCOUNTER — HOSPITAL ENCOUNTER (OUTPATIENT)
Dept: PHYSICAL THERAPY | Age: 67
Setting detail: THERAPIES SERIES
Discharge: HOME | End: 2022-06-10
Payer: COMMERCIAL

## 2022-06-10 PROCEDURE — 97016 VASOPNEUMATIC DEVICE THERAPY: CPT | Performed by: PHYSICAL THERAPIST

## 2022-06-10 PROCEDURE — 97112 NEUROMUSCULAR REEDUCATION: CPT | Performed by: PHYSICAL THERAPIST

## 2022-06-10 PROCEDURE — 97110 THERAPEUTIC EXERCISES: CPT | Performed by: PHYSICAL THERAPIST

## 2022-06-10 NOTE — FLOWSHEET NOTE
[facility], [address] [place]  Phone: [phone]  Fax [phone]    Physical Therapy Treatment Note/ Progress Report:           Date:  6/10/2022    Patient Name:  Tiarra Patton    :  1955  MRN: [id number]  Restrictions/Precautions:    Medical/Treatment Diagnosis Information:  · Diagnosis: left quad tendon revision repair on 22  · Treatment Diagnosis:left knee effusion M25.462 left knee weakness M62.82    gait abnormality r26.9,  difficulty walking U60.4  Insurance/Certification information:  PT Insurance Information: PT Insurance Information: BCBS_  0 copay 80/20 coins    PT/OT/ST 60 visits,  No auth  Physician Information:  Dr. Lillian Telles  Has the plan of care been signed (Y/N):        []  Yes  [x]  No     Date of Patient follow up with Physician: Dr. Lillian Telles 22      Is this a Progress Report:     []  Yes  [x]  No        If Yes:  Date Range for reporting period:  Beginning 22  Ending 22    Progress report will be due (10 Rx or 30 days whichever is less): 64      Recertification will be due (POC Duration  / 90 days whichever is less): 22     Visit # Insurance Allowable Auth Required   In-person 5 47 left    (13 used previously/600 []  Yes [x]  No    Telehealth   []  Yes []  No    Total          Functional Scale: FOTO left leg 52%    Date assessed:  22      Therapy Diagnosis/Practice Pattern: 4I     Number of Comorbidities:  []0     [x]1-2    []3+    Latex Allergy:  [x]NO      []YES  Preferred Language for Healthcare:   [x]English       []other:      Pain level:  0-2/10     SUBJECTIVE:  Pt had eye surgery, and he is not able to bend forward. Eyes are very swollen. Pt reports that swelling occurs in both legs when he wakes up, but it resolves on the right. Pt is concerned by inability to raise leg without lag.      OBJECTIVE:    Observation: : moderate effusion in lower leg   Test measurements:       Test used Initial score  5/23/22 Current Score  6/1/22   Pain Summary VAS 0-2/10 0-2/10   Functional questionnaire FOTO leg 52 n/a   ROM Knee flex 92 heelslide 110    ext 0 0               Strength Quad set Good - Good -   girth Mid pat 52.8 cm 48 cm    7cm prox to mid pat 54.5 cm 52 cm    20 cm distal to mid pat 43 cm 42.5 cm       RESTRICTIONS/PRECAUTIONS:  Revision left quad tendon repair left leg on 4/8/22. He is ok to ambulate in TROM brace open to 0-90 deg per MD.    Exercises/Interventions:     Therapeutic Ex (01589) Sets/sec Reps Notes/CUES   Bike  X 5 min     Ankle pumps 10x  HEP   longsit gastroc stretch with strap hep    hep   P hep   Weight shifts 10sec  10x hep   HR/ T      Mini squat 30 deg  15 x        Hip machine abd/ext 45#  2x10 each    Seated HS s :20 3x     Seated HS iso :05 X 20     Seated HS curl BL X 30    Seated soleus press 10# 2 x 10     Manual Intervention (41089)      Pat mobs, effluerage STM, PROM, HS stretch, GS stretch   5 min    Edema MA  5 min                            NMR re-education (78436)   CUES NEEDED   NMES Quad set  6 min    NMES  TKE BL 3 min    NMES mini squat  3 min                                  Therapeutic Activity (06180)                                            Access Code: [email or web address]  URL: [email or web address]  Date: 05/23/2022  Prepared by: Vero Quant    Exercises  Seated Ankle Pumps - 4 x daily - 7 x weekly - 30 reps  Long Sitting Calf Stretch with Strap - 2 x daily - 7 x weekly - 5 reps - 30 hold  Sitting Heel Slide with Towel - 2-4 x daily - 7 x weekly - 10 reps - 10 hold  Quad Set - 3 x daily - 7 x weekly - 3 sets - 10 reps - 10 hold  Side to Side Weight Shift with Counter Support - 1-2 x daily - 7 x weekly - 10 reps - 10 hold        Therapeutic Exercise and NMR EXR  [x] (44986) Provided verbal/tactile cueing for activities related to strengthening, flexibility, endurance, ROM for improvements in LE, proximal hip, and core control with self care, mobility, lifting, ambulation.  [] (20922) Provided verbal/tactile cueing for activities related to improving balance, coordination, kinesthetic sense, posture, motor skill, proprioception  to assist with LE, proximal hip, and core control in self care, mobility, lifting, ambulation and eccentric single leg control. NMR and Therapeutic Activities:    [] (16913 or 48451) Provided verbal/tactile cueing for activities related to improving balance, coordination, kinesthetic sense, posture, motor skill, proprioception and motor activation to allow for proper function of core, proximal hip and LE with self care and ADLs  [] (09499) Gait Re-education- Provided training and instruction to the patient for proper LE, core and proximal hip recruitment and positioning and eccentric body weight control with ambulation re-education including up and down stairs     Home Exercise Program:    [x] (12125) Reviewed/Progressed HEP activities related to strengthening, flexibility, endurance, ROM of core, proximal hip and LE for functional self-care, mobility, lifting and ambulation/stair navigation   [] (46739)Reviewed/Progressed HEP activities related to improving balance, coordination, kinesthetic sense, posture, motor skill, proprioception of core, proximal hip and LE for self care, mobility, lifting, and ambulation/stair navigation      Manual Treatments:  PROM / STM / Oscillations-Mobs:  G-I, II, III, IV (PA's, Inf., Post.)  [x] (74407) Provided manual therapy to mobilize LE, proximal hip and/or LS spine soft tissue/joints for the purpose of modulating pain, promoting relaxation,  increasing ROM, reducing/eliminating soft tissue swelling/inflammation/restriction, improving soft tissue extensibility and allowing for proper ROM for normal function with self care, mobility, lifting and ambulation. Modalities:  Declined   [x] GAME READY (VASO)- for significant edema, swelling, pain control. Charges  Timed Code Treatment Minutes: 45   Total Treatment Minutes: 60       [] EVAL (LOW) 40086   [] EVAL (MOD) 91750  [] EVAL (HIGH) 66930   [] RE-EVAL     [x] RODRÍGUEZ(07235) x 2   [] IONTO  [x] NMR (84947) x  1   [x] VASO  [] Manual (47822) x      [] Other:  [] TA x      [] Mech Traction (48384)  [] ES(attended) (73124)      [] ES (un) (64848):       GOALS: GOALS:  Patient stated goal: increase strength of left leg  [] Progressing: [] Met: [] Not Met: [] Adjusted    Therapist goals for Patient:   Short Term Goals: To be achieved in: 2 weeks  1. Independent in HEP and progression per patient tolerance, in order to prevent re-injury. [] Progressing: [] Met: [] Not Met: [] Adjusted  2. Patient will have a decrease in pain to facilitate improvement in movement, function, and ADLs as indicated by Functional Deficits. [] Progressing: [] Met: [] Not Met: [] Adjusted    Long Term Goals: To be achieved in: 8 weeks  1. Disability index score of 66% or more per FOTO to assist with reaching prior level of function. [] Progressing: [] Met: [] Not Met: [] Adjusted  2. Patient will demonstrate increased AROM to 0-110 to allow for proper joint functioning as indicated by patients Functional Deficits. [] Progressing: [] Met: [] Not Met: [] Adjusted  3. Patient will demonstrate an increase in Strength to good proximal hip strength and control, within 5lb HHD in LE to allow for proper functional mobility as indicated by patients Functional Deficits. [] Progressing: [] Met: [] Not Met: [] Adjusted  4. Patient will return to normal gait functional activities without increased symptoms or restriction. [] Progressing: [] Met: [] Not Met: [] Adjusted  5. Patient will be able to ambulate reciprocally up and down a flight of stairs    - Progressing: - Met: - Not Met: - Adjusted     Progression Towards Functional goals:  [] Patient is progressing as expected towards functional goals listed.     [] Progression is slowed due to complexities listed. [] Progression has been slowed due to co-morbidities. [x] Plan just implemented, too soon to assess goals progression  [] Other:         Overall Progression Towards Functional goals/ Treatment Progress Update:  [] Patient is progressing as expected towards functional goals listed. [] Progression is slowed due to complexities/Impairments listed. [] Progression has been slowed due to co-morbidities. [x] Plan just implemented, too soon to assess goals progression <30days   [] Goals require adjustment due to lack of progress  [] Patient is not progressing as expected and requires additional follow up with physician  [] Other    Prognosis for POC: [x] Good [] Fair  [] Poor      Patient requires continued skilled intervention: [x] Yes  [] No    Treatment/Activity Tolerance:  [x] Patient able to complete treatment  [] Patient limited by fatigue  [] Patient limited by pain    [] Patient limited by other medical complications  [] Other:     ASSESSMENT: Pt still has extensive ext lag with SLR. Did not even attempt SLR due to excessive swelling in lower leg. Encouraged pt to focus every hour on quad setting. Consider compression socks. Utilized VASO to address lower leg edema                    PLAN: Cont PT 1-2x/week for 8 weeks  [] Continue per plan of care [] Alter current plan (see comments above)  [x] Plan of care initiated [] Hold pending MD visit [] Discharge      Electronically signed by:  Lorena Adams PT    Note: If patient does not return for scheduled/ recommended follow up visits, this note will serve as a discharge from care along with most recent update on progress.

## 2022-06-15 ENCOUNTER — HOSPITAL ENCOUNTER (OUTPATIENT)
Dept: PHYSICAL THERAPY | Age: 67
Setting detail: THERAPIES SERIES
Discharge: HOME | End: 2022-06-15
Payer: COMMERCIAL

## 2022-06-15 PROCEDURE — 97016 VASOPNEUMATIC DEVICE THERAPY: CPT | Performed by: PHYSICAL THERAPIST

## 2022-06-15 PROCEDURE — 97112 NEUROMUSCULAR REEDUCATION: CPT | Performed by: PHYSICAL THERAPIST

## 2022-06-15 PROCEDURE — 97110 THERAPEUTIC EXERCISES: CPT | Performed by: PHYSICAL THERAPIST

## 2022-06-15 NOTE — FLOWSHEET NOTE
[facility], [address] [place]  Phone: [phone]  Fax [phone]    Physical Therapy Treatment Note/ Progress Report:           Date:  6/15/2022    Patient Name:  Pau Young    :  1955  MRN: [id number]  Restrictions/Precautions:    Medical/Treatment Diagnosis Information:  · Diagnosis: left quad tendon revision repair on 22  · Treatment Diagnosis:left knee effusion M25.462 left knee weakness M62.82    gait abnormality r26.9,  difficulty walking Y76.1  Insurance/Certification information:  PT Insurance Information: PT Insurance Information: BCBS_  0 copay 80/20 coins    PT/OT/ST 60 visits,  No auth  Physician Information:  Dr. Keith Reason  Has the plan of care been signed (Y/N):        []  Yes  [x]  No     Date of Patient follow up with Physician: Dr. Inna Tobias 22      Is this a Progress Report:     []  Yes  [x]  No        If Yes:  Date Range for reporting period:  Beginning 22  Ending 22    Progress report will be due (10 Rx or 30 days whichever is less): 43      Recertification will be due (POC Duration  / 90 days whichever is less): 22     Visit # Insurance Allowable Auth Required   In-person 6 47 left    (13 used previously/600 []  Yes [x]  No    Telehealth   []  Yes []  No    Total          Functional Scale: FOTO left leg 52%    Date assessed:  22      Therapy Diagnosis/Practice Pattern: 4I     Number of Comorbidities:  []0     [x]1-2    []3+    Latex Allergy:  [x]NO      []YES  Preferred Language for Healthcare:   [x]English       []other:      Pain level:  0-2/10     SUBJECTIVE:  Pt had eye surgery, and he is not able to bend forward. Eyes are very swollen. Pt reports that swelling occurs in both legs when he wakes up, but it resolves on the right. Pt is concerned by inability to raise leg without lag.      OBJECTIVE:    Observation: : moderate effusion in lower leg   Test measurements:       Test used Initial score  5/23/22 Current Score  6/1/22   Pain Summary VAS 0-2/10 0-2/10   Functional questionnaire FOTO leg 52 n/a   ROM Knee flex 92 heelslide 110    ext 0 0               Strength Quad set Good - Good -   girth Mid pat 52.8 cm 48 cm    7cm prox to mid pat 54.5 cm 52 cm    20 cm distal to mid pat 43 cm 42.5 cm       RESTRICTIONS/PRECAUTIONS:  Revision left quad tendon repair left leg on 4/8/22. He is ok to ambulate in TROM brace open to 0-90 deg per MD.    Exercises/Interventions:     Therapeutic Ex (24642) Sets/sec Reps Notes/CUES   Bike  X 5 min     Ankle pumps 10x  HEP   longsit gastroc stretch with strap hep   Supine hip add ISO  :05 X 10  hep   SL hip abd  2 x 10     Supine heel slides  X 15  hep   Weight shifts 10sec  10x hep   HR/ T      Mini squat 30 deg  15 x        Hip machine abd/ext 45#  2x10 each    Seated HS s :20 3x     Seated HS iso :05 X 20     Seated HS curl BL X 30    Seated soleus press 10# 2 x 10     Manual Intervention (88360)                                    NMR re-education (80131)   CUES NEEDED   NMES Quad set  6 min    NMES  TKE BL 3 min    NMES mini squat  3 min                                  Therapeutic Activity (01629)                                            Access Code: [email or web address]  URL: [email or web address]  Date: 05/23/2022  Prepared by: Roseline Blakely    Exercises  Seated Ankle Pumps - 4 x daily - 7 x weekly - 30 reps  Long Sitting Calf Stretch with Strap - 2 x daily - 7 x weekly - 5 reps - 30 hold  Sitting Heel Slide with Towel - 2-4 x daily - 7 x weekly - 10 reps - 10 hold  Quad Set - 3 x daily - 7 x weekly - 3 sets - 10 reps - 10 hold  Side to Side Weight Shift with Counter Support - 1-2 x daily - 7 x weekly - 10 reps - 10 hold        Therapeutic Exercise and NMR EXR  [x] (68252) Provided verbal/tactile cueing for activities related to strengthening, flexibility, endurance, ROM for improvements in LE, proximal hip, and core control with self care, mobility, lifting, ambulation.  [] (09554) Provided verbal/tactile cueing for activities related to improving balance, coordination, kinesthetic sense, posture, motor skill, proprioception  to assist with LE, proximal hip, and core control in self care, mobility, lifting, ambulation and eccentric single leg control. NMR and Therapeutic Activities:    [] (74342 or 97990) Provided verbal/tactile cueing for activities related to improving balance, coordination, kinesthetic sense, posture, motor skill, proprioception and motor activation to allow for proper function of core, proximal hip and LE with self care and ADLs  [] (18622) Gait Re-education- Provided training and instruction to the patient for proper LE, core and proximal hip recruitment and positioning and eccentric body weight control with ambulation re-education including up and down stairs     Home Exercise Program:    [x] (71149) Reviewed/Progressed HEP activities related to strengthening, flexibility, endurance, ROM of core, proximal hip and LE for functional self-care, mobility, lifting and ambulation/stair navigation   [] (05478)Reviewed/Progressed HEP activities related to improving balance, coordination, kinesthetic sense, posture, motor skill, proprioception of core, proximal hip and LE for self care, mobility, lifting, and ambulation/stair navigation      Manual Treatments:  PROM / STM / Oscillations-Mobs:  G-I, II, III, IV (PA's, Inf., Post.)  [x] (29983) Provided manual therapy to mobilize LE, proximal hip and/or LS spine soft tissue/joints for the purpose of modulating pain, promoting relaxation,  increasing ROM, reducing/eliminating soft tissue swelling/inflammation/restriction, improving soft tissue extensibility and allowing for proper ROM for normal function with self care, mobility, lifting and ambulation. Modalities:  Declined   [x] GAME READY (VASO)- for significant edema, swelling, pain control. Charges  Timed Code Treatment Minutes: 45   Total Treatment Minutes: 60       [] EVAL (LOW) 20234   [] EVAL (MOD) 27256  [] EVAL (HIGH) 50919   [] RE-EVAL     [x] EH(01748) x 2   [] IONTO  [x] NMR (05705) x  1   [x] VASO  [] Manual (74743) x      [] Other:  [] TA x      [] Mech Traction (70071)  [] ES(attended) (73228)      [] ES (un) (59143):       GOALS: GOALS:  Patient stated goal: increase strength of left leg  [] Progressing: [] Met: [] Not Met: [] Adjusted    Therapist goals for Patient:   Short Term Goals: To be achieved in: 2 weeks  1. Independent in HEP and progression per patient tolerance, in order to prevent re-injury. [] Progressing: [] Met: [] Not Met: [] Adjusted  2. Patient will have a decrease in pain to facilitate improvement in movement, function, and ADLs as indicated by Functional Deficits. [] Progressing: [] Met: [] Not Met: [] Adjusted    Long Term Goals: To be achieved in: 8 weeks  1. Disability index score of 66% or more per FOTO to assist with reaching prior level of function. [] Progressing: [] Met: [] Not Met: [] Adjusted  2. Patient will demonstrate increased AROM to 0-110 to allow for proper joint functioning as indicated by patients Functional Deficits. [] Progressing: [] Met: [] Not Met: [] Adjusted  3. Patient will demonstrate an increase in Strength to good proximal hip strength and control, within 5lb HHD in LE to allow for proper functional mobility as indicated by patients Functional Deficits. [] Progressing: [] Met: [] Not Met: [] Adjusted  4. Patient will return to normal gait functional activities without increased symptoms or restriction. [] Progressing: [] Met: [] Not Met: [] Adjusted  5. Patient will be able to ambulate reciprocally up and down a flight of stairs    - Progressing: - Met: - Not Met: - Adjusted     Progression Towards Functional goals:  [] Patient is progressing as expected towards functional goals listed.     [] Progression is slowed due to complexities listed. [] Progression has been slowed due to co-morbidities. [x] Plan just implemented, too soon to assess goals progression  [] Other:         Overall Progression Towards Functional goals/ Treatment Progress Update:  [] Patient is progressing as expected towards functional goals listed. [] Progression is slowed due to complexities/Impairments listed. [] Progression has been slowed due to co-morbidities. [x] Plan just implemented, too soon to assess goals progression <30days   [] Goals require adjustment due to lack of progress  [] Patient is not progressing as expected and requires additional follow up with physician  [] Other    Prognosis for POC: [x] Good [] Fair  [] Poor      Patient requires continued skilled intervention: [x] Yes  [] No    Treatment/Activity Tolerance:  [x] Patient able to complete treatment  [] Patient limited by fatigue  [] Patient limited by pain    [] Patient limited by other medical complications  [] Other:     ASSESSMENT: Pt reports that he has not been doing HEP much at home with upcoming move. Encouraged pt to focus every hour on quad setting. Consider compression socks. Utilized VASO to address lower leg edema. HEP was updated. Trying to focus on HS and soleus to avoid hyperextension                     PLAN: Cont PT 1-2x/week for 8 weeks  [] Continue per plan of care [] Alter current plan (see comments above)  [x] Plan of care initiated [] Hold pending MD visit [] Discharge      Electronically signed by:  Priscilla Sorto PT    Note: If patient does not return for scheduled/ recommended follow up visits, this note will serve as a discharge from care along with most recent update on progress.

## 2022-06-17 ENCOUNTER — HOSPITAL ENCOUNTER (OUTPATIENT)
Dept: PHYSICAL THERAPY | Age: 67
Setting detail: THERAPIES SERIES
Discharge: HOME | End: 2022-06-17
Payer: COMMERCIAL

## 2022-06-17 ENCOUNTER — TELEPHONE (OUTPATIENT)
Dept: ORTHOPEDIC SURGERY | Age: 67
End: 2022-06-17

## 2022-06-17 PROCEDURE — 97110 THERAPEUTIC EXERCISES: CPT | Performed by: PHYSICAL THERAPIST

## 2022-06-17 PROCEDURE — 97112 NEUROMUSCULAR REEDUCATION: CPT | Performed by: PHYSICAL THERAPIST

## 2022-06-17 PROCEDURE — 97016 VASOPNEUMATIC DEVICE THERAPY: CPT | Performed by: PHYSICAL THERAPIST

## 2022-06-17 NOTE — FLOWSHEET NOTE
[facility],  [address] [place]  Phone: [phone]  Fax [phone]    Physical Therapy Treatment Note/ Progress Report:           Date:  2022    Patient Name:  Hannah Cano    :  1955  MRN: [id number]  Restrictions/Precautions:    Medical/Treatment Diagnosis Information:  · Diagnosis: left quad tendon revision repair on 22  · Treatment Diagnosis:left knee effusion M25.462 left knee weakness M62.82    gait abnormality r26.9,  difficulty walking A56.6  Insurance/Certification information:  PT Insurance Information: PT Insurance Information: BCBS_  0 copay 80/20 coins    PT/OT/ST 60 visits,  No auth  Physician Information:  Dr. Alfred Marie  Has the plan of care been signed (Y/N):        []  Yes  [x]  No     Date of Patient follow up with Physician: Dr. Alfred Marie 22      Is this a Progress Report:     []  Yes  [x]  No        If Yes:  Date Range for reporting period:  Beginning 22  Ending 22    Progress report will be due (10 Rx or 30 days whichever is less): 60      Recertification will be due (POC Duration  / 90 days whichever is less): 22     Visit # Insurance Allowable Auth Required   In-person 7 47 left    (13 used previously/600 []  Yes [x]  No    Telehealth   []  Yes []  No    Total          Functional Scale: FOTO left leg 52%    Date assessed:  22      Therapy Diagnosis/Practice Pattern: 4I     Number of Comorbidities:  []0     [x]1-2    []3+    Latex Allergy:  [x]NO      []YES  Preferred Language for Healthcare:   [x]English       []other:      Pain level:  0-2/10     SUBJECTIVE:  Pt did not call Doc yet. He is traveling to [place] [address]. Ankle remains significantly swollen.      OBJECTIVE:    Observation: : moderate effusion in lower leg   Test measurements:       Test used Initial score  22 Current Score  22   Pain Summary VAS 0-2/10 0-2/10 Functional questionnaire FOTO leg 52 n/a   ROM Knee flex 92 heelslide 110    ext 0 0               Strength Quad set Good - Good -   girth Mid pat 52.8 cm 48 cm    7cm prox to mid pat 54.5 cm 52 cm    20 cm distal to mid pat 43 cm 42.5 cm       RESTRICTIONS/PRECAUTIONS:  Revision left quad tendon repair left leg on 4/8/22. He is ok to ambulate in TROM brace open to 0-90 deg per MD.    Exercises/Interventions:     Therapeutic Ex (54911) Sets/sec Reps Notes/CUES   Bike  X 5 min     Ankle pumps 10x  HEP   longsit gastroc stretch with strap hep   Supine hip add ISO  :05 X 20  hep   SL hip abd  2 x 10     Supine heel slides  X 15  hep   Weight shifts 10sec  10x hep   HR/ T      Mini squat 30 deg  15 x        Hip machine abd/ext 45#  2x10 each    Seated HS s :20 3x     Seated HS iso :05 X 20     Seated HS curl BL X 30    Seated soleus press 10# 2 x 10     Manual Intervention (36416)                                    NMR re-education (25162)   CUES NEEDED   NMES Quad set  6 min    NMES  TKE BL 3 min    NMES mini squat  3 min                                  Therapeutic Activity (65489)                                            Access Code: [email or web address]  URL: [email or web address]  Date: 05/23/2022  Prepared by: Yasmany Osborne    Exercises  Seated Ankle Pumps - 4 x daily - 7 x weekly - 30 reps  Long Sitting Calf Stretch with Strap - 2 x daily - 7 x weekly - 5 reps - 30 hold  Sitting Heel Slide with Towel - 2-4 x daily - 7 x weekly - 10 reps - 10 hold  Quad Set - 3 x daily - 7 x weekly - 3 sets - 10 reps - 10 hold  Side to Side Weight Shift with Counter Support - 1-2 x daily - 7 x weekly - 10 reps - 10 hold        Therapeutic Exercise and NMR EXR  [x] (71212) Provided verbal/tactile cueing for activities related to strengthening, flexibility, endurance, ROM for improvements in LE, proximal hip, and core control with self care, mobility, lifting, ambulation.  [] (38199) Provided verbal/tactile cueing for activities related to improving balance, coordination, kinesthetic sense, posture, motor skill, proprioception  to assist with LE, proximal hip, and core control in self care, mobility, lifting, ambulation and eccentric single leg control. NMR and Therapeutic Activities:    [] (26780 or 50706) Provided verbal/tactile cueing for activities related to improving balance, coordination, kinesthetic sense, posture, motor skill, proprioception and motor activation to allow for proper function of core, proximal hip and LE with self care and ADLs  [] (13190) Gait Re-education- Provided training and instruction to the patient for proper LE, core and proximal hip recruitment and positioning and eccentric body weight control with ambulation re-education including up and down stairs     Home Exercise Program:    [x] (91577) Reviewed/Progressed HEP activities related to strengthening, flexibility, endurance, ROM of core, proximal hip and LE for functional self-care, mobility, lifting and ambulation/stair navigation   [] (89824)Reviewed/Progressed HEP activities related to improving balance, coordination, kinesthetic sense, posture, motor skill, proprioception of core, proximal hip and LE for self care, mobility, lifting, and ambulation/stair navigation      Manual Treatments:  PROM / STM / Oscillations-Mobs:  G-I, II, III, IV (PA's, Inf., Post.)  [x] (92292) Provided manual therapy to mobilize LE, proximal hip and/or LS spine soft tissue/joints for the purpose of modulating pain, promoting relaxation,  increasing ROM, reducing/eliminating soft tissue swelling/inflammation/restriction, improving soft tissue extensibility and allowing for proper ROM for normal function with self care, mobility, lifting and ambulation. Modalities:  Declined   [x] GAME READY (VASO)- for significant edema, swelling, pain control.      Charges  Timed Code Treatment Minutes: 45   Total Treatment Minutes: 60       [] EVAL (LOW) 07291   [] EVAL (MOD) 08078  [] EVAL (HIGH) 83628   [] RE-EVAL     [x] ZK(47593) x 2   [] IONTO  [x] NMR (34369) x  1   [x] VASO  [] Manual (14634) x      [] Other:  [] TA x      [] Mech Traction (24835)  [] ES(attended) (27927)      [] ES (un) (82306):       GOALS: GOALS:  Patient stated goal: increase strength of left leg  [] Progressing: [] Met: [] Not Met: [] Adjusted    Therapist goals for Patient:   Short Term Goals: To be achieved in: 2 weeks  1. Independent in HEP and progression per patient tolerance, in order to prevent re-injury. [] Progressing: [] Met: [] Not Met: [] Adjusted  2. Patient will have a decrease in pain to facilitate improvement in movement, function, and ADLs as indicated by Functional Deficits. [] Progressing: [] Met: [] Not Met: [] Adjusted    Long Term Goals: To be achieved in: 8 weeks  1. Disability index score of 66% or more per FOTO to assist with reaching prior level of function. [] Progressing: [] Met: [] Not Met: [] Adjusted  2. Patient will demonstrate increased AROM to 0-110 to allow for proper joint functioning as indicated by patients Functional Deficits. [] Progressing: [] Met: [] Not Met: [] Adjusted  3. Patient will demonstrate an increase in Strength to good proximal hip strength and control, within 5lb HHD in LE to allow for proper functional mobility as indicated by patients Functional Deficits. [] Progressing: [] Met: [] Not Met: [] Adjusted  4. Patient will return to normal gait functional activities without increased symptoms or restriction. [] Progressing: [] Met: [] Not Met: [] Adjusted  5. Patient will be able to ambulate reciprocally up and down a flight of stairs    - Progressing: - Met: - Not Met: - Adjusted     Progression Towards Functional goals:  [] Patient is progressing as expected towards functional goals listed. [] Progression is slowed due to complexities listed. [] Progression has been slowed due to co-morbidities.   [x] Plan just implemented, too soon to assess goals progression  [] Other:         Overall Progression Towards Functional goals/ Treatment Progress Update:  [] Patient is progressing as expected towards functional goals listed. [] Progression is slowed due to complexities/Impairments listed. [] Progression has been slowed due to co-morbidities. [x] Plan just implemented, too soon to assess goals progression <30days   [] Goals require adjustment due to lack of progress  [] Patient is not progressing as expected and requires additional follow up with physician  [] Other    Prognosis for POC: [x] Good [] Fair  [] Poor      Patient requires continued skilled intervention: [x] Yes  [] No    Treatment/Activity Tolerance:  [x] Patient able to complete treatment  [] Patient limited by fatigue  [] Patient limited by pain    [] Patient limited by other medical complications  [] Other:     ASSESSMENT:  VASO helps to temporarily reduce swelling. Still unable to perform SLR                    PLAN: Cont PT 1-2x/week for 8 weeks  [x] Continue per plan of care [] Alter current plan (see comments above)  [] Plan of care initiated [] Hold pending MD visit [] Discharge      Electronically signed by:  Bertha Corea PT    Note: If patient does not return for scheduled/ recommended follow up visits, this note will serve as a discharge from care along with most recent update on progress.

## 2022-06-17 NOTE — TELEPHONE ENCOUNTER
General Question     Subject: PATIENT'S THERAPIST CONCERNED WITH THE SWELLING IN L ANKLE.   Patient: Sofia Janell  Contact Number: [phone]

## 2022-06-21 NOTE — TELEPHONE ENCOUNTER
L/M FOR KARISSA  I apologized for the delay in the return call. I asked him to contact the office regarding his message so that we may further discuss his symptoms.

## 2022-06-27 ENCOUNTER — TELEPHONE (OUTPATIENT)
Dept: ORTHOPEDIC SURGERY | Age: 67
End: 2022-06-27

## 2022-06-27 ENCOUNTER — OFFICE VISIT (OUTPATIENT)
Dept: ORTHOPEDIC SURGERY | Age: 67
End: 2022-06-27

## 2022-06-27 VITALS — HEIGHT: 70 IN | RESPIRATION RATE: 16 BRPM | BODY MASS INDEX: 38.14 KG/M2 | WEIGHT: 266.4 LBS

## 2022-06-27 DIAGNOSIS — S76.112D RUPTURE OF LEFT QUADRICEPS TENDON, SUBSEQUENT ENCOUNTER: Primary | ICD-10-CM

## 2022-06-27 PROCEDURE — 99024 POSTOP FOLLOW-UP VISIT: CPT | Performed by: ORTHOPAEDIC SURGERY

## 2022-06-27 NOTE — TELEPHONE ENCOUNTER
The patient call back and stated he need to get the US order fax back from Dr. Taylor Schneider over to Dr. Richy Solitario so he can get schedule for His US. Please Advise.

## 2022-06-27 NOTE — PROGRESS NOTES
History:  Rebeca Bernard is here for follow up after left quadriceps tendon repair. Surgery date was 4/8/22. Pain is controlled without analgesics. The patient's pain is rated at 0/10. He has been weightbearing as tolerated in the T scope brace locked at full extension. He has been doing PT at the [facility]. They have concerns about him not progressing as expected, and still having extensor lag. He denies any new injury. Physical Examination:  Resp 16   Ht 5' 10\" (1.778 m)   Wt 266 lb 6.4 oz (120.8 kg)   BMI 38.22 kg/m²   Patient is awake, alert, and in no acute distress. Incision are healed. Today, I cannot tell if there is a defect at the central aspect of his quad tendon again. I can feel the edges medially and laterally. He does have a knee effusion, and some anterior knee swelling. Assessment:   2 1/2 months status post bilateral quadriceps tendon repair      Plan:   I discussed with him, that I still have suspicion of either retear or failure to heal.  We need to evaluate the integrity of his repair. We will see if my partner Dr. Sonja Guevara is able to evaluate the quadriceps tendon via ultrasound. If not, then we need to repeat MRI. We will have him advance his weightbearing in the T scope brace unlocked from 0-90 degrees    Hold PT for now. Ice/heat as needed. Refill pain medications as needed. No NSAIDs. Follow-up after imaging. Indy Garza. Lenny Akins MD  Orthopaedic Surgery and Sports Medicine     Disclaimer: This note was generated with use of a verbal recognition program (DRAGON) and an attempt was made to check for errors. It is possible that there are still dictated errors within this office note. If so, please bring any significant errors to my attention for an addendum. All efforts were made to ensure that this office note is accurate.

## 2022-06-28 NOTE — TELEPHONE ENCOUNTER
Adarsh Gardiner the patient call back and is now ready to schedule his Ultrasound he just need a call back regarding this matter. Please Advise.

## 2022-07-06 ENCOUNTER — OFFICE VISIT (OUTPATIENT)
Dept: ORTHOPEDIC SURGERY | Age: 67
End: 2022-07-06
Payer: COMMERCIAL

## 2022-07-06 DIAGNOSIS — S76.112D QUADRICEPS TENDON RUPTURE, LEFT, SUBSEQUENT ENCOUNTER: Primary | ICD-10-CM

## 2022-07-06 PROCEDURE — 99203 OFFICE O/P NEW LOW 30 MIN: CPT | Performed by: STUDENT IN AN ORGANIZED HEALTH CARE EDUCATION/TRAINING PROGRAM

## 2022-07-06 PROCEDURE — 1124F ACP DISCUSS-NO DSCNMKR DOCD: CPT | Performed by: STUDENT IN AN ORGANIZED HEALTH CARE EDUCATION/TRAINING PROGRAM

## 2022-07-13 ENCOUNTER — OFFICE VISIT (OUTPATIENT)
Dept: ORTHOPEDIC SURGERY | Age: 67
End: 2022-07-13
Payer: COMMERCIAL

## 2022-07-13 VITALS — HEIGHT: 70 IN | BODY MASS INDEX: 38.08 KG/M2 | WEIGHT: 266 LBS

## 2022-07-13 DIAGNOSIS — S76.112D QUADRICEPS TENDON RUPTURE, LEFT, SUBSEQUENT ENCOUNTER: Primary | ICD-10-CM

## 2022-07-13 PROCEDURE — G8417 CALC BMI ABV UP PARAM F/U: HCPCS | Performed by: ORTHOPAEDIC SURGERY

## 2022-07-13 PROCEDURE — G8427 DOCREV CUR MEDS BY ELIG CLIN: HCPCS | Performed by: ORTHOPAEDIC SURGERY

## 2022-07-13 PROCEDURE — 1124F ACP DISCUSS-NO DSCNMKR DOCD: CPT | Performed by: ORTHOPAEDIC SURGERY

## 2022-07-13 PROCEDURE — 99214 OFFICE O/P EST MOD 30 MIN: CPT | Performed by: ORTHOPAEDIC SURGERY

## 2022-07-13 PROCEDURE — 3017F COLORECTAL CA SCREEN DOC REV: CPT | Performed by: ORTHOPAEDIC SURGERY

## 2022-07-13 PROCEDURE — 1036F TOBACCO NON-USER: CPT | Performed by: ORTHOPAEDIC SURGERY

## 2022-07-13 NOTE — LETTER
Surgery Scheduling Form:    Patient Name:  Oni Maloney  Patient :  1955     Patient MR#:  [id number]    Patient Address:  [facility] [address] [place] [address]    Surgeon:  Jonathan Chaves. Elizabeth Mobley M.D.    PCP:  Alise Wills MD  Insurance:    Payor/Plan Subscr  Sub. Ins. ID Effective Group Num   1. MEDICARE - Clau Sheehanome 1955 [id number]    2. [address] -* Shahrzad Cole 1955 [id number]     Diagnosis:  Left quadriceps tendon tear S76.112D    Operation:  Left quadriceps tendon revision repair, possible allograft augmentation [id number]    Location:  [facility]  Surgeon's Scheduling Instruction:  elective  Requested Date:  2022 OR Time: 9:10am   *Time Change*      Patient Arrival Time:  7:10am  OR Time Required:  90  Minutes    Anesthesia:  General  Surgical Assistant required:  Yes   Equipment:  Arthrex, have achilles allograft available  Mini C-Arm:  No Standard C-Arm:  No  Status:  Outpatient    PAT Required:  Yes    Comments: 2 week postop  History and Physical: Dr. Elizabeth Mobley will perform H+P the day of surgery  Covid: As of 3/23/2022: no test needed if symptom free  Additional Clearance: no             Jonathan Chaves. Elizabeth Mobley M.D.  22 8:49 AM EDT                                                          Pre-Admission Testing Order Set   In accordance with our formulary system, a generic equivalent drug may be dispensed and administered unless a D.A. W. is written with the medication order  All orders without checkboxes will processed automatically unless crossed out. Orders with checkboxes MUST be checked in order to be carried out.     Oni Maloney                                                        1955  Date of Procedure/Surgery:2022 Left quadriceps tendon revision repair, possible allograft augmentation  1. H & P for ALL procedure/surgery completed within 30 days, to be updated day of procedure/surgery  a. to be completed by  [place] the day of the procedure  2. [ ]Consults: PT/OT evaluation  3. [X ]Defer to Anesthesia for Pre-Admission testing orders  4. Diagnostic Tests:  [ ]EKG (if not completed in last 3 months) IF: (check all that apply)   Other:  [ Sravan Burnham (if not completed in last 6 months) IF: (check all that apply)   Hx Malignancy   Hx CVS/Thoracic Surg   CVS Disease   Hx Pneumonia last 3 months   Chronic Pulm Disease  Other:  [ ]Radiology   Knee Right Left Standing AP knee, hip to ankle on scoliosis film Other:  5. Labs  [ ]Basic Metabolic Profile  IF: (check all that apply)  [ ]Albumin    Other:     ________________________________________________________________  [ ]CBC without diff   Pre op exam      ________________________________________________________________  [ ]PT/INR  PTT   Pre op exam         ________________________________________________________________  [ ]Type & Screen   Surg with potiential for signif. blood loss  [ ]Type & cross match 2 units         ________________________________________________________________  [ ]Urine routine reflex to culture (UAR) If cultures positive, repeat on                  admission [ Nils Jay catch urine  [ ]Nasal culture for MRSA   [ ]Nasal MRSA culture  ________________________________________________________________  6. [ ]Other:    TO: __________________________________ Date: _______ Time:_________    Physician Signature:         7/14/2022    1:17 PM       Pre-operative Physician Prophylaxis Orders    Cinderella Mode  1955  All orders without checkboxes will be processed automatically unless crossed out. Orders with checkboxes MUST be checked in order to be carried out. 1. Allergies: Bee venom  2. Procedure: Left quadriceps tendon revision repair, possible allograft augmentation       Ht Readings from Last 1 Encounters:   07/13/22 5' 10\" (1.778 m)       Wt Readings from Last 1 Encounters:   07/13/22 266 lb (120.7 kg)   4.    [ ] DVT Prophylaxis-Contraindication (Do not give)  Allergy to heparin Currently on anticoagulation  Not indicated, low risk patient  Thrombocytopenia Admitted for bleeding diagnosis Surgery or invasive procedure  Origen.Coil ] Sequential Compression Boots to unaffected or bilateral extremities  [ ] Venous Compression Hose (BHUPENDRA hose) to unaffected or bilateral extremities        Knee high  [ ] Heparin 5,000 units subcutaneously 1-2 hours pre op into the thigh opposite of operative site  5.   [ ] Beta Blocker  Patient to take beta blocker the morning of surgery with a sip of water as prescribed at home  Other:  6. Origen.Coil ] Antimicrobial Prophylaxis (Consensus Guideline Recommendations) for       S.C.I. P. procedures  All antibiotics to be initiated 30 minutes pre-op (prior to leaving pre-op hold area/ACU) EXCEPT: Vancomycin and Ciprofloxacin. Initiate Vancomycin and Ciprofloxacin 2 hours pre-op. For combination antibiotic orders, start Ciprofloxacin first, then start second antibiotic ordered. In house patients, send pre-op antibiotics with patient to pre-op hold, do not initiate.   Surgical Procedure Routine pre-op Antibiotic  Penicillin or Cephalosporin Allergy  Orthopaedic  X Cefazolin (Ancef) 2 gm IVPB x1 X Clindamycin 900 mg IVPB x1    or     If > 80 kg Cefazolin 2 gm IVPB x1 Vancomycin 1 gm IVPB x1  Approved indications for Vancomycin:  MRSA related chronic wound dialysis  Other antibiotic to be given:  TO: ________________________________Date: ____________ Time: _______  Physician Signature: Date: 7/14/2022      Time: 1:17 PM

## 2022-07-13 NOTE — PROGRESS NOTES
History:  Shawn Devlin is here for follow up after left quadriceps tendon repair. Surgery date was 4/8/22. Pain is controlled without analgesics. The patient's pain is rated at 0/10. He saw Dr. Brent Wells last week and underwent ultrasound exam of his quadriceps tendon. There does appear to be another tear of his quadriceps tendon with either rerupture or nonhealing. Physical Examination:  There were no vitals taken for this visit. Patient is awake, alert, and in no acute distress. Assessment:   3 months status post left quadriceps tendon revision repair, with retear versus nonhealing      Plan:   I discussed with him, that unfortunately it does appear that he still has either nonhealing or retear. I had an extensive discussion with Mr. Shawn Devlin and/or family regarding the natural history, etiology, and long term consequences of his condition. I have presented reasonable alternatives to the patient's proposed care, treatment, and services. I have outlined a treatment plan with them and, in my opinion, surgical intervention is indicated at this time. Discussion I have had encompassed risks, benefits, and side effects related to the alternatives and the risks related to not receiving the proposed care, treatment, and services. I have discussed the potential complications (including, but not limited to injury to nerve or blood vessel, infection, bleeding, DVT or PE, stiffness, incomplete pain relief, need for further surgery, and anesthetic complications), limitations, expectations, alternatives, and risks of the surgical procedure. We also discussed the importance of postoperative rehabilitation. He has had full opportunity to ask his questions. I have answered them all to his satisfaction. I feel that Mr. Shawn Devlin understands our discussion today and he will provide written informed consent for the procedure.       Plan for left quadriceps tendon revision repair, with possible allograft augmentation. I will perform his H&P on the day of surgery. Helena Gusman. Sulema Brown MD  Orthopaedic Surgery and Sports Medicine     Disclaimer: This note was generated with use of a verbal recognition program (DRAGON) and an attempt was made to check for errors. It is possible that there are still dictated errors within this office note. If so, please bring any significant errors to my attention for an addendum. All efforts were made to ensure that this office note is accurate.

## 2022-07-13 NOTE — LETTER
Your surgery has been scheduled with Dr. Sylvia Galicia. DATE OF SURGERY:      7/22/2022     ARRIVAL TIME:      10:45am     TIME OF SURGERY:      12:55pm     LOCATION: [address] [place] [address].[facility] [place] [address]    **PLEASE NOTE: Due to medical conditions, your surgery time is subject to change. If this is the case, you will receive a call from the hospital or clinic staff to notify you.**    Report to Surgery Registration, which is located in the main lobby of the surgery center. PLEASE DO NOT EAT OR DRINK ANYTHING AFTER MIDNIGHT THE NIGHT BEFORE YOUR SURGERY. YOU WILL ALSO NEED TO HAVE A . POST-OP APPOINTMENT: 8/10/2022 at 10:45am at the [facility] will need to make an appointment with your family physician so he/she can do a pre-operative history and physical.  The history & physical cannot be completed more than 30 days prior to surgery. Please have the family physician fax the completed form to [facility]) at the number on your packet. Your pre-operative instructions and history and physical forms are included in this folder. Due to the [address] pandemic you will need to be tested prior to your surgery, unless otherwise noted. This test will need to be negative by the date of surgery. This may be required regardless of vaccination status. The hospital also requires several routine tests that will be done the day of your surgery. Because your surgery is scheduled as an outpatient procedure it will be necessary to have a responsible person with you for transportation. Please review all information given to you prior to your surgery date. If there are any questions, please do not hesitate to call our office at [phone]. Dear Patient:    As a valued customer, we would like to thank you for choosing [facility] for your upcoming surgery/procedure.     CHECKLIST FOR SURGERY:    [] History & physical exam by your primary care physician within 30 days of your surgery date. [x] COVID testing is not currently required unless you develop symptoms. Should this be the case, you will need to contact our office immediately to discuss how to proceed. [x] No aspirin products or blood thinners for one week prior to surgery. This includes NSAIDs, such as Aleve, Advil, Ibuprofen, Motrin, Naproxen, etc.    **If you are on an anti-coagulant, such as Plavix, Brilinta, Warfarin, etc., please consult your prescribing provider regarding stopping this medication. [] Medical clearance by a cardiologist, pulmonologist, endocrinologist, oncologist, or other specialist(s) if you are under their care. Please contact their office to obtain the clearance needed based on their discretion. [x] Nothing to eat or drink after midnight the night before surgery. [x] You will need to have a  the day of surgery. [x] Inform office of any changes in insurance, address or phone numbers. [] Provide a copy of your advanced directive/durable power of /living will on the day of your surgery/procedure. ** You will receive at least two calls from the Hospital.  One will be from Registration to pre-register you and go over your address, phone number, and insurance information. You will also hear from the Pre-Admission testing nurses. They will want to get a brief medical history and also go over your list of medications.

## 2022-07-14 ENCOUNTER — TELEPHONE (OUTPATIENT)
Dept: ORTHOPEDIC SURGERY | Age: 67
End: 2022-07-14

## 2022-07-14 NOTE — TELEPHONE ENCOUNTER
CPT: [id number]  BODY PART: left quadricep  STATUS: outpatient  LOCATION: [place]  AUTHORIZATION: NPR    Medicare is primary, NPR

## 2022-07-18 RX ORDER — LANOLIN ALCOHOL/MO/W.PET/CERES
3 CREAM (GRAM) TOPICAL NIGHTLY PRN
COMMUNITY

## 2022-07-18 RX ORDER — OMEPRAZOLE 20 MG/1
20 CAPSULE, DELAYED RELEASE ORAL DAILY PRN
COMMUNITY

## 2022-07-18 RX ORDER — HYDROCHLOROTHIAZIDE 12.5 MG/1
12.5 TABLET ORAL DAILY
COMMUNITY

## 2022-07-18 NOTE — PROGRESS NOTES
[facility] [facility] time_____0955_______        Surgery time_____1155_______    Take the following medications with a sip of water: Follow your MD/Surgeons pre-procedure instructions regarding your medications     Do not eat or drink anything after 12:00 midnight prior to your surgery. This includes water chewing gum, mints and ice chips. You may brush your teeth and gargle the morning of your surgery, but do not swallow the water     Please see your family doctor/pediatrician for a history and physical and/or concerning medications. Bring any test results/reports from your physicians office. If you are under the care of a heart doctor or specialist doctor, please be aware that you may be asked to them for clearance    You may be asked to stop blood thinners such as Coumadin, Plavix, Fragmin, Lovenox, etc., or any anti-inflammatories such as:  Aspirin, Ibuprofen, Advil, Naproxen prior to your surgery. We also ask that you stop any OTC medications such as fish oil, vitamin E, glucosamine, garlic, Multivitamins, COQ 10, etc.    We ask that you do not smoke 24 hours prior to surgery  We ask that you do not  drink any alcoholic beverages 24 hours prior to surgery     You must make arrangements for a responsible adult to take you home after your surgery. For your safety you will not be allowed to leave alone or drive yourself home. Your surgery will be cancelled if you do not have a ride home. Also for your safety, it is strongly suggested that someone stay with you the first 24 hours after your surgery. A parent or legal guardian must accompany a child scheduled for surgery and plan to stay at the hospital until the child is discharged. Please do not bring other children with you. For your comfort, please wear simple loose fitting clothing to the hospital.  Please do not bring valuables.     Do not wear any make-up or nail polish on your fingers or toes      For your safety, please do not wear any jewelry or body piercing's on the day of surgery. All jewelry must be removed. If you have dentures, they will be removed before going to operating room. For your convenience, we will provide you with a container. If you wear contact lenses or glasses, they will be removed, please bring a case for them. If you have a living will and a durable power of  for healthcare, please bring in a copy. As part of our patient safety program to minimize surgical site infections, we ask you to do the following:    Please notify your surgeon if you develop any illness between         now and the  day of your surgery. This includes a cough, cold, fever, sore throat, nausea,         or vomiting, and diarrhea, etc.   Please notify your surgeon if you experience dizziness, shortness         of breath or blurred vision between now and the time of your surgery. Do not shave your operative site 96 hours prior to surgery. For face and neck surgery, men may use an electric razor 48 hours   prior to surgery. You may shower the night before surgery or the morning of   your surgery with an antibacterial soap. You will need to bring a photo ID and insurance card    [facility] has an onsite pharmacy, would you like to utilize our pharmacy     If you will be staying overnight and use a C-pap machine, please bring   your C-pap to hospital     Our goal is to provide you with excellent care, therefore, visitors will be limited to two(2) in the room at a time so that we may focus on providing this care for you. Please contact pre-admission testing if you have any further questions. [facility] phone number:  [address] PAT fax number:  [phone]  Please note these are generalized instructions for all surgical cases, you may be provided with more specific instructions according to your surgery.     C-Difficile admission screening and protocol:       * Admitted with diarrhea? [] YES    [x]  NO     *Prior history of C-Diff. In last 3 months? [] YES    [x]  NO     *Antibiotic use in the past 6-8 weeks? [x]  NO    []  YES                 If yes, which ANTIBIOTIC AND REASON______     *Prior hospitalization or nursing home in the last month? []  YES    [x]  NO        SAFETY FIRST. .call before you fall

## 2022-07-19 ENCOUNTER — TELEPHONE (OUTPATIENT)
Dept: ORTHOPEDIC SURGERY | Age: 67
End: 2022-07-19

## 2022-07-19 NOTE — TELEPHONE ENCOUNTER
ORA/ADRIAN FOR KARISSA  New arrival time of 7:10am at [facility] on 7/22/2022. Requested return call or [employer] message confirming receipt. MyChart message also sent to patient.

## 2022-07-21 ENCOUNTER — ANESTHESIA EVENT (OUTPATIENT)
Dept: OPERATING ROOM | Age: 67
End: 2022-07-21
Payer: COMMERCIAL

## 2022-07-21 NOTE — H&P
History:  Nuris Galarza is here for left quadriceps tendon revision repair. Past Medical History:   Diagnosis Date    01 minute Apgar score 0     Anesthesia     pt stated with last surgery blood pressure elevated and had to stay overnight    Anxiety and depression     spouse passed 2 weeks ago    GERD (gastroesophageal reflux disease)     uses OTC medications    Hypertension     Kidney stone     Mitral valve prolapse     resolved on own    Pre-diabetes     Sarcoidosis     Sleep apnea     pt instruccted to bring Cpap machine    Spinal stenosis     Wears glasses     reading      Past Surgical History:   Procedure Laterality Date    BACK SURGERY  1992    inflamed nerve    BACK SURGERY  2012    nerve release    COLONOSCOPY  2015    LEG MUSCLE SURGERY Bilateral 2022    BILATERAL QUADRICEPS  TENDON REPAIR performed by aPnchito Kendall MD at [facility] Left 2022    LEFT REVISION QUADRICEPS TENDON REPAIR performed by Panchito Kendall MD at [place] [address]      No current facility-administered medications for this encounter. Current Outpatient Medications   Medication Sig Dispense Refill    melatonin 3 MG TABS tablet Take 3 mg by mouth nightly as needed      hydroCHLOROthiazide (HYDRODIURIL) 12.5 MG tablet Take 12.5 mg by mouth in the morning.       omeprazole (PRILOSEC) 20 MG delayed release capsule Take 20 mg by mouth daily as needed      GABAPENTIN PO Take 300 mg by mouth 3 times daily      amLODIPine (NORVASC) 10 MG tablet Take 1 tablet by mouth nightly 30 tablet 3    metoprolol succinate (TOPROL XL) 25 MG extended release tablet Take 1 tablet by mouth daily 30 tablet 3    ASPIRIN PO Take by mouth         Allergies   Allergen Reactions    Bee Venom Swelling       Family History   Problem Relation Age of Onset    High Blood Pressure Mother     Stroke Father        Social History     Socioeconomic History    Marital status:      Spouse name: Not on file    Number of

## 2022-07-21 NOTE — DISCHARGE INSTRUCTIONS
Weight bearing operative leg in knee brace locked at 0 degrees. Elevate operative leg as much as possible for first 3 days after surgery. Keep incision clean and dry. Do not take NSAIDS (Ibuprofen/Advil/Motrin or Aleve). Take medications only as prescribed. Follow up in office with Dr. Rohan Tuttle in 2 weeks. [phone].

## 2022-07-22 ENCOUNTER — ANESTHESIA (OUTPATIENT)
Dept: OPERATING ROOM | Age: 67
End: 2022-07-22
Payer: COMMERCIAL

## 2022-07-22 ENCOUNTER — HOSPITAL ENCOUNTER (OUTPATIENT)
Age: 67
Setting detail: OUTPATIENT SURGERY
Discharge: HOME | End: 2022-07-22
Attending: ORTHOPAEDIC SURGERY | Admitting: ORTHOPAEDIC SURGERY
Payer: COMMERCIAL

## 2022-07-22 VITALS
HEIGHT: 70 IN | DIASTOLIC BLOOD PRESSURE: 86 MMHG | HEART RATE: 73 BPM | SYSTOLIC BLOOD PRESSURE: 128 MMHG | BODY MASS INDEX: 38.08 KG/M2 | WEIGHT: 266 LBS | RESPIRATION RATE: 14 BRPM | OXYGEN SATURATION: 97 % | TEMPERATURE: 97 F

## 2022-07-22 DIAGNOSIS — S76.112D RUPTURE, TENDON, QUADRICEPS, LEFT, SUBSEQUENT ENCOUNTER: ICD-10-CM

## 2022-07-22 DIAGNOSIS — S76.112A QUADRICEPS TENDON RUPTURE, LEFT, INITIAL ENCOUNTER: Primary | ICD-10-CM

## 2022-07-22 PROCEDURE — 87116 MYCOBACTERIA CULTURE: CPT

## 2022-07-22 PROCEDURE — A4217 STERILE WATER/SALINE, 500 ML: HCPCS | Performed by: ORTHOPAEDIC SURGERY

## 2022-07-22 PROCEDURE — 7100000000 HC PACU RECOVERY - FIRST 15 MIN: Performed by: ORTHOPAEDIC SURGERY

## 2022-07-22 PROCEDURE — 6370000000 HC RX 637 (ALT 250 FOR IP): Performed by: ORTHOPAEDIC SURGERY

## 2022-07-22 PROCEDURE — 2580000003 HC RX 258: Performed by: ORTHOPAEDIC SURGERY

## 2022-07-22 PROCEDURE — 3700000000 HC ANESTHESIA ATTENDED CARE: Performed by: ORTHOPAEDIC SURGERY

## 2022-07-22 PROCEDURE — 3600000004 HC SURGERY LEVEL 4 BASE: Performed by: ORTHOPAEDIC SURGERY

## 2022-07-22 PROCEDURE — C1889 IMPLANT/INSERT DEVICE, NOC: HCPCS | Performed by: ORTHOPAEDIC SURGERY

## 2022-07-22 PROCEDURE — 2500000003 HC RX 250 WO HCPCS

## 2022-07-22 PROCEDURE — 7100000001 HC PACU RECOVERY - ADDTL 15 MIN: Performed by: ORTHOPAEDIC SURGERY

## 2022-07-22 PROCEDURE — 6360000002 HC RX W HCPCS: Performed by: ORTHOPAEDIC SURGERY

## 2022-07-22 PROCEDURE — 3600000014 HC SURGERY LEVEL 4 ADDTL 15MIN: Performed by: ORTHOPAEDIC SURGERY

## 2022-07-22 PROCEDURE — 87015 SPECIMEN INFECT AGNT CONCNTJ: CPT

## 2022-07-22 PROCEDURE — 87205 SMEAR GRAM STAIN: CPT

## 2022-07-22 PROCEDURE — 7100000011 HC PHASE II RECOVERY - ADDTL 15 MIN: Performed by: ORTHOPAEDIC SURGERY

## 2022-07-22 PROCEDURE — 87070 CULTURE OTHR SPECIMN AEROBIC: CPT

## 2022-07-22 PROCEDURE — 2500000003 HC RX 250 WO HCPCS: Performed by: ORTHOPAEDIC SURGERY

## 2022-07-22 PROCEDURE — 6360000002 HC RX W HCPCS

## 2022-07-22 PROCEDURE — 2580000003 HC RX 258

## 2022-07-22 PROCEDURE — 87075 CULTR BACTERIA EXCEPT BLOOD: CPT

## 2022-07-22 PROCEDURE — C1713 ANCHOR/SCREW BN/BN,TIS/BN: HCPCS | Performed by: ORTHOPAEDIC SURGERY

## 2022-07-22 PROCEDURE — 2709999900 HC NON-CHARGEABLE SUPPLY: Performed by: ORTHOPAEDIC SURGERY

## 2022-07-22 PROCEDURE — L1830 KO IMMOB CANVAS LONG PRE OTS: HCPCS | Performed by: ORTHOPAEDIC SURGERY

## 2022-07-22 PROCEDURE — 7100000010 HC PHASE II RECOVERY - FIRST 15 MIN: Performed by: ORTHOPAEDIC SURGERY

## 2022-07-22 PROCEDURE — 3700000001 HC ADD 15 MINUTES (ANESTHESIA): Performed by: ORTHOPAEDIC SURGERY

## 2022-07-22 PROCEDURE — 2580000003 HC RX 258: Performed by: ANESTHESIOLOGY

## 2022-07-22 PROCEDURE — 27386 REPAIR/GRAFT OF THIGH MUSCLE: CPT | Performed by: ORTHOPAEDIC SURGERY

## 2022-07-22 PROCEDURE — 87102 FUNGUS ISOLATION CULTURE: CPT

## 2022-07-22 PROCEDURE — 87206 SMEAR FLUORESCENT/ACID STAI: CPT

## 2022-07-22 DEVICE — GRAFT HUM TISS W40XL70MM THK1.5MM DECELLULARIZED DERM RM: Type: IMPLANTABLE DEVICE | Site: KNEE | Status: FUNCTIONAL

## 2022-07-22 DEVICE — ANCHOR SUT L14MM OD4.5MM BIOCOMP TWO NO 2 FIBERWIRE 4 NDL: Type: IMPLANTABLE DEVICE | Site: KNEE | Status: FUNCTIONAL

## 2022-07-22 RX ORDER — LABETALOL HYDROCHLORIDE 5 MG/ML
INJECTION, SOLUTION INTRAVENOUS PRN
Status: DISCONTINUED | OUTPATIENT
Start: 2022-07-22 | End: 2022-07-22 | Stop reason: SDUPTHER

## 2022-07-22 RX ORDER — OXYCODONE HYDROCHLORIDE AND ACETAMINOPHEN 5; 325 MG/1; MG/1
1 TABLET ORAL EVERY 4 HOURS PRN
Qty: 40 TABLET | Refills: 0 | Status: SHIPPED | OUTPATIENT
Start: 2022-07-22 | End: 2022-07-29

## 2022-07-22 RX ORDER — BUPIVACAINE HYDROCHLORIDE 5 MG/ML
INJECTION, SOLUTION EPIDURAL; INTRACAUDAL
Status: COMPLETED | OUTPATIENT
Start: 2022-07-22 | End: 2022-07-22

## 2022-07-22 RX ORDER — CEPHALEXIN 500 MG/1
500 CAPSULE ORAL 3 TIMES DAILY
Qty: 15 CAPSULE | Refills: 0 | Status: SHIPPED | OUTPATIENT
Start: 2022-07-22 | End: 2022-11-02 | Stop reason: ALTCHOICE

## 2022-07-22 RX ORDER — METOPROLOL TARTRATE 5 MG/5ML
INJECTION INTRAVENOUS PRN
Status: DISCONTINUED | OUTPATIENT
Start: 2022-07-22 | End: 2022-07-22 | Stop reason: SDUPTHER

## 2022-07-22 RX ORDER — SODIUM CHLORIDE 0.9 % (FLUSH) 0.9 %
5-40 SYRINGE (ML) INJECTION PRN
Status: DISCONTINUED | OUTPATIENT
Start: 2022-07-22 | End: 2022-07-22 | Stop reason: HOSPADM

## 2022-07-22 RX ORDER — SODIUM CHLORIDE 0.9 % (FLUSH) 0.9 %
5-40 SYRINGE (ML) INJECTION EVERY 12 HOURS SCHEDULED
Status: DISCONTINUED | OUTPATIENT
Start: 2022-07-22 | End: 2022-07-22 | Stop reason: HOSPADM

## 2022-07-22 RX ORDER — MAGNESIUM HYDROXIDE 1200 MG/15ML
LIQUID ORAL CONTINUOUS PRN
Status: DISCONTINUED | OUTPATIENT
Start: 2022-07-22 | End: 2022-07-22 | Stop reason: HOSPADM

## 2022-07-22 RX ORDER — ONDANSETRON 2 MG/ML
INJECTION INTRAMUSCULAR; INTRAVENOUS PRN
Status: DISCONTINUED | OUTPATIENT
Start: 2022-07-22 | End: 2022-07-22 | Stop reason: SDUPTHER

## 2022-07-22 RX ORDER — SUCCINYLCHOLINE CHLORIDE 20 MG/ML
INJECTION INTRAMUSCULAR; INTRAVENOUS PRN
Status: DISCONTINUED | OUTPATIENT
Start: 2022-07-22 | End: 2022-07-22 | Stop reason: SDUPTHER

## 2022-07-22 RX ORDER — EPHEDRINE SULFATE/0.9% NACL/PF 50 MG/5 ML
SYRINGE (ML) INTRAVENOUS PRN
Status: DISCONTINUED | OUTPATIENT
Start: 2022-07-22 | End: 2022-07-22 | Stop reason: SDUPTHER

## 2022-07-22 RX ORDER — SODIUM CHLORIDE 9 MG/ML
INJECTION, SOLUTION INTRAVENOUS PRN
Status: DISCONTINUED | OUTPATIENT
Start: 2022-07-22 | End: 2022-07-22 | Stop reason: HOSPADM

## 2022-07-22 RX ORDER — LIDOCAINE HYDROCHLORIDE 10 MG/ML
INJECTION, SOLUTION EPIDURAL; INFILTRATION; INTRACAUDAL; PERINEURAL PRN
Status: DISCONTINUED | OUTPATIENT
Start: 2022-07-22 | End: 2022-07-22 | Stop reason: SDUPTHER

## 2022-07-22 RX ORDER — ROCURONIUM BROMIDE 10 MG/ML
INJECTION, SOLUTION INTRAVENOUS PRN
Status: DISCONTINUED | OUTPATIENT
Start: 2022-07-22 | End: 2022-07-22 | Stop reason: SDUPTHER

## 2022-07-22 RX ORDER — POVIDONE-IODINE 10 MG/G
OINTMENT TOPICAL
Status: COMPLETED | OUTPATIENT
Start: 2022-07-22 | End: 2022-07-22

## 2022-07-22 RX ORDER — ONDANSETRON 2 MG/ML
4 INJECTION INTRAMUSCULAR; INTRAVENOUS
Status: DISCONTINUED | OUTPATIENT
Start: 2022-07-22 | End: 2022-07-22 | Stop reason: HOSPADM

## 2022-07-22 RX ORDER — FENTANYL CITRATE 50 UG/ML
INJECTION, SOLUTION INTRAMUSCULAR; INTRAVENOUS PRN
Status: DISCONTINUED | OUTPATIENT
Start: 2022-07-22 | End: 2022-07-22 | Stop reason: SDUPTHER

## 2022-07-22 RX ORDER — PROPOFOL 10 MG/ML
INJECTION, EMULSION INTRAVENOUS PRN
Status: DISCONTINUED | OUTPATIENT
Start: 2022-07-22 | End: 2022-07-22 | Stop reason: SDUPTHER

## 2022-07-22 RX ORDER — SODIUM CHLORIDE 9 MG/ML
INJECTION, SOLUTION INTRAVENOUS CONTINUOUS PRN
Status: DISCONTINUED | OUTPATIENT
Start: 2022-07-22 | End: 2022-07-22 | Stop reason: SDUPTHER

## 2022-07-22 RX ADMIN — SODIUM CHLORIDE: 9 INJECTION, SOLUTION INTRAVENOUS at 07:43

## 2022-07-22 RX ADMIN — METOPROLOL TARTRATE 2.5 MG: 5 INJECTION INTRAVENOUS at 11:28

## 2022-07-22 RX ADMIN — HYDROMORPHONE HYDROCHLORIDE 0.25 MG: 1 INJECTION, SOLUTION INTRAMUSCULAR; INTRAVENOUS; SUBCUTANEOUS at 10:48

## 2022-07-22 RX ADMIN — Medication 10 MG: at 10:00

## 2022-07-22 RX ADMIN — ROCURONIUM BROMIDE 10 MG: 10 INJECTION INTRAVENOUS at 10:24

## 2022-07-22 RX ADMIN — ROCURONIUM BROMIDE 5 MG: 10 INJECTION INTRAVENOUS at 09:34

## 2022-07-22 RX ADMIN — FENTANYL CITRATE 100 MCG: 50 INJECTION INTRAMUSCULAR; INTRAVENOUS at 09:34

## 2022-07-22 RX ADMIN — SODIUM CHLORIDE: 9 INJECTION, SOLUTION INTRAVENOUS at 09:39

## 2022-07-22 RX ADMIN — PROPOFOL 200 MG: 10 INJECTION, EMULSION INTRAVENOUS at 09:36

## 2022-07-22 RX ADMIN — LABETALOL HYDROCHLORIDE 5 MG: 5 INJECTION, SOLUTION INTRAVENOUS at 10:50

## 2022-07-22 RX ADMIN — LIDOCAINE HYDROCHLORIDE 100 MG: 10 INJECTION, SOLUTION EPIDURAL; INFILTRATION; INTRACAUDAL; PERINEURAL at 09:34

## 2022-07-22 RX ADMIN — LABETALOL HYDROCHLORIDE 5 MG: 5 INJECTION, SOLUTION INTRAVENOUS at 10:45

## 2022-07-22 RX ADMIN — ONDANSETRON 4 MG: 2 INJECTION INTRAMUSCULAR; INTRAVENOUS at 10:28

## 2022-07-22 RX ADMIN — SUGAMMADEX 400 MG: 100 INJECTION, SOLUTION INTRAVENOUS at 11:30

## 2022-07-22 RX ADMIN — Medication 10 MG: at 09:50

## 2022-07-22 RX ADMIN — ROCURONIUM BROMIDE 45 MG: 10 INJECTION INTRAVENOUS at 09:40

## 2022-07-22 RX ADMIN — Medication 10 MG: at 10:25

## 2022-07-22 RX ADMIN — HYDROMORPHONE HYDROCHLORIDE 0.5 MG: 1 INJECTION, SOLUTION INTRAMUSCULAR; INTRAVENOUS; SUBCUTANEOUS at 10:55

## 2022-07-22 RX ADMIN — HYDROMORPHONE HYDROCHLORIDE 0.5 MG: 1 INJECTION, SOLUTION INTRAMUSCULAR; INTRAVENOUS; SUBCUTANEOUS at 09:44

## 2022-07-22 RX ADMIN — HYDROMORPHONE HYDROCHLORIDE 0.5 MG: 1 INJECTION, SOLUTION INTRAMUSCULAR; INTRAVENOUS; SUBCUTANEOUS at 10:35

## 2022-07-22 RX ADMIN — Medication 10 MG: at 10:12

## 2022-07-22 RX ADMIN — LABETALOL HYDROCHLORIDE 10 MG: 5 INJECTION, SOLUTION INTRAVENOUS at 10:54

## 2022-07-22 RX ADMIN — SUCCINYLCHOLINE CHLORIDE 160 MG: 20 INJECTION, SOLUTION INTRAMUSCULAR; INTRAVENOUS at 09:35

## 2022-07-22 RX ADMIN — Medication 3000 MG: at 09:45

## 2022-07-22 RX ADMIN — SODIUM CHLORIDE: 9 INJECTION, SOLUTION INTRAVENOUS at 09:30

## 2022-07-22 RX ADMIN — HYDROMORPHONE HYDROCHLORIDE 0.25 MG: 1 INJECTION, SOLUTION INTRAMUSCULAR; INTRAVENOUS; SUBCUTANEOUS at 11:20

## 2022-07-22 ASSESSMENT — ENCOUNTER SYMPTOMS: SHORTNESS OF BREATH: 0

## 2022-07-22 ASSESSMENT — PAIN SCALES - GENERAL
PAINLEVEL_OUTOF10: 0
PAINLEVEL_OUTOF10: 0

## 2022-07-22 ASSESSMENT — PAIN DESCRIPTION - ORIENTATION: ORIENTATION: LEFT

## 2022-07-22 ASSESSMENT — PAIN DESCRIPTION - LOCATION: LOCATION: LEG

## 2022-07-22 ASSESSMENT — PAIN - FUNCTIONAL ASSESSMENT: PAIN_FUNCTIONAL_ASSESSMENT: 0-10

## 2022-07-22 NOTE — ANESTHESIA POSTPROCEDURE EVALUATION
Department of Anesthesiology  Postprocedure Note    Patient: Gabby Quinones  MRN: [id number]  YOB: 1955  Date of evaluation: 7/22/2022      Procedure Summary     Date: 07/22/22 Room / Location: [address]    Anesthesia Start: 0930 Anesthesia Stop: 4702    Procedure: LEFT QUADRICEPS [address], DERMAL ALLOGRAFT (Bilateral: Knee) Diagnosis:       Rupture, tendon, quadriceps, left, subsequent encounter      (LEFT QUADRICEPS TENDON TEAR)    Surgeons: Mariza Paredes MD Responsible Provider: Fernando Funez MD    Anesthesia Type: general ASA Status: 3          Anesthesia Type: No value filed.     Marcio Phase I: Marcio Score: 10    Marcio Phase II: Marcio Score: 10      Anesthesia Post Evaluation    Patient location during evaluation: PACU  Patient participation: complete - patient participated  Level of consciousness: awake and alert  Airway patency: patent  Nausea & Vomiting: no nausea and no vomiting  Complications: no  Cardiovascular status: hemodynamically stable  Respiratory status: acceptable  Hydration status: stable

## 2022-07-22 NOTE — PROGRESS NOTES
Pt to phase 2 from pacu. A/ox4, denies pain of any kind. Sensation intact. Able to move all extremities, given snack.

## 2022-07-22 NOTE — PROGRESS NOTES
To pacu from OR. PT asleep with oral airway in place. Dressing to left leg dry and intact. Ice to left leg. Pedal pulses palpable. Left leg knee immobilizer in use. IV infusing. Monitor in sinus rhythm.

## 2022-07-22 NOTE — OP NOTE
[address] [facility]                                OPERATIVE REPORT    PATIENT NAME: Brianne Beckford                    :        1955  MED REC NO:   [id number]                          ROOM:  ACCOUNT NO:   [de-identified]                           ADMIT DATE: 2022  PROVIDER:     Sherlean Blizzard, MD    DATE OF PROCEDURE:  2022    PREOPERATIVE DIAGNOSIS:  Left quadriceps tendon retear. POSTOPERATIVE DIAGNOSIS:  Left quadriceps tendon retear. OPERATION PERFORMED:  Revision left quadriceps tendon repair with  dermal allograft augmentation. SURGEON:  Sherlean Blizzard, MD    ASSISTANT:  Tiffani Gaytan    ANESTHESIA:  General.    EBL:  Minimal.    COMPLICATIONS:  None. DISPOSITION:  To PACU in good condition. INDICATIONS:  The patient is a 54-year-old gentleman who initially  sustained bilateral quadriceps tendon rupture and underwent initial  repair early in . He had uneventful recovery of his right  quadriceps tendon; however, he had noted extensor lag and questionable  history of injury or minor trauma with physical therapy and was  diagnosed with a left quadriceps tendon retear. He underwent revision  repair on 2022. Postoperatively, he was kept immobilized in full  extension for six weeks and then eventually started on physical therapy. Physical Therapy noted still continued extensor lag afterwards and then  with a possible recurrent gap in the superior pole of the patella. He  was sent to my partner, Dr. Tawny Ballesteros, who performed a diagnostic  ultrasound in the office and suspected a recurrent tear with a gap seen  on the ultrasound. We thus discussed continued nonoperative treatment  and the long-term natural history of this versus another attempt at  revision repair. He elected to proceed with revision repair.   After  discussing the risks, benefits, complications, and alternatives to the  procedure, he did subsequently provide written informed consent for the  procedure. OPERATIVE PROCEDURE:  The patient was seen in the preoperative holding  area. His left leg was marked. He was seen by anesthesia service. He  was then brought to the operating room. He was transferred onto the  operating room table in supine position. He was induced under general  anesthesia. He received prophylactic preoperative IV antibiotics. He  had DVT prophylaxis with sequential compression devices on his  nonoperative lower extremity. A well-padded tourniquet was placed along  his left proximal thigh. His left leg was then sterilely prepped and  draped in the usual fashion. A time-out was taken where the patient,  the operative extremity, and the operative procedure were once again  verified. We then exsanguinated the left leg with Esmarch bandage. Tourniquet was  inflated to 300 mmHg. We then made an incision through his prior  incision in the midline of the knee. Sharp dissection was carried down  through the skin and subcutaneous tissue. We then encountered scar  overlying the superior pole of the patella and the quadriceps tendon. We could feel what appeared to be a palpable gap behind some of that  scar tissue and once this was incised, we could visualize that there was  no healing of the quadriceps tendon back down to the superior pole of  the patella in the central aspect. Previously, it did appear to be a  U-shaped tear. Again, both the medial and lateral aspects of the tendon  appeared to be intact and seemed to be more a longitudinal split. I  could not mobilize any central tissue back down to the superior pole of  the patella. I thus elected to try to do a side-to-side repair and then  to try to get some healing at the superior pole of the patella. We did debride the superior pole of the patella with a rongeur, trying  to remove any foreign tissue.   I abraded the superior pole of the  patella to try to get a good bony healing surface with a rongeur as well  as a rasp. We then placed an Arthrex Corkscrew anchor in the center of  the patella. This was double loaded with FiberWire. I then ran one  limb of FiberWire suture up and down both sides of the tendon in a  whipstitch fashion. This was to try to give us some support to prevent  any retraction of the tendon itself. We then closed side-to-side using  a #5 FiberWire also and making our throws outside of the whipstitch to  create an almost rip-stop type configuration between the sutures to  reinforce our repair. We then used the second limb of suture that was  loaded within the anchor to try to close the remaining gap at the very  distal aspect of the tendon at the superior pole of the patella. Given his prior failure with revision repair, I thus elected to proceed  with using human dermal allograft as an augmentation to his tendon to  try to help thicken the tendon and promote further healing. This was  sewed on top of the tendon as well as the patella using 2-0 FiberWire as  well as 0 Vicryl to mat this down onto the tendon itself. We had good  apposition of the dermal allograft onto the tendon following the  suturing. I then placed the knee through flexion and extension, which  demonstrated no gapping of the tendon and good stability of our dermal  allograft on top of the tendon. We then copiously irrigated the wound with normal saline solution. We  closed subcutaneously with 2-0 Vicryl suture in simple interrupted  fashion with 2-0 Vicryls and then the skin was closed with staples. Xeroform gauze, 4x4 gauze, occlusive dressing, sterile Webril, and Ace  wrap were then applied and the patient was placed in a knee immobilizer. He was then awoken from anesthesia, transferred onto his hospital cart  and transported to the PACU for recovery.   He tolerated the procedure  well and without any complications. PLAN:  The patient will be recovered in the PACU and then be discharged  home. He is weightbearing as tolerated in the knee immobilizer, keeping  his knee fully extended at all times. He will follow up in the office  in two weeks for wound check as well as to remove the staples. He will  be in the knee immobilizer or a knee brace locked at full extension for  six weeks with no movement at all. We will start him on physical  therapy at six weeks afterwards and just slowly advancing him with knee  flexion, 15 degrees per week as tolerated. We will not start  strengthening on his knee until at least three months postoperatively.         Aleyda Joiner MD    D: 07/22/2022 11:46:23       T: 07/22/2022 13:28:08     [email or web address]  Job#: [id number]     Doc#: [id number]    CC:

## 2022-07-22 NOTE — ANESTHESIA PRE PROCEDURE
[facility] Department of Anesthesiology  Pre-Anesthesia Evaluation/Consultation       Name:  Katina Polanco  : 1955  Age:  75 y.o.                                            MRN:  [id number]  Date: 2022           Surgeon: Surgeon(s):  Vanna Oliver MD    Procedure: Procedure(s):  LEFT QUADRICEPS TENDON REVISION REPAIR, POSSIBLE ALLOGRAFT AUGMENTATION     Allergies   Allergen Reactions    Bee Venom Swelling     Patient Active Problem List   Diagnosis    Quadriceps tendon rupture, left, initial encounter    Quadriceps tendon rupture, right, initial encounter    HTN (hypertension)    Rupture of distal quadriceps tendon    Pre-diabetes    Hypertensive urgency    Hypertensive emergency     Past Medical History:   Diagnosis Date    01 minute Apgar score 0     Anesthesia     pt stated with last surgery blood pressure elevated and had to stay overnight    Anxiety and depression     spouse passed 2 weeks ago    GERD (gastroesophageal reflux disease)     uses OTC medications    Hypertension     Kidney stone     Mitral valve prolapse     resolved on own    Pre-diabetes     Sarcoidosis     Sleep apnea     pt instruccted to bring Cpap machine    Spinal stenosis     Wears glasses     reading     Past Surgical History:   Procedure Laterality Date    BACK SURGERY  1992    inflamed nerve    BACK SURGERY  2012    nerve release    COLONOSCOPY  2015    LEG MUSCLE SURGERY Bilateral 2022    BILATERAL QUADRICEPS  TENDON REPAIR performed by Vanna Oliver MD at [facility] Left 2022    LEFT REVISION QUADRICEPS TENDON REPAIR performed by Vanna Oliver MD at [place] [address] History     Tobacco Use    Smoking status: Never    Smokeless tobacco: Never   Vaping Use    Vaping Use: Never used   Substance Use Topics    Alcohol use: Not Currently     Comment: 2 x week    Drug use: Never     Medications  No current facility-administered medications on file prior to encounter. Current Outpatient Medications on File Prior to Encounter   Medication Sig Dispense Refill    melatonin 3 MG TABS tablet Take 3 mg by mouth nightly as needed      hydroCHLOROthiazide (HYDRODIURIL) 12.5 MG tablet Take 12.5 mg by mouth in the morning.       omeprazole (PRILOSEC) 20 MG delayed release capsule Take 20 mg by mouth daily as needed      GABAPENTIN PO Take 300 mg by mouth 3 times daily      amLODIPine (NORVASC) 10 MG tablet Take 1 tablet by mouth nightly 30 tablet 3    metoprolol succinate (TOPROL XL) 25 MG extended release tablet Take 1 tablet by mouth daily 30 tablet 3    ASPIRIN PO Take by mouth       Current Facility-Administered Medications   Medication Dose Route Frequency Provider Last Rate Last Admin    sodium chloride flush 0.9 % injection 5-40 mL  5-40 mL IntraVENous 2 times per day Richard Duke MD        sodium chloride flush 0.9 % injection 5-40 mL  5-40 mL IntraVENous PRN Richard Duke MD        0.9 % sodium chloride infusion   IntraVENous PRN Richard Duke  mL/hr at 22 0743 New Bag at 22    ceFAZolin (ANCEF) 3000 mg in dextrose 5 % 100 mL IVPB  3,000 mg IntraVENous Once Lilly West MD         Vital Signs (Current)   Vitals:    22 0949 22   BP:  123/78   Pulse:  70   Resp:  18   Temp:  98 °F (36.7 °C)   TempSrc:  Temporal   SpO2:  98%   Weight: 266 lb (120.7 kg)    Height: 5' 10\" (1.778 m)                                             Vital Signs Statistics (for past 48 hrs)     Temp  Av °F (36.7 °C)  Min: 98 °F (36.7 °C)   Min taken time: 22  Max: 98 °F (36.7 °C)   Max taken time: 22  Pulse  Av  Min: 79   Min taken time: 22  Max: 79   Max taken time: 22  Resp  Av  Min: 25   Min taken time: 22  Max: 18   Max taken time: 22  BP  Min: 123/78   Min taken time: 22  Max: 123/78   Max taken time: 22  SpO2  Av % Min: 98 %   Min taken time: 07/22/22 0737  Max: 98 %   Max taken time: 07/22/22 0737  BP Readings from Last 3 Encounters:   07/22/22 123/78   04/09/22 113/74   04/08/22 102/67       BMI  Body mass index is 38.17 kg/m². Estimated body mass index is 38.17 kg/m² as calculated from the following:    Height as of this encounter: 5' 10\" (1.778 m). Weight as of this encounter: 266 lb (120.7 kg). CBC   Lab Results   Component Value Date/Time    WBC 7.9 04/08/2022 09:57 PM    RBC 6.17 04/08/2022 09:57 PM    HGB 14.7 04/08/2022 09:57 PM    HCT 46.1 04/08/2022 09:57 PM    MCV 74.7 04/08/2022 09:57 PM    RDW 14.0 04/08/2022 09:57 PM     04/08/2022 09:57 PM     CMP    Lab Results   Component Value Date/Time     04/09/2022 04:54 AM    K 4.0 04/09/2022 04:54 AM     04/09/2022 04:54 AM    CO2 24 04/09/2022 04:54 AM    BUN 12 04/09/2022 04:54 AM    CREATININE 0.9 04/09/2022 04:54 AM    GFRAA >60 04/09/2022 04:54 AM    AGRATIO 1.5 04/08/2022 09:57 PM    LABGLOM >60 04/09/2022 04:54 AM    GLUCOSE 131 04/09/2022 04:54 AM    PROT 6.3 04/09/2022 04:54 AM    CALCIUM 8.8 04/09/2022 04:54 AM    BILITOT 0.6 04/09/2022 04:54 AM    ALKPHOS 64 04/09/2022 04:54 AM    AST 18 04/09/2022 04:54 AM    ALT 13 04/09/2022 04:54 AM     BMP    Lab Results   Component Value Date/Time     04/09/2022 04:54 AM    K 4.0 04/09/2022 04:54 AM     04/09/2022 04:54 AM    CO2 24 04/09/2022 04:54 AM    BUN 12 04/09/2022 04:54 AM    CREATININE 0.9 04/09/2022 04:54 AM    CALCIUM 8.8 04/09/2022 04:54 AM    GFRAA >60 04/09/2022 04:54 AM    LABGLOM >60 04/09/2022 04:54 AM    GLUCOSE 131 04/09/2022 04:54 AM     POCGlucose  No results for input(s): GLUCOSE in the last 72 hours.    [facility]    Lab Results   Component Value Date/Time    PROTIME 12.2 01/04/2022 06:22 PM    INR 1.08 01/04/2022 06:22 PM    APTT 22.5 01/04/2022 06:22 PM     HCG (If Applicable) No results found for: PREGTESTUR, PREGSERUM, HCG, HCGQUANT   ABGs No results found for: PHART, PO2ART, MCV3YKM, MSS2RZW, BEART, Q2RYEEPB   Type & Screen (If Applicable)  No results found for: LABABO, LABRH                         BMI: Wt Readings from Last 3 Encounters:       NPO Status:   Date of last liquid consumption: 07/22/22   Time of last liquid consumption: 2300   Date of last solid food consumption: 07/22/22      Time of last solid consumption: 2300       Anesthesia Evaluation  Patient summary reviewed and Nursing notes reviewed  Airway: Mallampati: III  TM distance: >3 FB   Neck ROM: full  Mouth opening: > = 3 FB   Dental:          Pulmonary:   (+) sleep apnea: on CPAP,      (-) COPD, asthma and shortness of breath                           Cardiovascular:    (+) hypertension:, valvular problems/murmurs: MVP,     (-) past MI, CAD, CABG/stent, dysrhythmias,  angina and no hyperlipidemia                Neuro/Psych:   (+) psychiatric history:depression/anxiety    (-) seizures, TIA and CVA           GI/Hepatic/Renal:   (+) GERD:, renal disease: kidney stones,      (-) PUD and liver disease       Endo/Other:        (-) diabetes mellitus               Abdominal:             Vascular: negative vascular ROS. Other Findings:           Anesthesia Plan      general     ASA 3     (I discussed with the patient the risks and benefits of PIV, general anesthesia, IV Narcotics, PACU. All questions were answered the patient agrees with the plan.)  Induction: intravenous. Anesthetic plan and risks discussed with patient. Plan discussed with CRNA. This pre-anesthesia assessment may be used as a history and physical.    DOS STAFF ADDENDUM:    Pt seen and examined, chart reviewed (including anesthesia, drug and allergy history). No interval changes to history and physical examination. Anesthetic plan, risks, benefits, alternatives, and personnel involved discussed with patient. Patient verbalized an understanding and agrees to proceed.       Shyam Cohen MD  July 22, 2022  8:33 AM

## 2022-07-22 NOTE — BRIEF OP NOTE
Brief Postoperative Note      Patient: Grace Blanco  YOB: 1955  MRN: [id number]    Date of Procedure: 7/22/2022    Pre-Op Diagnosis: LEFT QUADRICEPS TENDON TEAR    Post-Op Diagnosis: SAME       Procedure(s):  REVISION LEFT QUADRICEPS TENDON REPAIR WITH DERMAL ALLOGRAFT AUGMENTATION    Surgeon(s):  Ed Soto MD    Assistant:  Surgical Assistant: Kenyatta Olivarez RN    Anesthesia: General    Estimated Blood Loss (mL): Minimal    Complications: None    Specimens:   ID Type Source Tests Collected by Time Destination   1 : 1. LEFT QUAD TENDON AND PATELLA CULTURE SWAB Specimen Joint, Knee CULTURE, FUNGUS, CULTURE, SURGICAL, CULTURE WITH SMEAR, ACID FAST BACILLIUS, CULTURE, ANAEROBIC AND AEROBIC (Canceled) Ed Soto MD 7/22/2022 1007        Implants:  Implant Name Type Inv.  Item Serial No.  Lot No. LRB No. Used Action   ANCHOR SUT L14MM OD4.5MM BIOCOMP TWO NO 2 FIBERWIRE 4 NDL - [id number]  ANCHOR SUT L14MM OD4.5MM BIOCOMP TWO NO 2 FIBERWIRE 4 NDL  [employer] [facility] [id number] Left 1 Implanted   GRAFT HUM TISS [id number] THK1.5MM DECELLULARIZED DERM [facility] [id number]  GRAFT HUM TISS [id number] THK1.5MM DECELLULARIZED DERM [facility] TISSUE BANK-WD  Left 1 Implanted         Drains: * No LDAs found *      Electronically signed by Ed Soto MD on 7/22/2022 at 11:36 AM

## 2022-08-10 ENCOUNTER — OFFICE VISIT (OUTPATIENT)
Dept: ORTHOPEDIC SURGERY | Age: 67
End: 2022-08-10

## 2022-08-10 VITALS — WEIGHT: 266 LBS | RESPIRATION RATE: 16 BRPM | BODY MASS INDEX: 38.08 KG/M2 | HEIGHT: 70 IN

## 2022-08-10 DIAGNOSIS — M79.89 LEFT LEG SWELLING: ICD-10-CM

## 2022-08-10 DIAGNOSIS — S76.112D QUADRICEPS TENDON RUPTURE, LEFT, SUBSEQUENT ENCOUNTER: Primary | ICD-10-CM

## 2022-08-10 PROCEDURE — 99024 POSTOP FOLLOW-UP VISIT: CPT | Performed by: ORTHOPAEDIC SURGERY

## 2022-08-10 NOTE — PROGRESS NOTES
History:  Shawn Devlin is here for follow up after left revision quadriceps tendon repair with dermal allograft augmentation. Surgery date was 7/22/22. Pain is controlled with current analgesics. Medication(s) being used: Percocet. The patient's pain is rated at 0/10. Post op problems reported: none. Physical Examination:  Resp 16   Ht 5' 10\" (1.778 m)   Wt 266 lb (120.7 kg)   BMI 38.17 kg/m²    Patient is awake, alert, and in no acute distress. Dressing / incision clean, dry, intact. He is in a knee immobilizer. It was sitting somewhat low. He has significant swelling in his foot. He has some mild swelling in his calf. He has no tenderness in his calf/negative Homans. Surgical cultures: No growth to date. Assessment:   2 weeks status post left vision quadriceps tendon repair with dermal allograft augmentation  Left foot severe swelling      Plan:   He has significant swelling in his foot and slight swelling in his leg. We will obtain a Doppler ultrasound of his leg to evaluate for DVT. Elevate left leg. As we removed the staples and placed the Steri-Strips. He was moving his knee around and doing straight leg raises. I discussed with him that I did not want him doing this as this is placing stress on the repair site. This makes me question what he has been doing previously. Weightbearing as tolerated in knee immobilizer/knee brace. He can switch into the knee brace that he has at home. We will likely keep him in the knee brace locked at full extension for 6-8 weeks. Ice to knee as needed. Refill pain medications as needed. No NSAIDs. Follow up in 4 weeks for evaluation of progress or prn if problems. Ezra Rain MD  Orthopaedic Surgery and Sports Medicine     Disclaimer: This note was generated with use of a verbal recognition program (DRAGON) and an attempt was made to check for errors.   It is possible that there are still dictated errors within this office note. If so, please bring any significant errors to my attention for an addendum. All efforts were made to ensure that this office note is accurate.

## 2022-08-11 ENCOUNTER — TELEPHONE (OUTPATIENT)
Dept: ORTHOPEDIC SURGERY | Age: 67
End: 2022-08-11

## 2022-08-11 ENCOUNTER — HOSPITAL ENCOUNTER (OUTPATIENT)
Dept: VASCULAR LAB | Age: 67
Discharge: HOME | End: 2022-08-11
Payer: COMMERCIAL

## 2022-08-11 DIAGNOSIS — M79.89 LEFT LEG SWELLING: ICD-10-CM

## 2022-08-11 PROCEDURE — 93971 EXTREMITY STUDY: CPT

## 2022-08-15 LAB
ANAEROBIC CULTURE: NORMAL
CULTURE SURGICAL: NORMAL
GRAM STAIN RESULT: NORMAL

## 2022-08-22 LAB
FUNGUS (MYCOLOGY) CULTURE: NORMAL
FUNGUS STAIN: NORMAL

## 2022-09-06 LAB
AFB CULTURE (MYCOBACTERIA): NORMAL
AFB SMEAR: NORMAL

## 2022-09-21 ENCOUNTER — OFFICE VISIT (OUTPATIENT)
Dept: ORTHOPEDIC SURGERY | Age: 67
End: 2022-09-21

## 2022-09-21 VITALS — WEIGHT: 266 LBS | BODY MASS INDEX: 38.08 KG/M2 | HEIGHT: 70 IN | RESPIRATION RATE: 15 BRPM

## 2022-09-21 DIAGNOSIS — S76.112D QUADRICEPS TENDON RUPTURE, LEFT, SUBSEQUENT ENCOUNTER: Primary | ICD-10-CM

## 2022-09-21 PROCEDURE — 99024 POSTOP FOLLOW-UP VISIT: CPT | Performed by: ORTHOPAEDIC SURGERY

## 2022-09-26 ENCOUNTER — HOSPITAL ENCOUNTER (OUTPATIENT)
Dept: PHYSICAL THERAPY | Age: 67
Setting detail: THERAPIES SERIES
Discharge: HOME | End: 2022-09-26
Payer: MEDICARE

## 2022-09-26 PROCEDURE — 97112 NEUROMUSCULAR REEDUCATION: CPT | Performed by: PHYSICAL THERAPIST

## 2022-09-26 PROCEDURE — 97110 THERAPEUTIC EXERCISES: CPT | Performed by: PHYSICAL THERAPIST

## 2022-09-26 PROCEDURE — 97161 PT EVAL LOW COMPLEX 20 MIN: CPT | Performed by: PHYSICAL THERAPIST

## 2022-09-26 NOTE — PLAN OF CARE
[facility], [address]  Phone: [phone]  Fax [phone]     Physical Therapy Certification    Dear  Dr. Shavon Gonzalez,    We had the pleasure of evaluating the following patient for physical therapy services at [address] [place] [address]. A summary of our findings can be found in the initial assessment below. This includes our plan of care. If you have any questions or concerns regarding these findings, please do not hesitate to contact me at the office phone number checked above. Thank you for the referral.       Physician Signature:_______________________________Date:__________________  By signing above (or electronic signature), therapists plan is approved by physician    Patient: Elizabeth Irene   : 1955   MRN: [id number]  Referring Physician:  Dr. Shavon Gonzalez      Evaluation Date: 2022      Medical Diagnosis Information:  Diagnosis: Y80.531J (ICD-10-CM) - Quadriceps tendon rupture, left, subsequent encounter  s/p  quad tenon repair (2nd revision) with Dermal allograft augmentation SX: 22   Treatment Diagnosis: Left Knee Pain M25.562 / Difficulty walking R26.2 / LE muscle weakness M62.81                                         Insurance information: PT Insurance Information:  [address]       Precautions/ Contra-indications: Quad tendon protocol revision x 2 - SLOW PROGRESSION    C-SSRS Triggered by Intake questionnaire (Past 2 wk assessment):   [x] No, Questionnaire did not trigger screening.   [] Yes, Patient intake triggered further evaluation      [] C-SSRS Screening completed  [] PCP notified via Plan of Care  [] Emergency services notified     Latex Allergy:  [x]NO      []YES  Preferred Language for Healthcare:   [x]English       []other:    SUBJECTIVE: Patient stated complaint:  Patient with 2 failed quad tendon repairs. 3rd surgery involving allograft performed 22. addressed at this time. Co-morbidities/Complexities (which will affect course of rehabilitation):   []None           Arthritic conditions   []Rheumatoid arthritis (M05.9)  []Osteoarthritis (M19.91)   Cardiovascular conditions   [x]Hypertension (I10)  []Hyperlipidemia (E78.5)  []Angina pectoris (I20)  []Atherosclerosis (I70)   Musculoskeletal conditions   []Disc pathology   []Congenital spine pathologies   [x]Prior surgical intervention  []Osteoporosis (M81.8)  []Osteopenia (M85.8)   Endocrine conditions   []Hypothyroid (E03.9)  []Hyperthyroid Gastrointestinal conditions   []Constipation (L97.06)   Metabolic conditions   []Morbid obesity (E66.01)  []Diabetes type 1(E10.65) or 2 (E11.65)   []Neuropathy (G60.9)     Pulmonary conditions   []Asthma (J45)  []Coughing   []COPD (J44.9)   Psychological Disorders  []Anxiety (F41.9)  []Depression (F32.9)   []Other:   [x]Other:   2 previous failed quad tendon repairs       Barriers to/and or personal factors that will affect rehab potential:              []Age  []Sex              []Motivation/Lack of Motivation                        [x]Co-Morbidities              []Cognitive Function, education/learning barriers              []Environmental, home barriers              []profession/work barriers  []past PT/medical experience  []other:  Justification: Hx of failed quad tendon repairs. Significant LE edema    Falls Risk Assessment (30 days): POC to include LE strengthening, LE ROM, functional tasks, balance, etc to help prevent future falls  [] Falls Risk assessed and no intervention required.   [] Falls Risk assessed and Patient requires intervention due to being higher risk   TUG score (>12s at risk):     [] Falls education provided, including           ASSESSMENT:   Functional Impairments:     []Noted lumbar/proximal hip/LE joint hypomobility   [x]Decreased LE functional ROM   [x]Decreased core/proximal hip strength and neuromuscular control   [x]Decreased LE functional strength   [x]Reduced balance/proprioceptive control   []other:      Functional Activity Limitations (from functional questionnaire and intake)   []Reduced ability to tolerate prolonged functional positions   []Reduced ability or difficulty with changes of positions or transfers between positions   []Reduced ability to maintain good posture and demonstrate good body mechanics with sitting, bending, and lifting   []Reduced ability to sleep   [] Reduced ability or tolerance with driving and/or computer work   []Reduced ability to perform lifting, carrying tasks   []Reduced ability to squat   []Reduced ability to forward bend   []Reduced ability to ambulate prolonged functional periods/distances/surfaces   []Reduced ability to ascend/descend stairs   []Reduced ability to run, hop, cut or jump   []other:    Participation Restrictions   [x]Reduced participation in self care activities   [x]Reduced participation in home management activities   [x]Reduced participation in work activities   [x]Reduced participation in social activities. [x]Reduced participation in sport/recreation activities. Classification :    [x]Signs/symptoms consistent with post-surgical status including decreased ROM, strength and function.    []Signs/symptoms consistent with joint sprain/strain   []Signs/symptoms consistent with patella-femoral syndrome   []Signs/symptoms consistent with knee OA/hip OA   []Signs/symptoms consistent with internal derangement of knee/Hip   []Signs/symptoms consistent with functional hip weakness/NMR control      []Signs/symptoms consistent with tendinitis/tendinosis    []signs/symptoms consistent with pathology which may benefit from Dry needling      []other:      Prognosis/Rehab Potential:      []Excellent   [x]Good    []Fair   []Poor    Tolerance of evaluation/treatment:    []Excellent   [x]Good    []Fair   []Poor  Physical Therapy Evaluation Complexity Justification  [x] A history of present problem with:  [] no personal factors and/or comorbidities that impact the plan of care;  [x]1-2 personal factors and/or comorbidities that impact the plan of care  []3 personal factors and/or comorbidities that impact the plan of care  [x] An examination of body systems using standardized tests and measures addressing any of the following: body structures and functions (impairments), activity limitations, and/or participation restrictions;:  [] a total of 1-2 or more elements   [x] a total of 3 or more elements   [] a total of 4 or more elements   [x] A clinical presentation with:  [] stable and/or uncomplicated characteristics   [x] evolving clinical presentation with changing characteristics  [] unstable and unpredictable characteristics;   [x] Clinical decision making of [x] low, [] moderate, [] high complexity using standardized patient assessment instrument and/or measurable assessment of functional outcome. [x] EVAL (LOW) 26549 (typically 20 minutes face-to-face)  [] EVAL (MOD) 26742 (typically 30 minutes face-to-face)  [] EVAL (HIGH) 68676 (typically 45 minutes face-to-face)  [] RE-EVAL       PLAN:   Frequency/Duration:  2 days per week for 12 Weeks:  Interventions:  [x]  Therapeutic exercise including: strength training, ROM, for Lower extremity and core   [x]  NMR activation and proprioception for LE, Glutes and Core   [x]  Manual therapy as indicated for LE, Hip and spine to include: Dry Needling/IASTM, STM, PROM, Gr I-IV mobilizations, manipulation. [x] Modalities as needed that may include: thermal agents, E-stim, Biofeedback, US, iontophoresis as indicated  [x] Patient education on joint protection, postural re-education, activity modification, progression of HEP. HEP instruction:   Access Code: [email or web address]  URL: [email or web address]  Date: 09/26/2022  Prepared by: Virginia Andujar    Exercises  Long Sitting Calf Stretch with Strap - 2 x daily - 7 x weekly - 4 reps - 30 hold  Seated Table Hamstring Stretch -

## 2022-09-26 NOTE — FLOWSHEET NOTE
[facility],  [address] [place]  Phone: [phone]  Fax [phone]    Physical Therapy Treatment Note/ Progress Report:           Date:  2022    Patient Name:  Rosalio Duque    :  1955  MRN: [id number]  Restrictions/Precautions:    Medical/Treatment Diagnosis Information:  Diagnosis: S76.112D (ICD-10-CM) - Quadriceps tendon rupture, left, subsequent encounter  s/p  quad tenon repair (2nd revision) with Dermal allograft augmentation SX: 22  Treatment Diagnosis: Left Knee Pain M25.562 / Difficulty walking R26.2 / LE muscle weakness M04.97  Insurance/Certification information:  PT Insurance Information: [facility]  Physician Information:   Dr. Vega Arabic  Has the plan of care been signed (Y/N):        []  Yes  [x]  No     Date of Patient follow up with Physician:       Is this a Progress Report:     []  Yes  [x]  No        If Yes:  Date Range for reporting period:  Beginning 22  Ending    Progress report will be due (10 Rx or 30 days whichever is less): 63       Recertification will be due (POC Duration  / 90 days whichever is less): 12 week POC 22      Visit # Insurance Allowable Auth Required   In-person 1 [facility] []  Yes [x]  No    Telehealth   []  Yes []  No    Total          Functional Scale: FOTO Knee 42/100    Date assessed:  22      Therapy Diagnosis/Practice Pattern:I      Number of Comorbidities:  []0     [x]1-2    []3+    Latex Allergy:  [x]NO      []YES  Preferred Language for Healthcare:   [x]English       []other:      Pain level:  0-2/10     SUBJECTIVE:  See eval    OBJECTIVE: See eval  Observation:   Test measurements:      RESTRICTIONS/PRECAUTIONS: Quad tendon protocol revision x 2 - SLOW PROGRESSION  Brace 0-60 deg    Exercises/Interventions:     Therapeutic Ex (26712) Sets/sec Reps Notes/CUES         GS belt stretch 30\"  3 x  HEP   HS long sitting stretch 30\" 3 x  HEP Quad set 10\"  10 x  HEP   Heel slide seated with belt 5\"  10 x  HEP   SL ABD  15 x  HEP   HR  15 x  HEP                                 Manual Intervention (76186)      Patellar mobs  X 3 min                                   NMR re-education (50980)   CUES NEEDED   Weight shift fp 5\"  10 x  HEP   NMES quad set  X 10 minutes                                              Therapeutic Activity (92765)                                          Access Code: [email or web address]  URL: [email or web address]  Date: 09/26/2022  Prepared by: Lindsey Points     Exercises  Long Sitting Calf Stretch with Strap - 2 x daily - 7 x weekly - 4 reps - 30 hold  Seated Table Hamstring Stretch - 2 x daily - 7 x weekly - 4 reps - 30 hold  Long Sitting Quad Set - 2 x daily - 7 x weekly - 15 reps - 10 hold  Sitting Heel Slide with Towel - 2 x daily - 7 x weekly - 15 reps - 5 hold  Sidelying Hip Abduction - 2 x daily - 7 x weekly - 15 reps  Heel Raise - 2 x daily - 7 x weekly - 15 reps  Side to Side Weight Shift with Counter Support - 2 x daily - 7 x weekly - 10 reps - 5-10 hold    Therapeutic Exercise and NMR EXR  [x] (71687) Provided verbal/tactile cueing for activities related to strengthening, flexibility, endurance, ROM for improvements in LE, proximal hip, and core control with self care, mobility, lifting, ambulation.  [] (06670) Provided verbal/tactile cueing for activities related to improving balance, coordination, kinesthetic sense, posture, motor skill, proprioception  to assist with LE, proximal hip, and core control in self care, mobility, lifting, ambulation and eccentric single leg control.      NMR and Therapeutic Activities:    [] (07798 or 96709) Provided verbal/tactile cueing for activities related to improving balance, coordination, kinesthetic sense, posture, motor skill, proprioception and motor activation to allow for proper function of core, proximal hip and LE with self care and ADLs  [] (18617) Gait Re-education- Provided training and instruction to the patient for proper LE, core and proximal hip recruitment and positioning and eccentric body weight control with ambulation re-education including up and down stairs     Home Exercise Program:    [x] (56224) Reviewed/Progressed HEP activities related to strengthening, flexibility, endurance, ROM of core, proximal hip and LE for functional self-care, mobility, lifting and ambulation/stair navigation   [] (69881)Reviewed/Progressed HEP activities related to improving balance, coordination, kinesthetic sense, posture, motor skill, proprioception of core, proximal hip and LE for self care, mobility, lifting, and ambulation/stair navigation      Manual Treatments:  PROM / STM / Oscillations-Mobs:  G-I, II, III, IV (PA's, Inf., Post.)  [x] (28118) Provided manual therapy to mobilize LE, proximal hip and/or LS spine soft tissue/joints for the purpose of modulating pain, promoting relaxation,  increasing ROM, reducing/eliminating soft tissue swelling/inflammation/restriction, improving soft tissue extensibility and allowing for proper ROM for normal function with self care, mobility, lifting and ambulation. Modalities:  Encouraged VASO to LE but patient declined. [] GAME READY (VASO)- for significant edema, swelling, pain control. Charges  Timed Code Treatment Minutes: 30   Total Treatment Minutes: 50     Medicare total (add KX at $2000)  160         [x] EVAL (LOW) 82519   [] EVAL (MOD) 32135  [] EVAL (HIGH) 25033   [] RE-EVAL     [] HS(18005) x     [] IONTO  [] NMR (52363) x     [] VASO  [] Manual (62070) x      [] Other:  [] TA x      [] Mech Traction (66807)  [] ES(attended) (24041)      [] ES (un) (16057):       GOALS:   Patient stated goal: Return to PLOF  [] Progressing: [] Met: [] Not Met: [] Adjusted     Therapist goals for Patient:   Short Term Goals: To be achieved in: 2 weeks  1.  Independent in HEP and progression per patient tolerance, in order to prevent re-injury. [] Progressing: [] Met: [] Not Met: [] Adjusted  2. Patient will have a decrease in pain to facilitate improvement in movement, function, and ADLs as indicated by Functional Deficits. [] Progressing: [] Met: [] Not Met: [] Adjusted     Long Term Goals: To be achieved in: 12 weeks  1. Disability index score of 55% or more per FOTO to assist with reaching prior level of function. [] Progressing: [] Met: [] Not Met: [] Adjusted  2. Patient will demonstrate increased AROM to 0-120 deg to allow for proper joint functioning as indicated by patients Functional Deficits. [] Progressing: [] Met: [] Not Met: [] Adjusted  3. Patient will demonstrate an increase in Strength to good quad strength and control, 4+/5 MMT in LE to allow for proper functional mobility as indicated by patients Functional Deficits. [] Progressing: [] Met: [] Not Met: [] Adjusted  4. Patient will return to ambulating community distances for 20-30 minutes safely and without AD without increased symptoms or restriction. [] Progressing: [] Met: [] Not Met: [] Adjusted  5. Patient will be able to descend and ascend stairs with reciprocal gait. [] Progressing: [] Met: [] Not Met: [] Adjusted         Progression Towards Functional goals:  [] Patient is progressing as expected towards functional goals listed. [] Progression is slowed due to complexities listed. [] Progression has been slowed due to co-morbidities. [x] Plan just implemented, too soon to assess goals progression  [] Other:       Overall Progression Towards Functional goals/ Treatment Progress Update:  [] Patient is progressing as expected towards functional goals listed. [] Progression is slowed due to complexities/Impairments listed. [] Progression has been slowed due to co-morbidities.   [x] Plan just implemented, too soon to assess goals progression <30days   [] Goals require adjustment due to lack of progress  [] Patient is not progressing as expected and requires additional follow up with physician  [] Other    Prognosis for POC: [x] Good [] Fair  [] Poor      Patient requires continued skilled intervention: [x] Yes  [] No    Treatment/Activity Tolerance:  [x] Patient able to complete treatment  [] Patient limited by fatigue  [] Patient limited by pain    [] Patient limited by other medical complications  [] Other:     ASSESSMENT: See Eval                 PLAN: See eval  [x] Continue per plan of care [] Alter current plan (see comments above)  [] Plan of care initiated [] Hold pending MD visit [] Discharge      Electronically signed by:  Makayla Gomes 429    Note: If patient does not return for scheduled/ recommended follow up visits, this note will serve as a discharge from care along with most recent update on progress.

## 2022-09-30 ENCOUNTER — HOSPITAL ENCOUNTER (OUTPATIENT)
Dept: PHYSICAL THERAPY | Age: 67
Setting detail: THERAPIES SERIES
Discharge: HOME | End: 2022-09-30
Payer: MEDICARE

## 2022-09-30 PROCEDURE — 97016 VASOPNEUMATIC DEVICE THERAPY: CPT | Performed by: PHYSICAL THERAPIST

## 2022-09-30 NOTE — FLOWSHEET NOTE
[facility],  [address] [place]  Phone: [phone]  Fax [phone]    Physical Therapy Treatment Note/ Progress Report:           Date:  2022    Patient Name:  Marcell Duarte    :  1955  MRN: [id number]  Restrictions/Precautions:    Medical/Treatment Diagnosis Information:  Diagnosis: S76.112D (ICD-10-CM) - Quadriceps tendon rupture, left, subsequent encounter  s/p  quad tenon repair (2nd revision) with Dermal allograft augmentation SX: 22  Treatment Diagnosis: Left Knee Pain M25.562 / Difficulty walking R26.2 / LE muscle weakness V12.31  Insurance/Certification information:  PT Insurance Information: [facility]  Physician Information:   Dr. Kenneth Carroll  Has the plan of care been signed (Y/N):        []  Yes  [x]  No     Date of Patient follow up with Physician:       Is this a Progress Report:     []  Yes  [x]  No        If Yes:  Date Range for reporting period:  Beginning 22  Ending    Progress report will be due (10 Rx or 30 days whichever is less):        Recertification will be due (POC Duration  / 90 days whichever is less): 12 week POC 22      Visit # Insurance Allowable Auth Required   In-person 2 [facility] []  Yes [x]  No    Telehealth   []  Yes []  No    Total          Functional Scale: FOTO Knee 42/100    Date assessed:  22      Therapy Diagnosis/Practice Pattern:I      Number of Comorbidities:  []0     [x]1-2    []3+    Latex Allergy:  [x]NO      []YES  Preferred Language for Healthcare:   [x]English       []other:      Pain level:  0-2/10     SUBJECTIVE:  Patient reports swelling increased past few days. Initially unsure of reasoning. Difficulty moving ankle. Unable to fit into shoe. Patient later in session realized he ran out of diuretic and calcium blocker on Monday and due to insurance authorization unable to get filled until tomorrow.       OBJECTIVE: See eval  Observation:   Test measurements:  /97, O2 98, HR 66  Negative Glynn's     RESTRICTIONS/PRECAUTIONS: Quad tendon protocol revision x 2 - SLOW PROGRESSION  Brace 0-60 deg    Exercises/Interventions:     Therapeutic Ex (65213) Sets/sec Reps Notes/CUES                                       Manual Intervention (50761)                                       NMR re-education (35412)   CUES NEEDED   HEP                                                Therapeutic Activity (95178)                                          Access Code: [email or web address]  URL: [employer]/  Date: 09/26/2022  Prepared by: Nazario Root     Exercises  Long Sitting Calf Stretch with Strap - 2 x daily - 7 x weekly - 4 reps - 30 hold  Seated Table Hamstring Stretch - 2 x daily - 7 x weekly - 4 reps - 30 hold  Long Sitting Quad Set - 2 x daily - 7 x weekly - 15 reps - 10 hold  Sitting Heel Slide with Towel - 2 x daily - 7 x weekly - 15 reps - 5 hold  Sidelying Hip Abduction - 2 x daily - 7 x weekly - 15 reps  Heel Raise - 2 x daily - 7 x weekly - 15 reps  Side to Side Weight Shift with Counter Support - 2 x daily - 7 x weekly - 10 reps - 5-10 hold    Therapeutic Exercise and NMR EXR  [x] (90800) Provided verbal/tactile cueing for activities related to strengthening, flexibility, endurance, ROM for improvements in LE, proximal hip, and core control with self care, mobility, lifting, ambulation.  [] (63916) Provided verbal/tactile cueing for activities related to improving balance, coordination, kinesthetic sense, posture, motor skill, proprioception  to assist with LE, proximal hip, and core control in self care, mobility, lifting, ambulation and eccentric single leg control.      NMR and Therapeutic Activities:    [] (00191 or 66202) Provided verbal/tactile cueing for activities related to improving balance, coordination, kinesthetic sense, posture, motor skill, proprioception and motor activation to allow for proper function of core, proximal hip and LE with self care and ADLs  [] (46650) Gait Re-education- Provided training and instruction to the patient for proper LE, core and proximal hip recruitment and positioning and eccentric body weight control with ambulation re-education including up and down stairs     Home Exercise Program:    [x] (94687) Reviewed/Progressed HEP activities related to strengthening, flexibility, endurance, ROM of core, proximal hip and LE for functional self-care, mobility, lifting and ambulation/stair navigation   [] (72243)Reviewed/Progressed HEP activities related to improving balance, coordination, kinesthetic sense, posture, motor skill, proprioception of core, proximal hip and LE for self care, mobility, lifting, and ambulation/stair navigation      Manual Treatments:  PROM / STM / Oscillations-Mobs:  G-I, II, III, IV (PA's, Inf., Post.)  [x] (48622) Provided manual therapy to mobilize LE, proximal hip and/or LS spine soft tissue/joints for the purpose of modulating pain, promoting relaxation,  increasing ROM, reducing/eliminating soft tissue swelling/inflammation/restriction, improving soft tissue extensibility and allowing for proper ROM for normal function with self care, mobility, lifting and ambulation. Modalities:     [x] GAME READY (VASO)- for significant edema, swelling, pain control. For foot and ankle elevated x 2 incline wedges x 30 mintues    Charges  Timed Code Treatment Minutes: 0   Total Treatment Minutes: 30     Medicare total (add KX at $2000)  171         [] EVAL (LOW) 17513   [] EVAL (MOD) 51893  [] EVAL (HIGH) 02662   [] RE-EVAL     [] BI(55637) x     [] IONTO  [] NMR (27823) x     [x] VASO  [] Manual (19965) x      [] Other:  [] TA x      [] Mech Traction (71708)  [] ES(attended) (73413)      [] ES (un) (03433):       GOALS:   Patient stated goal: Return to PLOF  [] Progressing: [] Met: [] Not Met: [] Adjusted     Therapist goals for Patient:   Short Term Goals:  To be achieved in: 2 weeks  1. Independent in HEP and progression per patient tolerance, in order to prevent re-injury. [] Progressing: [] Met: [] Not Met: [] Adjusted  2. Patient will have a decrease in pain to facilitate improvement in movement, function, and ADLs as indicated by Functional Deficits. [] Progressing: [] Met: [] Not Met: [] Adjusted     Long Term Goals: To be achieved in: 12 weeks  1. Disability index score of 55% or more per FOTO to assist with reaching prior level of function. [] Progressing: [] Met: [] Not Met: [] Adjusted  2. Patient will demonstrate increased AROM to 0-120 deg to allow for proper joint functioning as indicated by patients Functional Deficits. [] Progressing: [] Met: [] Not Met: [] Adjusted  3. Patient will demonstrate an increase in Strength to good quad strength and control, 4+/5 MMT in LE to allow for proper functional mobility as indicated by patients Functional Deficits. [] Progressing: [] Met: [] Not Met: [] Adjusted  4. Patient will return to ambulating community distances for 20-30 minutes safely and without AD without increased symptoms or restriction. [] Progressing: [] Met: [] Not Met: [] Adjusted  5. Patient will be able to descend and ascend stairs with reciprocal gait. [] Progressing: [] Met: [] Not Met: [] Adjusted         Progression Towards Functional goals:  [] Patient is progressing as expected towards functional goals listed. [] Progression is slowed due to complexities listed. [] Progression has been slowed due to co-morbidities. [x] Plan just implemented, too soon to assess goals progression  [] Other:       Overall Progression Towards Functional goals/ Treatment Progress Update:  [] Patient is progressing as expected towards functional goals listed. [] Progression is slowed due to complexities/Impairments listed. [] Progression has been slowed due to co-morbidities.   [x] Plan just implemented, too soon to assess goals progression <30days   [] Goals require adjustment due to lack of progress  [] Patient is not progressing as expected and requires additional follow up with physician  [] Other    Prognosis for POC: [x] Good [] Fair  [] Poor      Patient requires continued skilled intervention: [x] Yes  [] No    Treatment/Activity Tolerance:  [x] Patient able to complete treatment  [] Patient limited by fatigue  [] Patient limited by pain    [] Patient limited by other medical complications  [] Other:     ASSESSMENT: Due to significant increase in LE edema, held therapy and only performed vaso pneumatic compression. Encouraged follow up with PCP on Monday discussing LE edema. Discussed home elevation and activity modification. Brace adjusted. PLAN: See eval  [x] Continue per plan of care [] Alter current plan (see comments above)  [] Plan of care initiated [] Hold pending MD visit [] Discharge      Electronically signed by:  Makayla Talley 429    Note: If patient does not return for scheduled/ recommended follow up visits, this note will serve as a discharge from care along with most recent update on progress.

## 2022-10-03 ENCOUNTER — HOSPITAL ENCOUNTER (OUTPATIENT)
Dept: PHYSICAL THERAPY | Age: 67
Setting detail: THERAPIES SERIES
Discharge: HOME | End: 2022-10-03
Payer: MEDICARE

## 2022-10-05 ENCOUNTER — HOSPITAL ENCOUNTER (OUTPATIENT)
Dept: PHYSICAL THERAPY | Age: 67
Setting detail: THERAPIES SERIES
Discharge: HOME | End: 2022-10-05
Payer: MEDICARE

## 2022-10-05 PROCEDURE — 97112 NEUROMUSCULAR REEDUCATION: CPT | Performed by: PHYSICAL THERAPIST

## 2022-10-05 PROCEDURE — 97110 THERAPEUTIC EXERCISES: CPT | Performed by: PHYSICAL THERAPIST

## 2022-10-05 PROCEDURE — 97140 MANUAL THERAPY 1/> REGIONS: CPT | Performed by: PHYSICAL THERAPIST

## 2022-10-05 PROCEDURE — 97016 VASOPNEUMATIC DEVICE THERAPY: CPT | Performed by: PHYSICAL THERAPIST

## 2022-10-05 NOTE — FLOWSHEET NOTE
[facility],  [address] [place]  Phone: [phone]  Fax [phone]    Physical Therapy Treatment Note/ Progress Report:           Date:  10/5/2022    Patient Name:  Ehsan Valle    :  1955  MRN: [id number]  Restrictions/Precautions:    Medical/Treatment Diagnosis Information:  Diagnosis: S76.112D (ICD-10-CM) - Quadriceps tendon rupture, left, subsequent encounter  s/p  quad tenon repair (2nd revision) with Dermal allograft augmentation SX: 22  Treatment Diagnosis: Left Knee Pain M25.562 / Difficulty walking R26.2 / LE muscle weakness K33.63  Insurance/Certification information:  PT Insurance Information:  [facility] [address]  Physician Information:   Dr. Roderick Lopez  Has the plan of care been signed (Y/N):        []  Yes  [x]  No     Date of Patient follow up with Physician:       Is this a Progress Report:     []  Yes  [x]  No        If Yes:  Date Range for reporting period:  Beginning 22  Ending    Progress report will be due (10 Rx or 30 days whichever is less):        Recertification will be due (POC Duration  / 90 days whichever is less): 12 week POC 22      Visit # Insurance Allowable Auth Required   In-person 3  [facility] []  Yes [x]  No    Telehealth   []  Yes []  No    Total          Functional Scale: FOTO Knee 42/100    Date assessed:  22      Therapy Diagnosis/Practice Pattern:I      Number of Comorbidities:  []0     [x]1-2    []3+    Latex Allergy:  [x]NO      []YES  Preferred Language for Healthcare:   [x]English       []other:      Pain level:  0-2/10     SUBJECTIVE:  Pt is 10.5 weeks post op.  Pt was given compression sock from PCP      OBJECTIVE: See eval  Observation:   Test measurements:  /97, O2 98, HR 66  Negative Glynn's     RESTRICTIONS/PRECAUTIONS: Quad tendon protocol revision x 2 - SLOW PROGRESSION  Brace 0-60 deg    Exercises/Interventions:     Therapeutic Ex (08373) Sets/sec Reps Notes/CUES         Quad set 10\"  10 x  Glut sets :10 X 10     Supine hip add with legs ext :10 X 10    Supine TB hip abd RD 2 x 10 Using slide board   Supine TB PF with leg elevated BL X 30           Manual Intervention (09167)         PROM  Knee and ankle  7 min    Man gastroc s / man HS s  4 min                      NMR re-education (81410)   CUES NEEDED   HEP   NMES quad set  X 12 minutes 10/10                                             Therapeutic Activity (88610)                                          Access Code: [email or web address]  URL: [email or web address]  Date: 09/26/2022  Prepared by: Estiven Fear     Exercises  Long Sitting Calf Stretch with Strap - 2 x daily - 7 x weekly - 4 reps - 30 hold  Seated Table Hamstring Stretch - 2 x daily - 7 x weekly - 4 reps - 30 hold  Long Sitting Quad Set - 2 x daily - 7 x weekly - 15 reps - 10 hold  Sitting Heel Slide with Towel - 2 x daily - 7 x weekly - 15 reps - 5 hold  Sidelying Hip Abduction - 2 x daily - 7 x weekly - 15 reps  Heel Raise - 2 x daily - 7 x weekly - 15 reps  Side to Side Weight Shift with Counter Support - 2 x daily - 7 x weekly - 10 reps - 5-10 hold    Therapeutic Exercise and NMR EXR  [x] (32649) Provided verbal/tactile cueing for activities related to strengthening, flexibility, endurance, ROM for improvements in LE, proximal hip, and core control with self care, mobility, lifting, ambulation.  [] (85245) Provided verbal/tactile cueing for activities related to improving balance, coordination, kinesthetic sense, posture, motor skill, proprioception  to assist with LE, proximal hip, and core control in self care, mobility, lifting, ambulation and eccentric single leg control.      NMR and Therapeutic Activities:    [] (89361 or 40311) Provided verbal/tactile cueing for activities related to improving balance, coordination, kinesthetic sense, posture, motor skill, proprioception and motor activation to allow for proper function of core, proximal hip and LE with self care and ADLs  [] (43634) Gait Re-education- Provided training and instruction to the patient for proper LE, core and proximal hip recruitment and positioning and eccentric body weight control with ambulation re-education including up and down stairs     Home Exercise Program:    [x] (75512) Reviewed/Progressed HEP activities related to strengthening, flexibility, endurance, ROM of core, proximal hip and LE for functional self-care, mobility, lifting and ambulation/stair navigation   [] (07970)Reviewed/Progressed HEP activities related to improving balance, coordination, kinesthetic sense, posture, motor skill, proprioception of core, proximal hip and LE for self care, mobility, lifting, and ambulation/stair navigation      Manual Treatments:  PROM / STM / Oscillations-Mobs:  G-I, II, III, IV (PA's, Inf., Post.)  [x] (29883) Provided manual therapy to mobilize LE, proximal hip and/or LS spine soft tissue/joints for the purpose of modulating pain, promoting relaxation,  increasing ROM, reducing/eliminating soft tissue swelling/inflammation/restriction, improving soft tissue extensibility and allowing for proper ROM for normal function with self care, mobility, lifting and ambulation. Modalities:     [x] GAME READY (VASO)- for significant edema, swelling, pain control. For foot and ankle elevated x 2 incline wedges x 30 mintues    Charges  Timed Code Treatment Minutes: 40   Total Treatment Minutes: 55     Medicare total (add KX at $2000)  273     102    [] EVAL (LOW) 91298   [] EVAL (MOD) 83441  [] EVAL (HIGH) 47352   [] RE-EVAL     [x] TW(80255) x   1  [] IONTO  [x] NMR (01853) x  1   [x] VASO  [x] Manual (08529) x  1    [] Other:  [] TA x      [] Mech Traction (42337)  [] ES(attended) (19933)      [] ES (un) (47114):       GOALS:   Patient stated goal: Return to PLOF  [] Progressing: [] Met: [] Not Met: [] Adjusted     Therapist goals for Patient:   Short Term Goals:  To be achieved in: 2 weeks  1. Independent in HEP and progression per patient tolerance, in order to prevent re-injury. [] Progressing: [] Met: [] Not Met: [] Adjusted  2. Patient will have a decrease in pain to facilitate improvement in movement, function, and ADLs as indicated by Functional Deficits. [] Progressing: [] Met: [] Not Met: [] Adjusted     Long Term Goals: To be achieved in: 12 weeks  1. Disability index score of 55% or more per FOTO to assist with reaching prior level of function. [] Progressing: [] Met: [] Not Met: [] Adjusted  2. Patient will demonstrate increased AROM to 0-120 deg to allow for proper joint functioning as indicated by patients Functional Deficits. [] Progressing: [] Met: [] Not Met: [] Adjusted  3. Patient will demonstrate an increase in Strength to good quad strength and control, 4+/5 MMT in LE to allow for proper functional mobility as indicated by patients Functional Deficits. [] Progressing: [] Met: [] Not Met: [] Adjusted  4. Patient will return to ambulating community distances for 20-30 minutes safely and without AD without increased symptoms or restriction. [] Progressing: [] Met: [] Not Met: [] Adjusted  5. Patient will be able to descend and ascend stairs with reciprocal gait. [] Progressing: [] Met: [] Not Met: [] Adjusted         Progression Towards Functional goals:  [] Patient is progressing as expected towards functional goals listed. [] Progression is slowed due to complexities listed. [] Progression has been slowed due to co-morbidities. [x] Plan just implemented, too soon to assess goals progression  [] Other:       Overall Progression Towards Functional goals/ Treatment Progress Update:  [] Patient is progressing as expected towards functional goals listed. [] Progression is slowed due to complexities/Impairments listed. [] Progression has been slowed due to co-morbidities.   [x] Plan just implemented, too soon to assess goals progression <30days   [] Goals require adjustment due to lack of progress  [] Patient is not progressing as expected and requires additional follow up with physician  [] Other    Prognosis for POC: [x] Good [] Fair  [] Poor      Patient requires continued skilled intervention: [x] Yes  [] No    Treatment/Activity Tolerance:  [x] Patient able to complete treatment  [] Patient limited by fatigue  [] Patient limited by pain    [] Patient limited by other medical complications  [] Other:     ASSESSMENT: will help pt put compression socks on. Pt does have improving PROM. Pt notices the weakness of left leg vs right. Unable to progress strengthening due to increased fluid in left lower leg. PLAN: See eval  [x] Continue per plan of care [] Alter current plan (see comments above)  [] Plan of care initiated [] Hold pending MD visit [] Discharge      Electronically signed by:  Izaiah Willams PT     Note: If patient does not return for scheduled/ recommended follow up visits, this note will serve as a discharge from care along with most recent update on progress.

## 2022-10-10 ENCOUNTER — HOSPITAL ENCOUNTER (OUTPATIENT)
Dept: PHYSICAL THERAPY | Age: 67
Setting detail: THERAPIES SERIES
Discharge: HOME | End: 2022-10-10
Payer: MEDICARE

## 2022-10-10 PROCEDURE — 97140 MANUAL THERAPY 1/> REGIONS: CPT | Performed by: PHYSICAL THERAPIST

## 2022-10-10 PROCEDURE — 97110 THERAPEUTIC EXERCISES: CPT | Performed by: PHYSICAL THERAPIST

## 2022-10-10 PROCEDURE — 97112 NEUROMUSCULAR REEDUCATION: CPT | Performed by: PHYSICAL THERAPIST

## 2022-10-10 NOTE — FLOWSHEET NOTE
[facility], [address] [place]  Phone: [phone]  Fax [phone]    Physical Therapy Treatment Note/ Progress Report:           Date:  10/10/2022    Patient Name:  Sofiya Ibarra    :  1955  MRN: [id number]  Restrictions/Precautions:    Medical/Treatment Diagnosis Information:  Diagnosis: S76.112D (ICD-10-CM) - Quadriceps tendon rupture, left, subsequent encounter  s/p  quad tenon repair (2nd revision) with Dermal allograft augmentation SX: 22  Treatment Diagnosis: Left Knee Pain M25.562 / Difficulty walking R26.2 / LE muscle weakness F89.12  Insurance/Certification information:  PT Insurance Information: [facility]  Physician Information:   Dr. Killian  Has the plan of care been signed (Y/N):        []  Yes  [x]  No     Date of Patient follow up with Physician:       Is this a Progress Report:     []  Yes  [x]  No        If Yes:  Date Range for reporting period:  Beginning 22  Ending    Progress report will be due (10 Rx or 30 days whichever is less):        Recertification will be due (POC Duration  / 90 days whichever is less): 12 week POC 22      Visit # Insurance Allowable Auth Required   In-person 4 [facility] []  Yes [x]  No    Telehealth   []  Yes []  No    Total          Functional Scale: FOTO Knee 42/100    Date assessed:  22      Therapy Diagnosis/Practice Pattern:I      Number of Comorbidities:  []0     [x]1-2    []3+    Latex Allergy:  [x]NO      []YES  Preferred Language for Healthcare:   [x]English       []other:      Pain level:  0-2/10     SUBJECTIVE:  Normal sensation in the foot. No coldness. No pain. Occasional calf pain. Had to cut off compression stocking due to  inability to get off foot. Had another doppler on Saturday to check calf. Patient unable to use ace wrap on foot but able to start at calf. Knee is feeling good.   No use of brace except for coming to PT.      OBJECTIVE: See eval  Observation:   Test measurements:  Knee  flexion  - 10/10/22    RESTRICTIONS/PRECAUTIONS: Quad tendon protocol revision x 2 - SLOW PROGRESSION  Brace 0-60 deg    Exercises/Interventions:     Therapeutic Ex (67299) Sets/sec Reps Notes/CUES         Quad set 10\"  10 x  Glut sets :10 X 10     Supine hip add with legs ext/ hooklying :10 X 10    Supine TB hip abd RD 2 x 10 Using slide board   Supine TB PF/DF  with leg elevated BL X 30           Manual Intervention (42580)         PROM  Knee and ankle  7 min    Man gastroc s / man HS s  4 min                      NMR re-education (13382)   CUES NEEDED   HEP   NMES quad set  X 10 minutes 10/10                                             Therapeutic Activity (61261)                                          Access Code: [email or web address]  URL: [email or web address]  Date: 09/26/2022  Prepared by: Wendy Garner     Exercises  Long Sitting Calf Stretch with Strap - 2 x daily - 7 x weekly - 4 reps - 30 hold  Seated Table Hamstring Stretch - 2 x daily - 7 x weekly - 4 reps - 30 hold  Long Sitting Quad Set - 2 x daily - 7 x weekly - 15 reps - 10 hold  Sitting Heel Slide with Towel - 2 x daily - 7 x weekly - 15 reps - 5 hold  Sidelying Hip Abduction - 2 x daily - 7 x weekly - 15 reps  Heel Raise - 2 x daily - 7 x weekly - 15 reps  Side to Side Weight Shift with Counter Support - 2 x daily - 7 x weekly - 10 reps - 5-10 hold    Therapeutic Exercise and NMR EXR  [x] (60749) Provided verbal/tactile cueing for activities related to strengthening, flexibility, endurance, ROM for improvements in LE, proximal hip, and core control with self care, mobility, lifting, ambulation.  [] (06965) Provided verbal/tactile cueing for activities related to improving balance, coordination, kinesthetic sense, posture, motor skill, proprioception  to assist with LE, proximal hip, and core control in self care, mobility, lifting, ambulation and eccentric single leg control. NMR and Therapeutic Activities:    [] (49995 or 49425) Provided verbal/tactile cueing for activities related to improving balance, coordination, kinesthetic sense, posture, motor skill, proprioception and motor activation to allow for proper function of core, proximal hip and LE with self care and ADLs  [] (23204) Gait Re-education- Provided training and instruction to the patient for proper LE, core and proximal hip recruitment and positioning and eccentric body weight control with ambulation re-education including up and down stairs     Home Exercise Program:    [x] (29528) Reviewed/Progressed HEP activities related to strengthening, flexibility, endurance, ROM of core, proximal hip and LE for functional self-care, mobility, lifting and ambulation/stair navigation   [] (48322)Reviewed/Progressed HEP activities related to improving balance, coordination, kinesthetic sense, posture, motor skill, proprioception of core, proximal hip and LE for self care, mobility, lifting, and ambulation/stair navigation      Manual Treatments:  PROM / STM / Oscillations-Mobs:  G-I, II, III, IV (PA's, Inf., Post.)  [x] (96389) Provided manual therapy to mobilize LE, proximal hip and/or LS spine soft tissue/joints for the purpose of modulating pain, promoting relaxation,  increasing ROM, reducing/eliminating soft tissue swelling/inflammation/restriction, improving soft tissue extensibility and allowing for proper ROM for normal function with self care, mobility, lifting and ambulation. Modalities:     [x] GAME READY (VASO)- for significant edema, swelling, pain control.  For foot and ankle elevated x 2 incline wedges x 30 mintues    Charges  Timed Code Treatment Minutes: 50   Total Treatment Minutes: 50     Medicare total (add KX at $2000)  362.95     102    [] EVAL (LOW) 25285   [] EVAL (MOD) 83968  [] EVAL (HIGH) 57028   [] RE-EVAL     [x] YC(42142) x   1  [] IONTO  [x] NMR (74557) x  1   [] VASO  [x] Manual (27132) x  1    [] Other:  [] TA x      [] Mech Traction (43366)  [] ES(attended) (95645)      [] ES (un) (81935):       GOALS:   Patient stated goal: Return to PLOF  [] Progressing: [] Met: [] Not Met: [] Adjusted     Therapist goals for Patient:   Short Term Goals: To be achieved in: 2 weeks  1. Independent in HEP and progression per patient tolerance, in order to prevent re-injury. [x] Progressing: [] Met: [] Not Met: [] Adjusted  2. Patient will have a decrease in pain to facilitate improvement in movement, function, and ADLs as indicated by Functional Deficits. [x] Progressing: [] Met: [] Not Met: [] Adjusted     Long Term Goals: To be achieved in: 12 weeks  1. Disability index score of 55% or more per FOTO to assist with reaching prior level of function. [] Progressing: [] Met: [] Not Met: [] Adjusted  2. Patient will demonstrate increased AROM to 0-120 deg to allow for proper joint functioning as indicated by patients Functional Deficits. [] Progressing: [] Met: [] Not Met: [] Adjusted  3. Patient will demonstrate an increase in Strength to good quad strength and control, 4+/5 MMT in LE to allow for proper functional mobility as indicated by patients Functional Deficits. [] Progressing: [] Met: [] Not Met: [] Adjusted  4. Patient will return to ambulating community distances for 20-30 minutes safely and without AD without increased symptoms or restriction. [] Progressing: [] Met: [] Not Met: [] Adjusted  5. Patient will be able to descend and ascend stairs with reciprocal gait. [] Progressing: [] Met: [] Not Met: [] Adjusted         Progression Towards Functional goals:  [] Patient is progressing as expected towards functional goals listed. [] Progression is slowed due to complexities listed. [] Progression has been slowed due to co-morbidities.   [x] Plan just implemented, too soon to assess goals progression  [] Other:       Overall Progression Towards Functional goals/ Treatment Progress Update:  [] Patient is progressing as expected towards functional goals listed. [] Progression is slowed due to complexities/Impairments listed. [] Progression has been slowed due to co-morbidities. [x] Plan just implemented, too soon to assess goals progression <30days   [] Goals require adjustment due to lack of progress  [] Patient is not progressing as expected and requires additional follow up with physician  [] Other    Prognosis for POC: [x] Good [] Fair  [] Poor      Patient requires continued skilled intervention: [x] Yes  [] No    Treatment/Activity Tolerance:  [x] Patient able to complete treatment  [] Patient limited by fatigue  [] Patient limited by pain    [] Patient limited by other medical complications  [] Other:     ASSESSMENT: Swelling persists. Able to initiated weight bearing activities with good tolerance. Cues to avoid hyperextension but improved from past episodes. Unable to progress strengthening due to increased fluid in left lower leg. Ace wrapped to help with fluid control especially in ankle. PLAN: See eval  [x] Continue per plan of care [] Alter current plan (see comments above)  [] Plan of care initiated [] Hold pending MD visit [] Discharge      Electronically signed by:  Hussain Hilton PT     Note: If patient does not return for scheduled/ recommended follow up visits, this note will serve as a discharge from care along with most recent update on progress.

## 2022-10-12 ENCOUNTER — HOSPITAL ENCOUNTER (OUTPATIENT)
Dept: PHYSICAL THERAPY | Age: 67
Setting detail: THERAPIES SERIES
Discharge: HOME | End: 2022-10-12
Payer: MEDICARE

## 2022-10-12 PROCEDURE — 97110 THERAPEUTIC EXERCISES: CPT | Performed by: PHYSICAL THERAPIST

## 2022-10-12 PROCEDURE — 97112 NEUROMUSCULAR REEDUCATION: CPT | Performed by: PHYSICAL THERAPIST

## 2022-10-12 PROCEDURE — 97140 MANUAL THERAPY 1/> REGIONS: CPT | Performed by: PHYSICAL THERAPIST

## 2022-10-12 NOTE — FLOWSHEET NOTE
[facility],  [address] [place]  Phone: [phone]  Fax [phone]    Physical Therapy Treatment Note/ Progress Report:           Date:  10/12/2022    Patient Name:  Deirdre Culver    :  1955  MRN: [id number]  Restrictions/Precautions:    Medical/Treatment Diagnosis Information:  Diagnosis: S76.112D (ICD-10-CM) - Quadriceps tendon rupture, left, subsequent encounter  s/p  quad tenon repair (2nd revision) with Dermal allograft augmentation SX: 22  Treatment Diagnosis: Left Knee Pain M25.562 / Difficulty walking R26.2 / LE muscle weakness H78.93  Insurance/Certification information:  PT Insurance Information: [facility]  Physician Information:   Dr. Mandeep Faulkner  Has the plan of care been signed (Y/N):        []  Yes  [x]  No     Date of Patient follow up with Physician:       Is this a Progress Report:     []  Yes  [x]  No        If Yes:  Date Range for reporting period:  Beginning 22  Ending    Progress report will be due (10 Rx or 30 days whichever is less):        Recertification will be due (POC Duration  / 90 days whichever is less): 12 week POC 22      Visit # Insurance Allowable Auth Required   In-person 5 [facility] []  Yes [x]  No    Telehealth   []  Yes []  No    Total          Functional Scale: FOTO Knee 42/100    Date assessed:  22      Therapy Diagnosis/Practice Pattern:I      Number of Comorbidities:  []0     [x]1-2    []3+    Latex Allergy:  [x]NO      []YES  Preferred Language for Healthcare:   [x]English       []other:      Pain level:  0-2/10     SUBJECTIVE:  Pt descended stairs by using two banisters. Swelling persists. Pt sees Doc .      OBJECTIVE: See eval  Observation:   Test measurements:  Knee  flexion  - 10/10/22  ROM LEFT current   Knee ext 0    Knee Flex 75 106   Strength  LEFT    Knee EXT (quad) Fair +           Pain Scale 0-2/ 10    FOTO 42 RESTRICTIONS/PRECAUTIONS: Quad tendon protocol revision x 2 - SLOW PROGRESSION  Brace 0-60 deg    Exercises/Interventions:     Therapeutic Ex (88506) Sets/sec Reps Notes/CUES         Quad set 10\"  10 x     Supine hip add with legs ext/ hooklying :10 X 10    Supine TB hip abd RD 2 x 10 Using slide board   Supine TB PF/DF  with leg elevated BL X 30     Standing Hip flex, abd, ext  X 20     Mini squats  X 20     TB TKE GR X 30           Manual Intervention (15723)         PROM  Knee and ankle  7 min    Man gastroc s / man HS s  4 min                      NMR re-education (86902)   CUES NEEDED   HEP   NMES quad set  X 10 minutes 10/10                                             Therapeutic Activity (64090)                                          Access Code: [email or web address]  URL: [email or web address]  Date: 09/26/2022  Prepared by: Wisam Brown     Exercises  Long Sitting Calf Stretch with Strap - 2 x daily - 7 x weekly - 4 reps - 30 hold  Seated Table Hamstring Stretch - 2 x daily - 7 x weekly - 4 reps - 30 hold  Long Sitting Quad Set - 2 x daily - 7 x weekly - 15 reps - 10 hold  Sitting Heel Slide with Towel - 2 x daily - 7 x weekly - 15 reps - 5 hold  Sidelying Hip Abduction - 2 x daily - 7 x weekly - 15 reps  Heel Raise - 2 x daily - 7 x weekly - 15 reps  Side to Side Weight Shift with Counter Support - 2 x daily - 7 x weekly - 10 reps - 5-10 hold    Therapeutic Exercise and NMR EXR  [x] (42594) Provided verbal/tactile cueing for activities related to strengthening, flexibility, endurance, ROM for improvements in LE, proximal hip, and core control with self care, mobility, lifting, ambulation.  [] (71160) Provided verbal/tactile cueing for activities related to improving balance, coordination, kinesthetic sense, posture, motor skill, proprioception  to assist with LE, proximal hip, and core control in self care, mobility, lifting, ambulation and eccentric single leg control.      NMR and Therapeutic Activities:    [] (20132 or 89444) Provided verbal/tactile cueing for activities related to improving balance, coordination, kinesthetic sense, posture, motor skill, proprioception and motor activation to allow for proper function of core, proximal hip and LE with self care and ADLs  [] (21731) Gait Re-education- Provided training and instruction to the patient for proper LE, core and proximal hip recruitment and positioning and eccentric body weight control with ambulation re-education including up and down stairs     Home Exercise Program:    [x] (83783) Reviewed/Progressed HEP activities related to strengthening, flexibility, endurance, ROM of core, proximal hip and LE for functional self-care, mobility, lifting and ambulation/stair navigation   [] (88160)Reviewed/Progressed HEP activities related to improving balance, coordination, kinesthetic sense, posture, motor skill, proprioception of core, proximal hip and LE for self care, mobility, lifting, and ambulation/stair navigation      Manual Treatments:  PROM / STM / Oscillations-Mobs:  G-I, II, III, IV (PA's, Inf., Post.)  [x] (25762) Provided manual therapy to mobilize LE, proximal hip and/or LS spine soft tissue/joints for the purpose of modulating pain, promoting relaxation,  increasing ROM, reducing/eliminating soft tissue swelling/inflammation/restriction, improving soft tissue extensibility and allowing for proper ROM for normal function with self care, mobility, lifting and ambulation. Modalities:     [x] GAME READY (VASO)- for significant edema, swelling, pain control.  For foot and ankle elevated x 2 incline wedges x 30 mintues    Charges  Timed Code Treatment Minutes: 50   Total Treatment Minutes: 50     Medicare total (add KX at $2000)  452.90     89.95    [] EVAL (LOW) 80961   [] EVAL (MOD) 01194  [] EVAL (HIGH) 02178   [] RE-EVAL     [x] [place](01135) x   1  [] IONTO  [x] NMR (20760) x  1   [] VASO  [x] Manual (90193) x  1    [] Other:  [] TA x [] [facility] (19320)  [] ES(attended) (87827)      [] ES (un) (34309):       GOALS:   Patient stated goal: Return to PLOF  [] Progressing: [] Met: [] Not Met: [] Adjusted     Therapist goals for Patient:   Short Term Goals: To be achieved in: 2 weeks  1. Independent in HEP and progression per patient tolerance, in order to prevent re-injury. [x] Progressing: [] Met: [] Not Met: [] Adjusted  2. Patient will have a decrease in pain to facilitate improvement in movement, function, and ADLs as indicated by Functional Deficits. [x] Progressing: [] Met: [] Not Met: [] Adjusted     Long Term Goals: To be achieved in: 12 weeks  1. Disability index score of 55% or more per FOTO to assist with reaching prior level of function. [] Progressing: [] Met: [] Not Met: [] Adjusted  2. Patient will demonstrate increased AROM to 0-120 deg to allow for proper joint functioning as indicated by patients Functional Deficits. [] Progressing: [] Met: [] Not Met: [] Adjusted  3. Patient will demonstrate an increase in Strength to good quad strength and control, 4+/5 MMT in LE to allow for proper functional mobility as indicated by patients Functional Deficits. [] Progressing: [] Met: [] Not Met: [] Adjusted  4. Patient will return to ambulating community distances for 20-30 minutes safely and without AD without increased symptoms or restriction. [] Progressing: [] Met: [] Not Met: [] Adjusted  5. Patient will be able to descend and ascend stairs with reciprocal gait. [] Progressing: [] Met: [] Not Met: [] Adjusted         Progression Towards Functional goals:  [x] Patient is progressing as expected towards functional goals listed. [] Progression is slowed due to complexities listed. [] Progression has been slowed due to co-morbidities.   [] Plan just implemented, too soon to assess goals progression  [] Other:       Overall Progression Towards Functional goals/ Treatment Progress Update:  [x] Patient is progressing as expected towards functional goals listed. [] Progression is slowed due to complexities/Impairments listed. [] Progression has been slowed due to co-morbidities. [] Plan just implemented, too soon to assess goals progression <30days   [] Goals require adjustment due to lack of progress  [] Patient is not progressing as expected and requires additional follow up with physician  [] Other    Prognosis for POC: [x] Good [] Fair  [] Poor      Patient requires continued skilled intervention: [x] Yes  [] No    Treatment/Activity Tolerance:  [x] Patient able to complete treatment  [] Patient limited by fatigue  [] Patient limited by pain    [] Patient limited by other medical complications  [] Other:     ASSESSMENT: Swelling persists. Cont to focus on closed chained exercise. Pt was able to do full rev on bike. PLAN: See eval  [x] Continue per plan of care [] Alter current plan (see comments above)  [] Plan of care initiated [] Hold pending MD visit [] Discharge      Electronically signed by:  Maico Rushing PT     Note: If patient does not return for scheduled/ recommended follow up visits, this note will serve as a discharge from care along with most recent update on progress.

## 2022-10-17 ENCOUNTER — HOSPITAL ENCOUNTER (OUTPATIENT)
Dept: PHYSICAL THERAPY | Age: 67
Setting detail: THERAPIES SERIES
Discharge: HOME | End: 2022-10-17
Payer: MEDICARE

## 2022-10-17 PROCEDURE — 97140 MANUAL THERAPY 1/> REGIONS: CPT | Performed by: PHYSICAL THERAPIST

## 2022-10-17 PROCEDURE — 97112 NEUROMUSCULAR REEDUCATION: CPT | Performed by: PHYSICAL THERAPIST

## 2022-10-17 PROCEDURE — 97110 THERAPEUTIC EXERCISES: CPT | Performed by: PHYSICAL THERAPIST

## 2022-10-17 NOTE — FLOWSHEET NOTE
[facility],  [address]  Phone: [phone]  Fax [phone]    Physical Therapy Treatment Note/ Progress Report:           Date:  10/17/2022    Patient Name:  Myrna Venegas    :  1955  MRN: [id number]  Restrictions/Precautions:    Medical/Treatment Diagnosis Information:  Diagnosis: S76.112D (ICD-10-CM) - Quadriceps tendon rupture, left, subsequent encounter  s/p  quad tenon repair (2nd revision) with Dermal allograft augmentation SX: 22  Treatment Diagnosis: Left Knee Pain M25.562 / Difficulty walking R26.2 / LE muscle weakness M28.46  Insurance/Certification information:  PT Insurance Information: [facility]  Physician Information:   Dr. Kg Guallpa  Has the plan of care been signed (Y/N):        []  Yes  [x]  No     Date of Patient follow up with Physician:       Is this a Progress Report:     []  Yes  [x]  No        If Yes:  Date Range for reporting period:  Beginning 22  Ending    Progress report will be due (10 Rx or 30 days whichever is less):        Recertification will be due (POC Duration  / 90 days whichever is less): 12 week POC 22      Visit # Insurance Allowable Auth Required   In-person 5 [facility] []  Yes [x]  No    Telehealth   []  Yes []  No    Total          Functional Scale: FOTO Knee 42/100    Date assessed:  22      Therapy Diagnosis/Practice Pattern:I      Number of Comorbidities:  []0     [x]1-2    []3+    Latex Allergy:  [x]NO      []YES  Preferred Language for Healthcare:   [x]English       []other:      Pain level:  0-2/10     SUBJECTIVE:  Patient states swelling persists. Had [address] on Saturday and negative for DVT. MD called during session and reported enlarged lymph node in groin but wants to wait until MD visit on  to see if any changes. Knee did buckle on him a couple times over the weekend.       OBJECTIVE: See eval  Observation:   Test measurements:  Knee  flexion  - 10/10/22  ROM LEFT current   Knee ext 0    Knee Flex 75 115   Strength  LEFT    Knee EXT (quad) Fair +           Pain Scale 0-2/ 10    FOTO 42      RESTRICTIONS/PRECAUTIONS: Quad tendon protocol revision x 2 - SLOW PROGRESSION  Brace 0-60 deg    Exercises/Interventions:     Therapeutic Ex (90263) Sets/sec Reps Notes/CUES            Supine hip add with legs ext/ hooklying :10 X 10    Supine TB hip abd GVL 2 x 10 Using slide board   Supine TB PF/DF  with leg elevated BL X 30     Standing Hip flex, abd, ext OVL X 20    Mini squats  X 20     KRISTY TKE 45# X 30    4 in [facility] [place]  Guarding, and cues against hyperextension anticipated             Manual Intervention (57409)         PROM  Knee and ankle  7 min    Man gastroc s / man HS s  4 min                      NMR re-education (15798)   CUES NEEDED   HEP   NMES quad set  X 10 minutes 10/10                                             Therapeutic Activity (57233)      Ace wrap of LLE from midfoot to above knee   Attempting to control continued swelling                                 Access Code: [email or web address]  URL: [email or web address]  Date: 09/26/2022  Prepared by: Wayne Haney     Exercises  Long Sitting Calf Stretch with Strap - 2 x daily - 7 x weekly - 4 reps - 30 hold  Seated Table Hamstring Stretch - 2 x daily - 7 x weekly - 4 reps - 30 hold  Long Sitting Quad Set - 2 x daily - 7 x weekly - 15 reps - 10 hold  Sitting Heel Slide with Towel - 2 x daily - 7 x weekly - 15 reps - 5 hold  Sidelying Hip Abduction - 2 x daily - 7 x weekly - 15 reps  Heel Raise - 2 x daily - 7 x weekly - 15 reps  Side to Side Weight Shift with Counter Support - 2 x daily - 7 x weekly - 10 reps - 5-10 hold    Therapeutic Exercise and NMR EXR  [x] (68489) Provided verbal/tactile cueing for activities related to strengthening, flexibility, endurance, ROM for improvements in LE, proximal hip, and core control with self care, mobility, lifting, ambulation.  [] (60002) Provided verbal/tactile cueing for activities related to improving balance, coordination, kinesthetic sense, posture, motor skill, proprioception  to assist with LE, proximal hip, and core control in self care, mobility, lifting, ambulation and eccentric single leg control. NMR and Therapeutic Activities:    [] (81514 or 21523) Provided verbal/tactile cueing for activities related to improving balance, coordination, kinesthetic sense, posture, motor skill, proprioception and motor activation to allow for proper function of core, proximal hip and LE with self care and ADLs  [] (20090) Gait Re-education- Provided training and instruction to the patient for proper LE, core and proximal hip recruitment and positioning and eccentric body weight control with ambulation re-education including up and down stairs     Home Exercise Program:    [x] (85663) Reviewed/Progressed HEP activities related to strengthening, flexibility, endurance, ROM of core, proximal hip and LE for functional self-care, mobility, lifting and ambulation/stair navigation   [] (91810)Reviewed/Progressed HEP activities related to improving balance, coordination, kinesthetic sense, posture, motor skill, proprioception of core, proximal hip and LE for self care, mobility, lifting, and ambulation/stair navigation      Manual Treatments:  PROM / STM / Oscillations-Mobs:  G-I, II, III, IV (PA's, Inf., Post.)  [x] (08221) Provided manual therapy to mobilize LE, proximal hip and/or LS spine soft tissue/joints for the purpose of modulating pain, promoting relaxation,  increasing ROM, reducing/eliminating soft tissue swelling/inflammation/restriction, improving soft tissue extensibility and allowing for proper ROM for normal function with self care, mobility, lifting and ambulation. Modalities:     [] GAME READY (VASO)- for significant edema, swelling, pain control.  For foot and ankle elevated x 2 incline wedges x 30 mintues    Charges  Timed Code Treatment Minutes: 50   Total Treatment Minutes: 50     Medicare total (add KX at $2000)  542.85     89.95    [] EVAL (LOW) 96486   [] EVAL (MOD) 35641  [] EVAL (HIGH) 64256   [] RE-EVAL     [x] OX(94195) x   1  [] IONTO  [x] NMR (54284) x  1   [] VASO  [x] Manual (11191) x  1    [] Other:  [] TA x      [] Mech Traction (22377)  [] ES(attended) (13096)      [] ES (un) (37929):       GOALS:   Patient stated goal: Return to PLOF  [] Progressing: [] Met: [] Not Met: [] Adjusted     Therapist goals for Patient:   Short Term Goals: To be achieved in: 2 weeks  1. Independent in HEP and progression per patient tolerance, in order to prevent re-injury. [x] Progressing: [] Met: [] Not Met: [] Adjusted  2. Patient will have a decrease in pain to facilitate improvement in movement, function, and ADLs as indicated by Functional Deficits. [x] Progressing: [] Met: [] Not Met: [] Adjusted     Long Term Goals: To be achieved in: 12 weeks  1. Disability index score of 55% or more per FOTO to assist with reaching prior level of function. [] Progressing: [] Met: [] Not Met: [] Adjusted  2. Patient will demonstrate increased AROM to 0-120 deg to allow for proper joint functioning as indicated by patients Functional Deficits. [] Progressing: [] Met: [] Not Met: [] Adjusted  3. Patient will demonstrate an increase in Strength to good quad strength and control, 4+/5 MMT in LE to allow for proper functional mobility as indicated by patients Functional Deficits. [] Progressing: [] Met: [] Not Met: [] Adjusted  4. Patient will return to ambulating community distances for 20-30 minutes safely and without AD without increased symptoms or restriction. [] Progressing: [] Met: [] Not Met: [] Adjusted  5. Patient will be able to descend and ascend stairs with reciprocal gait.    [] Progressing: [] Met: [] Not Met: [] Adjusted         Progression Towards Functional goals:  [x] Patient is progressing as expected towards functional goals listed. [] Progression is slowed due to complexities listed. [] Progression has been slowed due to co-morbidities. [] Plan just implemented, too soon to assess goals progression  [] Other:       Overall Progression Towards Functional goals/ Treatment Progress Update:  [x] Patient is progressing as expected towards functional goals listed. [] Progression is slowed due to complexities/Impairments listed. [] Progression has been slowed due to co-morbidities. [] Plan just implemented, too soon to assess goals progression <30days   [] Goals require adjustment due to lack of progress  [] Patient is not progressing as expected and requires additional follow up with physician  [] Other    Prognosis for POC: [x] Good [] Fair  [] Poor      Patient requires continued skilled intervention: [x] Yes  [] No    Treatment/Activity Tolerance:  [x] Patient able to complete treatment  [] Patient limited by fatigue  [] Patient limited by pain    [] Patient limited by other medical complications  [] Other:     ASSESSMENT: Swelling in LLE persists. Cont to focus on closed chained exercise. Pt tolerated advanced resistance of exercises well. Significant gain in knee flexion ROM (115 deg) made at this visit at well. PLAN: See eval  [x] Continue per plan of care [] Alter current plan (see comments above)  [] Plan of care initiated [] Hold pending MD visit [] Discharge      Electronically signed by:  EL Saucedo SPT    Therapist was present, directed the patient's care, made skilled judgement, and was responsible for assessment and treatment of the patient. Note: If patient does not return for scheduled/ recommended follow up visits, this note will serve as a discharge from care along with most recent update on progress.

## 2022-10-19 ENCOUNTER — HOSPITAL ENCOUNTER (OUTPATIENT)
Dept: PHYSICAL THERAPY | Age: 67
Setting detail: THERAPIES SERIES
Discharge: HOME | End: 2022-10-19
Payer: MEDICARE

## 2022-10-19 PROCEDURE — 97140 MANUAL THERAPY 1/> REGIONS: CPT | Performed by: PHYSICAL THERAPIST

## 2022-10-19 PROCEDURE — 97110 THERAPEUTIC EXERCISES: CPT | Performed by: PHYSICAL THERAPIST

## 2022-10-19 PROCEDURE — 97112 NEUROMUSCULAR REEDUCATION: CPT | Performed by: PHYSICAL THERAPIST

## 2022-10-19 NOTE — FLOWSHEET NOTE
[facility],  [address] [place]  Phone: [phone]  Fax [phone]    Physical Therapy Treatment Note/ Progress Report:           Date:  10/19/2022    Patient Name:  Dena Jacobson    :  1955  MRN: [id number]  Restrictions/Precautions:    Medical/Treatment Diagnosis Information:  Diagnosis: S76.112D (ICD-10-CM) - Quadriceps tendon rupture, left, subsequent encounter  s/p  quad tenon repair (2nd revision) with Dermal allograft augmentation SX: 22  Treatment Diagnosis: Left Knee Pain M25.562 / Difficulty walking R26.2 / LE muscle weakness M45.02  Insurance/Certification information:  PT Insurance Information: [address]  Physician Information:   Dr. Sugar Hoff  Has the plan of care been signed (Y/N):        []  Yes  [x]  No     Date of Patient follow up with Physician:       Is this a Progress Report:     []  Yes  [x]  No        If Yes:  Date Range for reporting period:  Beginning 22  Ending    Progress report will be due (10 Rx or 30 days whichever is less):        Recertification will be due (POC Duration  / 90 days whichever is less): 12 week POC 22      Visit # Insurance Allowable Auth Required   In-person 6  [address] []  Yes [x]  No    Telehealth   []  Yes []  No    Total          Functional Scale: FOTO Knee 42/100    Date assessed:  22      Therapy Diagnosis/Practice Pattern:I      Number of Comorbidities:  []0     [x]1-2    []3+    Latex Allergy:  [x]NO      []YES  Preferred Language for Healthcare:   [x]English       []other:      Pain level:  0-2/10     SUBJECTIVE:  When patient wakes up, he has pain in the post knee. Pt has been on his feet a lot of work activity. Ankle swelling is worse.      OBJECTIVE: See eval  Observation:   Test measurements:  Knee  flexion  - 10/10/22  ROM LEFT current   Knee ext 0    Knee Flex 75 115   Strength  LEFT    Knee EXT (quad) Fair + Pain Scale 0-2/ 10    FOTO 42      RESTRICTIONS/PRECAUTIONS: Quad tendon protocol revision x 2 - SLOW PROGRESSION  Brace 0-60 deg    Exercises/Interventions:     Therapeutic Ex (17315) Sets/sec Reps Notes/CUES            Supine hip add with legs ext/ hooklying :10 X 10    Supine TB hip abd GVL 2 x 10 Using slide board   Supine TB PF/DF  with leg elevated BL X 30     Standing Hip flex, abd, ext OVL X 20    Mini squats  X 20     KRISTY TKE 45# X 30    4 in [facility] [place]  Guarding, and cues against hyperextension anticipated             Manual Intervention (04480)         PROM  Knee and ankle  7 min    Man gastroc s / man HS s  4 min    Lymphatic massage  6 min                NMR re-education (81752)   CUES NEEDED   HEP   NMES quad set  X 10 minutes 10/10                                             Therapeutic Activity (68514)      Ace wrap of LLE from midfoot to above knee   Attempting to control continued swelling                                 Access Code: [email or web address]  URL: [email or web address]  Date: 09/26/2022  Prepared by: Stefani Garibay     Exercises  Long Sitting Calf Stretch with Strap - 2 x daily - 7 x weekly - 4 reps - 30 hold  Seated Table Hamstring Stretch - 2 x daily - 7 x weekly - 4 reps - 30 hold  Long Sitting Quad Set - 2 x daily - 7 x weekly - 15 reps - 10 hold  Sitting Heel Slide with Towel - 2 x daily - 7 x weekly - 15 reps - 5 hold  Sidelying Hip Abduction - 2 x daily - 7 x weekly - 15 reps  Heel Raise - 2 x daily - 7 x weekly - 15 reps  Side to Side Weight Shift with Counter Support - 2 x daily - 7 x weekly - 10 reps - 5-10 hold    Therapeutic Exercise and NMR EXR  [x] (49420) Provided verbal/tactile cueing for activities related to strengthening, flexibility, endurance, ROM for improvements in LE, proximal hip, and core control with self care, mobility, lifting, ambulation.  [] (98629) Provided verbal/tactile cueing for activities related to improving balance, coordination, kinesthetic sense, posture, motor skill, proprioception  to assist with LE, proximal hip, and core control in self care, mobility, lifting, ambulation and eccentric single leg control. NMR and Therapeutic Activities:    [] (31024 or 25077) Provided verbal/tactile cueing for activities related to improving balance, coordination, kinesthetic sense, posture, motor skill, proprioception and motor activation to allow for proper function of core, proximal hip and LE with self care and ADLs  [] (26517) Gait Re-education- Provided training and instruction to the patient for proper LE, core and proximal hip recruitment and positioning and eccentric body weight control with ambulation re-education including up and down stairs     Home Exercise Program:    [x] (25249) Reviewed/Progressed HEP activities related to strengthening, flexibility, endurance, ROM of core, proximal hip and LE for functional self-care, mobility, lifting and ambulation/stair navigation   [] (03130)Reviewed/Progressed HEP activities related to improving balance, coordination, kinesthetic sense, posture, motor skill, proprioception of core, proximal hip and LE for self care, mobility, lifting, and ambulation/stair navigation      Manual Treatments:  PROM / STM / Oscillations-Mobs:  G-I, II, III, IV (PA's, Inf., Post.)  [x] (05165) Provided manual therapy to mobilize LE, proximal hip and/or LS spine soft tissue/joints for the purpose of modulating pain, promoting relaxation,  increasing ROM, reducing/eliminating soft tissue swelling/inflammation/restriction, improving soft tissue extensibility and allowing for proper ROM for normal function with self care, mobility, lifting and ambulation. Modalities:     [] GAME READY (VASO)- for significant edema, swelling, pain control.  For foot and ankle elevated x 2 incline wedges x 30 mintues    Charges  Timed Code Treatment Minutes: 50   Total Treatment Minutes: 50     Medicare total (add KX at $2000)  905     89.95    [] VENKATESH (LOW) 18728   [] EVAL (MOD) 79936  [] EVAL (HIGH) 80133   [] RE-EVAL     [x] JU(69072) x   1  [] IONTO  [x] NMR (23810) x  1   [] VASO  [x] Manual (34120) x  1    [] Other:  [] TA x      [] Mech Traction (45163)  [] ES(attended) (04826)      [] ES (un) (75728):       GOALS:   Patient stated goal: Return to PLOF  [] Progressing: [] Met: [] Not Met: [] Adjusted     Therapist goals for Patient:   Short Term Goals: To be achieved in: 2 weeks  1. Independent in HEP and progression per patient tolerance, in order to prevent re-injury. [x] Progressing: [] Met: [] Not Met: [] Adjusted  2. Patient will have a decrease in pain to facilitate improvement in movement, function, and ADLs as indicated by Functional Deficits. [x] Progressing: [] Met: [] Not Met: [] Adjusted     Long Term Goals: To be achieved in: 12 weeks  1. Disability index score of 55% or more per FOTO to assist with reaching prior level of function. [] Progressing: [] Met: [] Not Met: [] Adjusted  2. Patient will demonstrate increased AROM to 0-120 deg to allow for proper joint functioning as indicated by patients Functional Deficits. [] Progressing: [] Met: [] Not Met: [] Adjusted  3. Patient will demonstrate an increase in Strength to good quad strength and control, 4+/5 MMT in LE to allow for proper functional mobility as indicated by patients Functional Deficits. [] Progressing: [] Met: [] Not Met: [] Adjusted  4. Patient will return to ambulating community distances for 20-30 minutes safely and without AD without increased symptoms or restriction. [] Progressing: [] Met: [] Not Met: [] Adjusted  5. Patient will be able to descend and ascend stairs with reciprocal gait. [] Progressing: [] Met: [] Not Met: [] Adjusted         Progression Towards Functional goals:  [x] Patient is progressing as expected towards functional goals listed. [] Progression is slowed due to complexities listed. [] Progression has been slowed due to co-morbidities.   [] Plan just implemented, too soon to assess goals progression  [] Other:       Overall Progression Towards Functional goals/ Treatment Progress Update:  [x] Patient is progressing as expected towards functional goals listed. [] Progression is slowed due to complexities/Impairments listed. [] Progression has been slowed due to co-morbidities. [] Plan just implemented, too soon to assess goals progression <30days   [] Goals require adjustment due to lack of progress  [] Patient is not progressing as expected and requires additional follow up with physician  [] Other    Prognosis for POC: [x] Good [] Fair  [] Poor      Patient requires continued skilled intervention: [x] Yes  [] No    Treatment/Activity Tolerance:  [x] Patient able to complete treatment  [] Patient limited by fatigue  [] Patient limited by pain    [] Patient limited by other medical complications  [] Other:     ASSESSMENT: Pt could increase weight with TKE. Pt need v cues to keep leg locked with standing hip flex. Pt has tenderness to palpation in gastroc. Pt did get some relief with lymphatic massage. PLAN: See eval  [x] Continue per plan of care [] Alter current plan (see comments above)  [] Plan of care initiated [] Hold pending MD visit [] Discharge      Electronically signed by:  Rosalio He SPT    Therapist was present, directed the patient's care, made skilled judgement, and was responsible for assessment and treatment of the patient. Note: If patient does not return for scheduled/ recommended follow up visits, this note will serve as a discharge from care along with most recent update on progress.

## 2022-10-24 ENCOUNTER — HOSPITAL ENCOUNTER (OUTPATIENT)
Dept: PHYSICAL THERAPY | Age: 67
Setting detail: THERAPIES SERIES
Discharge: HOME | End: 2022-10-24
Payer: MEDICARE

## 2022-10-24 PROCEDURE — 97140 MANUAL THERAPY 1/> REGIONS: CPT | Performed by: PHYSICAL THERAPIST

## 2022-10-24 PROCEDURE — 97112 NEUROMUSCULAR REEDUCATION: CPT | Performed by: PHYSICAL THERAPIST

## 2022-10-24 PROCEDURE — 97110 THERAPEUTIC EXERCISES: CPT | Performed by: PHYSICAL THERAPIST

## 2022-10-24 NOTE — FLOWSHEET NOTE
[facility],  [address] [place]  Phone: [phone]  Fax [phone]    Physical Therapy Treatment Note/ Progress Report:           Date:  10/24/2022    Patient Name:  Whitley Navarrete    :  1955  MRN: [id number]  Restrictions/Precautions:    Medical/Treatment Diagnosis Information:  Diagnosis: S76.112D (ICD-10-CM) - Quadriceps tendon rupture, left, subsequent encounter  s/p  quad tenon repair (2nd revision) with Dermal allograft augmentation SX: 22  Treatment Diagnosis: Left Knee Pain M25.562 / Difficulty walking R26.2 / LE muscle weakness N59.83  Insurance/Certification information:  PT Insurance Information:  [address]  Physician Information:   Dr. Yennifer Wilcox  Has the plan of care been signed (Y/N):        []  Yes  [x]  No     Date of Patient follow up with Physician:       Is this a Progress Report:     []  Yes  [x]  No        If Yes:  Date Range for reporting period:  Beginning 22  Ending    Progress report will be due (10 Rx or 30 days whichever is less):        Recertification will be due (POC Duration  / 90 days whichever is less): 12 week POC 22      Visit # Insurance Allowable Auth Required   In-person [address] []  Yes [x]  No    Telehealth   []  Yes []  No    Total          Functional Scale: FOTO Knee 42/100    Date assessed:  22      Therapy Diagnosis/Practice Pattern:I      Number of Comorbidities:  []0     [x]1-2    []3+    Latex Allergy:  [x]NO      []YES  Preferred Language for Healthcare:   [x]English       []other:      Pain level:  0-2/10     SUBJECTIVE:  pt was unable to get his cat to the vet as he cannot stoop. Swelling persists and is notably worse with prolonged standing at work event.       OBJECTIVE: See eval  Observation:   Test measurements:  Knee  flexion  - 10/10/22  ROM LEFT current   Knee ext 0    Knee Flex 75 115   Strength  LEFT    Knee EXT (quad) Fair +           Pain Scale 0-2/ 10    FOTO 42      RESTRICTIONS/PRECAUTIONS: Quad tendon protocol revision x 2 - SLOW PROGRESSION  Brace 0-60 deg    Exercises/Interventions:     Therapeutic Ex (78804) Sets/sec Reps Notes/CUES            Supine hip add with legs ext/ hooklying :10 X 10    Supine TB hip abd GVL 2 x 10 Using slide board   Supine TB PF/DF  with leg elevated BL X 30     Standing Hip flex, abd, ext OVL X 20    Mini squats  X 20     KRISTY TKE 45# X 30    4 in FSU 6 inch X 10 with CGA-1 Guarding, and cues against hyperextension anticipated   Leg Press 100# 3 x 10          Manual Intervention (20761)         PROM  Knee and ankle  7 min    Man gastroc s / man HS s  4 min    Lymphatic massage  6 min                NMR re-education (57422)   CUES NEEDED   HEP   Lateral side stepping  1 lap                                        Therapeutic Activity (25945)      Ace wrap of LLE from midfoot to above knee   Attempting to control continued swelling                                 Access Code: [email or web address]  URL: [email or web address]  Date: 09/26/2022  Prepared by: Franklin Mohr     Exercises  Long Sitting Calf Stretch with Strap - 2 x daily - 7 x weekly - 4 reps - 30 hold  Seated Table Hamstring Stretch - 2 x daily - 7 x weekly - 4 reps - 30 hold  Long Sitting Quad Set - 2 x daily - 7 x weekly - 15 reps - 10 hold  Sitting Heel Slide with Towel - 2 x daily - 7 x weekly - 15 reps - 5 hold  Sidelying Hip Abduction - 2 x daily - 7 x weekly - 15 reps  Heel Raise - 2 x daily - 7 x weekly - 15 reps  Side to Side Weight Shift with Counter Support - 2 x daily - 7 x weekly - 10 reps - 5-10 hold    Therapeutic Exercise and NMR EXR  [x] (13172) Provided verbal/tactile cueing for activities related to strengthening, flexibility, endurance, ROM for improvements in LE, proximal hip, and core control with self care, mobility, lifting, ambulation.  [] (51040) Provided verbal/tactile cueing for activities related to improving balance, coordination, kinesthetic sense, posture, motor skill, proprioception  to assist with LE, proximal hip, and core control in self care, mobility, lifting, ambulation and eccentric single leg control. NMR and Therapeutic Activities:    [] (69208 or 28241) Provided verbal/tactile cueing for activities related to improving balance, coordination, kinesthetic sense, posture, motor skill, proprioception and motor activation to allow for proper function of core, proximal hip and LE with self care and ADLs  [] (78586) Gait Re-education- Provided training and instruction to the patient for proper LE, core and proximal hip recruitment and positioning and eccentric body weight control with ambulation re-education including up and down stairs     Home Exercise Program:    [x] (29795) Reviewed/Progressed HEP activities related to strengthening, flexibility, endurance, ROM of core, proximal hip and LE for functional self-care, mobility, lifting and ambulation/stair navigation   [] (69775)Reviewed/Progressed HEP activities related to improving balance, coordination, kinesthetic sense, posture, motor skill, proprioception of core, proximal hip and LE for self care, mobility, lifting, and ambulation/stair navigation      Manual Treatments:  PROM / STM / Oscillations-Mobs:  G-I, II, III, IV (PA's, Inf., Post.)  [x] (00805) Provided manual therapy to mobilize LE, proximal hip and/or LS spine soft tissue/joints for the purpose of modulating pain, promoting relaxation,  increasing ROM, reducing/eliminating soft tissue swelling/inflammation/restriction, improving soft tissue extensibility and allowing for proper ROM for normal function with self care, mobility, lifting and ambulation. Modalities:     [] GAME READY (VASO)- for significant edema, swelling, pain control.  For foot and ankle elevated x 2 incline wedges x 30 mintues    Charges  Timed Code Treatment Minutes: 50   Total Treatment Minutes: 50     Medicare total (add KX at $2000)  721.95     89.95    [] EVAL (LOW) 94112   [] EVAL (MOD) 85154  [] EVAL (HIGH) 69962   [] RE-EVAL     [x] WK(04025) x   1  [] IONTO  [x] NMR (51250) x  1   [] VASO  [x] Manual (59344) x  1    [] Other:  [] TA x      [] Mech Traction (76219)  [] ES(attended) (24768)      [] ES (un) (32858):       GOALS:   Patient stated goal: Return to PLOF  [] Progressing: [] Met: [] Not Met: [] Adjusted     Therapist goals for Patient:   Short Term Goals: To be achieved in: 2 weeks  1. Independent in HEP and progression per patient tolerance, in order to prevent re-injury. [x] Progressing: [] Met: [] Not Met: [] Adjusted  2. Patient will have a decrease in pain to facilitate improvement in movement, function, and ADLs as indicated by Functional Deficits. [x] Progressing: [] Met: [] Not Met: [] Adjusted     Long Term Goals: To be achieved in: 12 weeks  1. Disability index score of 55% or more per FOTO to assist with reaching prior level of function. [] Progressing: [] Met: [] Not Met: [] Adjusted  2. Patient will demonstrate increased AROM to 0-120 deg to allow for proper joint functioning as indicated by patients Functional Deficits. [] Progressing: [] Met: [] Not Met: [] Adjusted  3. Patient will demonstrate an increase in Strength to good quad strength and control, 4+/5 MMT in LE to allow for proper functional mobility as indicated by patients Functional Deficits. [] Progressing: [] Met: [] Not Met: [] Adjusted  4. Patient will return to ambulating community distances for 20-30 minutes safely and without AD without increased symptoms or restriction. [] Progressing: [] Met: [] Not Met: [] Adjusted  5. Patient will be able to descend and ascend stairs with reciprocal gait. [] Progressing: [] Met: [] Not Met: [] Adjusted         Progression Towards Functional goals:  [x] Patient is progressing as expected towards functional goals listed. [] Progression is slowed due to complexities listed.   [] Progression has been slowed due to co-morbidities. [] Plan just implemented, too soon to assess goals progression  [] Other:       Overall Progression Towards Functional goals/ Treatment Progress Update:  [x] Patient is progressing as expected towards functional goals listed. [] Progression is slowed due to complexities/Impairments listed. [] Progression has been slowed due to co-morbidities. [] Plan just implemented, too soon to assess goals progression <30days   [] Goals require adjustment due to lack of progress  [] Patient is not progressing as expected and requires additional follow up with physician  [] Other    Prognosis for POC: [x] Good [] Fair  [] Poor      Patient requires continued skilled intervention: [x] Yes  [] No    Treatment/Activity Tolerance:  [x] Patient able to complete treatment  [] Patient limited by fatigue  [] Patient limited by pain    [] Patient limited by other medical complications  [] Other:     ASSESSMENT: Pt does move into hyper ext. Able to better control on leg press. Need manual support with FSU and lateral side stepping                PLAN: See eval  [x] Continue per plan of care [] Alter current plan (see comments above)  [] Plan of care initiated [] Hold pending MD visit [] Discharge      Electronically signed by:  Sosa Sheffield, PT       Note: If patient does not return for scheduled/ recommended follow up visits, this note will serve as a discharge from care along with most recent update on progress.

## 2022-10-26 ENCOUNTER — APPOINTMENT (OUTPATIENT)
Dept: PHYSICAL THERAPY | Age: 67
End: 2022-10-26
Payer: MEDICARE

## 2022-10-28 ENCOUNTER — HOSPITAL ENCOUNTER (OUTPATIENT)
Dept: PHYSICAL THERAPY | Age: 67
Setting detail: THERAPIES SERIES
Discharge: HOME | End: 2022-10-28
Payer: MEDICARE

## 2022-10-28 PROCEDURE — 97140 MANUAL THERAPY 1/> REGIONS: CPT | Performed by: PHYSICAL THERAPIST

## 2022-10-28 PROCEDURE — 97110 THERAPEUTIC EXERCISES: CPT | Performed by: PHYSICAL THERAPIST

## 2022-10-28 PROCEDURE — 97112 NEUROMUSCULAR REEDUCATION: CPT | Performed by: PHYSICAL THERAPIST

## 2022-10-28 NOTE — FLOWSHEET NOTE
[facility],  [address] [place]  Phone: [phone]  Fax [phone]    Physical Therapy Treatment Note/ Progress Report:           Date:  10/28/2022    Patient Name:  Sherilyn Carrel    :  1955  MRN: [id number]  Restrictions/Precautions:    Medical/Treatment Diagnosis Information:  Diagnosis: S76.112D (ICD-10-CM) - Quadriceps tendon rupture, left, subsequent encounter  s/p  quad tenon repair (2nd revision) with Dermal allograft augmentation SX: 22  Treatment Diagnosis: Left Knee Pain M25.562 / Difficulty walking R26.2 / LE muscle weakness Y27.43  Insurance/Certification information:  PT Insurance Information: [facility]  Physician Information:   Dr. Manpreet Castle  Has the plan of care been signed (Y/N):        []  Yes  [x]  No     Date of Patient follow up with Physician:       Is this a Progress Report:     []  Yes  [x]  No        If Yes:  Date Range for reporting period:  Beginning 22  Ending    Progress report will be due (10 Rx or 30 days whichever is less):        Recertification will be due (POC Duration  / 90 days whichever is less): 12 week POC 22      Visit # Insurance Allowable Auth Required   In-person 8 [facility] []  Yes [x]  No    Telehealth   []  Yes []  No    Total          Functional Scale: FOTO Knee 42/100    Date assessed:  22      Therapy Diagnosis/Practice Pattern:I      Number of Comorbidities:  []0     [x]1-2    []3+    Latex Allergy:  [x]NO      []YES  Preferred Language for Healthcare:   [x]English       []other:      Pain level:  0-2/10     SUBJECTIVE: Pt did have increased swelling today. He did eat chinese last night.       OBJECTIVE: See eval  Observation:   Test measurements:  Knee  flexion  - 10/10/22  ROM LEFT current   Knee ext 0    Knee Flex 75 115   Strength  LEFT    Knee EXT (quad) Fair +           Pain Scale 0-2/ 10    FOTO 42 RESTRICTIONS/PRECAUTIONS: Quad tendon protocol revision x 2 - SLOW PROGRESSION  Brace 0-60 deg    Exercises/Interventions:     Therapeutic Ex (83249) Sets/sec Reps Notes/CUES            Supine hip add with legs ext/ hooklying :10 X 10    Supine TB hip abd GVL 2 x 10 Using slide board   Supine TB PF/DF  with leg elevated BL X 30     Standing Hip flex, abd, ext OVL X 20    Mini squats  X 20     KRISTY TKE 45# X 30    4 in FSU 6 inch X 10 with CGA-1 Guarding, and cues against hyperextension anticipated   Leg Press 100# 3 x 10          Manual Intervention (68516)         PROM  Knee and ankle  7 min    Man gastroc s / man HS s  4 min    Lymphatic massage  6 min                NMR re-education (69831)   CUES NEEDED   HEP   Lateral side stepping  1 lap                                        Therapeutic Activity (69922)      Ace wrap of LLE from midfoot to above knee   Attempting to control continued swelling                                 Access Code: [email or web address]  URL: [email or web address]  Date: 09/26/2022  Prepared by: Roxana Patiño     Exercises  Long Sitting Calf Stretch with Strap - 2 x daily - 7 x weekly - 4 reps - 30 hold  Seated Table Hamstring Stretch - 2 x daily - 7 x weekly - 4 reps - 30 hold  Long Sitting Quad Set - 2 x daily - 7 x weekly - 15 reps - 10 hold  Sitting Heel Slide with Towel - 2 x daily - 7 x weekly - 15 reps - 5 hold  Sidelying Hip Abduction - 2 x daily - 7 x weekly - 15 reps  Heel Raise - 2 x daily - 7 x weekly - 15 reps  Side to Side Weight Shift with Counter Support - 2 x daily - 7 x weekly - 10 reps - 5-10 hold    Therapeutic Exercise and NMR EXR  [x] (74564) Provided verbal/tactile cueing for activities related to strengthening, flexibility, endurance, ROM for improvements in LE, proximal hip, and core control with self care, mobility, lifting, ambulation.  [] (10639) Provided verbal/tactile cueing for activities related to improving balance, coordination, kinesthetic sense, posture, motor skill, proprioception  to assist with LE, proximal hip, and core control in self care, mobility, lifting, ambulation and eccentric single leg control. NMR and Therapeutic Activities:    [] (07444 or 68588) Provided verbal/tactile cueing for activities related to improving balance, coordination, kinesthetic sense, posture, motor skill, proprioception and motor activation to allow for proper function of core, proximal hip and LE with self care and ADLs  [] (48313) Gait Re-education- Provided training and instruction to the patient for proper LE, core and proximal hip recruitment and positioning and eccentric body weight control with ambulation re-education including up and down stairs     Home Exercise Program:    [x] (19121) Reviewed/Progressed HEP activities related to strengthening, flexibility, endurance, ROM of core, proximal hip and LE for functional self-care, mobility, lifting and ambulation/stair navigation   [] (04438)Reviewed/Progressed HEP activities related to improving balance, coordination, kinesthetic sense, posture, motor skill, proprioception of core, proximal hip and LE for self care, mobility, lifting, and ambulation/stair navigation      Manual Treatments:  PROM / STM / Oscillations-Mobs:  G-I, II, III, IV (PA's, Inf., Post.)  [x] (25590) Provided manual therapy to mobilize LE, proximal hip and/or LS spine soft tissue/joints for the purpose of modulating pain, promoting relaxation,  increasing ROM, reducing/eliminating soft tissue swelling/inflammation/restriction, improving soft tissue extensibility and allowing for proper ROM for normal function with self care, mobility, lifting and ambulation. Modalities:     [] GAME READY (VASO)- for significant edema, swelling, pain control.  For foot and ankle elevated x 2 incline wedges x 30 mintues    Charges  Timed Code Treatment Minutes: 50   Total Treatment Minutes: 50     Medicare total (add KX at $2000)  811.95     89.95    [] EVAL (LOW) 49446   [] EVAL (MOD) 45961  [] EVAL (HIGH) 66609   [] RE-EVAL     [x] ZZ(22324) x   1  [] IONTO  [x] NMR (05229) x  1   [] VASO  [x] Manual (30777) x  1    [] Other:  [] TA x      [] Mech Traction (03738)  [] ES(attended) (82342)      [] ES (un) (27899):       GOALS:   Patient stated goal: Return to PLOF  [] Progressing: [] Met: [] Not Met: [] Adjusted     Therapist goals for Patient:   Short Term Goals: To be achieved in: 2 weeks  1. Independent in HEP and progression per patient tolerance, in order to prevent re-injury. [x] Progressing: [] Met: [] Not Met: [] Adjusted  2. Patient will have a decrease in pain to facilitate improvement in movement, function, and ADLs as indicated by Functional Deficits. [x] Progressing: [] Met: [] Not Met: [] Adjusted     Long Term Goals: To be achieved in: 12 weeks  1. Disability index score of 55% or more per FOTO to assist with reaching prior level of function. [] Progressing: [] Met: [] Not Met: [] Adjusted  2. Patient will demonstrate increased AROM to 0-120 deg to allow for proper joint functioning as indicated by patients Functional Deficits. [] Progressing: [] Met: [] Not Met: [] Adjusted  3. Patient will demonstrate an increase in Strength to good quad strength and control, 4+/5 MMT in LE to allow for proper functional mobility as indicated by patients Functional Deficits. [] Progressing: [] Met: [] Not Met: [] Adjusted  4. Patient will return to ambulating community distances for 20-30 minutes safely and without AD without increased symptoms or restriction. [] Progressing: [] Met: [] Not Met: [] Adjusted  5. Patient will be able to descend and ascend stairs with reciprocal gait. [] Progressing: [] Met: [] Not Met: [] Adjusted         Progression Towards Functional goals:  [x] Patient is progressing as expected towards functional goals listed. [] Progression is slowed due to complexities listed.   [] Progression has been slowed due to co-morbidities. [] Plan just implemented, too soon to assess goals progression  [] Other:       Overall Progression Towards Functional goals/ Treatment Progress Update:  [x] Patient is progressing as expected towards functional goals listed. [] Progression is slowed due to complexities/Impairments listed. [] Progression has been slowed due to co-morbidities. [] Plan just implemented, too soon to assess goals progression <30days   [] Goals require adjustment due to lack of progress  [] Patient is not progressing as expected and requires additional follow up with physician  [] Other    Prognosis for POC: [x] Good [] Fair  [] Poor      Patient requires continued skilled intervention: [x] Yes  [] No    Treatment/Activity Tolerance:  [x] Patient able to complete treatment  [] Patient limited by fatigue  [] Patient limited by pain    [] Patient limited by other medical complications  [] Other:     ASSESSMENT: Pt cont to have crepitus in knee. Pt does fly into hyperextension without upper extremity support. PLAN: See eval  [x] Continue per plan of care [] Alter current plan (see comments above)  [] Plan of care initiated [] Hold pending MD visit [] Discharge      Electronically signed by:  Tammie Sinclari PT       Note: If patient does not return for scheduled/ recommended follow up visits, this note will serve as a discharge from care along with most recent update on progress.

## 2022-10-31 ENCOUNTER — HOSPITAL ENCOUNTER (OUTPATIENT)
Dept: PHYSICAL THERAPY | Age: 67
Setting detail: THERAPIES SERIES
Discharge: HOME | End: 2022-10-31
Payer: MEDICARE

## 2022-10-31 PROCEDURE — 97112 NEUROMUSCULAR REEDUCATION: CPT | Performed by: PHYSICAL THERAPIST

## 2022-10-31 PROCEDURE — 97140 MANUAL THERAPY 1/> REGIONS: CPT | Performed by: PHYSICAL THERAPIST

## 2022-10-31 PROCEDURE — 97110 THERAPEUTIC EXERCISES: CPT | Performed by: PHYSICAL THERAPIST

## 2022-10-31 NOTE — FLOWSHEET NOTE
[facility],  [address] [place]  Phone: [phone]  Fax [phone]    Physical Therapy Treatment Note/ Progress Report:           Date:  10/31/2022    Patient Name:  Tala Mathews    :  1955  MRN: [id number]  Restrictions/Precautions:    Medical/Treatment Diagnosis Information:  Diagnosis: S76.112D (ICD-10-CM) - Quadriceps tendon rupture, left, subsequent encounter  s/p  quad tenon repair (2nd revision) with Dermal allograft augmentation SX: 22  Treatment Diagnosis: Left Knee Pain M25.562 / Difficulty walking R26.2 / LE muscle weakness I92.45  Insurance/Certification information:  PT Insurance Information: [facility]  Physician Information:   Dr. Bucky Chery  Has the plan of care been signed (Y/N):        []  Yes  [x]  No     Date of Patient follow up with Physician:       Is this a Progress Report:     []  Yes  [x]  No        If Yes:  Date Range for reporting period:  Beginning 22  Ending    Progress report will be due (10 Rx or 30 days whichever is less): 37/15/99       Recertification will be due (POC Duration  / 90 days whichever is less): 12 week POC 22      Visit # Insurance Allowable Auth Required   In-person 5 [facility] []  Yes [x]  No    Telehealth   []  Yes []  No    Total          Functional Scale: FOTO Knee 42/100    Date assessed:  22      Therapy Diagnosis/Practice Pattern:I      Number of Comorbidities:  []0     [x]1-2    []3+    Latex Allergy:  [x]NO      []YES  Preferred Language for Healthcare:   [x]English       []other:      Pain level:  0-2/10     SUBJECTIVE: Pt does feel like fluid in leg is impacted by diet. Pt did not get the opportunity to elevate leg much this weekend. Pt will see surgeon and PCP again this week.       OBJECTIVE: See eval  Observation:   Test measurements:  Knee  flexion  - 10/10/22  ROM LEFT current   Knee ext 0    Knee Flex 75 115   Strength LEFT    Knee EXT (quad) Fair +           Pain Scale 0-2/ 10    FOTO 42      RESTRICTIONS/PRECAUTIONS: Quad tendon protocol revision x 2 - SLOW PROGRESSION  Brace 0-60 deg    Exercises/Interventions:     Therapeutic Ex (41523) Sets/sec Reps Notes/CUES   Supine hip abd/ add iso :05 X 20     Heel slide supine/   supine hip abd 5\"  2x 10  Bridges  x15       Supine hip add with legs ext/ hooklying :10 X 10    Supine TB hip abd GVL 2 x 10 Using slide board   Supine TB PF/DF  with leg elevated BL X 30     Standing Hip flex, abd, ext OVL X 20    Mini squats  X 20     KRISTY TKE 45# X 30    4 in FSU 6 inch X 10 with CGA-1 Guarding, and cues against hyperextension anticipated   Leg Press 110# 3 x 10          Manual Intervention (62168)         PROM  Knee and ankle  7 min    Man gastroc s / man HS s  4 min    Lymphatic massage  6 min                NMR re-education (72114)   CUES NEEDED   HEP   Lateral side stepping  1 lap                                        Therapeutic Activity (97106)      Ace wrap of LLE from midfoot to above knee   Attempting to control continued swelling                                 Access Code: [email or web address]  URL: [email or web address]  Date: 09/26/2022  Prepared by: Gregice Root     Exercises  Long Sitting Calf Stretch with Strap - 2 x daily - 7 x weekly - 4 reps - 30 hold  Seated Table Hamstring Stretch - 2 x daily - 7 x weekly - 4 reps - 30 hold  Long Sitting Quad Set - 2 x daily - 7 x weekly - 15 reps - 10 hold  Sitting Heel Slide with Towel - 2 x daily - 7 x weekly - 15 reps - 5 hold  Sidelying Hip Abduction - 2 x daily - 7 x weekly - 15 reps  Heel Raise - 2 x daily - 7 x weekly - 15 reps  Side to Side Weight Shift with Counter Support - 2 x daily - 7 x weekly - 10 reps - 5-10 hold    Therapeutic Exercise and NMR EXR  [x] (54545) Provided verbal/tactile cueing for activities related to strengthening, flexibility, endurance, ROM for improvements in LE, proximal hip, and core control with self care, mobility, lifting, ambulation.  [] (80474) Provided verbal/tactile cueing for activities related to improving balance, coordination, kinesthetic sense, posture, motor skill, proprioception  to assist with LE, proximal hip, and core control in self care, mobility, lifting, ambulation and eccentric single leg control. NMR and Therapeutic Activities:    [] (16094 or 69858) Provided verbal/tactile cueing for activities related to improving balance, coordination, kinesthetic sense, posture, motor skill, proprioception and motor activation to allow for proper function of core, proximal hip and LE with self care and ADLs  [] (38467) Gait Re-education- Provided training and instruction to the patient for proper LE, core and proximal hip recruitment and positioning and eccentric body weight control with ambulation re-education including up and down stairs     Home Exercise Program:    [x] (45621) Reviewed/Progressed HEP activities related to strengthening, flexibility, endurance, ROM of core, proximal hip and LE for functional self-care, mobility, lifting and ambulation/stair navigation   [] (90838)Reviewed/Progressed HEP activities related to improving balance, coordination, kinesthetic sense, posture, motor skill, proprioception of core, proximal hip and LE for self care, mobility, lifting, and ambulation/stair navigation      Manual Treatments:  PROM / STM / Oscillations-Mobs:  G-I, II, III, IV (PA's, Inf., Post.)  [x] (27516) Provided manual therapy to mobilize LE, proximal hip and/or LS spine soft tissue/joints for the purpose of modulating pain, promoting relaxation,  increasing ROM, reducing/eliminating soft tissue swelling/inflammation/restriction, improving soft tissue extensibility and allowing for proper ROM for normal function with self care, mobility, lifting and ambulation. Modalities:     [] GAME READY (VASO)- for significant edema, swelling, pain control.  For foot and ankle elevated x 2 incline wedges x 30 mintues    Charges  Timed Code Treatment Minutes: 50   Total Treatment Minutes: 50     Medicare total (add KX at $2000)  971.90     89.95    [] EVAL (LOW) 46398   [] EVAL (MOD) 44108  [] EVAL (HIGH) 76275   [] RE-EVAL     [x] IQ(95299) x   1  [] IONTO  [x] NMR (78315) x  1   [] VASO  [x] Manual (97824) x  1    [] Other:  [] TA x      [] Mech Traction (74985)  [] ES(attended) (52972)      [] ES (un) (36232):       GOALS:   Patient stated goal: Return to PLOF  [] Progressing: [] Met: [] Not Met: [] Adjusted     Therapist goals for Patient:   Short Term Goals: To be achieved in: 2 weeks  1. Independent in HEP and progression per patient tolerance, in order to prevent re-injury. [x] Progressing: [] Met: [] Not Met: [] Adjusted  2. Patient will have a decrease in pain to facilitate improvement in movement, function, and ADLs as indicated by Functional Deficits. [x] Progressing: [] Met: [] Not Met: [] Adjusted     Long Term Goals: To be achieved in: 12 weeks  1. Disability index score of 55% or more per FOTO to assist with reaching prior level of function. [] Progressing: [] Met: [] Not Met: [] Adjusted  2. Patient will demonstrate increased AROM to 0-120 deg to allow for proper joint functioning as indicated by patients Functional Deficits. [] Progressing: [] Met: [] Not Met: [] Adjusted  3. Patient will demonstrate an increase in Strength to good quad strength and control, 4+/5 MMT in LE to allow for proper functional mobility as indicated by patients Functional Deficits. [] Progressing: [] Met: [] Not Met: [] Adjusted  4. Patient will return to ambulating community distances for 20-30 minutes safely and without AD without increased symptoms or restriction. [] Progressing: [] Met: [] Not Met: [] Adjusted  5. Patient will be able to descend and ascend stairs with reciprocal gait.    [] Progressing: [] Met: [] Not Met: [] Adjusted         Progression Towards Functional goals:  [x] Patient is progressing as expected towards functional goals listed. [] Progression is slowed due to complexities listed. [] Progression has been slowed due to co-morbidities. [] Plan just implemented, too soon to assess goals progression  [] Other:       Overall Progression Towards Functional goals/ Treatment Progress Update:  [x] Patient is progressing as expected towards functional goals listed. [] Progression is slowed due to complexities/Impairments listed. [] Progression has been slowed due to co-morbidities. [] Plan just implemented, too soon to assess goals progression <30days   [] Goals require adjustment due to lack of progress  [] Patient is not progressing as expected and requires additional follow up with physician  [] Other    Prognosis for POC: [x] Good [] Fair  [] Poor      Patient requires continued skilled intervention: [x] Yes  [] No    Treatment/Activity Tolerance:  [x] Patient able to complete treatment  [] Patient limited by fatigue  [] Patient limited by pain    [] Patient limited by other medical complications  [] Other:     ASSESSMENT:Edema in left leg continues to inhibit progression. Pt only does closed chained activities. Pt is experiencing buckling with WB activities. Pt demonstrates good ROM, but quad strength is lacking. PLAN: See eval  [x] Continue per plan of care [] Alter current plan (see comments above)  [] Plan of care initiated [] Hold pending MD visit [] Discharge      Electronically signed by:  Roderick Diop PT       Note: If patient does not return for scheduled/ recommended follow up visits, this note will serve as a discharge from care along with most recent update on progress.

## 2022-11-02 ENCOUNTER — HOSPITAL ENCOUNTER (OUTPATIENT)
Dept: PHYSICAL THERAPY | Age: 67
Setting detail: THERAPIES SERIES
Discharge: HOME | End: 2022-11-02
Payer: MEDICARE

## 2022-11-02 ENCOUNTER — OFFICE VISIT (OUTPATIENT)
Dept: ORTHOPEDIC SURGERY | Age: 67
End: 2022-11-02
Payer: MEDICARE

## 2022-11-02 VITALS — WEIGHT: 279.8 LBS | BODY MASS INDEX: 40.06 KG/M2 | HEIGHT: 70 IN | RESPIRATION RATE: 16 BRPM

## 2022-11-02 DIAGNOSIS — I89.0 LYMPHEDEMA: ICD-10-CM

## 2022-11-02 DIAGNOSIS — S76.112D QUADRICEPS TENDON RUPTURE, LEFT, SUBSEQUENT ENCOUNTER: Primary | ICD-10-CM

## 2022-11-02 PROCEDURE — G8417 CALC BMI ABV UP PARAM F/U: HCPCS | Performed by: ORTHOPAEDIC SURGERY

## 2022-11-02 PROCEDURE — 97140 MANUAL THERAPY 1/> REGIONS: CPT | Performed by: PHYSICAL THERAPIST

## 2022-11-02 PROCEDURE — 99213 OFFICE O/P EST LOW 20 MIN: CPT | Performed by: ORTHOPAEDIC SURGERY

## 2022-11-02 PROCEDURE — 97112 NEUROMUSCULAR REEDUCATION: CPT | Performed by: PHYSICAL THERAPIST

## 2022-11-02 PROCEDURE — 97110 THERAPEUTIC EXERCISES: CPT | Performed by: PHYSICAL THERAPIST

## 2022-11-02 PROCEDURE — 3017F COLORECTAL CA SCREEN DOC REV: CPT | Performed by: ORTHOPAEDIC SURGERY

## 2022-11-02 PROCEDURE — G8484 FLU IMMUNIZE NO ADMIN: HCPCS | Performed by: ORTHOPAEDIC SURGERY

## 2022-11-02 PROCEDURE — G8427 DOCREV CUR MEDS BY ELIG CLIN: HCPCS | Performed by: ORTHOPAEDIC SURGERY

## 2022-11-02 PROCEDURE — 1036F TOBACCO NON-USER: CPT | Performed by: ORTHOPAEDIC SURGERY

## 2022-11-02 PROCEDURE — 1124F ACP DISCUSS-NO DSCNMKR DOCD: CPT | Performed by: ORTHOPAEDIC SURGERY

## 2022-11-02 NOTE — FLOWSHEET NOTE
[facility],  [address] [place]  Phone: [phone]  Fax [phone]    Physical Therapy Treatment Note/ Progress Report:           Date:  2022    Patient Name:  Dena Jacobson    :  1955  MRN: [id number]  Restrictions/Precautions:    Medical/Treatment Diagnosis Information:  Diagnosis: S76.112D (ICD-10-CM) - Quadriceps tendon rupture, left, subsequent encounter  s/p  quad tenon repair (2nd revision) with Dermal allograft augmentation SX: 22  Treatment Diagnosis: Left Knee Pain M25.562 / Difficulty walking R26.2 / LE muscle weakness L42.23  Insurance/Certification information:  PT Insurance Information: [facility]  Physician Information:   Dr. Sugar Hoff  Has the plan of care been signed (Y/N):        []  Yes  [x]  No     Date of Patient follow up with Physician:       Is this a Progress Report:     [x]  Yes  []  No        If Yes:  Date Range for reporting period:  Beginning 22  Endin22    Progress report will be due (10 Rx or 30 days whichever is less):        Recertification will be due (POC Duration  / 90 days whichever is less): 12 week POC 22      Visit # Insurance Allowable Auth Required   In-person 8 [facility] []  Yes [x]  No    Telehealth   []  Yes []  No    Total          Therapy Diagnosis/Practice Pattern:I      Number of Comorbidities:  []0     [x]1-2    []3+    Latex Allergy:  [x]NO      []YES  Preferred Language for Healthcare:   [x]English       []other:      Pain level:  0-2/10     SUBJECTIVE: Pt feels edema in leg is less when resting. Pt is unable to walk dog. Pt's leg hyperextends if he tries to ascend stairs with reciprocal gait. Pt is limited with how long he can stand.       OBJECTIVE: See eval  Observation:   Test measurements:  Knee  flexion  - 10/10/22  ROM LEFT  22 current   Knee ext 0 0   Knee Flex 75 115   Strength  LEFT    Knee EXT (quad) Fair +           Pain Scale 0-2/ 10    FOTO 42 50     RESTRICTIONS/PRECAUTIONS: Quad tendon protocol revision x 2 - SLOW PROGRESSION  Brace 0-60 deg    Exercises/Interventions:     Therapeutic Ex (82343) Sets/sec Reps Notes/CUES   Supine hip abd/ add iso :05 X 20     Heel slide supine/   supine hip abd 5\"  2x 10  Bridges  x15       Supine hip add with legs ext/ hooklying :10 X 10    Supine TB hip abd GVL 2 x 10 Using slide board   Supine TB PF/DF  with leg elevated BL X 30     Standing Hip flex, abd, ext OVL X 20    Mini squats  X 20     KRISTY TKE 45# X 30    4 in FSU 6 inch X 10 with CGA-1 Guarding, and cues against hyperextension anticipated   Leg Press 110# 3 x 10          Manual Intervention (95093)         PROM  Knee and ankle  7 min    Man gastroc s / man HS s  4 min    Lymphatic massage  6 min                NMR re-education (93644)   CUES NEEDED   HEP   Lateral side stepping  1 lap                                        Therapeutic Activity (42935)      Ace wrap of LLE from midfoot to above knee   Attempting to control continued swelling                                 Access Code: [email or web address]  URL: [email or web address]  Date: 09/26/2022  Prepared by: Roxana Patiño     Exercises  Long Sitting Calf Stretch with Strap - 2 x daily - 7 x weekly - 4 reps - 30 hold  Seated Table Hamstring Stretch - 2 x daily - 7 x weekly - 4 reps - 30 hold  Long Sitting Quad Set - 2 x daily - 7 x weekly - 15 reps - 10 hold  Sitting Heel Slide with Towel - 2 x daily - 7 x weekly - 15 reps - 5 hold  Sidelying Hip Abduction - 2 x daily - 7 x weekly - 15 reps  Heel Raise - 2 x daily - 7 x weekly - 15 reps  Side to Side Weight Shift with Counter Support - 2 x daily - 7 x weekly - 10 reps - 5-10 hold    Therapeutic Exercise and NMR EXR  [x] (48345) Provided verbal/tactile cueing for activities related to strengthening, flexibility, endurance, ROM for improvements in LE, proximal hip, and core control with self care, mobility, lifting, ambulation.  [] (30106) Provided verbal/tactile cueing for activities related to improving balance, coordination, kinesthetic sense, posture, motor skill, proprioception  to assist with LE, proximal hip, and core control in self care, mobility, lifting, ambulation and eccentric single leg control. NMR and Therapeutic Activities:    [] (77580 or 64201) Provided verbal/tactile cueing for activities related to improving balance, coordination, kinesthetic sense, posture, motor skill, proprioception and motor activation to allow for proper function of core, proximal hip and LE with self care and ADLs  [] (50931) Gait Re-education- Provided training and instruction to the patient for proper LE, core and proximal hip recruitment and positioning and eccentric body weight control with ambulation re-education including up and down stairs     Home Exercise Program:    [x] (91064) Reviewed/Progressed HEP activities related to strengthening, flexibility, endurance, ROM of core, proximal hip and LE for functional self-care, mobility, lifting and ambulation/stair navigation   [] (74056)Reviewed/Progressed HEP activities related to improving balance, coordination, kinesthetic sense, posture, motor skill, proprioception of core, proximal hip and LE for self care, mobility, lifting, and ambulation/stair navigation      Manual Treatments:  PROM / STM / Oscillations-Mobs:  G-I, II, III, IV (PA's, Inf., Post.)  [x] (23582) Provided manual therapy to mobilize LE, proximal hip and/or LS spine soft tissue/joints for the purpose of modulating pain, promoting relaxation,  increasing ROM, reducing/eliminating soft tissue swelling/inflammation/restriction, improving soft tissue extensibility and allowing for proper ROM for normal function with self care, mobility, lifting and ambulation. Modalities:     [] GAME READY (VASO)- for significant edema, swelling, pain control.  For foot and ankle elevated x 2 incline wedges x 30 mintues    Charges  Timed Code Treatment Minutes: 50   Total Treatment Minutes: 50     Medicare total (add KX at $2000)  971.90     89.95    [] EVAL (LOW) 81405   [] EVAL (MOD) 30213  [] EVAL (HIGH) 59609   [] RE-EVAL     [x] ML(27343) x   1  [] IONTO  [x] NMR (19107) x  1   [] VASO  [x] Manual (95761) x  1    [] Other:  [] TA x      [] Mech Traction (05053)  [] ES(attended) (97156)      [] ES (un) (50670):       GOALS:   Patient stated goal: Return to PLOF  [] Progressing: [] Met: [] Not Met: [] Adjusted     Therapist goals for Patient:   Short Term Goals: To be achieved in: 2 weeks  1. Independent in HEP and progression per patient tolerance, in order to prevent re-injury. [x] Progressing: [] Met: [] Not Met: [] Adjusted  2. Patient will have a decrease in pain to facilitate improvement in movement, function, and ADLs as indicated by Functional Deficits. [x] Progressing: [] Met: [] Not Met: [] Adjusted     Long Term Goals: To be achieved in: 12 weeks  1. Disability index score of 55% or more per FOTO to assist with reaching prior level of function. [x] Progressing: [] Met: [] Not Met: [] Adjusted  2. Patient will demonstrate increased AROM to 0-120 deg to allow for proper joint functioning as indicated by patients Functional Deficits. [x] Progressing: [] Met: [] Not Met: [] Adjusted  3. Patient will demonstrate an increase in Strength to good quad strength and control, 4+/5 MMT in LE to allow for proper functional mobility as indicated by patients Functional Deficits. [] Progressing: [] Met: [x] Not Met: [] Adjusted  4. Patient will return to ambulating community distances for 20-30 minutes safely and without AD without increased symptoms or restriction. [] Progressing: [x] Met: [] Not Met: [] Adjusted  5. Patient will be able to descend and ascend stairs with reciprocal gait.    [] Progressing: [x] Met: [] Not Met: [] Adjusted         Progression Towards Functional goals:  [] Patient is progressing as expected towards functional goals listed. [] Progression is slowed due to complexities listed. [x] Progression has been slowed due to co-morbidities. [] Plan just implemented, too soon to assess goals progression  [] Other:       Overall Progression Towards Functional goals/ Treatment Progress Update:  [] Patient is progressing as expected towards functional goals listed. [] Progression is slowed due to complexities/Impairments listed. [x] Progression has been slowed due to co-morbidities. [] Plan just implemented, too soon to assess goals progression <30days   [] Goals require adjustment due to lack of progress  [] Patient is not progressing as expected and requires additional follow up with physician  [] Other    Prognosis for POC: [x] Good [] Fair  [] Poor      Patient requires continued skilled intervention: [x] Yes  [] No    Treatment/Activity Tolerance:  [x] Patient able to complete treatment  [] Patient limited by fatigue  [] Patient limited by pain    [] Patient limited by other medical complications  [] Other:     ASSESSMENT:Unable to perform any open chained exercises due to lymphatic issues in leg. Pt is able to perform closed chained activities, but if on single leg, leg will hyperext without PT support. Pt is seeing Doc for re-eval. Pt is also seeing PCP this week. PLAN: See eval  [x] Continue per plan of care [] Alter current plan (see comments above)  [] Plan of care initiated [] Hold pending MD visit [] Discharge      Electronically signed by:  Zac Myers PT       Note: If patient does not return for scheduled/ recommended follow up visits, this note will serve as a discharge from care along with most recent update on progress.

## 2022-11-02 NOTE — PROGRESS NOTES
History:  Lesvia Rosales is here for follow up after left revision quadriceps tendon repair with dermal allograft augmentation. Surgery date was 7/22/22. The patient's pain is rated at 0/10. He has been to PT at [facility]. He is not wearing the T scope today. He states he stopped wearing it about 1 month ago, which was about 2 weeks after he saw us in the office. Physical Examination:  Ht 5' 10\" (1.778 m)   BMI 38.17 kg/m²    Patient is awake, alert, and in no acute distress. The quad tendon does appear to be intact and firm at the superior pole of the patella. No palpable gap in the quadriceps tendon. He does have decent quad activation. He does continue to have swelling in his left foot and leg similar to before. Left leg ultrasound 8/11/2022: No evidence of DVT. Assessment:   3-1/2 months status post left vision quadriceps tendon repair with dermal allograft augmentation  Left leg lymphedema  Noncompliance      Plan:   He asked about going back in the knee brace. I think that is pointless at this time since he has not been wearing for the last month. Continue PT. Very gentle progression. Ice to knee as needed. Elevate left leg. I would consider referral to lymphedema clinic. Follow up in 2 months for evaluation of progress or prn if problems. Wen Christensen. Cody Barrett MD  Orthopaedic Surgery and Sports Medicine     Disclaimer: This note was generated with use of a verbal recognition program (DRAGON) and an attempt was made to check for errors. It is possible that there are still dictated errors within this office note. If so, please bring any significant errors to my attention for an addendum. All efforts were made to ensure that this office note is accurate.

## 2022-11-08 ENCOUNTER — APPOINTMENT (OUTPATIENT)
Dept: PHYSICAL THERAPY | Age: 67
End: 2022-11-08
Payer: MEDICARE

## 2022-11-08 ENCOUNTER — HOSPITAL ENCOUNTER (OUTPATIENT)
Dept: PHYSICAL THERAPY | Age: 67
Setting detail: THERAPIES SERIES
Discharge: HOME | End: 2022-11-08
Payer: MEDICARE

## 2022-11-08 PROCEDURE — 97140 MANUAL THERAPY 1/> REGIONS: CPT | Performed by: PHYSICAL THERAPIST

## 2022-11-08 PROCEDURE — 97112 NEUROMUSCULAR REEDUCATION: CPT | Performed by: PHYSICAL THERAPIST

## 2022-11-08 PROCEDURE — 97110 THERAPEUTIC EXERCISES: CPT | Performed by: PHYSICAL THERAPIST

## 2022-11-08 NOTE — FLOWSHEET NOTE
[facility],  [address] [place]  Phone: [phone]  Fax [phone]    Physical Therapy Treatment Note/ Progress Report:           Date:  2022    Patient Name:  Kaushal Hernandez    :  1955  MRN: [id number]  Restrictions/Precautions:    Medical/Treatment Diagnosis Information:  Diagnosis: S76.112D (ICD-10-CM) - Quadriceps tendon rupture, left, subsequent encounter  s/p  quad tenon repair (2nd revision) with Dermal allograft augmentation SX: 22  Treatment Diagnosis: Left Knee Pain M25.562 / Difficulty walking R26.2 / LE muscle weakness A24.23  Insurance/Certification information:  PT Insurance Information: [facility]  Physician Information:   Dr. Bartolo Sierra  Has the plan of care been signed (Y/N):        []  Yes  [x]  No     Date of Patient follow up with Physician:       Is this a Progress Report:     [x]  Yes  []  No        If Yes:  Date Range for reporting period:  Beginning 22  Endin22    Progress report will be due (10 Rx or 30 days whichever is less):        Recertification will be due (POC Duration  / 90 days whichever is less): 12 week POC 22      Visit # Insurance Allowable Auth Required   In-person 6 [facility] []  Yes [x]  No    Telehealth   []  Yes []  No    Total          Therapy Diagnosis/Practice Pattern:I      Number of Comorbidities:  []0     [x]1-2    []3+    Latex Allergy:  [x]NO      []YES  Preferred Language for Healthcare:   [x]English       []other:      Pain level:  0-2/10     SUBJECTIVE: Pt feels edema in leg is less when resting. Pt is unable to walk dog. Pt's leg hyperextends if he tries to ascend stairs with reciprocal gait. Pt is limited with how long he can stand.       OBJECTIVE: See eval  Observation:   Test measurements:  Knee  flexion  - 10/10/22  ROM LEFT  22 current   Knee ext 0 0   Knee Flex 75 115   Strength  LEFT    Knee EXT (quad) Fair +           Pain Scale 0-2/ 10    FOTO 42 50     RESTRICTIONS/PRECAUTIONS: Quad tendon protocol revision x 2 - SLOW PROGRESSION  Brace 0-60 deg    Exercises/Interventions:     Therapeutic Ex (86797) Sets/sec Reps Notes/CUES   Supine hip abd/ add iso :05 X 20     Heel slide supine/   supine hip abd 5\"  2x 10  SL ABD  15 x  Bridges  x15       Supine hip add with legs ext/ hooklying    Supine TB hip abd Using slide board   Supine TB PF/DF  with leg elevated    Standing Hip flex, abd, ext OVL X 20    Mini squats  X 20       Guarding, and cues against hyperextension anticipated   HR/ TL  X 20    Standing HS curl  2 x 10     Leg Press 110# 3 x 10          Manual Intervention (33323)         PROM  Knee and ankle  7 min    Man gastroc s / man HS s  4 min    Lymphatic massage  6 min                NMR re-education (07968)   CUES NEEDED   HEP   NMES quad set  X 10 minutes Lateral side stepping  1 lap                                        Therapeutic Activity (58193)      Ace wrap of LLE from midfoot to above knee   Attempting to control continued swelling                                 Access Code: [email or web address]  URL: [email or web address]  Date: 09/26/2022  Prepared by: Wendy Garner     Exercises  Long Sitting Calf Stretch with Strap - 2 x daily - 7 x weekly - 4 reps - 30 hold  Seated Table Hamstring Stretch - 2 x daily - 7 x weekly - 4 reps - 30 hold  Long Sitting Quad Set - 2 x daily - 7 x weekly - 15 reps - 10 hold  Sitting Heel Slide with Towel - 2 x daily - 7 x weekly - 15 reps - 5 hold  Sidelying Hip Abduction - 2 x daily - 7 x weekly - 15 reps  Heel Raise - 2 x daily - 7 x weekly - 15 reps  Side to Side Weight Shift with Counter Support - 2 x daily - 7 x weekly - 10 reps - 5-10 hold    Therapeutic Exercise and NMR EXR  [x] (09635) Provided verbal/tactile cueing for activities related to strengthening, flexibility, endurance, ROM for improvements in LE, proximal hip, and core control with self care, mobility, lifting, ambulation.  [] (71948) Provided verbal/tactile cueing for activities related to improving balance, coordination, kinesthetic sense, posture, motor skill, proprioception  to assist with LE, proximal hip, and core control in self care, mobility, lifting, ambulation and eccentric single leg control. NMR and Therapeutic Activities:    [] (98246 or 28098) Provided verbal/tactile cueing for activities related to improving balance, coordination, kinesthetic sense, posture, motor skill, proprioception and motor activation to allow for proper function of core, proximal hip and LE with self care and ADLs  [] (74186) Gait Re-education- Provided training and instruction to the patient for proper LE, core and proximal hip recruitment and positioning and eccentric body weight control with ambulation re-education including up and down stairs     Home Exercise Program:    [x] (45042) Reviewed/Progressed HEP activities related to strengthening, flexibility, endurance, ROM of core, proximal hip and LE for functional self-care, mobility, lifting and ambulation/stair navigation   [] (76450)Reviewed/Progressed HEP activities related to improving balance, coordination, kinesthetic sense, posture, motor skill, proprioception of core, proximal hip and LE for self care, mobility, lifting, and ambulation/stair navigation      Manual Treatments:  PROM / STM / Oscillations-Mobs:  G-I, II, III, IV (PA's, Inf., Post.)  [x] (24390) Provided manual therapy to mobilize LE, proximal hip and/or LS spine soft tissue/joints for the purpose of modulating pain, promoting relaxation,  increasing ROM, reducing/eliminating soft tissue swelling/inflammation/restriction, improving soft tissue extensibility and allowing for proper ROM for normal function with self care, mobility, lifting and ambulation. Modalities:     [] GAME READY (VASO)- for significant edema, swelling, pain control.  For foot and ankle elevated x 2 incline wedges x 30 mintues    Charges  Timed Code Treatment Minutes: 50   Total Treatment Minutes: 50     Medicare total (add KX at $2000)  1061.85     89.95    [] EVAL (LOW) 37151   [] EVAL (MOD) 84368  [] EVAL (HIGH) 93457   [] RE-EVAL     [x] SE(93521) x   1  [] IONTO  [x] NMR (97263) x  1   [] VASO  [x] Manual (76309) x  1    [] Other:  [] TA x      [] Mech Traction (42024)  [] ES(attended) (63649)      [] ES (un) (74080):       GOALS:   Patient stated goal: Return to PLOF  [] Progressing: [] Met: [] Not Met: [] Adjusted     Therapist goals for Patient:   Short Term Goals: To be achieved in: 2 weeks  1. Independent in HEP and progression per patient tolerance, in order to prevent re-injury. [x] Progressing: [] Met: [] Not Met: [] Adjusted  2. Patient will have a decrease in pain to facilitate improvement in movement, function, and ADLs as indicated by Functional Deficits. [x] Progressing: [] Met: [] Not Met: [] Adjusted     Long Term Goals: To be achieved in: 12 weeks  1. Disability index score of 55% or more per FOTO to assist with reaching prior level of function. [x] Progressing: [] Met: [] Not Met: [] Adjusted  2. Patient will demonstrate increased AROM to 0-120 deg to allow for proper joint functioning as indicated by patients Functional Deficits. [x] Progressing: [] Met: [] Not Met: [] Adjusted  3. Patient will demonstrate an increase in Strength to good quad strength and control, 4+/5 MMT in LE to allow for proper functional mobility as indicated by patients Functional Deficits. [] Progressing: [] Met: [x] Not Met: [] Adjusted  4. Patient will return to ambulating community distances for 20-30 minutes safely and without AD without increased symptoms or restriction. [] Progressing: [x] Met: [] Not Met: [] Adjusted  5. Patient will be able to descend and ascend stairs with reciprocal gait.    [] Progressing: [x] Met: [] Not Met: [] Adjusted         Progression Towards Functional goals:  [] Patient is progressing as expected towards functional goals listed. [] Progression is slowed due to complexities listed. [x] Progression has been slowed due to co-morbidities. [] Plan just implemented, too soon to assess goals progression  [] Other:       Overall Progression Towards Functional goals/ Treatment Progress Update:  [] Patient is progressing as expected towards functional goals listed. [] Progression is slowed due to complexities/Impairments listed. [x] Progression has been slowed due to co-morbidities. [] Plan just implemented, too soon to assess goals progression <30days   [] Goals require adjustment due to lack of progress  [] Patient is not progressing as expected and requires additional follow up with physician  [] Other    Prognosis for POC: [x] Good [] Fair  [] Poor      Patient requires continued skilled intervention: [x] Yes  [] No    Treatment/Activity Tolerance:  [x] Patient able to complete treatment  [] Patient limited by fatigue  [] Patient limited by pain    [] Patient limited by other medical complications  [] Other:     ASSESSMENT:Tried two ace wraps in CW/ CCW pattern to help with edema. Pt is more swollen with travel. STill unable to perform SLR due to weight of leg. PLAN: See eval  [x] Continue per plan of care [] Alter current plan (see comments above)  [] Plan of care initiated [] Hold pending MD visit [] Discharge      Electronically signed by:  Sue Ritchie PT       Note: If patient does not return for scheduled/ recommended follow up visits, this note will serve as a discharge from care along with most recent update on progress.

## 2022-11-11 ENCOUNTER — HOSPITAL ENCOUNTER (OUTPATIENT)
Dept: PHYSICAL THERAPY | Age: 67
Setting detail: THERAPIES SERIES
Discharge: HOME | End: 2022-11-11
Payer: MEDICARE

## 2022-11-11 PROCEDURE — 97110 THERAPEUTIC EXERCISES: CPT | Performed by: PHYSICAL THERAPIST

## 2022-11-11 PROCEDURE — 97140 MANUAL THERAPY 1/> REGIONS: CPT | Performed by: PHYSICAL THERAPIST

## 2022-11-11 PROCEDURE — 97112 NEUROMUSCULAR REEDUCATION: CPT | Performed by: PHYSICAL THERAPIST

## 2022-11-11 NOTE — FLOWSHEET NOTE
[facility],  [address] [place]  Phone: [phone]  Fax [phone]    Physical Therapy Treatment Note/ Progress Report:           Date:  2022    Patient Name:  Raj Guillaume    :  1955  MRN: [id number]  Restrictions/Precautions:    Medical/Treatment Diagnosis Information:  Diagnosis: S76.112D (ICD-10-CM) - Quadriceps tendon rupture, left, subsequent encounter  s/p  quad tenon repair (2nd revision) with Dermal allograft augmentation SX: 22  Treatment Diagnosis: Left Knee Pain M25.562 / Difficulty walking R26.2 / LE muscle weakness Y10.90  Insurance/Certification information:  PT Insurance Information: [facility]  Physician Information:   Dr. Luis Alberto Evans  Has the plan of care been signed (Y/N):        []  Yes  [x]  No     Date of Patient follow up with Physician:       Is this a Progress Report:     [x]  Yes  []  No        If Yes:  Date Range for reporting period:  Beginning 22  Endin22    Progress report will be due (10 Rx or 30 days whichever is less):        Recertification will be due (POC Duration  / 90 days whichever is less): 12 week POC 22      Visit # Insurance Allowable Auth Required   In-person 6 [facility] []  Yes [x]  No    Telehealth   []  Yes []  No    Total          Therapy Diagnosis/Practice Pattern:I      Number of Comorbidities:  []0     [x]1-2    []3+    Latex Allergy:  [x]NO      []YES  Preferred Language for Healthcare:   [x]English       []other:      Pain level:  0-2/10     SUBJECTIVE: Pt did not feel that compression wrap helped last time. Pt is concerned about integrity of tendon.      OBJECTIVE: See eval  Observation:   Test measurements:  Knee  flexion  - 10/10/22  ROM LEFT  22 current   Knee ext 0 0   Knee Flex 75 115   Strength  LEFT    Knee EXT (quad) Fair +           Pain Scale 0-2/ 10    FOTO 42 50     RESTRICTIONS/PRECAUTIONS: [employer] tendon protocol revision x 2 - SLOW PROGRESSION  Brace 0-60 deg    Exercises/Interventions:     Therapeutic Ex (32711) Sets/sec Reps Notes/CUES   Supine hip abd/ add iso :05 X 20     SL ABD  15 x  Bridges  x15 Spider killers :05 X 10     Supine hip add with legs ext/ hooklying    Supine TB hip abd Using slide board   Supine TB PF/DF  with leg elevated    Standing Hip flex, abd, ext  X 20    Mini squats  X 20       Guarding, and cues against hyperextension anticipated   HR/ TL  X 20    Standing HS curl    Leg Press         Manual Intervention (25381)         PROM  Knee and ankle  7 min    Man gastroc s / man HS s  4 min    Lymphatic massage  6 min                NMR re-education (75448)   CUES NEEDED   HEP   NMES quad set  X 10 minutes Lateral side stepping  1 lap                                        Therapeutic Activity (42419)      Ace wrap of LLE from midfoot to above knee   Attempting to control continued swelling                                   Access Code: [email or web address]  URL: [email or web address]  Date: 78/48/1250  Prepared by: Richad Soles    Exercises  Long Sitting Calf Stretch with Strap - 2 x daily - 7 x weekly - 4 reps - 30 hold  Seated Table Hamstring Stretch - 2 x daily - 7 x weekly - 4 reps - 30 hold  Long Sitting Quad Set - 2 x daily - 7 x weekly - 15 reps - 10 hold  Sitting Heel Slide with Towel - 2 x daily - 7 x weekly - 15 reps - 5 hold  Sidelying Hip Abduction - 2 x daily - 7 x weekly - 15 reps  Side to Side Weight Shift with Counter Support - 2 x daily - 7 x weekly - 10 reps - 5-10 hold  Standing Hip Flexion with Counter Support - 1 x daily - 7 x weekly - 3 sets - 10 reps  Standing 4-Way Leg Reach with Counter Support - 1 x daily - 7 x weekly - 3 sets - 10 reps  Standing Hip Extension with Counter Support - 1 x daily - 7 x weekly - 3 sets - 10 reps  Mini Squat with Counter Support - 1 x daily - 7 x weekly - 3 sets - 10 reps    Therapeutic Exercise and NMR EXR  [x] (28786) Provided verbal/tactile cueing for activities related to strengthening, flexibility, endurance, ROM for improvements in LE, proximal hip, and core control with self care, mobility, lifting, ambulation.  [] (20015) Provided verbal/tactile cueing for activities related to improving balance, coordination, kinesthetic sense, posture, motor skill, proprioception  to assist with LE, proximal hip, and core control in self care, mobility, lifting, ambulation and eccentric single leg control.      NMR and Therapeutic Activities:    [] (34801 or 93834) Provided verbal/tactile cueing for activities related to improving balance, coordination, kinesthetic sense, posture, motor skill, proprioception and motor activation to allow for proper function of core, proximal hip and LE with self care and ADLs  [] (91457) Gait Re-education- Provided training and instruction to the patient for proper LE, core and proximal hip recruitment and positioning and eccentric body weight control with ambulation re-education including up and down stairs     Home Exercise Program:    [x] (46731) Reviewed/Progressed HEP activities related to strengthening, flexibility, endurance, ROM of core, proximal hip and LE for functional self-care, mobility, lifting and ambulation/stair navigation   [] (94514)Reviewed/Progressed HEP activities related to improving balance, coordination, kinesthetic sense, posture, motor skill, proprioception of core, proximal hip and LE for self care, mobility, lifting, and ambulation/stair navigation      Manual Treatments:  PROM / STM / Oscillations-Mobs:  G-I, II, III, IV (PA's, Inf., Post.)  [x] (71432) Provided manual therapy to mobilize LE, proximal hip and/or LS spine soft tissue/joints for the purpose of modulating pain, promoting relaxation,  increasing ROM, reducing/eliminating soft tissue swelling/inflammation/restriction, improving soft tissue extensibility and allowing for proper ROM for normal function with self care, mobility, lifting and ambulation. Modalities:     [] GAME READY (VASO)- for significant edema, swelling, pain control. For foot and ankle elevated x 2 incline wedges x 30 mintues    Charges  Timed Code Treatment Minutes: 50   Total Treatment Minutes: 50     Medicare total (add KX at $2000)  1151.86     89.95    [] EVAL (LOW) 28533   [] EVAL (MOD) 87955  [] EVAL (HIGH) 43540   [] RE-EVAL     [x] TA(01149) x   1  [] IONTO  [x] NMR (42645) x  1   [] VASO  [x] Manual (61814) x  1    [] Other:  [] TA x      [] Mech Traction (89351)  [] ES(attended) (91128)      [] ES (un) (99808):       GOALS:   Patient stated goal: Return to PLOF  [] Progressing: [] Met: [] Not Met: [] Adjusted     Therapist goals for Patient:   Short Term Goals: To be achieved in: 2 weeks  1. Independent in HEP and progression per patient tolerance, in order to prevent re-injury. [x] Progressing: [] Met: [] Not Met: [] Adjusted  2. Patient will have a decrease in pain to facilitate improvement in movement, function, and ADLs as indicated by Functional Deficits. [x] Progressing: [] Met: [] Not Met: [] Adjusted     Long Term Goals: To be achieved in: 12 weeks  1. Disability index score of 55% or more per FOTO to assist with reaching prior level of function. [x] Progressing: [] Met: [] Not Met: [] Adjusted  2. Patient will demonstrate increased AROM to 0-120 deg to allow for proper joint functioning as indicated by patients Functional Deficits. [x] Progressing: [] Met: [] Not Met: [] Adjusted  3. Patient will demonstrate an increase in Strength to good quad strength and control, 4+/5 MMT in LE to allow for proper functional mobility as indicated by patients Functional Deficits. [] Progressing: [] Met: [x] Not Met: [] Adjusted  4. Patient will return to ambulating community distances for 20-30 minutes safely and without AD without increased symptoms or restriction. [] Progressing: [x] Met: [] Not Met: [] Adjusted  5.  Patient will be able to descend and ascend stairs with reciprocal gait. [] Progressing: [x] Met: [] Not Met: [] Adjusted         Progression Towards Functional goals:  [] Patient is progressing as expected towards functional goals listed. [] Progression is slowed due to complexities listed. [x] Progression has been slowed due to co-morbidities. [] Plan just implemented, too soon to assess goals progression  [] Other:       Overall Progression Towards Functional goals/ Treatment Progress Update:  [] Patient is progressing as expected towards functional goals listed. [] Progression is slowed due to complexities/Impairments listed. [x] Progression has been slowed due to co-morbidities. [] Plan just implemented, too soon to assess goals progression <30days   [] Goals require adjustment due to lack of progress  [] Patient is not progressing as expected and requires additional follow up with physician  [] Other    Prognosis for POC: [x] Good [] Fair  [] Poor      Patient requires continued skilled intervention: [x] Yes  [] No    Treatment/Activity Tolerance:  [x] Patient able to complete treatment  [] Patient limited by fatigue  [] Patient limited by pain    [] Patient limited by other medical complications  [] Other:     ASSESSMENT:Pt does have hard time with standing SLR. Pt does have ext lag. PLAN: See eval  [x] Continue per plan of care [] Alter current plan (see comments above)  [] Plan of care initiated [] Hold pending MD visit [] Discharge      Electronically signed by:  Rosa Elena Lizarraga PT       Note: If patient does not return for scheduled/ recommended follow up visits, this note will serve as a discharge from care along with most recent update on progress.

## 2022-11-14 ENCOUNTER — HOSPITAL ENCOUNTER (OUTPATIENT)
Dept: PHYSICAL THERAPY | Age: 67
Setting detail: THERAPIES SERIES
Discharge: HOME | End: 2022-11-14
Payer: MEDICARE

## 2022-11-14 PROCEDURE — 97140 MANUAL THERAPY 1/> REGIONS: CPT | Performed by: PHYSICAL THERAPIST

## 2022-11-14 PROCEDURE — 97110 THERAPEUTIC EXERCISES: CPT | Performed by: PHYSICAL THERAPIST

## 2022-11-14 PROCEDURE — 97112 NEUROMUSCULAR REEDUCATION: CPT | Performed by: PHYSICAL THERAPIST

## 2022-11-14 NOTE — FLOWSHEET NOTE
[facility],  [address] [place]  Phone: [phone]  Fax [phone]    Physical Therapy Treatment Note/ Progress Report:           Date:  2022    Patient Name:  Whitley Navarrete    :  1955  MRN: [id number]  Restrictions/Precautions:    Medical/Treatment Diagnosis Information:  Diagnosis: S76.112D (ICD-10-CM) - Quadriceps tendon rupture, left, subsequent encounter  s/p  quad tenon repair (2nd revision) with Dermal allograft augmentation SX: 22  Treatment Diagnosis: Left Knee Pain M25.562 / Difficulty walking R26.2 / LE muscle weakness L91.69  Insurance/Certification information:  PT Insurance Information: [facility]  Physician Information:   Dr. Yennifer Wilcox  Has the plan of care been signed (Y/N):        []  Yes  [x]  No     Date of Patient follow up with Physician:       Is this a Progress Report:     [x]  Yes  []  No        If Yes:  Date Range for reporting period:  Beginning 22  Endin22    Progress report will be due (10 Rx or 30 days whichever is less):        Recertification will be due (POC Duration  / 90 days whichever is less): 12 week POC 22      Visit # Insurance Allowable Auth Required   In-person 6 [facility] []  Yes [x]  No    Telehealth   []  Yes []  No    Total          Therapy Diagnosis/Practice Pattern:I      Number of Comorbidities:  []0     [x]1-2    []3+    Latex Allergy:  [x]NO      []YES  Preferred Language for Healthcare:   [x]English       []other:      Pain level:  0-2/10     SUBJECTIVE: Pt feels more agile with ADLs with the exception of walking on declines and stairs.      OBJECTIVE: See eval  Observation:   Test measurements:  Knee  flexion  - 10/10/22  ROM LEFT  22 current   Knee ext 0 0   Knee Flex 75 115   Strength  LEFT    Knee EXT (quad) Fair +           Pain Scale 0-2/ 10    FOTO 42 50     RESTRICTIONS/PRECAUTIONS: Quad tendon protocol revision x 2 - SLOW PROGRESSION  Brace 0-60 deg    Exercises/Interventions:     Therapeutic Ex (82441) Sets/sec Reps Notes/CUES   Supine hip abd/ add iso :05 X 20     SL ABD  15 x  Bridges  x15 Spider killers :05 X 10     Supine hip add with legs ext/ hooklying    Supine TB hip abd Using slide board   Supine TB PF/DF  with leg elevated    Standing Hip flex, abd, ext  X 20    Mini squats  X 20    TB KRISTY TKE 30# X 30    Guarding, and cues against hyperextension anticipated   HR/ TL  X 20    Standing HS curl  2 x 10     Leg Press @ 60  80# 3 x 10          Manual Intervention (30542)         PROM  Knee and ankle  7 min    Man gastroc s / man HS s  4 min    Lymphatic massage  6 min                NMR re-education (46191)   CUES NEEDED   HEP   NMES quad set  X 10 minutes Lateral side stepping  1 lap                                        Therapeutic Activity (45304)      Ace wrap of LLE from midfoot to above knee   Attempting to control continued swelling                                   Access Code: [email or web address]  URL: [email or web address]  Date: 81/78/3260  Prepared by: Zach Choi    Exercises  Long Sitting Calf Stretch with Strap - 2 x daily - 7 x weekly - 4 reps - 30 hold  Seated Table Hamstring Stretch - 2 x daily - 7 x weekly - 4 reps - 30 hold  Long Sitting Quad Set - 2 x daily - 7 x weekly - 15 reps - 10 hold  Sitting Heel Slide with Towel - 2 x daily - 7 x weekly - 15 reps - 5 hold  Sidelying Hip Abduction - 2 x daily - 7 x weekly - 15 reps  Side to Side Weight Shift with Counter Support - 2 x daily - 7 x weekly - 10 reps - 5-10 hold  Standing Hip Flexion with Counter Support - 1 x daily - 7 x weekly - 3 sets - 10 reps  Standing 4-Way Leg Reach with Counter Support - 1 x daily - 7 x weekly - 3 sets - 10 reps  Standing Hip Extension with Counter Support - 1 x daily - 7 x weekly - 3 sets - 10 reps  Mini Squat with Counter Support - 1 x daily - 7 x weekly - 3 sets - 10 reps    Therapeutic Exercise and NMR EXR  [x] (58319) Provided verbal/tactile cueing for activities related to strengthening, flexibility, endurance, ROM for improvements in LE, proximal hip, and core control with self care, mobility, lifting, ambulation.  [] (27611) Provided verbal/tactile cueing for activities related to improving balance, coordination, kinesthetic sense, posture, motor skill, proprioception  to assist with LE, proximal hip, and core control in self care, mobility, lifting, ambulation and eccentric single leg control.      NMR and Therapeutic Activities:    [] (81484 or 22835) Provided verbal/tactile cueing for activities related to improving balance, coordination, kinesthetic sense, posture, motor skill, proprioception and motor activation to allow for proper function of core, proximal hip and LE with self care and ADLs  [] (44761) Gait Re-education- Provided training and instruction to the patient for proper LE, core and proximal hip recruitment and positioning and eccentric body weight control with ambulation re-education including up and down stairs     Home Exercise Program:    [x] (93883) Reviewed/Progressed HEP activities related to strengthening, flexibility, endurance, ROM of core, proximal hip and LE for functional self-care, mobility, lifting and ambulation/stair navigation   [] (69752)Reviewed/Progressed HEP activities related to improving balance, coordination, kinesthetic sense, posture, motor skill, proprioception of core, proximal hip and LE for self care, mobility, lifting, and ambulation/stair navigation      Manual Treatments:  PROM / STM / Oscillations-Mobs:  G-I, II, III, IV (PA's, Inf., Post.)  [x] (10518) Provided manual therapy to mobilize LE, proximal hip and/or LS spine soft tissue/joints for the purpose of modulating pain, promoting relaxation,  increasing ROM, reducing/eliminating soft tissue swelling/inflammation/restriction, improving soft tissue extensibility and allowing for proper ROM for normal function with self care, mobility, lifting and ambulation. Modalities:     [] GAME READY (VASO)- for significant edema, swelling, pain control. For foot and ankle elevated x 2 incline wedges x 30 mintues    Charges  Timed Code Treatment Minutes: 50   Total Treatment Minutes: 50     Medicare total (add KX at $2000)  1241.86     89.95    [] EVAL (LOW) 79674   [] EVAL (MOD) 41342  [] EVAL (HIGH) 30679   [] RE-EVAL     [x] RQ(40881) x   1  [] IONTO  [x] NMR (06659) x  1   [] VASO  [x] Manual (69085) x  1    [] Other:  [] TA x      [] Mech Traction (26580)  [] ES(attended) (88194)      [] ES (un) (19301):       GOALS:   Patient stated goal: Return to PLOF  [] Progressing: [] Met: [] Not Met: [] Adjusted     Therapist goals for Patient:   Short Term Goals: To be achieved in: 2 weeks  1. Independent in HEP and progression per patient tolerance, in order to prevent re-injury. [x] Progressing: [] Met: [] Not Met: [] Adjusted  2. Patient will have a decrease in pain to facilitate improvement in movement, function, and ADLs as indicated by Functional Deficits. [x] Progressing: [] Met: [] Not Met: [] Adjusted     Long Term Goals: To be achieved in: 12 weeks  1. Disability index score of 55% or more per FOTO to assist with reaching prior level of function. [x] Progressing: [] Met: [] Not Met: [] Adjusted  2. Patient will demonstrate increased AROM to 0-120 deg to allow for proper joint functioning as indicated by patients Functional Deficits. [x] Progressing: [] Met: [] Not Met: [] Adjusted  3. Patient will demonstrate an increase in Strength to good quad strength and control, 4+/5 MMT in LE to allow for proper functional mobility as indicated by patients Functional Deficits. [] Progressing: [] Met: [x] Not Met: [] Adjusted  4. Patient will return to ambulating community distances for 20-30 minutes safely and without AD without increased symptoms or restriction. [] Progressing: [x] Met: [] Not Met: [] Adjusted  5.

## 2022-11-15 ENCOUNTER — APPOINTMENT (OUTPATIENT)
Dept: PHYSICAL THERAPY | Age: 67
End: 2022-11-15
Payer: MEDICARE

## 2022-11-16 ENCOUNTER — HOSPITAL ENCOUNTER (OUTPATIENT)
Dept: PHYSICAL THERAPY | Age: 67
Setting detail: THERAPIES SERIES
Discharge: HOME | End: 2022-11-16
Payer: MEDICARE

## 2022-11-16 PROCEDURE — 97110 THERAPEUTIC EXERCISES: CPT | Performed by: PHYSICAL THERAPIST

## 2022-11-16 PROCEDURE — 97112 NEUROMUSCULAR REEDUCATION: CPT | Performed by: PHYSICAL THERAPIST

## 2022-11-16 PROCEDURE — 97140 MANUAL THERAPY 1/> REGIONS: CPT | Performed by: PHYSICAL THERAPIST

## 2022-11-16 NOTE — FLOWSHEET NOTE
[facility],  [address]  Phone: [phone]  Fax [phone]    Physical Therapy Treatment Note/ Progress Report:           Date:  2022    Patient Name:  Nobie Koyanagi    :  1955  MRN: [id number]  Restrictions/Precautions:    Medical/Treatment Diagnosis Information:  Diagnosis: S76.112D (ICD-10-CM) - Quadriceps tendon rupture, left, subsequent encounter  s/p  quad tenon repair (2nd revision) with Dermal allograft augmentation SX: 22  Treatment Diagnosis: Left Knee Pain M25.562 / Difficulty walking R26.2 / LE muscle weakness R71.20  Insurance/Certification information:  PT Insurance Information: [facility]  Physician Information:   Dr. Stephanie Mittal  Has the plan of care been signed (Y/N):        []  Yes  [x]  No     Date of Patient follow up with Physician:       Is this a Progress Report:     [x]  Yes  []  No        If Yes:  Date Range for reporting period:  Beginning 22  Endin22    Progress report will be due (10 Rx or 30 days whichever is less): 01       Recertification will be due (POC Duration  / 90 days whichever is less): 12 week POC 22      Visit # Insurance Allowable Auth Required   In-person 15 [facility] []  Yes [x]  No    Telehealth   []  Yes []  No    Total          Therapy Diagnosis/Practice Pattern:I      Number of Comorbidities:  []0     [x]1-2    []3+    Latex Allergy:  [x]NO      []YES  Preferred Language for Healthcare:   [x]English       []other:      Pain level:  0-2/10     SUBJECTIVE: Pt reports feeling decent. No change in pain or symptoms since last visit. Swelling down at beginning of morning. Wrapped ankle but swelling already increased. Be traveling / flying to [place] tomorrow.         OBJECTIVE: See eval  Observation: 22: Significant indentation from ace wrap at sock line still swollen at ankle, minimal swelling at knee  Test measurements:  Knee ROM 100 flexion  - 10/10/22  ROM LEFT  9/26/22 current   Knee ext 0 0   Knee Flex 75 115   Strength  LEFT    Knee EXT (quad) Fair +           Pain Scale 0-2/ 10    FOTO 42 50     RESTRICTIONS/PRECAUTIONS: Quad tendon protocol revision x 2 - SLOW PROGRESSION  Brace 0-60 deg    Exercises/Interventions:     Therapeutic Ex (93750) Sets/sec Reps Notes/CUES   Supine hip abd/ add iso :05 X 20     SL ABD  15 x  Bridges  x15 Spider killers :05 X 10     Supine hip add with legs ext/ hooklying    Supine TB hip abd Using slide board   Supine TB PF/DF  with leg elevated    Standing Hip flex, abd, ext  20 x ea    Mini squats     TB KRISTY TKE    Guarding, and cues against hyperextension anticipated   HR/ TL     Standing HS curl  2 x 10     Leg Press @ 60  80# 3 x 10          Manual Intervention (51286)         PROM  Knee and ankle  7 min    Man gastroc s / man HS s  4 min    Lymphatic massage  6 min                NMR re-education (50383)   CUES NEEDED   HEP   NMES quad set  X 10 minutes Lateral side stepping  1 lap Mini squat position                                       Therapeutic Activity (33523)      Ace wrap of LLE from midfoot to above knee   Attempting to control continued swelling                                   Access Code: [email or web address]  URL: [email or web address]  Date: 92/93/6322  Prepared by: Mami Pleitez Sitting Calf Stretch with Strap - 2 x daily - 7 x weekly - 4 reps - 30 hold  Seated Table Hamstring Stretch - 2 x daily - 7 x weekly - 4 reps - 30 hold  Long Sitting Quad Set - 2 x daily - 7 x weekly - 15 reps - 10 hold  Sitting Heel Slide with Towel - 2 x daily - 7 x weekly - 15 reps - 5 hold  Sidelying Hip Abduction - 2 x daily - 7 x weekly - 15 reps  Side to Side Weight Shift with Counter Support - 2 x daily - 7 x weekly - 10 reps - 5-10 hold  Standing Hip Flexion with Counter Support - 1 x daily - 7 x weekly - 3 sets - 10 reps  Standing 4-Way Leg Reach with Counter Support - 1 x daily - 7 x weekly - 3 sets - 10 reps  Standing Hip Extension with Counter Support - 1 x daily - 7 x weekly - 3 sets - 10 reps  Mini Squat with Counter Support - 1 x daily - 7 x weekly - 3 sets - 10 reps    Therapeutic Exercise and NMR EXR  [x] (86547) Provided verbal/tactile cueing for activities related to strengthening, flexibility, endurance, ROM for improvements in LE, proximal hip, and core control with self care, mobility, lifting, ambulation.  [] (03378) Provided verbal/tactile cueing for activities related to improving balance, coordination, kinesthetic sense, posture, motor skill, proprioception  to assist with LE, proximal hip, and core control in self care, mobility, lifting, ambulation and eccentric single leg control.      NMR and Therapeutic Activities:    [] (59804 or 86897) Provided verbal/tactile cueing for activities related to improving balance, coordination, kinesthetic sense, posture, motor skill, proprioception and motor activation to allow for proper function of core, proximal hip and LE with self care and ADLs  [] (60008) Gait Re-education- Provided training and instruction to the patient for proper LE, core and proximal hip recruitment and positioning and eccentric body weight control with ambulation re-education including up and down stairs     Home Exercise Program:    [x] (67096) Reviewed/Progressed HEP activities related to strengthening, flexibility, endurance, ROM of core, proximal hip and LE for functional self-care, mobility, lifting and ambulation/stair navigation   [] (23879)Reviewed/Progressed HEP activities related to improving balance, coordination, kinesthetic sense, posture, motor skill, proprioception of core, proximal hip and LE for self care, mobility, lifting, and ambulation/stair navigation      Manual Treatments:  PROM / STM / Oscillations-Mobs:  G-I, II, III, IV (PA's, Inf., Post.)  [x] (80108) Provided manual therapy to mobilize LE, proximal hip and/or LS spine soft tissue/joints for the purpose of modulating pain, promoting relaxation,  increasing ROM, reducing/eliminating soft tissue swelling/inflammation/restriction, improving soft tissue extensibility and allowing for proper ROM for normal function with self care, mobility, lifting and ambulation. Modalities:     [] GAME READY (VASO)- for significant edema, swelling, pain control. For foot and ankle elevated x 2 incline wedges x 30 mintues    Charges  Timed Code Treatment Minutes: 50   Total Treatment Minutes: 50     Medicare total (add KX at $2000)  1,331.81     89.95    [] EVAL (LOW) 63159   [] EVAL (MOD) 97971  [] EVAL (HIGH) 81607   [] RE-EVAL     [x] YD(18531) x   1  [] IONTO  [x] NMR (44038) x  1   [] VASO  [x] Manual (17316) x  1    [] Other:  [] TA x      [] Mech Traction (56136)  [] ES(attended) (00048)      [] ES (un) (95872):       GOALS:   Patient stated goal: Return to PLOF  [] Progressing: [] Met: [] Not Met: [] Adjusted     Therapist goals for Patient:   Short Term Goals: To be achieved in: 2 weeks  1. Independent in HEP and progression per patient tolerance, in order to prevent re-injury. [x] Progressing: [] Met: [] Not Met: [] Adjusted  2. Patient will have a decrease in pain to facilitate improvement in movement, function, and ADLs as indicated by Functional Deficits. [x] Progressing: [] Met: [] Not Met: [] Adjusted     Long Term Goals: To be achieved in: 12 weeks  1. Disability index score of 55% or more per FOTO to assist with reaching prior level of function. [x] Progressing: [] Met: [] Not Met: [] Adjusted  2. Patient will demonstrate increased AROM to 0-120 deg to allow for proper joint functioning as indicated by patients Functional Deficits. [x] Progressing: [] Met: [] Not Met: [] Adjusted  3. Patient will demonstrate an increase in Strength to good quad strength and control, 4+/5 MMT in LE to allow for proper functional mobility as indicated by patients Functional Deficits.   [] Progressing: [] Met: [x] Not Met: [] Adjusted  4. Patient will return to ambulating community distances for 20-30 minutes safely and without AD without increased symptoms or restriction. [] Progressing: [x] Met: [] Not Met: [] Adjusted  5. Patient will be able to descend and ascend stairs with reciprocal gait. [] Progressing: [x] Met: [] Not Met: [] Adjusted         Progression Towards Functional goals:  [] Patient is progressing as expected towards functional goals listed. [] Progression is slowed due to complexities listed. [x] Progression has been slowed due to co-morbidities. [] Plan just implemented, too soon to assess goals progression  [] Other:       Overall Progression Towards Functional goals/ Treatment Progress Update:  [] Patient is progressing as expected towards functional goals listed. [] Progression is slowed due to complexities/Impairments listed. [x] Progression has been slowed due to co-morbidities. [] Plan just implemented, too soon to assess goals progression <30days   [] Goals require adjustment due to lack of progress  [] Patient is not progressing as expected and requires additional follow up with physician  [] Other    Prognosis for POC: [x] Good [] Fair  [] Poor      Patient requires continued skilled intervention: [x] Yes  [] No    Treatment/Activity Tolerance:  [x] Patient able to complete treatment  [] Patient limited by fatigue  [] Patient limited by pain    [] Patient limited by other medical complications  [] Other:     ASSESSMENT: Pt continues to present with significant extensor lag during functional transfers. Significant difficulty with SL abd, many corrections made to prevent compensating with hip flexion. Continues to feel wobbly during gait related to weakness in quad persisting. Lateral walk along wall was less difficult at this visit. Continuing to progress strengthening in safe ranges for hip and quad to return to proper functional gait patterns.     PLAN: See eval  [x] Continue per plan of care [] Alter current plan (see comments above)  [] Plan of care initiated [] Hold pending MD visit [] Discharge      Electronically signed by:  EL Wild SPT    Therapist was present, directed the patient's care, made skilled judgement, and was responsible for assessment and treatment of the patient. Note: If patient does not return for scheduled/ recommended follow up visits, this note will serve as a discharge from care along with most recent update on progress.

## 2022-11-18 ENCOUNTER — APPOINTMENT (OUTPATIENT)
Dept: PHYSICAL THERAPY | Age: 67
End: 2022-11-18
Payer: MEDICARE

## 2022-11-22 ENCOUNTER — HOSPITAL ENCOUNTER (OUTPATIENT)
Dept: PHYSICAL THERAPY | Age: 67
Setting detail: THERAPIES SERIES
Discharge: HOME | End: 2022-11-22
Payer: MEDICARE

## 2022-11-22 PROCEDURE — 97140 MANUAL THERAPY 1/> REGIONS: CPT | Performed by: PHYSICAL THERAPIST

## 2022-11-22 PROCEDURE — 97112 NEUROMUSCULAR REEDUCATION: CPT | Performed by: PHYSICAL THERAPIST

## 2022-11-22 PROCEDURE — 97110 THERAPEUTIC EXERCISES: CPT | Performed by: PHYSICAL THERAPIST

## 2022-11-22 NOTE — FLOWSHEET NOTE
[facility], [address] [place]  Phone: [phone]  Fax [phone]    Physical Therapy Treatment Note/ Progress Report:           Date:  2022    Patient Name:  Ehsan Valle    :  1955  MRN: [id number]  Restrictions/Precautions:    Medical/Treatment Diagnosis Information:  Diagnosis: S76.112D (ICD-10-CM) - Quadriceps tendon rupture, left, subsequent encounter  s/p  quad tenon repair (2nd revision) with Dermal allograft augmentation SX: 22  Treatment Diagnosis: Left Knee Pain M25.562 / Difficulty walking R26.2 / LE muscle weakness D18.05  Insurance/Certification information:  PT Insurance Information: [facility]  Physician Information:   Dr. Roderick Lopez  Has the plan of care been signed (Y/N):        []  Yes  [x]  No     Date of Patient follow up with Physician:       Is this a Progress Report:     [x]  Yes  []  No        If Yes:  Date Range for reporting period:  Beginning 22  Endin22    Progress report will be due (10 Rx or 30 days whichever is less):        Recertification will be due (POC Duration  / 90 days whichever is less): 12 week POC 22      Visit # Insurance Allowable Auth Required   In-person 15 [facility] []  Yes [x]  No    Telehealth   []  Yes []  No    Total          Therapy Diagnosis/Practice Pattern:I      Number of Comorbidities:  []0     [x]1-2    []3+    Latex Allergy:  [x]NO      []YES  Preferred Language for Healthcare:   [x]English       []other:      Pain level:  0-2/10     SUBJECTIVE: Pt is 3 months. Pt was able to ascend winding staircase at friend's house with step to gait. Swelling persists. [place] Pt had some episodes of buckling.         OBJECTIVE: See eval  Observation: 22: Significant indentation from ace wrap at sock line still swollen at ankle, minimal swelling at knee  Test measurements:  Knee  flexion - 10/10/22  ROM LEFT  9/26/22 current   Knee ext 0 0   Knee Flex 75 115   Strength  LEFT    Knee EXT (quad) Fair +           Pain Scale 0-2/ 10    FOTO 42 50     RESTRICTIONS/PRECAUTIONS: Quad tendon protocol revision x 2 - SLOW PROGRESSION  Brace 0-60 deg    Exercises/Interventions:     Therapeutic Ex (60734) Sets/sec Reps Notes/CUES   Supine hip abd/ add iso :05 X 20     SL ABD  15 x  Bridges  x15 Spider killers :05 X 20    Supine hip add with legs ext/ hooklying    Supine TB hip abd Using slide board   Supine TB PF/DF  with leg elevated    Standing Hip flex, abd, ext  20 x ea    Mini squats     TB KRISTY TKE    4 in FSU 4 inch X 10 with CGA-1 Guarding, and cues against hyperextension anticipated   HR/ TL     Standing HS curl  2 x 10     Leg Press @ 60  80# 3 x 10          Manual Intervention (00278)         PROM  Knee and ankle  7 min    Man gastroc s / man HS s  4 min    Lymphatic massage  6 min                NMR re-education (83859)   CUES NEEDED   HEP   NMES quad set  X 10 minutes Lateral side stepping  1 lap Mini squat position                                       Therapeutic Activity (07656)      Ace wrap of LLE from midfoot to above knee   Attempting to control continued swelling                                   Access Code: [email or web address]  URL: [email or web address]  Date: 21/01/1266  Prepared by: Rae Shelling Sitting Calf Stretch with Strap - 2 x daily - 7 x weekly - 4 reps - 30 hold  Seated Table Hamstring Stretch - 2 x daily - 7 x weekly - 4 reps - 30 hold  Long Sitting Quad Set - 2 x daily - 7 x weekly - 15 reps - 10 hold  Sitting Heel Slide with Towel - 2 x daily - 7 x weekly - 15 reps - 5 hold  Sidelying Hip Abduction - 2 x daily - 7 x weekly - 15 reps  Side to Side Weight Shift with Counter Support - 2 x daily - 7 x weekly - 10 reps - 5-10 hold  Standing Hip Flexion with Counter Support - 1 x daily - 7 x weekly - 3 sets - 10 reps  Standing 4-Way Leg Reach with Counter Support - 1 x daily - 7 x weekly - 3 sets - 10 reps  Standing Hip Extension with Counter Support - 1 x daily - 7 x weekly - 3 sets - 10 reps  Mini Squat with Counter Support - 1 x daily - 7 x weekly - 3 sets - 10 reps    Therapeutic Exercise and NMR EXR  [x] (32981) Provided verbal/tactile cueing for activities related to strengthening, flexibility, endurance, ROM for improvements in LE, proximal hip, and core control with self care, mobility, lifting, ambulation.  [] (77350) Provided verbal/tactile cueing for activities related to improving balance, coordination, kinesthetic sense, posture, motor skill, proprioception  to assist with LE, proximal hip, and core control in self care, mobility, lifting, ambulation and eccentric single leg control.      NMR and Therapeutic Activities:    [] (58093 or 23958) Provided verbal/tactile cueing for activities related to improving balance, coordination, kinesthetic sense, posture, motor skill, proprioception and motor activation to allow for proper function of core, proximal hip and LE with self care and ADLs  [] (89621) Gait Re-education- Provided training and instruction to the patient for proper LE, core and proximal hip recruitment and positioning and eccentric body weight control with ambulation re-education including up and down stairs     Home Exercise Program:    [x] (15513) Reviewed/Progressed HEP activities related to strengthening, flexibility, endurance, ROM of core, proximal hip and LE for functional self-care, mobility, lifting and ambulation/stair navigation   [] (50414)Reviewed/Progressed HEP activities related to improving balance, coordination, kinesthetic sense, posture, motor skill, proprioception of core, proximal hip and LE for self care, mobility, lifting, and ambulation/stair navigation      Manual Treatments:  PROM / STM / Oscillations-Mobs:  G-I, II, III, IV (PA's, Inf., Post.)  [x] (33138) Provided manual therapy to mobilize LE, proximal hip and/or LS spine soft tissue/joints for the purpose of modulating pain, promoting relaxation,  increasing ROM, reducing/eliminating soft tissue swelling/inflammation/restriction, improving soft tissue extensibility and allowing for proper ROM for normal function with self care, mobility, lifting and ambulation. Modalities:     [] GAME READY (VASO)- for significant edema, swelling, pain control. For foot and ankle elevated x 2 incline wedges x 30 mintues    Charges  Timed Code Treatment Minutes: 50   Total Treatment Minutes: 50     Medicare total (add KX at $2000)  1421.76     89.95    [] EVAL (LOW) 55924   [] EVAL (MOD) 25067  [] EVAL (HIGH) 68753   [] RE-EVAL     [x] JW(15268) x   1  [] IONTO  [x] NMR (22531) x  1   [] VASO  [x] Manual (23429) x  1    [] Other:  [] TA x      [] Mech Traction (83665)  [] ES(attended) (02622)      [] ES (un) (83454):       GOALS:   Patient stated goal: Return to PLOF  [] Progressing: [] Met: [] Not Met: [] Adjusted     Therapist goals for Patient:   Short Term Goals: To be achieved in: 2 weeks  1. Independent in HEP and progression per patient tolerance, in order to prevent re-injury. [x] Progressing: [] Met: [] Not Met: [] Adjusted  2. Patient will have a decrease in pain to facilitate improvement in movement, function, and ADLs as indicated by Functional Deficits. [x] Progressing: [] Met: [] Not Met: [] Adjusted     Long Term Goals: To be achieved in: 12 weeks  1. Disability index score of 55% or more per FOTO to assist with reaching prior level of function. [x] Progressing: [] Met: [] Not Met: [] Adjusted  2. Patient will demonstrate increased AROM to 0-120 deg to allow for proper joint functioning as indicated by patients Functional Deficits. [x] Progressing: [] Met: [] Not Met: [] Adjusted  3. Patient will demonstrate an increase in Strength to good quad strength and control, 4+/5 MMT in LE to allow for proper functional mobility as indicated by patients Functional Deficits.   [] Progressing: [] Met: [x] Not Met: [] Adjusted  4. Patient will return to ambulating community distances for 20-30 minutes safely and without AD without increased symptoms or restriction. [] Progressing: [x] Met: [] Not Met: [] Adjusted  5. Patient will be able to descend and ascend stairs with reciprocal gait. [] Progressing: [x] Met: [] Not Met: [] Adjusted         Progression Towards Functional goals:  [] Patient is progressing as expected towards functional goals listed. [] Progression is slowed due to complexities listed. [x] Progression has been slowed due to co-morbidities. [] Plan just implemented, too soon to assess goals progression  [] Other:       Overall Progression Towards Functional goals/ Treatment Progress Update:  [] Patient is progressing as expected towards functional goals listed. [] Progression is slowed due to complexities/Impairments listed. [x] Progression has been slowed due to co-morbidities. [] Plan just implemented, too soon to assess goals progression <30days   [] Goals require adjustment due to lack of progress  [] Patient is not progressing as expected and requires additional follow up with physician  [] Other    Prognosis for POC: [x] Good [] Fair  [] Poor      Patient requires continued skilled intervention: [x] Yes  [] No    Treatment/Activity Tolerance:  [x] Patient able to complete treatment  [] Patient limited by fatigue  [] Patient limited by pain    [] Patient limited by other medical complications  [] Other:     ASSESSMENT: Pt is able to control hyperext on step up. Crepitus is felt under patella. Pt unable to perform SLR, but slight improvement with control with standing hip flex.     PLAN: See eval  [x] Continue per plan of care [] Alter current plan (see comments above)  [] Plan of care initiated [] Hold pending MD visit [] Discharge      Electronically signed by:  Alanis Moss PT       Note: If patient does not return for scheduled/ recommended follow up visits, this note will serve as a discharge from care along with most recent update on progress.

## 2022-11-29 ENCOUNTER — HOSPITAL ENCOUNTER (OUTPATIENT)
Dept: PHYSICAL THERAPY | Age: 67
Setting detail: THERAPIES SERIES
Discharge: HOME | End: 2022-11-29
Payer: MEDICARE

## 2022-11-29 PROCEDURE — 97140 MANUAL THERAPY 1/> REGIONS: CPT | Performed by: PHYSICAL THERAPIST

## 2022-11-29 PROCEDURE — 97110 THERAPEUTIC EXERCISES: CPT | Performed by: PHYSICAL THERAPIST

## 2022-11-29 PROCEDURE — 97112 NEUROMUSCULAR REEDUCATION: CPT | Performed by: PHYSICAL THERAPIST

## 2022-11-29 NOTE — FLOWSHEET NOTE
[facility],  [address]  Phone: [phone]  Fax [phone]    Physical Therapy Treatment Note/ Progress Report:           Date:  2022    Patient Name:  Cornelia Wayne    :  1955  MRN: [id number]  Restrictions/Precautions:    Medical/Treatment Diagnosis Information:  Diagnosis: S76.112D (ICD-10-CM) - Quadriceps tendon rupture, left, subsequent encounter  s/p  quad tenon repair (2nd revision) with Dermal allograft augmentation SX: 22  Treatment Diagnosis: Left Knee Pain M25.562 / Difficulty walking R26.2 / LE muscle weakness O10.98  Insurance/Certification information:  PT Insurance Information: [facility]  Physician Information:   Dr. John Woo  Has the plan of care been signed (Y/N):        []  Yes  [x]  No     Date of Patient follow up with Physician:       Is this a Progress Report:     [x]  Yes  []  No        If Yes:  Date Range for reporting period:  Beginning 22  Endin22    Progress report will be due (10 Rx or 30 days whichever is less): /       Recertification will be due (POC Duration  / 90 days whichever is less): 12 week POC 22      Visit # Insurance Allowable Auth Required   In-person 15 [facility] []  Yes [x]  No    Telehealth   []  Yes []  No    Total          Therapy Diagnosis/Practice Pattern:I      Number of Comorbidities:  []0     [x]1-2    []3+    Latex Allergy:  [x]NO      []YES  Preferred Language for Healthcare:   [x]English       []other:      Pain level:  0-2/10     SUBJECTIVE: Pt feels like it is harder to walk in brace. Also feels that brace adds to swelling.         OBJECTIVE: See eval  Observation: 22: Significant indentation from ace wrap at sock line still swollen at ankle, minimal swelling at knee  Test measurements:  Knee  flexion  - 10/10/22  ROM LEFT  22 current   Knee ext 0 0   Knee Flex 75 115   Strength  LEFT    Knee EXT (quad) Fair +           Pain Scale 0-2/ 10    FOTO 42 50     RESTRICTIONS/PRECAUTIONS: Quad tendon protocol revision x 2 - SLOW PROGRESSION  Brace 0-60 deg    Exercises/Interventions:     Therapeutic Ex (91146) Sets/sec Reps Notes/CUES   Supine hip abd/ add iso :05 X 20     SL ABD  15 x  Bridges  x15 Spider killers :05 X 20    Supine hip add with legs ext/ hooklying    Supine TB hip abd Using slide board   Supine TB PF/DF  with leg elevated    Standing Hip flex, abd, ext  20 x ea    Mini squats     TB KRISTY TKE    4 in FSU 4 inch X 10 with CGA-1 Guarding, and cues against hyperextension anticipated   HR/ TL     Standing HS curl  2 x 10     Leg Press @ 60  80# 3 x 10          Manual Intervention (18622)         PROM  Knee and ankle  7 min    Man gastroc s / man HS s  4 min    Lymphatic massage  6 min                NMR re-education (23444)   CUES NEEDED   HEP   NMES quad set  X 10 minutes Lateral side stepping  1 lap Mini squat position                                       Therapeutic Activity (43002)      Ace wrap of LLE from midfoot to above knee   Attempting to control continued swelling                                   Access Code: [email or web address]  URL: [email or web address]  Date: 37/92/1383  Prepared by: Aston Boswell Sitting Calf Stretch with Strap - 2 x daily - 7 x weekly - 4 reps - 30 hold  Seated Table Hamstring Stretch - 2 x daily - 7 x weekly - 4 reps - 30 hold  Long Sitting Quad Set - 2 x daily - 7 x weekly - 15 reps - 10 hold  Sitting Heel Slide with Towel - 2 x daily - 7 x weekly - 15 reps - 5 hold  Sidelying Hip Abduction - 2 x daily - 7 x weekly - 15 reps  Side to Side Weight Shift with Counter Support - 2 x daily - 7 x weekly - 10 reps - 5-10 hold  Standing Hip Flexion with Counter Support - 1 x daily - 7 x weekly - 3 sets - 10 reps  Standing 4-Way Leg Reach with Counter Support - 1 x daily - 7 x weekly - 3 sets - 10 reps  Standing Hip Extension with Counter Support - 1 x daily - 7 x weekly - 3 sets - 10 reps  Mini Squat with Counter Support - 1 x daily - 7 x weekly - 3 sets - 10 reps    Therapeutic Exercise and NMR EXR  [x] (89980) Provided verbal/tactile cueing for activities related to strengthening, flexibility, endurance, ROM for improvements in LE, proximal hip, and core control with self care, mobility, lifting, ambulation.  [] (27575) Provided verbal/tactile cueing for activities related to improving balance, coordination, kinesthetic sense, posture, motor skill, proprioception  to assist with LE, proximal hip, and core control in self care, mobility, lifting, ambulation and eccentric single leg control.      NMR and Therapeutic Activities:    [] (48583 or 02837) Provided verbal/tactile cueing for activities related to improving balance, coordination, kinesthetic sense, posture, motor skill, proprioception and motor activation to allow for proper function of core, proximal hip and LE with self care and ADLs  [] (65206) Gait Re-education- Provided training and instruction to the patient for proper LE, core and proximal hip recruitment and positioning and eccentric body weight control with ambulation re-education including up and down stairs     Home Exercise Program:    [x] (80875) Reviewed/Progressed HEP activities related to strengthening, flexibility, endurance, ROM of core, proximal hip and LE for functional self-care, mobility, lifting and ambulation/stair navigation   [] (56753)Reviewed/Progressed HEP activities related to improving balance, coordination, kinesthetic sense, posture, motor skill, proprioception of core, proximal hip and LE for self care, mobility, lifting, and ambulation/stair navigation      Manual Treatments:  PROM / STM / Oscillations-Mobs:  G-I, II, III, IV (PA's, Inf., Post.)  [x] (76958) Provided manual therapy to mobilize LE, proximal hip and/or LS spine soft tissue/joints for the purpose of modulating pain, promoting relaxation,  increasing ROM, reducing/eliminating soft tissue swelling/inflammation/restriction, improving soft tissue extensibility and allowing for proper ROM for normal function with self care, mobility, lifting and ambulation. Modalities:     [] GAME READY (VASO)- for significant edema, swelling, pain control. For foot and ankle elevated x 2 incline wedges x 30 mintues    Charges  Timed Code Treatment Minutes: 50   Total Treatment Minutes: 50     Medicare total (add KX at $2000)  1511.71     89.95    [] EVAL (LOW) 62616   [] EVAL (MOD) 99890  [] EVAL (HIGH) 74683   [] RE-EVAL     [x] WN(94721) x   1  [] IONTO  [x] NMR (98736) x  1   [] VASO  [x] Manual (75075) x  1    [] Other:  [] TA x      [] Mech Traction (38577)  [] ES(attended) (56645)      [] ES (un) (26384):       GOALS:   Patient stated goal: Return to PLOF  [] Progressing: [] Met: [] Not Met: [] Adjusted     Therapist goals for Patient:   Short Term Goals: To be achieved in: 2 weeks  1. Independent in HEP and progression per patient tolerance, in order to prevent re-injury. [x] Progressing: [] Met: [] Not Met: [] Adjusted  2. Patient will have a decrease in pain to facilitate improvement in movement, function, and ADLs as indicated by Functional Deficits. [x] Progressing: [] Met: [] Not Met: [] Adjusted     Long Term Goals: To be achieved in: 12 weeks  1. Disability index score of 55% or more per FOTO to assist with reaching prior level of function. [x] Progressing: [] Met: [] Not Met: [] Adjusted  2. Patient will demonstrate increased AROM to 0-120 deg to allow for proper joint functioning as indicated by patients Functional Deficits. [x] Progressing: [] Met: [] Not Met: [] Adjusted  3. Patient will demonstrate an increase in Strength to good quad strength and control, 4+/5 MMT in LE to allow for proper functional mobility as indicated by patients Functional Deficits. [] Progressing: [] Met: [x] Not Met: [] Adjusted  4.  Patient will return to ambulating community distances for 20-30 minutes safely and without AD without increased symptoms or restriction. [] Progressing: [x] Met: [] Not Met: [] Adjusted  5. Patient will be able to descend and ascend stairs with reciprocal gait. [] Progressing: [x] Met: [] Not Met: [] Adjusted         Progression Towards Functional goals:  [] Patient is progressing as expected towards functional goals listed. [] Progression is slowed due to complexities listed. [x] Progression has been slowed due to co-morbidities. [] Plan just implemented, too soon to assess goals progression  [] Other:       Overall Progression Towards Functional goals/ Treatment Progress Update:  [] Patient is progressing as expected towards functional goals listed. [] Progression is slowed due to complexities/Impairments listed. [x] Progression has been slowed due to co-morbidities. [] Plan just implemented, too soon to assess goals progression <30days   [] Goals require adjustment due to lack of progress  [] Patient is not progressing as expected and requires additional follow up with physician  [] Other    Prognosis for POC: [x] Good [] Fair  [] Poor      Patient requires continued skilled intervention: [x] Yes  [] No    Treatment/Activity Tolerance:  [x] Patient able to complete treatment  [] Patient limited by fatigue  [] Patient limited by pain    [] Patient limited by other medical complications  [] Other:     ASSESSMENT: Ext lag persists. Progress is slow. PLAN: See eval  [x] Continue per plan of care [] Alter current plan (see comments above)  [] Plan of care initiated [] Hold pending MD visit [] Discharge      Electronically signed by:  Zac Myers PT       Note: If patient does not return for scheduled/ recommended follow up visits, this note will serve as a discharge from care along with most recent update on progress.

## 2022-12-02 ENCOUNTER — HOSPITAL ENCOUNTER (OUTPATIENT)
Dept: PHYSICAL THERAPY | Age: 67
Setting detail: THERAPIES SERIES
Discharge: HOME | End: 2022-12-02
Payer: MEDICARE

## 2022-12-02 PROCEDURE — 97112 NEUROMUSCULAR REEDUCATION: CPT | Performed by: PHYSICAL THERAPIST

## 2022-12-02 PROCEDURE — 97110 THERAPEUTIC EXERCISES: CPT | Performed by: PHYSICAL THERAPIST

## 2022-12-02 NOTE — FLOWSHEET NOTE
[facility],  [address] [place]  Phone: [phone]  Fax [phone]    Physical Therapy Treatment Note/ Progress Report:           Date:  2022    Patient Name:  Cornelia Wayne    :  1955  MRN: [id number]  Restrictions/Precautions:    Medical/Treatment Diagnosis Information:  Diagnosis: S76.112D (ICD-10-CM) - Quadriceps tendon rupture, left, subsequent encounter  s/p  quad tenon repair (2nd revision) with Dermal allograft augmentation SX: 22  Treatment Diagnosis: Left Knee Pain M25.562 / Difficulty walking R26.2 / LE muscle weakness M19.36  Insurance/Certification information:  PT Insurance Information: [facility]  Physician Information:   Dr. John Woo  Has the plan of care been signed (Y/N):        []  Yes  [x]  No     Date of Patient follow up with Physician:       Is this a Progress Report:     [x]  Yes  []  No        If Yes:  Date Range for reporting period:  Beginning 22  Endin22    Progress report will be due (10 Rx or 30 days whichever is less):        Recertification will be due (POC Duration  / 90 days whichever is less): 12 week POC 22      Visit # Insurance Allowable Auth Required   In-person 13 [facility] []  Yes [x]  No    Telehealth   []  Yes []  No    Total          Therapy Diagnosis/Practice Pattern:I      Number of Comorbidities:  []0     [x]1-2    []3+    Latex Allergy:  [x]NO      []YES  Preferred Language for Healthcare:   [x]English       []other:      Pain level:  0-2/10     SUBJECTIVE: Pt will be traveling to [place] next week. Weakness persists.       OBJECTIVE: See eval  Observation: 22: Significant indentation from ace wrap at sock line still swollen at ankle, minimal swelling at knee  Test measurements:  Knee  flexion  - 10/10/22  ROM LEFT  22 current   Knee ext 0 0   Knee Flex 75 115   Strength  LEFT    Knee EXT (quad) Fair + Pain Scale 0-2/ 10    FOTO 42 50     RESTRICTIONS/PRECAUTIONS: Quad tendon protocol revision x 2 - SLOW PROGRESSION  Brace 0-60 deg    Exercises/Interventions:     Therapeutic Ex (26312) Sets/sec Reps Notes/CUES   Supine hip abd/ add iso :05 X 20     SL ABD  15 x  Bridges  x15 Spider killers :05 X 20    Supine hip add with legs ext/ hooklying    Supine TB hip abd Using slide board   Supine TB PF/DF  with leg elevated    Standing Hip flex, abd, ext  20 x ea    Mini squats     TB KRISTY TKE    4 in FSU 4 inch X 10 with CGA-1 Guarding, and cues against hyperextension anticipated   HR/ TL  X 20    Standing HS curl  2 x 10     Leg Press @ 60  80# 3 x 10          Manual Intervention (81154)         PROM  Knee and ankle  7 min    Man gastroc s / man HS s  4 min    Lymphatic massage  6 min                NMR re-education (99830)   CUES NEEDED   HEP   NMES quad set  X 10 minutes Lateral side stepping  1 lap Mini squat position                                       Therapeutic Activity (42442)      Ace wrap of LLE from midfoot to above knee   Attempting to control continued swelling                                   Access Code: [email or web address]  URL: [email or web address]/  Date: 94/44/1045  Prepared by: Alphonso Davis Sitting Calf Stretch with Strap - 2 x daily - 7 x weekly - 4 reps - 30 hold  Seated Table Hamstring Stretch - 2 x daily - 7 x weekly - 4 reps - 30 hold  Long Sitting Quad Set - 2 x daily - 7 x weekly - 15 reps - 10 hold  Sitting Heel Slide with Towel - 2 x daily - 7 x weekly - 15 reps - 5 hold  Sidelying Hip Abduction - 2 x daily - 7 x weekly - 15 reps  Side to Side Weight Shift with Counter Support - 2 x daily - 7 x weekly - 10 reps - 5-10 hold  Standing Hip Flexion with Counter Support - 1 x daily - 7 x weekly - 3 sets - 10 reps  Standing 4-Way Leg Reach with Counter Support - 1 x daily - 7 x weekly - 3 sets - 10 reps  Standing Hip Extension with Counter Support - 1 x daily - 7 x weekly - 3 sets - 10 reps  Mini Squat with Counter Support - 1 x daily - 7 x weekly - 3 sets - 10 reps    Therapeutic Exercise and NMR EXR  [x] (84428) Provided verbal/tactile cueing for activities related to strengthening, flexibility, endurance, ROM for improvements in LE, proximal hip, and core control with self care, mobility, lifting, ambulation.  [] (82379) Provided verbal/tactile cueing for activities related to improving balance, coordination, kinesthetic sense, posture, motor skill, proprioception  to assist with LE, proximal hip, and core control in self care, mobility, lifting, ambulation and eccentric single leg control.      NMR and Therapeutic Activities:    [] (04696 or 85858) Provided verbal/tactile cueing for activities related to improving balance, coordination, kinesthetic sense, posture, motor skill, proprioception and motor activation to allow for proper function of core, proximal hip and LE with self care and ADLs  [] (38731) Gait Re-education- Provided training and instruction to the patient for proper LE, core and proximal hip recruitment and positioning and eccentric body weight control with ambulation re-education including up and down stairs     Home Exercise Program:    [x] (22991) Reviewed/Progressed HEP activities related to strengthening, flexibility, endurance, ROM of core, proximal hip and LE for functional self-care, mobility, lifting and ambulation/stair navigation   [] (05470)Reviewed/Progressed HEP activities related to improving balance, coordination, kinesthetic sense, posture, motor skill, proprioception of core, proximal hip and LE for self care, mobility, lifting, and ambulation/stair navigation      Manual Treatments:  PROM / STM / Oscillations-Mobs:  G-I, II, III, IV (PA's, Inf., Post.)  [x] (44950) Provided manual therapy to mobilize LE, proximal hip and/or LS spine soft tissue/joints for the purpose of modulating pain, promoting relaxation,  increasing ROM, reducing/eliminating soft safely and without AD without increased symptoms or restriction. [] Progressing: [x] Met: [] Not Met: [] Adjusted  5. Patient will be able to descend and ascend stairs with reciprocal gait. [] Progressing: [x] Met: [] Not Met: [] Adjusted         Progression Towards Functional goals:  [] Patient is progressing as expected towards functional goals listed. [] Progression is slowed due to complexities listed. [x] Progression has been slowed due to co-morbidities. [] Plan just implemented, too soon to assess goals progression  [] Other:       Overall Progression Towards Functional goals/ Treatment Progress Update:  [] Patient is progressing as expected towards functional goals listed. [] Progression is slowed due to complexities/Impairments listed. [x] Progression has been slowed due to co-morbidities. [] Plan just implemented, too soon to assess goals progression <30days   [] Goals require adjustment due to lack of progress  [] Patient is not progressing as expected and requires additional follow up with physician  [] Other    Prognosis for POC: [x] Good [] Fair  [] Poor      Patient requires continued skilled intervention: [x] Yes  [] No    Treatment/Activity Tolerance:  [x] Patient able to complete treatment  [] Patient limited by fatigue  [] Patient limited by pain    [] Patient limited by other medical complications  [] Other:     ASSESSMENT: Ext lag persists. Progress is slow. PLAN: See eval  [x] Continue per plan of care [] Alter current plan (see comments above)  [] Plan of care initiated [] Hold pending MD visit [] Discharge      Electronically signed by:  Liset Mario PT       Note: If patient does not return for scheduled/ recommended follow up visits, this note will serve as a discharge from care along with most recent update on progress.

## 2022-12-05 ENCOUNTER — HOSPITAL ENCOUNTER (OUTPATIENT)
Dept: PHYSICAL THERAPY | Age: 67
Setting detail: THERAPIES SERIES
Discharge: HOME | End: 2022-12-05
Payer: MEDICARE

## 2022-12-05 PROCEDURE — 97110 THERAPEUTIC EXERCISES: CPT | Performed by: PHYSICAL THERAPIST

## 2022-12-05 PROCEDURE — 97112 NEUROMUSCULAR REEDUCATION: CPT | Performed by: PHYSICAL THERAPIST

## 2022-12-05 NOTE — FLOWSHEET NOTE
[facility],  [address] [place]  Phone: [phone]  Fax [phone]    Physical Therapy Treatment Note/ Progress Report:           Date:  2022    Patient Name:  Sofiya Ibarra    :  1955  MRN: [id number]  Restrictions/Precautions:    Medical/Treatment Diagnosis Information:  Diagnosis: S76.112D (ICD-10-CM) - Quadriceps tendon rupture, left, subsequent encounter  s/p  quad tenon repair (2nd revision) with Dermal allograft augmentation SX: 22  Treatment Diagnosis: Left Knee Pain M25.562 / Difficulty walking R26.2 / LE muscle weakness K89.19  Insurance/Certification information:  PT Insurance Information: [facility]  Physician Information:   Dr. Killian  Has the plan of care been signed (Y/N):        []  Yes  [x]  No     Date of Patient follow up with Physician:       Is this a Progress Report:     [x]  Yes  []  No        If Yes:  Date Range for reporting period:  Beginning 22  Endin22    Progress report will be due (10 Rx or 30 days whichever is less):        Recertification will be due (POC Duration  / 90 days whichever is less): 12 week POC 22      Visit # Insurance Allowable Auth Required   In-person 12 [facility] []  Yes [x]  No    Telehealth   []  Yes []  No    Total          Therapy Diagnosis/Practice Pattern:I      Number of Comorbidities:  []0     [x]1-2    []3+    Latex Allergy:  [x]NO      []YES  Preferred Language for Healthcare:   [x]English       []other:      Pain level:  0-2/10     SUBJECTIVE: Pt is able to ascend stairs with hand rail. Pt is able to walk at grocery with cart.          OBJECTIVE: See eval  Observation: 22: Significant indentation from ace wrap at sock line still swollen at ankle, minimal swelling at knee  Test measurements:  Knee  flexion  - 10/10/22  ROM LEFT  22 current   Knee ext 0 0   Knee Flex 75 115   Strength  LEFT    Knee EXT (quad) Fair +           Pain Scale 0-2/ 10    FOTO 42 50     RESTRICTIONS/PRECAUTIONS: Quad tendon protocol revision x 2 - SLOW PROGRESSION  Brace 0-60 deg    Exercises/Interventions:     Therapeutic Ex (79893) Sets/sec Reps Notes/CUES   Supine hip abd/ add iso :05 X 20     SL ABD  15 x  Bridges  x15 Spider killers :05 X 20    Supine hip add with legs ext/ hooklying    Supine TB hip abd Using slide board   Supine TB PF/DF  with leg elevated    Standing Hip flex, abd, ext  20 x ea    Mini squats     TB KRISTY TKE    4 in FSU 4 inch X 10 with CGA-1 Guarding, and cues against hyperextension anticipated   HR/ TL  X 20    Standing HS curl  2 x 10     Leg Press @ 60  80# 3 x 10          Manual Intervention (24183)         PROM  Knee and ankle  7 min    Man gastroc s / man HS s  4 min    Lymphatic massage  6 min                NMR re-education (61254)   CUES NEEDED   HEP   NMES quad set  X 10 minutes Lateral side stepping  1 lap Mini squat position                                       Therapeutic Activity (06835)      Ace wrap of LLE from midfoot to above knee   Attempting to control continued swelling                                   Access Code: [email or web address]  URL: [email or web address]  Date: 41/07/5419  Prepared by: Vladimir Noble Sitting Calf Stretch with Strap - 2 x daily - 7 x weekly - 4 reps - 30 hold  Seated Table Hamstring Stretch - 2 x daily - 7 x weekly - 4 reps - 30 hold  Long Sitting Quad Set - 2 x daily - 7 x weekly - 15 reps - 10 hold  Sitting Heel Slide with Towel - 2 x daily - 7 x weekly - 15 reps - 5 hold  Sidelying Hip Abduction - 2 x daily - 7 x weekly - 15 reps  Side to Side Weight Shift with Counter Support - 2 x daily - 7 x weekly - 10 reps - 5-10 hold  Standing Hip Flexion with Counter Support - 1 x daily - 7 x weekly - 3 sets - 10 reps  Standing 4-Way Leg Reach with Counter Support - 1 x daily - 7 x weekly - 3 sets - 10 reps  Standing Hip Extension with Counter Support - 1 x daily - 7 x weekly - 3 sets - 10 reps  Mini Squat with Counter Support - 1 x daily - 7 x weekly - 3 sets - 10 reps    Therapeutic Exercise and NMR EXR  [x] (34096) Provided verbal/tactile cueing for activities related to strengthening, flexibility, endurance, ROM for improvements in LE, proximal hip, and core control with self care, mobility, lifting, ambulation.  [] (20004) Provided verbal/tactile cueing for activities related to improving balance, coordination, kinesthetic sense, posture, motor skill, proprioception  to assist with LE, proximal hip, and core control in self care, mobility, lifting, ambulation and eccentric single leg control.      NMR and Therapeutic Activities:    [] (78282 or 88632) Provided verbal/tactile cueing for activities related to improving balance, coordination, kinesthetic sense, posture, motor skill, proprioception and motor activation to allow for proper function of core, proximal hip and LE with self care and ADLs  [] (12509) Gait Re-education- Provided training and instruction to the patient for proper LE, core and proximal hip recruitment and positioning and eccentric body weight control with ambulation re-education including up and down stairs     Home Exercise Program:    [x] (59021) Reviewed/Progressed HEP activities related to strengthening, flexibility, endurance, ROM of core, proximal hip and LE for functional self-care, mobility, lifting and ambulation/stair navigation   [] (57185)Reviewed/Progressed HEP activities related to improving balance, coordination, kinesthetic sense, posture, motor skill, proprioception of core, proximal hip and LE for self care, mobility, lifting, and ambulation/stair navigation      Manual Treatments:  PROM / STM / Oscillations-Mobs:  G-I, II, III, IV (PA's, Inf., Post.)  [x] (82505) Provided manual therapy to mobilize LE, proximal hip and/or LS spine soft tissue/joints for the purpose of modulating pain, promoting relaxation,  increasing ROM, reducing/eliminating soft tissue swelling/inflammation/restriction, improving soft tissue extensibility and allowing for proper ROM for normal function with self care, mobility, lifting and ambulation. Modalities:     [] GAME READY (VASO)- for significant edema, swelling, pain control. For foot and ankle elevated x 2 incline wedges x 30 mintues    Charges  Timed Code Treatment Minutes: 50   Total Treatment Minutes: 50     Medicare total (add KX at $2000)  1601.66     89.95    [] EVAL (LOW) 88339   [] EVAL (MOD) 59136  [] EVAL (HIGH) 27531   [] RE-EVAL     [x] QT(24650) x   1  [] IONTO  [x] NMR (99116) x  1   [] VASO  [] Manual (07833) x      [] Other:  [] TA x      [] Mech Traction (44171)  [] ES(attended) (60609)      [] ES (un) (63258):       GOALS:   Patient stated goal: Return to PLOF  [] Progressing: [] Met: [] Not Met: [] Adjusted     Therapist goals for Patient:   Short Term Goals: To be achieved in: 2 weeks  1. Independent in HEP and progression per patient tolerance, in order to prevent re-injury. [x] Progressing: [] Met: [] Not Met: [] Adjusted  2. Patient will have a decrease in pain to facilitate improvement in movement, function, and ADLs as indicated by Functional Deficits. [x] Progressing: [] Met: [] Not Met: [] Adjusted     Long Term Goals: To be achieved in: 12 weeks  1. Disability index score of 55% or more per FOTO to assist with reaching prior level of function. [x] Progressing: [] Met: [] Not Met: [] Adjusted  2. Patient will demonstrate increased AROM to 0-120 deg to allow for proper joint functioning as indicated by patients Functional Deficits. [x] Progressing: [] Met: [] Not Met: [] Adjusted  3. Patient will demonstrate an increase in Strength to good quad strength and control, 4+/5 MMT in LE to allow for proper functional mobility as indicated by patients Functional Deficits. [] Progressing: [] Met: [x] Not Met: [] Adjusted  4.  Patient will return to ambulating community distances for 20-30 minutes safely and without AD without increased symptoms or restriction. [] Progressing: [x] Met: [] Not Met: [] Adjusted  5. Patient will be able to descend and ascend stairs with reciprocal gait. [] Progressing: [x] Met: [] Not Met: [] Adjusted         Progression Towards Functional goals:  [] Patient is progressing as expected towards functional goals listed. [] Progression is slowed due to complexities listed. [x] Progression has been slowed due to co-morbidities. [] Plan just implemented, too soon to assess goals progression  [] Other:       Overall Progression Towards Functional goals/ Treatment Progress Update:  [] Patient is progressing as expected towards functional goals listed. [] Progression is slowed due to complexities/Impairments listed. [x] Progression has been slowed due to co-morbidities. [] Plan just implemented, too soon to assess goals progression <30days   [] Goals require adjustment due to lack of progress  [] Patient is not progressing as expected and requires additional follow up with physician  [] Other    Prognosis for POC: [x] Good [] Fair  [] Poor      Patient requires continued skilled intervention: [x] Yes  [] No    Treatment/Activity Tolerance:  [x] Patient able to complete treatment  [] Patient limited by fatigue  [] Patient limited by pain    [] Patient limited by other medical complications  [] Other:     ASSESSMENT: Ext lag persists. Progress is slow. PLAN: See eval  [x] Continue per plan of care [] Alter current plan (see comments above)  [] Plan of care initiated [] Hold pending MD visit [] Discharge      Electronically signed by:  Prince Ashok PT       Note: If patient does not return for scheduled/ recommended follow up visits, this note will serve as a discharge from care along with most recent update on progress.

## 2022-12-06 ENCOUNTER — OFFICE VISIT (OUTPATIENT)
Dept: CARDIOLOGY CLINIC | Age: 67
End: 2022-12-06
Payer: MEDICARE

## 2022-12-06 VITALS
HEIGHT: 70 IN | WEIGHT: 278 LBS | OXYGEN SATURATION: 97 % | HEART RATE: 77 BPM | BODY MASS INDEX: 39.8 KG/M2 | SYSTOLIC BLOOD PRESSURE: 120 MMHG | DIASTOLIC BLOOD PRESSURE: 76 MMHG

## 2022-12-06 DIAGNOSIS — I51.7 ENLARGED HEART: ICD-10-CM

## 2022-12-06 DIAGNOSIS — I50.9 CONGESTIVE HEART FAILURE, UNSPECIFIED HF CHRONICITY, UNSPECIFIED HEART FAILURE TYPE (HCC): ICD-10-CM

## 2022-12-06 DIAGNOSIS — R22.42 LOCALIZED SWELLING OF LEFT LOWER EXTREMITY: ICD-10-CM

## 2022-12-06 DIAGNOSIS — I25.10 CORONARY ARTERY CALCIFICATION SEEN ON CT SCAN: ICD-10-CM

## 2022-12-06 DIAGNOSIS — I51.89 GRADE I DIASTOLIC DYSFUNCTION: ICD-10-CM

## 2022-12-06 DIAGNOSIS — I10 HYPERTENSION, UNSPECIFIED TYPE: Primary | ICD-10-CM

## 2022-12-06 LAB — PRO-BNP: 14 PG/ML (ref 0–124)

## 2022-12-06 PROCEDURE — 3074F SYST BP LT 130 MM HG: CPT | Performed by: INTERNAL MEDICINE

## 2022-12-06 PROCEDURE — 93000 ELECTROCARDIOGRAM COMPLETE: CPT | Performed by: INTERNAL MEDICINE

## 2022-12-06 PROCEDURE — G8427 DOCREV CUR MEDS BY ELIG CLIN: HCPCS | Performed by: INTERNAL MEDICINE

## 2022-12-06 PROCEDURE — G8417 CALC BMI ABV UP PARAM F/U: HCPCS | Performed by: INTERNAL MEDICINE

## 2022-12-06 PROCEDURE — 3078F DIAST BP <80 MM HG: CPT | Performed by: INTERNAL MEDICINE

## 2022-12-06 PROCEDURE — G8484 FLU IMMUNIZE NO ADMIN: HCPCS | Performed by: INTERNAL MEDICINE

## 2022-12-06 PROCEDURE — 99204 OFFICE O/P NEW MOD 45 MIN: CPT | Performed by: INTERNAL MEDICINE

## 2022-12-06 NOTE — PATIENT INSTRUCTIONS
Plan:  1. Order BNP ~ swelling to lower extremities  2. Order echocardiogram ~ enlarged heart, lower extremity edema   ~A test that records movement of your heart valves and chambers by ultrasound. Evaluates heart valves, any chamber enlargement, abnormal openings, or any fluid in the sac surrounding the heart. 3. Discussed swelling could be correlated to lymphatic system ~await testing results   ~ May refer to [facility] for treatment  4. Recommend compression stocking and elevation of legs while sitting in chair. 5. Follow up based on testing results.

## 2022-12-06 NOTE — PROGRESS NOTES
[address] [facility]   Cardiovascular Evaluation    PATIENT: Lesvia Rosales  DATE: 2022  MRN: [id number]  CSN: [id number]  : 1955    Primary Care Doctor/Referring provider: Manisha Naidu MD, No admitting provider for patient encounter. Reason for evaluation/Chief complaint:   Follow-up, Hypertension, and Edema (Elgs and feet)      Subjective:    History of present illness on initial date of evaluation:   Lesvia Rosales is a 75 y.o. patient who presents as a new patient for cardiology evaluation and treatment for leg and foot swelling. He has a past medical history including Asthma, Diastolic dysfunction (), GERD (gastroesophageal reflux disease), Glaucoma, Hypertension, Sarcoid (), and Sleep apnea. He had a venous doppler 22 of both lower extremities negative for deep vein thrombosis. He had a CT chest 20 coronary artery calcifications noted. Heart noted to be enlarged at that time. Echo 20 [facility] EF 52-72%, grade I DD. Today he presents at the recommendation of his physical therapist for leg swelling. Patient reports he tore his quad tendons in 2022, he has had 3 surgeries for this. He developed swelling after the second surgery. Swelling to left foot and calf. He reports in  he was diagnosed with mitral valve prolapse and in  told he had enlarged heart. Patient Active Problem List   Diagnosis    Quadriceps tendon rupture, left, initial encounter    Quadriceps tendon rupture, right, initial encounter    HTN (hypertension)    Rupture of distal quadriceps tendon    Pre-diabetes    Hypertensive urgency    Hypertensive emergency    Grade I diastolic dysfunction    Enlarged heart    Coronary artery calcification seen on CT scan    Localized swelling of left lower extremity         Cardiac Testing: I have reviewed the findings below.   EKG:  ECHO:   STRESS TEST:  CATH:  BYPASS:  VASCULAR:    Past Medical History:   has a past medical history of 01 minute Apgar score 0, Anesthesia, Anxiety and depression, GERD (gastroesophageal reflux disease), Hypertension, Kidney stone, Mitral valve prolapse, Pre-diabetes, Sarcoidosis, Sleep apnea, Spinal stenosis, and Wears glasses. Surgical History:   has a past surgical history that includes back surgery (1992); Colonoscopy (2015); Leg Muscle Surgery (Bilateral, 2022); Leg Muscle Surgery (Left, 2022); back surgery (); and Leg Muscle Surgery (Bilateral, 2022). Social History:   reports that he has never smoked. He has never used smokeless tobacco. He reports that he does not currently use alcohol. He reports that he does not use drugs. Family History:  No evidence for sudden cardiac death or premature CAD    Medications:  Reviewed and are listed in nursing record. and/or listed below  Outpatient Medications:  Prior to Admission medications    Medication Sig Start Date End Date Taking? Authorizing Provider   melatonin 3 MG TABS tablet Take 3 mg by mouth nightly as needed   Yes Historical Provider, MD   hydroCHLOROthiazide (HYDRODIURIL) 12.5 MG tablet Take 12.5 mg by mouth in the morning. Yes Historical Provider, MD   omeprazole (PRILOSEC) 20 MG delayed release capsule Take 20 mg by mouth daily as needed   Yes Historical Provider, MD   GABAPENTIN PO Take 300 mg by mouth 3 times daily   Yes Historical Provider, MD   amLODIPine (NORVASC) 10 MG tablet Take 1 tablet by mouth nightly 22  Yes Nilo Patel MD   metoprolol succinate (TOPROL XL) 25 MG extended release tablet Take 1 tablet by mouth daily 4/10/22  Yes Nilo Patel MD   ASPIRIN PO Take by mouth   Yes Historical Provider, MD       In-patient schedule medications:        Infusion Medications: Allergies:  Bee venom     Review of Systems:   All 14 point review of symptoms completed. Pertinent positives identified in the HPI, all other review of symptoms findings as below.      Review or deformity   Heart:  Regular rhythm and normal rate; S1, S2 are normal; no murmur noted; no rub or gallop   Abdomen:   Soft, non-tender, bowel sounds active all four quadrants,  no masses, no organomegaly           Extremities: Extremities normal, atraumatic, no cyanosis. LLE with ++2 ankle to knee consistent with lymphatic disease. Pulses: 2+ and symmetric   Skin: Skin color, texture, turgor normal, no rashes or lesions   Pysch: Normal mood and affect   Neurologic: Normal gross motor and sensory exam.         Labs  No results for input(s): WBC, HGB, HCT, MCV, PLT in the last 72 hours. No results for input(s): CREATININE, BUN, NA, K, CL, CO2 in the last 72 hours. No results for input(s): INR, PROTIME in the last 72 hours. No results for input(s): TROPONINI in the last 72 hours. Invalid input(s): PRO-BNP  No results for input(s): CHOL, LDL, HDL in the last 72 hours. Invalid input(s): TG      Imaging:  I have reviewed the below testing personally and my interpretation is below. EKG:  CXR:      Assessment:  75 y.o. patient with:  Active Problems:    * No active hospital problems. *  Resolved Problems:    * No resolved hospital problems. *      Problem List Items Addressed This Visit       Grade I diastolic dysfunction    Enlarged heart    Relevant Orders    Echo 2D w doppler w color complete    Coronary artery calcification seen on CT scan    Localized swelling of left lower extremity    HTN (hypertension) - Primary    Relevant Orders    EKG 12 lead (Completed)       Plan:  1. Order BNP ~ swelling to lower extremities  2. Order echocardiogram ~ enlarged heart, lower extremity edema   ~A test that records movement of your heart valves and chambers by ultrasound. Evaluates heart valves, any chamber enlargement, abnormal openings, or any fluid in the sac surrounding the heart. 3. Discussed swelling could be correlated to lymphatic system ~await testing results   ~ May refer to Wound Clinic for treatment  4. Recommend compression stocking and elevation of legs while sitting in chair. 5.  Follow up based on testing results. Scribe's attestation: This note was scribed in the presence of Keshia Levin by Jayleen Balderas RN     I, Dr. Rosalva Parker, personally performed the services described in this documentation, as scribed by the above signed scribe in my presence. It is both accurate and complete to my knowledge. I agree with the details independently gathered by the clinical support staff, while the remaining scribed note accurately describes my personal service to the patient. Medical Decision Making: The following items were considered in medical decision making:  Independent review of images  Review / order clinical lab tests  Review / order radiology tests  Decision to obtain old records  Review and summation of old records as accessed through [facility] (a summary of my findings in these old records)      Time Based Itemization  A total of 60 minutes was spent on today's patient encounter. If applicable, non-patient-facing activities:  (X)Preparing to see the patient and reviewing records  (X) Individual interpretation of results  ( ) Discussion or coordination of care with other health care professionals  () Ordering of unique tests, medications, or procedures  (X) Documentation within the EHR               All questions and concerns were addressed to the patient/family. Alternatives to my treatment were discussed. The note was completed using EMR. Every effort was made to ensure accuracy; however, inadvertent computerized transcription errors may be present.     Rosalva Parker MD, [address], [place], [place]  [phone] [facility]  [phone] [facility]  12/6/2022  10:47 AM

## 2022-12-08 ENCOUNTER — TELEPHONE (OUTPATIENT)
Dept: CARDIOLOGY CLINIC | Age: 67
End: 2022-12-08

## 2022-12-08 DIAGNOSIS — I89.0 LYMPHEDEMA: Primary | ICD-10-CM

## 2022-12-08 NOTE — TELEPHONE ENCOUNTER
----- Message from Lindsey Snider MD sent at 12/7/2022 10:28 AM EST -----  The test is normal.    This low and normal level of BNP suggest that he does not have congestive heart failure. We will confirm this with his echocardiogram.    Lets plan on sending him to see the wound clinic at [facility] for lymphedema management.

## 2022-12-08 NOTE — TELEPHONE ENCOUNTER
Attempted to reach patient. Left VM to call office to relay VSP results. Please provide wound care contact information. Referral placed.    Keegan Swenson Dr[address]  [place], [address] [facility]  [phone]

## 2022-12-12 ENCOUNTER — HOSPITAL ENCOUNTER (OUTPATIENT)
Dept: PHYSICAL THERAPY | Age: 67
Setting detail: THERAPIES SERIES
Discharge: HOME | End: 2022-12-12
Payer: MEDICARE

## 2022-12-12 PROCEDURE — 97140 MANUAL THERAPY 1/> REGIONS: CPT | Performed by: PHYSICAL THERAPIST

## 2022-12-12 PROCEDURE — 97110 THERAPEUTIC EXERCISES: CPT | Performed by: PHYSICAL THERAPIST

## 2022-12-12 NOTE — FLOWSHEET NOTE
Indigestion for one week, chest discomfort started last night. No SOB. Last meal was last night. [place] [address] [facility],  [address] [place]  Phone: [phone]  Fax [phone]    Physical Therapy Treatment Note/ Progress Report:           Date:  2022    Patient Name:  Li Serrano    :  1955  MRN: [id number]  Restrictions/Precautions:    Medical/Treatment Diagnosis Information:  Diagnosis: S76.112D (ICD-10-CM) - Quadriceps tendon rupture, left, subsequent encounter  s/p  quad tenon repair (2nd revision) with Dermal allograft augmentation SX: 22  Treatment Diagnosis: Left Knee Pain M25.562 / Difficulty walking R26.2 / LE muscle weakness Y81.44  Insurance/Certification information:  PT Insurance Information: [facility]  Physician Information:   Dr. Dorys Juarez  Has the plan of care been signed (Y/N):        []  Yes  [x]  No     Date of Patient follow up with Physician:       Is this a Progress Report:     [x]  Yes  []  No        If Yes:  Date Range for reporting period:  Beginning 22  Endin22    Progress report will be due (10 Rx or 30 days whichever is less):        Recertification will be due (POC Duration  / 90 days whichever is less): 12 week POC 22      Visit # Insurance Allowable Auth Required   In-person 16 [facility] []  Yes [x]  No    Telehealth   []  Yes []  No    Total          Therapy Diagnosis/Practice Pattern:I      Number of Comorbidities:  []0     [x]1-2    []3+    Latex Allergy:  [x]NO      []YES  Preferred Language for Healthcare:   [x]English       []other:      Pain level:  0-2/10     SUBJECTIVE: Patient reports he has appointment with a Lymphedema specialist tomorrow. Woke up today with little calf swelling but ankle was still swollen. He went to a cardiologist and his primary care doctor. They do not think he has congestive heart failure. He is doing an echo doppler on 23. He reports he is feeling better.  He does notice some buckling on occasion, but not as much as before. OBJECTIVE: See eval  Observation: 11/16/22: Significant indentation from ace wrap at sock line still swollen at ankle, minimal swelling at knee  Test measurements:  Knee  flexion  - 10/10/22  ROM LEFT  9/26/22 current   Knee ext 0 0   Knee Flex 75 115   Strength  LEFT    Knee EXT (quad) Fair +           Pain Scale 0-2/ 10    FOTO 42 50     RESTRICTIONS/PRECAUTIONS: Quad tendon protocol revision x 2 - SLOW PROGRESSION  Brace 0-60 deg    Exercises/Interventions:     Therapeutic Ex (19782) Sets/sec Reps Notes/CUES      Bridges  x15 Spider killers :05 X 20    Supine SAQ  unable Unable to raise heel off table   Hooklying hip abd Green band  2x15                 TB KRISTY TKE 30# X 10    Guarding, and cues against hyperextension anticipated         Leg Press @ 60  120# 1x30 Cues for controlling full knee extension   Leg press left only   60# 1x30 Focus on controlling terminal knee ext        Manual Intervention (69975)         PROM  Knee and ankle  3 min       Lymphatic massage  8 min                NMR re-education (20400)   CUES NEEDED   HEP   Lateral side stepping  2 laps Mini squat position                                       Therapeutic Activity (79198)        Attempting to control continued swelling                                   Access Code: [email or web address]  URL: [email or web address]  Date: 52/71/0620  Prepared by: Sony Sandy Sitting Calf Stretch with Strap - 2 x daily - 7 x weekly - 4 reps - 30 hold  Seated Table Hamstring Stretch - 2 x daily - 7 x weekly - 4 reps - 30 hold  Long Sitting Quad Set - 2 x daily - 7 x weekly - 15 reps - 10 hold  Sitting Heel Slide with Towel - 2 x daily - 7 x weekly - 15 reps - 5 hold  Sidelying Hip Abduction - 2 x daily - 7 x weekly - 15 reps  Side to Side Weight Shift with Counter Support - 2 x daily - 7 x weekly - 10 reps - 5-10 hold  Standing Hip Flexion with Counter Support - 1 x daily - 7 x weekly - 3 sets - 10 reps  Standing 4-Way Leg Reach with Counter Support - 1 x daily - 7 x weekly - 3 sets - 10 reps  Standing Hip Extension with Counter Support - 1 x daily - 7 x weekly - 3 sets - 10 reps  Mini Squat with Counter Support - 1 x daily - 7 x weekly - 3 sets - 10 reps    Therapeutic Exercise and NMR EXR  [x] (50895) Provided verbal/tactile cueing for activities related to strengthening, flexibility, endurance, ROM for improvements in LE, proximal hip, and core control with self care, mobility, lifting, ambulation.  [] (45824) Provided verbal/tactile cueing for activities related to improving balance, coordination, kinesthetic sense, posture, motor skill, proprioception  to assist with LE, proximal hip, and core control in self care, mobility, lifting, ambulation and eccentric single leg control.      NMR and Therapeutic Activities:    [] (57029 or 57298) Provided verbal/tactile cueing for activities related to improving balance, coordination, kinesthetic sense, posture, motor skill, proprioception and motor activation to allow for proper function of core, proximal hip and LE with self care and ADLs  [] (93761) Gait Re-education- Provided training and instruction to the patient for proper LE, core and proximal hip recruitment and positioning and eccentric body weight control with ambulation re-education including up and down stairs     Home Exercise Program:    [x] (89655) Reviewed/Progressed HEP activities related to strengthening, flexibility, endurance, ROM of core, proximal hip and LE for functional self-care, mobility, lifting and ambulation/stair navigation   [] (05282)Reviewed/Progressed HEP activities related to improving balance, coordination, kinesthetic sense, posture, motor skill, proprioception of core, proximal hip and LE for self care, mobility, lifting, and ambulation/stair navigation      Manual Treatments:  PROM / STM / Oscillations-Mobs:  G-I, II, III, IV (PA's, Inf., Post.)  [x] (55396) Provided manual therapy to mobilize LE, proximal hip and/or LS spine soft tissue/joints for the purpose of modulating pain, promoting relaxation,  increasing ROM, reducing/eliminating soft tissue swelling/inflammation/restriction, improving soft tissue extensibility and allowing for proper ROM for normal function with self care, mobility, lifting and ambulation. Modalities:     [] GAME READY (VASO)- for significant edema, swelling, pain control. Charges  Timed Code Treatment Minutes: 45   Total Treatment Minutes: 45     Medicare total (add KX at $2000)  1601.66     89.95    [] EVAL (LOW) 40403   [] EVAL (MOD) 00631  [] EVAL (HIGH) 17560   [] RE-EVAL     [x] MX(31955) x   2  [] IONTO  [] NMR (56603) x     [] VASO  [x] Manual (60421) x 1     [] Other:  [] TA x      [] Mech Traction (64007)  [] ES(attended) (50829)      [] ES (un) (03986):       GOALS:   Patient stated goal: Return to PLOF  [] Progressing: [] Met: [] Not Met: [] Adjusted     Therapist goals for Patient:   Short Term Goals: To be achieved in: 2 weeks  1. Independent in HEP and progression per patient tolerance, in order to prevent re-injury. [x] Progressing: [] Met: [] Not Met: [] Adjusted  2. Patient will have a decrease in pain to facilitate improvement in movement, function, and ADLs as indicated by Functional Deficits. [x] Progressing: [] Met: [] Not Met: [] Adjusted     Long Term Goals: To be achieved in: 12 weeks  1. Disability index score of 55% or more per FOTO to assist with reaching prior level of function. [x] Progressing: [] Met: [] Not Met: [] Adjusted  2. Patient will demonstrate increased AROM to 0-120 deg to allow for proper joint functioning as indicated by patients Functional Deficits. [x] Progressing: [] Met: [] Not Met: [] Adjusted  3. Patient will demonstrate an increase in Strength to good quad strength and control, 4+/5 MMT in LE to allow for proper functional mobility as indicated by patients Functional Deficits.   [] Progressing: [] Met: [x] Not Met: [] Adjusted  4. Patient will return to ambulating community distances for 20-30 minutes safely and without AD without increased symptoms or restriction. [] Progressing: [x] Met: [] Not Met: [] Adjusted  5. Patient will be able to descend and ascend stairs with reciprocal gait. [] Progressing: [x] Met: [] Not Met: [] Adjusted         Progression Towards Functional goals:  [] Patient is progressing as expected towards functional goals listed. [] Progression is slowed due to complexities listed. [x] Progression has been slowed due to co-morbidities. [] Plan just implemented, too soon to assess goals progression  [] Other:       Overall Progression Towards Functional goals/ Treatment Progress Update:  [] Patient is progressing as expected towards functional goals listed. [] Progression is slowed due to complexities/Impairments listed. [x] Progression has been slowed due to co-morbidities. [] Plan just implemented, too soon to assess goals progression <30days   [] Goals require adjustment due to lack of progress  [] Patient is not progressing as expected and requires additional follow up with physician  [] Other    Prognosis for POC: [x] Good [] Fair  [] Poor      Patient requires continued skilled intervention: [x] Yes  [] No    Treatment/Activity Tolerance:  [x] Patient able to complete treatment  [] Patient limited by fatigue  [] Patient limited by pain    [] Patient limited by other medical complications  [] Other:     ASSESSMENT: Ext lag persists. Progress is slow. PLAN: See eval  [x] Continue per plan of care [] Alter current plan (see comments above)  [] Plan of care initiated [] Hold pending MD visit [] Discharge      Electronically signed by:  Mahendra Almonte PT       Note: If patient does not return for scheduled/ recommended follow up visits, this note will serve as a discharge from care along with most recent update on progress.

## 2022-12-19 ENCOUNTER — HOSPITAL ENCOUNTER (OUTPATIENT)
Dept: PHYSICAL THERAPY | Age: 67
Setting detail: THERAPIES SERIES
Discharge: HOME | End: 2022-12-19
Payer: MEDICARE

## 2022-12-19 NOTE — FLOWSHEET NOTE
[place] [facility] [facility],  [address]        Physical Therapy  Cancellation/No-show Note  Patient Name:  Alexander Olivares  :  1955   Date:  2022  Cancelled visits to date: 1  No-shows to date: 0    For today's appointment patient:  [x]  Cancelled  []  Rescheduled appointment  []  No-show     Reason given by patient:  []  Patient ill  []  Conflicting appointment  []  No transportation    []  Conflict with work  []  No reason given  []  Other:     Comments:      Electronically signed by:  Phuong Bourne PT

## 2022-12-28 ENCOUNTER — OFFICE VISIT (OUTPATIENT)
Dept: ORTHOPEDIC SURGERY | Age: 67
End: 2022-12-28
Payer: MEDICARE

## 2022-12-28 VITALS — RESPIRATION RATE: 16 BRPM | WEIGHT: 281.6 LBS | HEIGHT: 70 IN | BODY MASS INDEX: 40.31 KG/M2

## 2022-12-28 DIAGNOSIS — S76.112D QUADRICEPS TENDON RUPTURE, LEFT, SUBSEQUENT ENCOUNTER: Primary | ICD-10-CM

## 2022-12-28 PROCEDURE — G8484 FLU IMMUNIZE NO ADMIN: HCPCS | Performed by: ORTHOPAEDIC SURGERY

## 2022-12-28 PROCEDURE — 3017F COLORECTAL CA SCREEN DOC REV: CPT | Performed by: ORTHOPAEDIC SURGERY

## 2022-12-28 PROCEDURE — 1124F ACP DISCUSS-NO DSCNMKR DOCD: CPT | Performed by: ORTHOPAEDIC SURGERY

## 2022-12-28 PROCEDURE — 1036F TOBACCO NON-USER: CPT | Performed by: ORTHOPAEDIC SURGERY

## 2022-12-28 PROCEDURE — 99213 OFFICE O/P EST LOW 20 MIN: CPT | Performed by: ORTHOPAEDIC SURGERY

## 2022-12-28 PROCEDURE — G8427 DOCREV CUR MEDS BY ELIG CLIN: HCPCS | Performed by: ORTHOPAEDIC SURGERY

## 2022-12-28 PROCEDURE — G8417 CALC BMI ABV UP PARAM F/U: HCPCS | Performed by: ORTHOPAEDIC SURGERY

## 2022-12-28 NOTE — PROGRESS NOTES
History:  Chris Smith is here for follow up after left revision quadriceps tendon repair with dermal allograft augmentation. Surgery date was 7/22/22. The patient's pain is rated at 0/10. He has been to PT at [facility] office. He mostly just has difficulty with stairs. Physical Examination:  Resp 16   Ht 5' 10\" (1.778 m)   Wt 281 lb 9.6 oz (127.7 kg)   BMI 40.41 kg/m²    Patient is awake, alert, and in no acute distress. The quad tendon does appear to be intact and firm at the superior pole of the patella. No palpable gap in the quadriceps tendon. He does have decent quad activation. He does continue to have swelling in his left foot and leg similar to before. He has about 10-20 degree extensor lag. Left leg ultrasound 8/11/2022: No evidence of DVT. Assessment:   5 months status post left vision quadriceps tendon repair with dermal allograft augmentation  Left leg lymphedema  Noncompliance      Plan:   Continue PT/strengthening. We discussed that there is a possibility that he may always have a slight extensor lag. I would not really consider a third revision, in light of his recent noncompliance with brace protection after the last revision. Ice to knee as needed. Elevate left leg. Continue left leg lymphedema treatment. Follow up in 3 months for evaluation of progress or prn if problems. Martin Sánchez. Shy Augustine MD  Orthopaedic Surgery and Sports Medicine     Disclaimer: This note was generated with use of a verbal recognition program (DRAGON) and an attempt was made to check for errors. It is possible that there are still dictated errors within this office note. If so, please bring any significant errors to my attention for an addendum. All efforts were made to ensure that this office note is accurate.

## 2023-01-06 ENCOUNTER — TELEPHONE (OUTPATIENT)
Dept: CARDIOLOGY CLINIC | Age: 68
End: 2023-01-06

## 2023-01-06 ENCOUNTER — HOSPITAL ENCOUNTER (OUTPATIENT)
Dept: CARDIOLOGY | Age: 68
Discharge: HOME | End: 2023-01-06
Payer: MEDICARE

## 2023-01-06 DIAGNOSIS — I51.7 ENLARGED HEART: ICD-10-CM

## 2023-01-06 DIAGNOSIS — R22.42 LOCALIZED SWELLING OF LEFT LOWER EXTREMITY: ICD-10-CM

## 2023-01-06 PROCEDURE — 93306 TTE W/DOPPLER COMPLETE: CPT

## 2023-01-06 NOTE — TELEPHONE ENCOUNTER
Called and left message for patient to call back.  When he calls back let him know per VSP- Echo is normal.

## 2023-01-06 NOTE — TELEPHONE ENCOUNTER
----- Message from Marcus Hess MD sent at 1/6/2023 11:58 AM EST -----  The test is normal. Please call the patient and inform them of the normal result.

## 2023-01-30 ENCOUNTER — TELEPHONE (OUTPATIENT)
Dept: ORTHOPEDIC SURGERY | Age: 68
End: 2023-01-30

## 2023-01-30 NOTE — TELEPHONE ENCOUNTER
S/W Romeo Kelsy has been placed in his chart and sent via [employer] portal. He may stop by the office to p/u at his convenience should he have trouble accessing through the portal. Patient voiced understanding of the conversation and will contact the office with further questions or concerns.

## 2023-01-30 NOTE — TELEPHONE ENCOUNTER
Last seen 12/28/22 s/p revision left quadriceps tendon 7/22/22. On 3/24/22 was given letter for handicap parking placard for 3 months. Please advise re: request for handicap parking.

## 2023-01-30 NOTE — LETTER
[facility]  [address] [address]  Phone: [phone]  Fax: [phone]    Jaycee Mallory MD         January 30, 2023     Patient: Kaushal Hernandez   YOB: 1955   Date of Visit: 1/30/2023       To Whom It May Concern: It is my medical opinion that Kaushal Hernandez requires a disability parking placard for the following reasons:  He has limited walking ability due to an orthopedic condition. Duration of need: 1 year    If you have any questions or concerns, please don't hesitate to call.     Sincerely,    Jaycee Mallory MD

## 2023-04-25 NOTE — TELEPHONE ENCOUNTER
Nabil was transferred to clinic returning all. He states he has had some swelling in his left ankle and difficulty performing an SLR. He states himself and the physical therapist feel he should be progressing more than what he has over the last couple weeks. He is currently in [place] on a business trip and cannot come to the office tomorrow. Patient was scheduled for follow up on Monday at [place].   We discussed continuing his HEP until then, however, he states this is difficult due to the nature of the travel. Patient denies calf pain, swelling, chest pain and shortness of breath. Patient voiced understanding of the conversation and will contact the office with further questions or concerns. Patient Name: Sandoval Navarro  : 1959    MRN: [id number]                              Today's Date: 2023       Admit Date: 2023    Visit Dx:     ICD-10-CM ICD-9-CM   1. Chest pain, unspecified type  R07.9 786.50   2. Impaired mobility and ADLs  Z74.09 V49.89    Z78.9    3. Impaired functional mobility, balance, gait, and endurance  Z74.09 V49.89     Patient Active Problem List   Diagnosis   • Leukocytosis   • Chest pain   • Dyspnea on exertion   • Chronic fatigue   • PVD (peripheral vascular disease) with claudication   • Cigarette nicotine dependence, uncomplicated   • Overweight (BMI 25.0-29.9)   • Chronic obstructive pulmonary disease   • Carotid stenosis, asymptomatic, right   • ALICE (obstructive sleep apnea)   • Hyperlipemia   • Multiple subsegmental pulmonary emboli without acute cor pulmonale   • Acute on chronic respiratory failure with hypoxia and hypercapnia   • Chest pain, unspecified type     Past Medical History:   Diagnosis Date   • COPD (chronic obstructive pulmonary disease)    • Diabetes mellitus    • GERD (gastroesophageal reflux disease)    • Gout    • History of transfusion    • Hyperlipemia    • Sleep apnea    • Stroke      Past Surgical History:   Procedure Laterality Date   • BACK SURGERY     • CHOLECYSTECTOMY     • COLONOSCOPY N/A 3/14/2017    Procedure: COLONOSCOPY;  Surgeon: Cachorro Perez DO;  Location: [facility] ENDOSCOPY;  Service:    • HAND SURGERY     • LEG SURGERY Right    • NOSE SURGERY     • PANCREAS SURGERY     • SPLENECTOMY        General Information     Row Name 23 1233          OT Time and Intention    Document Type therapy note (daily note)  -CM     Mode of Treatment occupational therapy;individual therapy  -CM     Row Name 23 1230          General Information    Patient Profile Reviewed yes  -CM     Existing Precautions/Restrictions fall  -CM     Row Name 23 123          Cognition    Orientation Status (Cognition) oriented  to;person;place;time  -CM     Row Name 04/25/23 1233          Safety Issues, Functional Mobility    Impairments Affecting Function (Mobility) balance;cognition;endurance/activity tolerance;range of motion (ROM);strength;pain;grasp  -CM           User Key  (r) = Recorded By, (t) = Taken By, (c) = Cosigned By    Initials Name Provider Type    CM Belkis Regalado OT Occupational Therapist                 Mobility/ADL's     Row Name 04/25/23 1228          Bed Mobility    Bed Mobility supine-sit;sit-supine  -CM     Supine-Sit [place] (Bed Mobility) standby assist  -CM     Sit-Supine [place] (Bed Mobility) standby assist  -CM     Bed Mobility, Safety Issues decreased use of arms for pushing/pulling;decreased use of legs for bridging/pushing  -CM     Assistive Device (Bed Mobility) bed rails;head of bed elevated  -CM     Row Name 04/25/23 1228          Transfers    Transfers stand-sit transfer;sit-stand transfer  -CM     Row Name 04/25/23 1228          Sit-Stand Transfer    Sit-Stand [place] (Transfers) contact guard  -CM     Assistive Device (Sit-Stand Transfers) walker, front-wheeled  -CM     Row Name 04/25/23 1228          Stand-Sit Transfer    Stand-Sit [place] (Transfers) contact guard  -CM     Assistive Device (Stand-Sit Transfers) walker, front-wheeled  -CM     Row Name 04/25/23 1228          Functional Mobility    Functional Mobility- Ind. Level minimum assist (75% patient effort)  -CM     Functional Mobility- Device walker, front-wheeled  -CM     Functional Mobility-Distance (Feet) 4  -CM     Row Name 04/25/23 1228          Activities of Daily Living    BADL Assessment/Intervention bathing;upper body dressing;lower body dressing;grooming  -CM     Row Name 04/25/23 1228          Lower Body Dressing Assessment/Training    [place] Level (Lower Body Dressing) don;doff;socks;maximum assist (25% patient effort)  -CM     Position (Lower Body Dressing) edge of bed sitting  -CM     Row Name 04/25/23  1228          Bathing Assessment/Intervention    [place] Level (Bathing) bathing skills;lower body;maximum assist (25% patient effort);upper body;moderate assist (50% patient effort)  -CM     Position (Bathing) edge of bed sitting  -CM     Row Name 04/25/23 1228          Upper Body Dressing Assessment/Training    [place] Level (Upper Body Dressing) upper body dressing skills;doff;don;other (see comments);moderate assist (50% patient effort)  HG  -CM     Position (Upper Body Dressing) edge of bed sitting  -CM     Row Name 04/25/23 1228          Grooming Assessment/Training    [place] Level (Grooming) grooming skills;hair care, combing/brushing;wash face, hands;set up  -CM     Position (Grooming) edge of bed sitting  -CM           User Key  (r) = Recorded By, (t) = Taken By, (c) = Cosigned By    Initials Name Provider Type    Belkis Salmeron OT Occupational Therapist               Obj/Interventions     Row Name 04/25/23 1228          Shoulder (Therapeutic Exercise)    Shoulder (Therapeutic Exercise) strengthening exercise  -CM     Shoulder Strengthening (Therapeutic Exercise) right;flexion;extension;horizontal aBduction/aDduction;aBduction;aDduction;1 lb free weight;supine;15 repititions;2 sets  -CM     Row Name 04/25/23 1228          Elbow/Forearm (Therapeutic Exercise)    Elbow/Forearm (Therapeutic Exercise) strengthening exercise  -CM     Elbow/Forearm Strengthening (Therapeutic Exercise) right;flexion;extension;supine;1 lb free weight;2 sets;15 repititions  -CM     Row Name 04/25/23 1228          Wrist (Therapeutic Exercise)    Wrist (Therapeutic Exercise) strengthening exercise  -CM     Wrist Strengthening (Therapeutic Exercise) right;flexion;extension;1 lb free weight;15 repititions;2 sets  -CM     Row Name 04/25/23 1228          Motor Skills    Therapeutic Exercise shoulder;elbow/forearm;wrist  -CM           User Key  (r) = Recorded By, (t) = Taken By, (c) = Cosigned By    Initials Name  Provider Type    Belkis Salmeron OT Occupational Therapist               Goals/Plan     Row Name 04/25/23 1228          Transfer Goal 1 (OT)    Activity/Assistive Device (Transfer Goal 1, OT) toilet  -CM     [place] Level/Cues Needed (Transfer Goal 1, OT) minimum assist (75% or more patient effort)  -CM     Time Frame (Transfer Goal 1, OT) long term goal (LTG);by discharge  -CM     Progress/Outcome (Transfer Goal 1, OT) goal not met  -CM     Row Name 04/25/23 1228          Bathing Goal 1 (OT)    Activity/Device (Bathing Goal 1, OT) lower body bathing  -CM     [place] Level/Cues Needed (Bathing Goal 1, OT) standby assist  -CM     Time Frame (Bathing Goal 1, OT) long term goal (LTG);by discharge  -CM     Progress/Outcomes (Bathing Goal 1, OT) goal not met  -CM     Row Name 04/25/23 1228          Dressing Goal 1 (OT)    Activity/Device (Dressing Goal 1, OT) lower body dressing  -CM     [place]/Cues Needed (Dressing Goal 1, OT) minimum assist (75% or more patient effort)  -CM     Time Frame (Dressing Goal 1, OT) long term goal (LTG);by discharge  -CM     Progress/Outcome (Dressing Goal 1, OT) goal not met  -CM     Row Name 04/25/23 1228          Toileting Goal 1 (OT)    Activity/Device (Toileting Goal 1, OT) toileting skills, all  -CM     [place] Level/Cues Needed (Toileting Goal 1, OT) minimum assist (75% or more patient effort)  -CM     Time Frame (Toileting Goal 1, OT) long term goal (LTG);by discharge  -CM     Progress/Outcome (Toileting Goal 1, OT) goal not met  -CM           User Key  (r) = Recorded By, (t) = Taken By, (c) = Cosigned By    Initials Name Provider Type    Belkis Salmeron OT Occupational Therapist               Clinical Impression     Row Name 04/25/23 1228          Pain Assessment    Pretreatment Pain Rating 7/10  -CM     Posttreatment Pain Rating 7/10  -CM     Pain Location - abdomen  -CM     Pain Intervention(s) Ambulation/increased activity;Repositioned  -CM     Row Name  04/25/23 1228          Plan of Care Review    Plan of Care Reviewed With patient  -CM     Outcome Evaluation OT tx completed. Pt sitting up in bed upon arrival. Alert and agreeable to therapy. Pt completed sup to sit with SBA. Pt sat EOB with SBS. Pt completed LB bathing and dressing with Max A. Pt completed UB bathing and dressing with Mod A. Pt completed sit <> stand with FWW with CGA. Pt took x2 steps towards the HOB with Min A and FWW. Pt returned to sup with SBA. Pt completed RUE strengthening exercises with 1lb weight in all planes. OTR worked with pt on positioning of LUE. Pt was left supine in bed with exit alarm on and all needs in reach. Cont OT POC. Anticipate return to SNf at d/c with therapy.  -CM     Row Name 04/25/23 1228          Therapy Assessment/Plan (OT)    Rehab Potential (OT) good, to achieve stated therapy goals  -CM     Criteria for Skilled Therapeutic Interventions Met (OT) yes;skilled treatment is necessary  -CM     Therapy Frequency (OT) other (see comments)  5-7 d/wk  -CM     Row Name 04/25/23 1228          Therapy Plan Review/Discharge Plan (OT)    Anticipated Discharge Disposition (OT) skilled nursing facility  -CM     Row Name 04/25/23 1228          Positioning and Restraints    Pre-Treatment Position in bed  -CM     Post Treatment Position bed  -CM     In Bed supine;call light within reach;encouraged to call for assist;exit alarm on;side rails up x2  -CM           User Key  (r) = Recorded By, (t) = Taken By, (c) = Cosigned By    Initials Name Provider Type    Belkis Salmeron OT Occupational Therapist               Outcome Measures     Row Name 04/25/23 1228          How much help from another is currently needed...    Putting on and taking off regular lower body clothing? 2  -CM     Bathing (including washing, rinsing, and drying) 2  -CM     Toileting (which includes using toilet bed pan or urinal) 2  -CM     Putting on and taking off regular upper body clothing 3  -CM     Taking  care of personal grooming (such as brushing teeth) 3  -CM     Eating meals 3  -CM     AM-PAC 6 Clicks Score (OT) 15  -CM     Row Name 04/25/23 0828          How much help from another person do you currently need...    Turning from your back to your side while in flat bed without using bedrails? 3  -LB     Moving from lying on back to sitting on the side of a flat bed without bedrails? 3  -LB     Moving to and from a bed to a chair (including a wheelchair)? 3  -LB     Standing up from a chair using your arms (e.g., wheelchair, bedside chair)? 3  -LB     Climbing 3-5 steps with a railing? 2  -LB     To walk in hospital room? 2  -LB     AM-PAC 6 Clicks Score (PT) 16  -LB     Highest level of mobility 5 --> Static standing  -LB     Row Name 04/25/23 1228          Functional Assessment    Outcome Measure Options AM-PAC 6 Clicks Daily Activity (OT)  -CM           User Key  (r) = Recorded By, (t) = Taken By, (c) = Cosigned By    Initials Name Provider Type    Sisi Andres, RN Registered Nurse    Belkis Salmeron OT Occupational Therapist                Occupational Therapy Education     Title: PT OT SLP Therapies (In Progress)     Topic: Occupational Therapy (In Progress)     Point: ADL training (Done)     Description:   Instruct learner(s) on proper safety adaptation and remediation techniques during self care or transfers.   Instruct in proper use of assistive devices.              Learning Progress Summary           Patient Acceptance, E,TB, VU by  at 4/24/2023 0671    Comment: POC, role of OT, transfer training                   Point: Home exercise program (Not Started)     Description:   Instruct learner(s) on appropriate technique for monitoring, assisting and/or progressing therapeutic exercises/activities.              Learner Progress:  Not documented in this visit.          Point: Precautions (Not Started)     Description:   Instruct learner(s) on prescribed precautions during self-care and functional  transfers.              Learner Progress:  Not documented in this visit.          Point: Body mechanics (Not Started)     Description:   Instruct learner(s) on proper positioning and spine alignment during self-care, functional mobility activities and/or exercises.              Learner Progress:  Not documented in this visit.                      User Key     Initials Effective Dates Name Provider Type Discipline     06/14/21 -  Alan Holder OT Occupational Therapist OT              OT Recommendation and Plan  Therapy Frequency (OT): other (see comments) (5-7 d/wk)  Plan of Care Review  Plan of Care Reviewed With: patient  Outcome Evaluation: OT tx completed. Pt sitting up in bed upon arrival. Alert and agreeable to therapy. Pt completed sup to sit with SBA. Pt sat EOB with SBS. Pt completed LB bathing and dressing with Max A. Pt completed UB bathing and dressing with Mod A. Pt completed sit <> stand with FWW with CGA. Pt took x2 steps towards the HOB with Min A and FWW. Pt returned to sup with SBA. Pt completed RUE strengthening exercises with 1lb weight in all planes. OTR worked with pt on positioning of LUE. Pt was left supine in bed with exit alarm on and all needs in reach. Cont OT POC. Anticipate return to SNf at d/c with therapy.     Time Calculation:    Time Calculation- OT     Row Name 04/25/23 1537             Time Calculation- OT    OT Start Time 1228  -CM      OT Stop Time 1300  -CM      OT Time Calculation (min) 32 min  -CM      Total Timed Code Minutes- OT 32 minute(s)  -CM      OT Received On 04/25/23  -CM         Timed Charges    04343 - OT Therapeutic Exercise Minutes 10  -CM      30126 - OT Self Care/Mgmt Minutes 22  -CM         Total Minutes    Timed Charges Total Minutes 32  -CM       Total Minutes 32  -CM            User Key  (r) = Recorded By, (t) = Taken By, (c) = Cosigned By    Initials Name Provider Type    Belkis Salmeron OT Occupational Therapist              Therapy Charges for  Today     Code Description Service Date Service Provider Modifiers Qty    65556209305 HC OT SELF CARE/MGMT/TRAIN EA 15 MIN 4/25/2023 Belkis Regalado OT GO 1    58724039594 HC OT THER PROC EA 15 MIN 4/25/2023 Belkis Regalado OT GO 1               Belkis Regalado OT  4/25/2023

## 2023-06-21 ENCOUNTER — OFFICE VISIT (OUTPATIENT)
Dept: ORTHOPEDIC SURGERY | Age: 68
End: 2023-06-21
Payer: MEDICARE

## 2023-06-21 VITALS — BODY MASS INDEX: 40.23 KG/M2 | RESPIRATION RATE: 15 BRPM | WEIGHT: 281 LBS | HEIGHT: 70 IN

## 2023-06-21 DIAGNOSIS — S76.112D QUADRICEPS TENDON RUPTURE, LEFT, SUBSEQUENT ENCOUNTER: Primary | ICD-10-CM

## 2023-06-21 PROCEDURE — 99213 OFFICE O/P EST LOW 20 MIN: CPT | Performed by: STUDENT IN AN ORGANIZED HEALTH CARE EDUCATION/TRAINING PROGRAM

## 2023-06-21 PROCEDURE — 3017F COLORECTAL CA SCREEN DOC REV: CPT | Performed by: STUDENT IN AN ORGANIZED HEALTH CARE EDUCATION/TRAINING PROGRAM

## 2023-06-21 PROCEDURE — 1036F TOBACCO NON-USER: CPT | Performed by: STUDENT IN AN ORGANIZED HEALTH CARE EDUCATION/TRAINING PROGRAM

## 2023-06-21 PROCEDURE — G8428 CUR MEDS NOT DOCUMENT: HCPCS | Performed by: STUDENT IN AN ORGANIZED HEALTH CARE EDUCATION/TRAINING PROGRAM

## 2023-06-21 PROCEDURE — 1124F ACP DISCUSS-NO DSCNMKR DOCD: CPT | Performed by: STUDENT IN AN ORGANIZED HEALTH CARE EDUCATION/TRAINING PROGRAM

## 2023-06-21 PROCEDURE — G8417 CALC BMI ABV UP PARAM F/U: HCPCS | Performed by: STUDENT IN AN ORGANIZED HEALTH CARE EDUCATION/TRAINING PROGRAM

## 2023-06-21 NOTE — PROGRESS NOTES
History:  Bruce Huston is here for follow up after left revision quadriceps tendon repair with dermal allograft augmentation. Surgery date was 7/22/22. The patient's pain is rated at 0/10. He has been to PT at [facility] office. 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 He has been doing well. He reports only 2-3 incidents of buckling over the last 6 months. Physical Examination:  Resp 15   Ht 5' 10\" (1.778 m)   Wt 281 lb (127.5 kg)   BMI 40.32 kg/m²    Patient is awake, alert, and in no acute distress. The quad tendon does appear to be intact and firm at the superior pole of the patella. No palpable gap in the quadriceps tendon. He does have decent quad activation. He does continue to have swelling in his left foot and leg similar to before (lymphedema). He has about 10 degree extensor lag. Left leg ultrasound 8/11/2022: No evidence of DVT. Assessment:   11 months status post left vision quadriceps tendon repair with dermal allograft augmentation  Left leg lymphedema  Noncompliance      Plan:   Continue PT/strengthening to maintain quad strength. We discussed that there is a possibility that he may always have a slight extensor lag. Dr. Michelle Mcneil would not really consider a third revision, in light of his recent noncompliance with brace protection after the last revision. Ice to knee as needed. Elevate left leg. Continue left leg lymphedema treatment. Follow up as needed. Jessica Henriquez PA-C  Board Certified by the [employer] on Certification of [address] and [address]: This note was generated with use of a verbal recognition program (DRAGON) and an attempt was made to check for errors. It is possible that there are still dictated errors within this office note. If so, please bring any significant errors to my attention for an addendum.   All efforts were made to ensure

## 2025-07-09 NOTE — TELEPHONE ENCOUNTER
- Overall mgmt currently by primary team   - Consult would care for any wounds or significant concerns for skin integrity   - Increased risk of skin wounds/breakdown/rashes due to recent immobility and co-morbidities   - Turn patient Q2H in bed (use pillows or wedges), encourage wt shifts every 10-15 minutes if/when in chair/WC  - Patient and if pertinent caregiver education   - Hydragaurd (or other appropriate barrier cream) to buttocks and sacrum BID & PRN   - Allevyn foam to heels and/or float heels when in bed   - Optimal bowel/bladder hygiene; keep skin clean and dry   - EHOB waffle cushion to chair/WC when OOB  - Rec nursing to document in chart and Notify MD if buttock, sacrum, heel, or other skin site develops erythema, skin breakdown, or rashes as soon as possible.  If patient is soiling themselves with urine or stool notify MD.  If you are unable to maintain skin integrity and prevent erythema due to frequency of soiling notify MD as soon as possible as well   ----- Message from Junior Bass ATC sent at 6/27/2022  9:04 AM EDT -----  Regarding: Diagnostic US  This is the patient that needs the diagnostic US for the left quad tendon, possible rupture. Thanks for helping get this scheduled!

## (undated) DEVICE — GOWN SIRUS NONREIN LG W/TWL: Brand: MEDLINE INDUSTRIES, INC.

## (undated) DEVICE — SUTURE VCRL + SZ 2-0 L18IN ABSRB UD CT1 L36MM 1/2 CIR VCP839D

## (undated) DEVICE — OPTIFOAM GENTLE SA, POSTOP, 4X10: Brand: MEDLINE

## (undated) DEVICE — 3M™ TEGADERM™ TRANSPARENT FILM DRESSING FRAME STYLE, 1626W, 4 IN X 4-3/4 IN (10 CM X 12 CM), 50/CT 4CT/CASE: Brand: 3M™ TEGADERM™

## (undated) DEVICE — MAJOR SET UP: Brand: MEDLINE INDUSTRIES, INC.

## (undated) DEVICE — INTENT TO BE USED WITH SUTURE MATERIAL FOR TISSUE CLOSURE: Brand: RICHARD-ALLAN® NEEDLE 3/8 CIRCLE TROCAR

## (undated) DEVICE — DRAPE,U/SHT,SPLIT,FILM,60X84,STERILE: Brand: MEDLINE

## (undated) DEVICE — 3M™ IOBAN™ 2 ANTIMICROBIAL INCISE DRAPE 6650EZ: Brand: IOBAN™ 2

## (undated) DEVICE — SUTURE SUTTAPE L40IN DIA1.3MM NONABSORBABLE WHT BLU L26.5MM AR7500

## (undated) DEVICE — ZIMMER® STERILE DISPOSABLE TOURNIQUET CUFF WITH PLC, DUAL PORT, SINGLE BLADDER, 34 IN. (86 CM)

## (undated) DEVICE — GLOVE ORANGE PI 7 1/2   MSG9075

## (undated) DEVICE — BANDAGE,GAUZE,4.5"X4.1YD,STERILE,LF: Brand: MEDLINE

## (undated) DEVICE — BANDAGE COMPR W6INXL10YD ST M E WHITE/BEIGE

## (undated) DEVICE — SUTURE VCRL + SZ 4-0 L18IN ABSRB UD L19MM PS-2 3/8 CIR PRIM VCP496H

## (undated) DEVICE — INTENDED FOR TISSUE SEPARATION, AND OTHER PROCEDURES THAT REQUIRE A SHARP SURGICAL BLADE TO PUNCTURE OR CUT.: Brand: BARD-PARKER ® CARBON RIB-BACK BLADES

## (undated) DEVICE — SPONGE LAP W18XL18IN WHT COT 4 PLY FLD STRUNG RADPQ DISP ST

## (undated) DEVICE — SHEET,DRAPE,53X77,STERILE: Brand: MEDLINE

## (undated) DEVICE — SWAB CULT LIQ STUART AGR AERB MOD IN BRK SGL RAYON TIP PLAS 220139] CARDINAL HEALTH]

## (undated) DEVICE — BLADE CARBON-STEEL #15 STRL TWIN-BACK

## (undated) DEVICE — SUTURE VCRL + SZ 2-0 L27IN ABSRB WHT SH 1/2 CIR TAPERCUT VCP417H

## (undated) DEVICE — BLADE CARBON-STEEL #10 STRL TWIN-BLADE

## (undated) DEVICE — 3M™ TEGADERM™ TRANSPARENT FILM DRESSING FRAME STYLE, 1627, 4 IN X 10 IN (10 CM X 25 CM), 20/CT 4CT/CASE: Brand: 3M™ TEGADERM™

## (undated) DEVICE — BANDAGE COMPR W6INXL12FT SMOOTH FOR LIMB EXSANG ESMARCH

## (undated) DEVICE — TOWEL,OR,DSP,ST,BLUE,STD,4/PK,20PK/CS: Brand: MEDLINE

## (undated) DEVICE — 3M™ STERI-DRAPE™ U-DRAPE 1015: Brand: STERI-DRAPE™

## (undated) DEVICE — PADDING CAST W4INXL4YD ST COT RAYON MICROPLEATED HIGHLY

## (undated) DEVICE — KIT OR TURNOVER

## (undated) DEVICE — Device

## (undated) DEVICE — GAUZE,SPONGE,4"X4",8PLY,STRL,LF,10/TRAY: Brand: MEDLINE

## (undated) DEVICE — NEEDLE HYPO 23GA L1.5IN TURQ POLYPR HUB S STL THN WALL IM

## (undated) DEVICE — 3M™ COBAN™ NL STERILE NON-LATEX SELF-ADHERENT WRAP, 2086S, 6 IN X 5 YD (15 CM X 4,5 M), 12 ROLLS/CASE: Brand: 3M™ COBAN™

## (undated) DEVICE — KIT INSTR TRANSTIBIAL CRUCE W/O SAW BLDE DISP

## (undated) DEVICE — SYRINGE MED 10ML LUERLOCK TIP W/O SFTY DISP

## (undated) DEVICE — INTENDED FOR TISSUE SEPARATION, AND OTHER PROCEDURES THAT REQUIRE A SHARP SURGICAL BLADE TO PUNCTURE OR CUT.: Brand: BARD-PARKER ® STAINLESS STEEL BLADES

## (undated) DEVICE — T-DRAPE,EXTREMITY,STERILE: Brand: MEDLINE

## (undated) DEVICE — SUTURE FIBERWIRE SZ 5 L38IN NONABSORBABLE BLU L48MM 1/2 AR7211

## (undated) DEVICE — STERILE LATEX POWDER-FREE SURGICAL GLOVESWITH NITRILE COATING: Brand: PROTEXIS

## (undated) DEVICE — STAPLER SKIN H3.9MM WIRE DIA0.58MM CRWN 6.9MM 35 STPL FIX

## (undated) DEVICE — STRIP,CLOSURE,WOUND,MEDI-STRIP,1/2X4: Brand: MEDLINE

## (undated) DEVICE — SOLUTION IV IRRIG POUR BRL 0.9% SODIUM CHL 2F7124

## (undated) DEVICE — BANDAGE NL COFLEX 4INX5YD: Brand: MEDLINE INDUSTRIES, INC.

## (undated) DEVICE — SPONGE GZ W4XL4IN COT 12 PLY TYP VII WVN C FLD DSGN

## (undated) DEVICE — IMMOBILIZER KNEE CUTAWAY 24 IN

## (undated) DEVICE — STOCKINETTE,IMPERVIOUS,12X48,STERILE: Brand: MEDLINE

## (undated) DEVICE — SUTURE VCRL + SZ 0 L18IN ABSRB UD L36MM CT-1 1/2 CIR VCP840D

## (undated) DEVICE — GOWN SIRUS NONREIN XL W/TWL: Brand: MEDLINE INDUSTRIES, INC.

## (undated) DEVICE — SUTURE PROL SZ 2-0 L18IN NONABSORBABLE BLU FS L26MM 3/8 CIR 8685H

## (undated) DEVICE — GLOVE ORTHO 8   MSG9480

## (undated) DEVICE — HYPODERMIC SAFETY NEEDLE: Brand: MAGELLAN

## (undated) DEVICE — ANCHOR SUT L14MM DIA4.5MM BIOCOMP FULL THRD W/ DRL BIT GUID

## (undated) DEVICE — STAPLER SKIN H3.9MM WIRE DIA0.58MM CRWN 6.9MM 35 STPL ROT

## (undated) DEVICE — STOCKINETTE IMPERV M 9X44IN POLY INNR LAYR COT ORTH EXT

## (undated) DEVICE — DRESSING,GAUZE,XEROFORM,CURAD,1"X8",ST: Brand: CURAD